# Patient Record
Sex: MALE | Race: WHITE | NOT HISPANIC OR LATINO | Employment: OTHER | URBAN - METROPOLITAN AREA
[De-identification: names, ages, dates, MRNs, and addresses within clinical notes are randomized per-mention and may not be internally consistent; named-entity substitution may affect disease eponyms.]

---

## 2018-01-03 ENCOUNTER — OFFICE VISIT CONVERTED (OUTPATIENT)
Dept: CARDIOLOGY | Facility: CLINIC | Age: 68
End: 2018-01-03
Attending: INTERNAL MEDICINE

## 2018-01-08 ENCOUNTER — CONVERSION ENCOUNTER (OUTPATIENT)
Dept: CARDIOLOGY | Facility: CLINIC | Age: 68
End: 2018-01-08
Attending: INTERNAL MEDICINE

## 2018-01-08 ENCOUNTER — CONVERSION ENCOUNTER (OUTPATIENT)
Dept: CARDIOLOGY | Facility: CLINIC | Age: 68
End: 2018-01-08

## 2018-04-11 ENCOUNTER — CONVERSION ENCOUNTER (OUTPATIENT)
Dept: CARDIOLOGY | Facility: CLINIC | Age: 68
End: 2018-04-11

## 2018-04-11 ENCOUNTER — OFFICE VISIT CONVERTED (OUTPATIENT)
Dept: CARDIOLOGY | Facility: CLINIC | Age: 68
End: 2018-04-11
Attending: INTERNAL MEDICINE

## 2018-05-08 ENCOUNTER — OFFICE VISIT CONVERTED (OUTPATIENT)
Dept: SURGERY | Facility: CLINIC | Age: 68
End: 2018-05-08
Attending: NURSE PRACTITIONER

## 2018-10-04 ENCOUNTER — OFFICE VISIT CONVERTED (OUTPATIENT)
Dept: SURGERY | Facility: CLINIC | Age: 68
End: 2018-10-04
Attending: NURSE PRACTITIONER

## 2018-10-22 ENCOUNTER — OFFICE VISIT CONVERTED (OUTPATIENT)
Dept: CARDIOLOGY | Facility: CLINIC | Age: 68
End: 2018-10-22
Attending: INTERNAL MEDICINE

## 2019-01-28 ENCOUNTER — HOSPITAL ENCOUNTER (OUTPATIENT)
Dept: LAB | Facility: HOSPITAL | Age: 69
Discharge: HOME | End: 2019-01-28
Attending: PHYSICIAN ASSISTANT

## 2019-01-28 LAB
ALBUMIN SERPL-MCNC: 4 G/DL (ref 3.5–5)
ALBUMIN/GLOB SERPL: 1.6 {RATIO} (ref 1.4–2.6)
ALP SERPL-CCNC: 91 U/L (ref 56–155)
ALT SERPL-CCNC: 16 U/L (ref 10–40)
ANION GAP SERPL CALC-SCNC: 19 MMOL/L (ref 8–19)
AST SERPL-CCNC: 15 U/L (ref 15–50)
BILIRUB SERPL-MCNC: 0.29 MG/DL (ref 0.2–1.3)
BNP SERPL-MCNC: 562 PG/ML (ref 0–900)
BUN SERPL-MCNC: 17 MG/DL (ref 5–25)
BUN/CREAT SERPL: 17 {RATIO} (ref 6–20)
CALCIUM SERPL-MCNC: 9.4 MG/DL (ref 8.7–10.4)
CHLORIDE SERPL-SCNC: 102 MMOL/L (ref 99–111)
CONV CO2: 28 MMOL/L (ref 22–32)
CONV TOTAL PROTEIN: 6.5 G/DL (ref 6.3–8.2)
CREAT UR-MCNC: 1.03 MG/DL (ref 0.7–1.2)
GFR SERPLBLD BASED ON 1.73 SQ M-ARVRAT: >60 ML/MIN/{1.73_M2}
GLOBULIN UR ELPH-MCNC: 2.5 G/DL (ref 2–3.5)
GLUCOSE SERPL-MCNC: 112 MG/DL (ref 70–99)
OSMOLALITY SERPL CALC.SUM OF ELEC: 302 MOSM/KG (ref 273–304)
POTASSIUM SERPL-SCNC: 4.3 MMOL/L (ref 3.5–5.3)
SODIUM SERPL-SCNC: 145 MMOL/L (ref 135–147)

## 2019-01-30 ENCOUNTER — HOSPITAL ENCOUNTER (OUTPATIENT)
Dept: GENERAL RADIOLOGY | Facility: HOSPITAL | Age: 69
Discharge: HOME | End: 2019-01-30
Attending: PHYSICIAN ASSISTANT

## 2019-02-07 ENCOUNTER — HOSPITAL ENCOUNTER (OUTPATIENT)
Dept: LAB | Facility: HOSPITAL | Age: 69
Discharge: HOME | End: 2019-02-07
Attending: PHYSICIAN ASSISTANT

## 2019-02-07 LAB
ANION GAP SERPL CALC-SCNC: 20 MMOL/L (ref 8–19)
BUN SERPL-MCNC: 17 MG/DL (ref 5–25)
BUN/CREAT SERPL: 14 {RATIO} (ref 6–20)
CALCIUM SERPL-MCNC: 9.8 MG/DL (ref 8.7–10.4)
CHLORIDE SERPL-SCNC: 98 MMOL/L (ref 99–111)
CONV CO2: 28 MMOL/L (ref 22–32)
CREAT UR-MCNC: 1.18 MG/DL (ref 0.7–1.2)
GFR SERPLBLD BASED ON 1.73 SQ M-ARVRAT: >60 ML/MIN/{1.73_M2}
GLUCOSE SERPL-MCNC: 120 MG/DL (ref 70–99)
OSMOLALITY SERPL CALC.SUM OF ELEC: 297 MOSM/KG (ref 273–304)
POTASSIUM SERPL-SCNC: 4 MMOL/L (ref 3.5–5.3)
SODIUM SERPL-SCNC: 142 MMOL/L (ref 135–147)

## 2019-04-17 ENCOUNTER — HOSPITAL ENCOUNTER (OUTPATIENT)
Dept: LAB | Facility: HOSPITAL | Age: 69
Discharge: HOME | End: 2019-04-17
Attending: INTERNAL MEDICINE

## 2019-04-17 LAB
ALBUMIN SERPL-MCNC: 4 G/DL (ref 3.5–5)
ALBUMIN/GLOB SERPL: 1.4 {RATIO} (ref 1.4–2.6)
ALP SERPL-CCNC: 93 U/L (ref 56–155)
ALT SERPL-CCNC: 21 U/L (ref 10–40)
ANION GAP SERPL CALC-SCNC: 21 MMOL/L (ref 8–19)
AST SERPL-CCNC: 22 U/L (ref 15–50)
BILIRUB SERPL-MCNC: 0.33 MG/DL (ref 0.2–1.3)
BUN SERPL-MCNC: 13 MG/DL (ref 5–25)
BUN/CREAT SERPL: 11 {RATIO} (ref 6–20)
CALCIUM SERPL-MCNC: 9.3 MG/DL (ref 8.7–10.4)
CHLORIDE SERPL-SCNC: 99 MMOL/L (ref 99–111)
CHOLEST SERPL-MCNC: 147 MG/DL (ref 107–200)
CHOLEST/HDLC SERPL: 4.7 {RATIO} (ref 3–6)
CONV CO2: 27 MMOL/L (ref 22–32)
CONV TOTAL PROTEIN: 6.8 G/DL (ref 6.3–8.2)
CREAT UR-MCNC: 1.18 MG/DL (ref 0.7–1.2)
EST. AVERAGE GLUCOSE BLD GHB EST-MCNC: 134 MG/DL
GFR SERPLBLD BASED ON 1.73 SQ M-ARVRAT: >60 ML/MIN/{1.73_M2}
GLOBULIN UR ELPH-MCNC: 2.8 G/DL (ref 2–3.5)
GLUCOSE SERPL-MCNC: 116 MG/DL (ref 70–99)
HBA1C MFR BLD: 6.3 % (ref 3.5–5.7)
HDLC SERPL-MCNC: 31 MG/DL (ref 40–60)
LDLC SERPL CALC-MCNC: 45 MG/DL (ref 70–100)
OSMOLALITY SERPL CALC.SUM OF ELEC: 295 MOSM/KG (ref 273–304)
POTASSIUM SERPL-SCNC: 4.5 MMOL/L (ref 3.5–5.3)
PSA SERPL-MCNC: 0.74 NG/ML (ref 0–4)
SODIUM SERPL-SCNC: 142 MMOL/L (ref 135–147)
TRIGL SERPL-MCNC: 355 MG/DL (ref 40–150)
URATE SERPL-MCNC: 4 MG/DL (ref 3.5–8.5)
VLDLC SERPL-MCNC: 71 MG/DL (ref 5–37)

## 2019-04-22 ENCOUNTER — OFFICE VISIT CONVERTED (OUTPATIENT)
Dept: CARDIOLOGY | Facility: CLINIC | Age: 69
End: 2019-04-22
Attending: INTERNAL MEDICINE

## 2019-05-20 ENCOUNTER — HOSPITAL ENCOUNTER (OUTPATIENT)
Dept: CARDIOLOGY | Facility: HOSPITAL | Age: 69
Discharge: HOME | End: 2019-05-20
Attending: INTERNAL MEDICINE

## 2019-07-18 ENCOUNTER — HOSPITAL ENCOUNTER (OUTPATIENT)
Dept: SLEEP MEDICINE | Facility: HOSPITAL | Age: 69
Discharge: HOME | End: 2019-07-18
Attending: INTERNAL MEDICINE

## 2019-08-02 ENCOUNTER — HOSPITAL ENCOUNTER (OUTPATIENT)
Dept: GENERAL RADIOLOGY | Facility: HOSPITAL | Age: 69
Discharge: HOME | End: 2019-08-02
Attending: INTERNAL MEDICINE

## 2019-08-02 ENCOUNTER — HOSPITAL ENCOUNTER (OUTPATIENT)
Dept: LAB | Facility: HOSPITAL | Age: 69
Discharge: HOME | End: 2019-08-02
Attending: INTERNAL MEDICINE

## 2019-08-02 LAB
25(OH)D3 SERPL-MCNC: 32.6 NG/ML (ref 30–100)
ALBUMIN SERPL-MCNC: 4.2 G/DL (ref 3.5–5)
ALBUMIN/GLOB SERPL: 1.6 {RATIO} (ref 1.4–2.6)
ALP SERPL-CCNC: 53 U/L (ref 56–155)
ALT SERPL-CCNC: 33 U/L (ref 10–40)
ANION GAP SERPL CALC-SCNC: 19 MMOL/L (ref 8–19)
AST SERPL-CCNC: 45 U/L (ref 15–50)
BILIRUB SERPL-MCNC: 0.41 MG/DL (ref 0.2–1.3)
BUN SERPL-MCNC: 20 MG/DL (ref 5–25)
BUN/CREAT SERPL: 14 {RATIO} (ref 6–20)
CALCIUM SERPL-MCNC: 9.3 MG/DL (ref 8.7–10.4)
CHLORIDE SERPL-SCNC: 98 MMOL/L (ref 99–111)
CHOLEST SERPL-MCNC: 139 MG/DL (ref 107–200)
CHOLEST/HDLC SERPL: 5.3 {RATIO} (ref 3–6)
CONV CO2: 29 MMOL/L (ref 22–32)
CONV TOTAL PROTEIN: 6.8 G/DL (ref 6.3–8.2)
CREAT UR-MCNC: 1.35 MG/DL (ref 0.7–1.2)
CREAT UR-MCNC: 1.39 MG/DL (ref 0.7–1.2)
GFR SERPLBLD BASED ON 1.73 SQ M-ARVRAT: 51 ML/MIN/{1.73_M2}
GLOBULIN UR ELPH-MCNC: 2.6 G/DL (ref 2–3.5)
GLUCOSE SERPL-MCNC: 113 MG/DL (ref 70–99)
HDLC SERPL-MCNC: 26 MG/DL (ref 40–60)
LDLC SERPL CALC-MCNC: 61 MG/DL (ref 70–100)
OSMOLALITY SERPL CALC.SUM OF ELEC: 297 MOSM/KG (ref 273–304)
POTASSIUM SERPL-SCNC: 3.8 MMOL/L (ref 3.5–5.3)
SODIUM SERPL-SCNC: 142 MMOL/L (ref 135–147)
TRIGL SERPL-MCNC: 258 MG/DL (ref 40–150)
URATE SERPL-MCNC: 3.6 MG/DL (ref 3.5–8.5)
VLDLC SERPL-MCNC: 52 MG/DL (ref 5–37)

## 2019-10-29 ENCOUNTER — HOSPITAL ENCOUNTER (OUTPATIENT)
Dept: LAB | Facility: HOSPITAL | Age: 69
Discharge: HOME | End: 2019-10-29
Attending: INTERNAL MEDICINE

## 2019-10-29 LAB
25(OH)D3 SERPL-MCNC: 28.5 NG/ML (ref 30–100)
ALBUMIN SERPL-MCNC: 4.3 G/DL (ref 3.5–5)
ALBUMIN/GLOB SERPL: 1.7 {RATIO} (ref 1.4–2.6)
ALP SERPL-CCNC: 48 U/L (ref 56–155)
ALT SERPL-CCNC: 19 U/L (ref 10–40)
ANION GAP SERPL CALC-SCNC: 22 MMOL/L (ref 8–19)
AST SERPL-CCNC: 31 U/L (ref 15–50)
BASOPHILS # BLD AUTO: 0.08 10*3/UL (ref 0–0.2)
BASOPHILS NFR BLD AUTO: 1 % (ref 0–3)
BILIRUB SERPL-MCNC: 0.42 MG/DL (ref 0.2–1.3)
BUN SERPL-MCNC: 18 MG/DL (ref 5–25)
BUN/CREAT SERPL: 11 {RATIO} (ref 6–20)
CALCIUM SERPL-MCNC: 9.8 MG/DL (ref 8.7–10.4)
CHLORIDE SERPL-SCNC: 101 MMOL/L (ref 99–111)
CHOLEST SERPL-MCNC: 126 MG/DL (ref 107–200)
CHOLEST/HDLC SERPL: 4.8 {RATIO} (ref 3–6)
CONV ABS IMM GRAN: 0.04 10*3/UL (ref 0–0.2)
CONV CO2: 27 MMOL/L (ref 22–32)
CONV IMMATURE GRAN: 0.5 % (ref 0–1.8)
CONV TOTAL PROTEIN: 6.8 G/DL (ref 6.3–8.2)
CREAT UR-MCNC: 1.64 MG/DL (ref 0.7–1.2)
CREAT UR-MCNC: 1.66 MG/DL (ref 0.7–1.2)
DEPRECATED RDW RBC AUTO: 53.1 FL (ref 35.1–43.9)
EOSINOPHIL # BLD AUTO: 0.57 10*3/UL (ref 0–0.7)
EOSINOPHIL # BLD AUTO: 6.8 % (ref 0–7)
ERYTHROCYTE [DISTWIDTH] IN BLOOD BY AUTOMATED COUNT: 15.9 % (ref 11.6–14.4)
GFR SERPLBLD BASED ON 1.73 SQ M-ARVRAT: 41 ML/MIN/{1.73_M2}
GLOBULIN UR ELPH-MCNC: 2.5 G/DL (ref 2–3.5)
GLUCOSE SERPL-MCNC: 93 MG/DL (ref 70–99)
HCT VFR BLD AUTO: 48.4 % (ref 42–52)
HDLC SERPL-MCNC: 26 MG/DL (ref 40–60)
HGB BLD-MCNC: 14.2 G/DL (ref 14–18)
LDLC SERPL CALC-MCNC: 68 MG/DL (ref 70–100)
LYMPHOCYTES # BLD AUTO: 1.9 10*3/UL (ref 1–5)
LYMPHOCYTES NFR BLD AUTO: 22.8 % (ref 20–45)
MCH RBC QN AUTO: 26.7 PG (ref 27–31)
MCHC RBC AUTO-ENTMCNC: 29.3 G/DL (ref 33–37)
MCV RBC AUTO: 91.1 FL (ref 80–96)
MONOCYTES # BLD AUTO: 0.85 10*3/UL (ref 0.2–1.2)
MONOCYTES NFR BLD AUTO: 10.2 % (ref 3–10)
NEUTROPHILS # BLD AUTO: 4.89 10*3/UL (ref 2–8)
NEUTROPHILS NFR BLD AUTO: 58.7 % (ref 30–85)
NRBC CBCN: 0 % (ref 0–0.7)
OSMOLALITY SERPL CALC.SUM OF ELEC: 302 MOSM/KG (ref 273–304)
PLATELET # BLD AUTO: 244 10*3/UL (ref 130–400)
PMV BLD AUTO: 12.1 FL (ref 9.4–12.4)
POTASSIUM SERPL-SCNC: 4.8 MMOL/L (ref 3.5–5.3)
RBC # BLD AUTO: 5.31 10*6/UL (ref 4.7–6.1)
SODIUM SERPL-SCNC: 145 MMOL/L (ref 135–147)
T4 FREE SERPL-MCNC: 1.4 NG/DL (ref 0.9–1.8)
TRIGL SERPL-MCNC: 158 MG/DL (ref 40–150)
TSH SERPL-ACNC: 8.18 M[IU]/L (ref 0.27–4.2)
URATE SERPL-MCNC: 4.3 MG/DL (ref 3.5–8.5)
VLDLC SERPL-MCNC: 32 MG/DL (ref 5–37)
WBC # BLD AUTO: 8.33 10*3/UL (ref 4.8–10.8)

## 2019-11-06 ENCOUNTER — OFFICE VISIT CONVERTED (OUTPATIENT)
Dept: CARDIOLOGY | Facility: CLINIC | Age: 69
End: 2019-11-06
Attending: NURSE PRACTITIONER

## 2019-12-19 ENCOUNTER — HOSPITAL ENCOUNTER (OUTPATIENT)
Dept: LAB | Facility: HOSPITAL | Age: 69
Discharge: HOME | End: 2019-12-19
Attending: INTERNAL MEDICINE

## 2019-12-19 LAB
T4 FREE SERPL-MCNC: 2.7 NG/DL (ref 0.9–1.8)
TSH SERPL-ACNC: 0.04 M[IU]/L (ref 0.27–4.2)

## 2020-02-11 ENCOUNTER — HOSPITAL ENCOUNTER (OUTPATIENT)
Dept: LAB | Facility: HOSPITAL | Age: 70
Discharge: HOME | End: 2020-02-11
Attending: INTERNAL MEDICINE

## 2020-02-11 LAB
25(OH)D3 SERPL-MCNC: 29 NG/ML (ref 30–100)
CREAT UR-MCNC: 1.43 MG/DL (ref 0.7–1.2)
URATE SERPL-MCNC: 4.2 MG/DL (ref 3.5–8.5)

## 2020-03-12 ENCOUNTER — HOSPITAL ENCOUNTER (OUTPATIENT)
Dept: SLEEP MEDICINE | Facility: HOSPITAL | Age: 70
Discharge: HOME | End: 2020-03-12
Attending: INTERNAL MEDICINE

## 2020-04-08 ENCOUNTER — OFFICE VISIT CONVERTED (OUTPATIENT)
Dept: OTHER | Facility: HOSPITAL | Age: 70
End: 2020-04-08
Attending: PHYSICIAN ASSISTANT

## 2020-04-27 ENCOUNTER — HOSPITAL ENCOUNTER (OUTPATIENT)
Dept: LAB | Facility: HOSPITAL | Age: 70
Discharge: HOME | End: 2020-04-27
Attending: INTERNAL MEDICINE

## 2020-04-27 LAB
ALBUMIN SERPL-MCNC: 3.6 G/DL (ref 3.5–5)
ALBUMIN/GLOB SERPL: 1.2 {RATIO} (ref 1.4–2.6)
ALP SERPL-CCNC: 94 U/L (ref 56–155)
ALT SERPL-CCNC: 52 U/L (ref 10–40)
ANION GAP SERPL CALC-SCNC: 20 MMOL/L (ref 8–19)
AST SERPL-CCNC: 27 U/L (ref 15–50)
BASOPHILS # BLD AUTO: 0.05 10*3/UL (ref 0–0.2)
BASOPHILS NFR BLD AUTO: 0.7 % (ref 0–3)
BILIRUB SERPL-MCNC: 0.48 MG/DL (ref 0.2–1.3)
BUN SERPL-MCNC: 15 MG/DL (ref 5–25)
BUN/CREAT SERPL: 11 {RATIO} (ref 6–20)
CALCIUM SERPL-MCNC: 10 MG/DL (ref 8.7–10.4)
CEA SERPL-MCNC: 2.5 NG/ML (ref 0–5)
CHLORIDE SERPL-SCNC: 102 MMOL/L (ref 99–111)
CHOLEST SERPL-MCNC: 128 MG/DL (ref 107–200)
CHOLEST/HDLC SERPL: 5.1 {RATIO} (ref 3–6)
CONV ABS IMM GRAN: 0.02 10*3/UL (ref 0–0.2)
CONV CO2: 25 MMOL/L (ref 22–32)
CONV CREATININE URINE, RANDOM: 89.6 MG/DL (ref 10–300)
CONV IMMATURE GRAN: 0.3 % (ref 0–1.8)
CONV MICROALBUM.,U,RANDOM: 30.6 MG/L (ref 0–20)
CONV TOTAL PROTEIN: 6.6 G/DL (ref 6.3–8.2)
CREAT UR-MCNC: 1.33 MG/DL (ref 0.7–1.2)
DEPRECATED RDW RBC AUTO: 55.8 FL (ref 35.1–43.9)
EOSINOPHIL # BLD AUTO: 0.26 10*3/UL (ref 0–0.7)
EOSINOPHIL # BLD AUTO: 3.6 % (ref 0–7)
ERYTHROCYTE [DISTWIDTH] IN BLOOD BY AUTOMATED COUNT: 17.6 % (ref 11.6–14.4)
EST. AVERAGE GLUCOSE BLD GHB EST-MCNC: 169 MG/DL
GFR SERPLBLD BASED ON 1.73 SQ M-ARVRAT: 53 ML/MIN/{1.73_M2}
GLOBULIN UR ELPH-MCNC: 3 G/DL (ref 2–3.5)
GLUCOSE SERPL-MCNC: 98 MG/DL (ref 70–99)
HBA1C MFR BLD: 7.5 % (ref 3.5–5.7)
HCT VFR BLD AUTO: 43.1 % (ref 42–52)
HDLC SERPL-MCNC: 25 MG/DL (ref 40–60)
HGB BLD-MCNC: 12.8 G/DL (ref 14–18)
LDLC SERPL CALC-MCNC: 65 MG/DL (ref 70–100)
LYMPHOCYTES # BLD AUTO: 1.7 10*3/UL (ref 1–5)
LYMPHOCYTES NFR BLD AUTO: 23.4 % (ref 20–45)
MCH RBC QN AUTO: 26.3 PG (ref 27–31)
MCHC RBC AUTO-ENTMCNC: 29.7 G/DL (ref 33–37)
MCV RBC AUTO: 88.7 FL (ref 80–96)
MICROALBUMIN/CREAT UR: 34.2 MG/G{CRE} (ref 0–25)
MONOCYTES # BLD AUTO: 0.8 10*3/UL (ref 0.2–1.2)
MONOCYTES NFR BLD AUTO: 11 % (ref 3–10)
NEUTROPHILS # BLD AUTO: 4.44 10*3/UL (ref 2–8)
NEUTROPHILS NFR BLD AUTO: 61 % (ref 30–85)
NRBC CBCN: 0 % (ref 0–0.7)
OSMOLALITY SERPL CALC.SUM OF ELEC: 297 MOSM/KG (ref 273–304)
PLATELET # BLD AUTO: 171 10*3/UL (ref 130–400)
PMV BLD AUTO: 12.7 FL (ref 9.4–12.4)
POTASSIUM SERPL-SCNC: 4.2 MMOL/L (ref 3.5–5.3)
PSA SERPL-MCNC: 0.79 NG/ML (ref 0–4)
RBC # BLD AUTO: 4.86 10*6/UL (ref 4.7–6.1)
SODIUM SERPL-SCNC: 143 MMOL/L (ref 135–147)
T4 FREE SERPL-MCNC: 1.1 NG/DL (ref 0.9–1.8)
TESTOST SERPL-MCNC: 369 NG/DL (ref 193–740)
TRIGL SERPL-MCNC: 188 MG/DL (ref 40–150)
TSH SERPL-ACNC: 13.1 M[IU]/L (ref 0.27–4.2)
VLDLC SERPL-MCNC: 38 MG/DL (ref 5–37)
WBC # BLD AUTO: 7.27 10*3/UL (ref 4.8–10.8)

## 2020-05-04 ENCOUNTER — HOSPITAL ENCOUNTER (OUTPATIENT)
Dept: OTHER | Facility: HOSPITAL | Age: 70
Discharge: HOME | End: 2020-05-04

## 2020-05-05 ENCOUNTER — HOSPITAL ENCOUNTER (OUTPATIENT)
Dept: GENERAL RADIOLOGY | Facility: HOSPITAL | Age: 70
Discharge: HOME | End: 2020-05-05
Attending: INTERNAL MEDICINE

## 2020-05-05 LAB — SARS-COV-2 RNA SPEC QL NAA+PROBE: NOT DETECTED

## 2020-05-06 ENCOUNTER — TELEMEDICINE CONVERTED (OUTPATIENT)
Dept: CARDIOLOGY | Facility: CLINIC | Age: 70
End: 2020-05-06
Attending: NURSE PRACTITIONER

## 2020-07-15 ENCOUNTER — HOSPITAL ENCOUNTER (OUTPATIENT)
Dept: LAB | Facility: HOSPITAL | Age: 70
Discharge: HOME | End: 2020-07-15
Attending: INTERNAL MEDICINE

## 2020-07-15 LAB
ALBUMIN SERPL-MCNC: 4.2 G/DL (ref 3.5–5)
ALBUMIN/GLOB SERPL: 1.9 {RATIO} (ref 1.4–2.6)
ALP SERPL-CCNC: 38 U/L (ref 56–155)
ALT SERPL-CCNC: 14 U/L (ref 10–40)
ANION GAP SERPL CALC-SCNC: 17 MMOL/L (ref 8–19)
AST SERPL-CCNC: 23 U/L (ref 15–50)
BASOPHILS # BLD AUTO: 0.07 10*3/UL (ref 0–0.2)
BASOPHILS NFR BLD AUTO: 0.9 % (ref 0–3)
BILIRUB SERPL-MCNC: 0.46 MG/DL (ref 0.2–1.3)
BNP SERPL-MCNC: 1155 PG/ML (ref 0–900)
BUN SERPL-MCNC: 19 MG/DL (ref 5–25)
BUN/CREAT SERPL: 11 {RATIO} (ref 6–20)
CALCIUM SERPL-MCNC: 9.4 MG/DL (ref 8.7–10.4)
CHLORIDE SERPL-SCNC: 101 MMOL/L (ref 99–111)
CONV ABS IMM GRAN: 0.03 10*3/UL (ref 0–0.2)
CONV CO2: 28 MMOL/L (ref 22–32)
CONV IMMATURE GRAN: 0.4 % (ref 0–1.8)
CONV TOTAL PROTEIN: 6.4 G/DL (ref 6.3–8.2)
CREAT UR-MCNC: 1.69 MG/DL (ref 0.7–1.2)
DEPRECATED RDW RBC AUTO: 56.1 FL (ref 35.1–43.9)
EOSINOPHIL # BLD AUTO: 0.46 10*3/UL (ref 0–0.7)
EOSINOPHIL # BLD AUTO: 5.6 % (ref 0–7)
ERYTHROCYTE [DISTWIDTH] IN BLOOD BY AUTOMATED COUNT: 16.7 % (ref 11.6–14.4)
GFR SERPLBLD BASED ON 1.73 SQ M-ARVRAT: 40 ML/MIN/{1.73_M2}
GLOBULIN UR ELPH-MCNC: 2.2 G/DL (ref 2–3.5)
GLUCOSE SERPL-MCNC: 65 MG/DL (ref 70–99)
HCT VFR BLD AUTO: 45 % (ref 42–52)
HGB BLD-MCNC: 13.5 G/DL (ref 14–18)
LYMPHOCYTES # BLD AUTO: 2.19 10*3/UL (ref 1–5)
LYMPHOCYTES NFR BLD AUTO: 26.8 % (ref 20–45)
MCH RBC QN AUTO: 27.3 PG (ref 27–31)
MCHC RBC AUTO-ENTMCNC: 30 G/DL (ref 33–37)
MCV RBC AUTO: 91.1 FL (ref 80–96)
MONOCYTES # BLD AUTO: 0.63 10*3/UL (ref 0.2–1.2)
MONOCYTES NFR BLD AUTO: 7.7 % (ref 3–10)
NEUTROPHILS # BLD AUTO: 4.79 10*3/UL (ref 2–8)
NEUTROPHILS NFR BLD AUTO: 58.6 % (ref 30–85)
NRBC CBCN: 0 % (ref 0–0.7)
OSMOLALITY SERPL CALC.SUM OF ELEC: 294 MOSM/KG (ref 273–304)
PLATELET # BLD AUTO: 216 10*3/UL (ref 130–400)
PMV BLD AUTO: 12.2 FL (ref 9.4–12.4)
POTASSIUM SERPL-SCNC: 4.2 MMOL/L (ref 3.5–5.3)
RBC # BLD AUTO: 4.94 10*6/UL (ref 4.7–6.1)
SODIUM SERPL-SCNC: 142 MMOL/L (ref 135–147)
T4 FREE SERPL-MCNC: 1.6 NG/DL (ref 0.9–1.8)
TSH SERPL-ACNC: 5.24 M[IU]/L (ref 0.27–4.2)
WBC # BLD AUTO: 8.17 10*3/UL (ref 4.8–10.8)

## 2020-08-07 ENCOUNTER — HOSPITAL ENCOUNTER (OUTPATIENT)
Dept: LAB | Facility: HOSPITAL | Age: 70
Discharge: HOME | End: 2020-08-07
Attending: INTERNAL MEDICINE

## 2020-08-07 ENCOUNTER — HOSPITAL ENCOUNTER (OUTPATIENT)
Dept: GENERAL RADIOLOGY | Facility: HOSPITAL | Age: 70
Discharge: HOME | End: 2020-08-07
Attending: INTERNAL MEDICINE

## 2020-08-07 LAB
ALBUMIN SERPL-MCNC: 3.6 G/DL (ref 3.5–5)
ALBUMIN/GLOB SERPL: 1.7 {RATIO} (ref 1.4–2.6)
ALP SERPL-CCNC: 64 U/L (ref 56–155)
ALT SERPL-CCNC: 19 U/L (ref 10–40)
ANION GAP SERPL CALC-SCNC: 22 MMOL/L (ref 8–19)
AST SERPL-CCNC: 20 U/L (ref 15–50)
BASOPHILS # BLD AUTO: 0.07 10*3/UL (ref 0–0.2)
BASOPHILS NFR BLD AUTO: 0.5 % (ref 0–3)
BILIRUB SERPL-MCNC: 0.51 MG/DL (ref 0.2–1.3)
BUN SERPL-MCNC: 14 MG/DL (ref 5–25)
BUN/CREAT SERPL: 8 {RATIO} (ref 6–20)
CALCIUM SERPL-MCNC: 10.2 MG/DL (ref 8.7–10.4)
CHLORIDE SERPL-SCNC: 100 MMOL/L (ref 99–111)
CONV ABS IMM GRAN: 0.17 10*3/UL (ref 0–0.2)
CONV CO2: 25 MMOL/L (ref 22–32)
CONV IMMATURE GRAN: 1.2 % (ref 0–1.8)
CONV TOTAL PROTEIN: 5.7 G/DL (ref 6.3–8.2)
CREAT UR-MCNC: 1.83 MG/DL (ref 0.7–1.2)
DEPRECATED RDW RBC AUTO: 57 FL (ref 35.1–43.9)
EOSINOPHIL # BLD AUTO: 0.27 10*3/UL (ref 0–0.7)
EOSINOPHIL # BLD AUTO: 1.9 % (ref 0–7)
ERYTHROCYTE [DISTWIDTH] IN BLOOD BY AUTOMATED COUNT: 17.3 % (ref 11.6–14.4)
GFR SERPLBLD BASED ON 1.73 SQ M-ARVRAT: 36 ML/MIN/{1.73_M2}
GLOBULIN UR ELPH-MCNC: 2.1 G/DL (ref 2–3.5)
GLUCOSE SERPL-MCNC: 80 MG/DL (ref 70–99)
HCT VFR BLD AUTO: 41.5 % (ref 42–52)
HGB BLD-MCNC: 12.3 G/DL (ref 14–18)
LYMPHOCYTES # BLD AUTO: 2.68 10*3/UL (ref 1–5)
LYMPHOCYTES NFR BLD AUTO: 19.3 % (ref 20–45)
MCH RBC QN AUTO: 26.9 PG (ref 27–31)
MCHC RBC AUTO-ENTMCNC: 29.6 G/DL (ref 33–37)
MCV RBC AUTO: 90.6 FL (ref 80–96)
MONOCYTES # BLD AUTO: 1.17 10*3/UL (ref 0.2–1.2)
MONOCYTES NFR BLD AUTO: 8.4 % (ref 3–10)
NEUTROPHILS # BLD AUTO: 9.5 10*3/UL (ref 2–8)
NEUTROPHILS NFR BLD AUTO: 68.7 % (ref 30–85)
NRBC CBCN: 0 % (ref 0–0.7)
OSMOLALITY SERPL CALC.SUM OF ELEC: 293 MOSM/KG (ref 273–304)
PLATELET # BLD AUTO: 243 10*3/UL (ref 130–400)
PMV BLD AUTO: 11.8 FL (ref 9.4–12.4)
POTASSIUM SERPL-SCNC: 4.6 MMOL/L (ref 3.5–5.3)
RBC # BLD AUTO: 4.58 10*6/UL (ref 4.7–6.1)
SODIUM SERPL-SCNC: 142 MMOL/L (ref 135–147)
WBC # BLD AUTO: 13.86 10*3/UL (ref 4.8–10.8)

## 2020-08-12 ENCOUNTER — OFFICE VISIT CONVERTED (OUTPATIENT)
Dept: PULMONOLOGY | Facility: CLINIC | Age: 70
End: 2020-08-12
Attending: NURSE PRACTITIONER

## 2020-08-19 ENCOUNTER — HOSPITAL ENCOUNTER (OUTPATIENT)
Dept: CARDIOLOGY | Facility: HOSPITAL | Age: 70
Discharge: HOME | End: 2020-08-19
Attending: INTERNAL MEDICINE

## 2020-09-02 ENCOUNTER — HOSPITAL ENCOUNTER (OUTPATIENT)
Dept: LAB | Facility: HOSPITAL | Age: 70
Discharge: HOME | End: 2020-09-02
Attending: INTERNAL MEDICINE

## 2020-09-02 LAB
ANION GAP SERPL CALC-SCNC: 21 MMOL/L (ref 8–19)
BUN SERPL-MCNC: 23 MG/DL (ref 5–25)
BUN/CREAT SERPL: 12 {RATIO} (ref 6–20)
CALCIUM SERPL-MCNC: 10.2 MG/DL (ref 8.7–10.4)
CHLORIDE SERPL-SCNC: 102 MMOL/L (ref 99–111)
CONV CO2: 25 MMOL/L (ref 22–32)
CREAT UR-MCNC: 1.92 MG/DL (ref 0.7–1.2)
GFR SERPLBLD BASED ON 1.73 SQ M-ARVRAT: 34 ML/MIN/{1.73_M2}
GLUCOSE SERPL-MCNC: 93 MG/DL (ref 70–99)
OSMOLALITY SERPL CALC.SUM OF ELEC: 299 MOSM/KG (ref 273–304)
POTASSIUM SERPL-SCNC: 5 MMOL/L (ref 3.5–5.3)
SODIUM SERPL-SCNC: 143 MMOL/L (ref 135–147)

## 2020-09-08 ENCOUNTER — APPOINTMENT (OUTPATIENT)
Dept: PREADMISSION TESTING | Facility: HOSPITAL | Age: 70
End: 2020-09-08

## 2020-09-11 ENCOUNTER — APPOINTMENT (OUTPATIENT)
Dept: PREADMISSION TESTING | Facility: HOSPITAL | Age: 70
End: 2020-09-11

## 2020-09-11 VITALS
HEART RATE: 63 BPM | BODY MASS INDEX: 34.51 KG/M2 | OXYGEN SATURATION: 98 % | TEMPERATURE: 98.2 F | HEIGHT: 69 IN | WEIGHT: 233 LBS | SYSTOLIC BLOOD PRESSURE: 126 MMHG | RESPIRATION RATE: 20 BRPM | DIASTOLIC BLOOD PRESSURE: 73 MMHG

## 2020-09-11 LAB
ANION GAP SERPL CALCULATED.3IONS-SCNC: 9.7 MMOL/L (ref 5–15)
BUN SERPL-MCNC: 11 MG/DL (ref 8–23)
BUN/CREAT SERPL: 9.2 (ref 7–25)
CALCIUM SPEC-SCNC: 9 MG/DL (ref 8.6–10.5)
CHLORIDE SERPL-SCNC: 101 MMOL/L (ref 98–107)
CO2 SERPL-SCNC: 27.3 MMOL/L (ref 22–29)
CREAT SERPL-MCNC: 1.2 MG/DL (ref 0.76–1.27)
DEPRECATED RDW RBC AUTO: 48.3 FL (ref 37–54)
ERYTHROCYTE [DISTWIDTH] IN BLOOD BY AUTOMATED COUNT: 15.6 % (ref 12.3–15.4)
GFR SERPL CREATININE-BSD FRML MDRD: 60 ML/MIN/1.73
GLUCOSE SERPL-MCNC: 79 MG/DL (ref 65–99)
HCT VFR BLD AUTO: 38.8 % (ref 37.5–51)
HGB BLD-MCNC: 12.1 G/DL (ref 13–17.7)
MCH RBC QN AUTO: 26.7 PG (ref 26.6–33)
MCHC RBC AUTO-ENTMCNC: 31.2 G/DL (ref 31.5–35.7)
MCV RBC AUTO: 85.7 FL (ref 79–97)
PLATELET # BLD AUTO: 171 10*3/MM3 (ref 140–450)
PMV BLD AUTO: 12.7 FL (ref 6–12)
POTASSIUM SERPL-SCNC: 4.5 MMOL/L (ref 3.5–5.2)
RBC # BLD AUTO: 4.53 10*6/MM3 (ref 4.14–5.8)
SODIUM SERPL-SCNC: 138 MMOL/L (ref 136–145)
WBC # BLD AUTO: 9.91 10*3/MM3 (ref 3.4–10.8)

## 2020-09-11 PROCEDURE — 36415 COLL VENOUS BLD VENIPUNCTURE: CPT

## 2020-09-11 PROCEDURE — C9803 HOPD COVID-19 SPEC COLLECT: HCPCS | Performed by: NURSE PRACTITIONER

## 2020-09-11 PROCEDURE — 93005 ELECTROCARDIOGRAM TRACING: CPT

## 2020-09-11 PROCEDURE — 80048 BASIC METABOLIC PNL TOTAL CA: CPT | Performed by: SURGERY

## 2020-09-11 PROCEDURE — 85027 COMPLETE CBC AUTOMATED: CPT | Performed by: SURGERY

## 2020-09-11 PROCEDURE — 93010 ELECTROCARDIOGRAM REPORT: CPT | Performed by: INTERNAL MEDICINE

## 2020-09-11 PROCEDURE — U0004 COV-19 TEST NON-CDC HGH THRU: HCPCS | Performed by: NURSE PRACTITIONER

## 2020-09-11 RX ORDER — IPRATROPIUM BROMIDE AND ALBUTEROL SULFATE 2.5; .5 MG/3ML; MG/3ML
SOLUTION RESPIRATORY (INHALATION) AS NEEDED
COMMUNITY
Start: 2020-07-29

## 2020-09-11 RX ORDER — COLCHICINE 0.6 MG/1
0.6 TABLET ORAL AS NEEDED
COMMUNITY
Start: 2020-07-02 | End: 2021-12-14 | Stop reason: SDUPTHER

## 2020-09-11 RX ORDER — LEVOTHYROXINE SODIUM 0.15 MG/1
TABLET ORAL DAILY
COMMUNITY
Start: 2020-07-11 | End: 2021-12-13

## 2020-09-11 RX ORDER — FENOFIBRATE 145 MG/1
145 TABLET, COATED ORAL DAILY
COMMUNITY
Start: 2020-08-08 | End: 2021-07-19

## 2020-09-11 RX ORDER — MORPHINE SULFATE 30 MG/1
30 TABLET, FILM COATED, EXTENDED RELEASE ORAL 2 TIMES DAILY
COMMUNITY
Start: 2020-08-29

## 2020-09-11 RX ORDER — PANTOPRAZOLE SODIUM 40 MG/1
40 TABLET, DELAYED RELEASE ORAL DAILY
COMMUNITY
Start: 2020-06-20 | End: 2021-12-02

## 2020-09-11 RX ORDER — FUROSEMIDE 20 MG/1
40 TABLET ORAL DAILY
COMMUNITY
End: 2021-08-13 | Stop reason: ALTCHOICE

## 2020-09-11 RX ORDER — BUDESONIDE 0.5 MG/2ML
0.5 INHALANT ORAL AS NEEDED
COMMUNITY
Start: 2020-07-29 | End: 2022-10-13

## 2020-09-11 RX ORDER — ASCORBIC ACID 500 MG
500 TABLET ORAL DAILY
COMMUNITY
Start: 2020-06-20 | End: 2021-12-13

## 2020-09-11 RX ORDER — POTASSIUM CHLORIDE 750 MG/1
10 CAPSULE, EXTENDED RELEASE ORAL DAILY
COMMUNITY
Start: 2020-07-29 | End: 2021-08-13

## 2020-09-11 RX ORDER — FERROUS SULFATE 325(65) MG
TABLET ORAL
COMMUNITY
Start: 2020-06-20 | End: 2021-12-02

## 2020-09-11 RX ORDER — METOPROLOL SUCCINATE 50 MG/1
50 TABLET, EXTENDED RELEASE ORAL 2 TIMES DAILY
COMMUNITY
Start: 2020-06-15 | End: 2021-07-29

## 2020-09-11 RX ORDER — ATORVASTATIN CALCIUM 20 MG/1
20 TABLET, FILM COATED ORAL DAILY
COMMUNITY
Start: 2020-07-16 | End: 2021-11-01

## 2020-09-11 RX ORDER — BUPROPION HYDROCHLORIDE 150 MG/1
150 TABLET ORAL
COMMUNITY
Start: 2020-07-20 | End: 2022-06-27

## 2020-09-11 RX ORDER — AMLODIPINE BESYLATE 5 MG/1
2.5 TABLET ORAL DAILY
COMMUNITY
Start: 2020-07-11 | End: 2021-08-02 | Stop reason: SDUPTHER

## 2020-09-11 RX ORDER — GABAPENTIN 400 MG/1
600 CAPSULE ORAL 2 TIMES DAILY
COMMUNITY
End: 2021-08-13 | Stop reason: ALTCHOICE

## 2020-09-11 RX ORDER — CLOPIDOGREL BISULFATE 75 MG/1
75 TABLET ORAL DAILY
COMMUNITY
Start: 2020-07-16 | End: 2021-11-22

## 2020-09-11 RX ORDER — ASPIRIN 81 MG/1
81 TABLET ORAL DAILY
COMMUNITY
End: 2022-06-15

## 2020-09-11 NOTE — DISCHARGE INSTRUCTIONS
Take the following medications the morning of surgery:    AMLODIPINE, METOPROLOL, MORPHINE, LEVOTHYROXINE, GABAPENTIN, MININEBS      ARRIVE TO MAIN SURGERY AT 5:30 AM ON 9/14/2020    If you are on prescription narcotic pain medication to control your pain you may also take that medication the morning of surgery.    General Instructions:  • Do not eat solid food after midnight the night before surgery.  • You may drink clear liquids day of surgery but must stop at least one hour before your hospital arrival time.  • It is beneficial for you to have a clear drink that contains carbohydrates the day of surgery.  We suggest a 12 to 20 ounce bottle of Gatorade or Powerade for non-diabetic patients or a 12 to 20 ounce bottle of G2 or Powerade Zero for diabetic patients. (Pediatric patients, are not advised to drink a 12 to 20 ounce carbohydrate drink)    Clear liquids are liquids you can see through.  Nothing red in color.     Plain water                               Sports drinks  Sodas                                   Gelatin (Jell-O)  Fruit juices without pulp such as white grape juice and apple juice  Popsicles that contain no fruit or yogurt  Tea or coffee (no cream or milk added)  Gatorade / Powerade  G2 / Powerade Zero    • Infants may have breast milk up to four hours before surgery.  • Infants drinking formula may drink formula up to six hours before surgery.   • Patients who avoid smoking, chewing tobacco and alcohol for 4 weeks prior to surgery have a reduced risk of post-operative complications.  Quit smoking as many days before surgery as you can.  • Do not smoke, use chewing tobacco or drink alcohol the day of surgery.   • If applicable bring your C-PAP/ BI-PAP machine.  • Bring any papers given to you in the doctor’s office.  • Wear clean comfortable clothes.  • Do not wear contact lenses, false eyelashes or make-up.  Bring a case for your glasses.   • Bring crutches or walker if applicable.  • Remove all  piercings.  Leave jewelry and any other valuables at home.  • Hair extensions with metal clips must be removed prior to surgery.  • The Pre-Admission Testing nurse will instruct you to bring medications if unable to obtain an accurate list in Pre-Admission Testing.            Preventing a Surgical Site Infection:  • For 2 to 3 days before surgery, avoid shaving with a razor because the razor can irritate skin and make it easier to develop an infection.    • Any areas of open skin can increase the risk of a post-operative wound infection by allowing bacteria to enter and travel throughout the body.  Notify your surgeon if you have any skin wounds / rashes even if it is not near the expected surgical site.  The area will need assessed to determine if surgery should be delayed until it is healed.  • The night prior to surgery shower using a fresh bar of anti-bacterial soap (such as Dial) and clean washcloth.  Sleep in a clean bed with clean clothing.  Do not allow pets to sleep with you.  • Shower on the morning of surgery using a fresh bar of anti-bacterial soap (such as Dial) and clean washcloth.  Dry with a clean towel and dress in clean clothing.  • Ask your surgeon if you will be receiving antibiotics prior to surgery.  • Make sure you, your family, and all healthcare providers clean their hands with soap and water or an alcohol based hand  before caring for you or your wound.    Day of surgery:  Your arrival time is approximately two hours before your scheduled surgery time.  Upon arrival, a Pre-op nurse and Anesthesiologist will review your health history, obtain vital signs, and answer questions you may have.  The only belongings needed at this time will be a list of your home medications and if applicable your C-PAP/BI-PAP machine.  If you are staying overnight your family can leave the rest of your belongings in the car and bring them to your room later.  A Pre-op nurse will start an IV and you may  receive medication in preparation for surgery, including something to help you relax.  Your family will be able to see you in the Pre-op area.  Two visitors at a time will be allowed in the Pre-op room.  While you are in surgery your family should notify the waiting room  if they leave the waiting room area and provide a contact phone number.    Please be aware that surgery does come with discomfort.  We want to make every effort to control your discomfort so please discuss any uncontrolled symptoms with your nurse.   Your doctor will most likely have prescribed pain medications.      If you are going home after surgery you will receive individualized written care instructions before being discharged.  A responsible adult must drive you to and from the hospital on the day of your surgery and stay with you for 24 hours.    If you are staying overnight following surgery, you will be transported to your hospital room following the recovery period.  [facility] has all private rooms.    If you have any questions please call Pre-Admission Testing at [phone].  Deductibles and co-payments are collected on the day of service. Please be prepared to pay the required co-pay, deductible or deposit on the day of service as defined by your plan.    Patient Education for Self-Quarantine Process    Following your COVID testing, we strongly recommend that you do not leave your home after you have been tested for COVID except to get medical care. This includes not going to work, school or to public areas.  If this is not possible for you to do please limit your activities to only required outings.  Be sure to wear a mask when you are with other people, practice social distancing and wash your hands frequently.      The following items provide additional details to keep you safe.  • Wash your hands with soap and water frequently for at least 20 seconds.   • Avoid touching your eyes, nose and mouth with  unwashed hands.  • Do not share anything - utensils, towels, food from the same bowl.   • Have your own utensils, drinking glass, dishes, towels and bedding.   • Do not have visitors.   • Do use FaceTime to stay in touch with family and friends.  • You should stay in a specific room away from others if possible.   • Stay at least 6 feet away from others in the home if you cannot have a dedicated room to yourself.   • Do not snuggle with your pet. While the CDC says there is no evidence that pets can spread COVID-19 or be infected from humans, it is probably best to avoid “petting, snuggling, being kissed or licked and sharing food (during self-quarantine)”, according to the CDC.   • Sanitize household surfaces daily. Include all high touch areas (door handles, light switches, phones, countertops, etc.)  • Do not share a bathroom with others, if possible.   • Wear a mask around others in your home if you are unable to stay in a separate room or 6 feet apart. If  you are unable to wear a mask, have your family member wear a mask if they must be within 6 feet of you.   Call your surgeon immediately if you experience any of the following symptoms:  • Sore Throat  • Shortness of Breath or difficulty breathing  • Cough  • Chills  • Body soreness or muscle pain  • Headache  • Fever  • New loss of taste or smell  • Do not arrive for your surgery ill.  Your procedure will need to be rescheduled to another time.  You will need to call your physician before the day of surgery to avoid any unnecessary exposure to hospital staff as well as other patients.    CHLORHEXIDINE CLOTH INSTRUCTIONS  The morning of surgery follow these instructions using the Chlorhexidine cloths you've been given.  These steps reduce bacteria on the body.  Do not use the cloths near your eyes, ears mouth, genitalia or on open wounds.  Throw the cloths away after use but do not try to flush them down a toilet.      • Open and remove one cloth at a time  from the package.    • Leave the cloth unfolded and begin the bathing.  • Massage the skin with the cloths using gentle pressure to remove bacteria.  Do not scrub harshly.   • Follow the steps below with one 2% CHG cloth per area (6 total cloths).  • One cloth for neck, shoulders and chest.  • One cloth for both arms, hands, fingers and underarms (do underarms last).  • One cloth for the abdomen followed by groin.  • One cloth for right leg and foot including between the toes.  • One cloth for left leg and foot including between the toes.  • The last cloth is to be used for the back of the neck, back and buttocks.    Allow the CHG to air dry 3 minutes on the skin which will give it time to work and decrease the chance of irritation.  The skin may feel sticky until it is dry.  Do not rinse with water or any other liquid or you will lose the beneficial effects of the CHG.  If mild skin irritation occurs, do rinse the skin to remove the CHG.  Report this to the nurse at time of admission.  Do not apply lotions, creams, ointments, deodorants or perfumes after using the clothes. Dress in clean clothes before coming to the hospital.

## 2020-09-12 LAB — SARS-COV-2 RNA RESP QL NAA+PROBE: NOT DETECTED

## 2020-09-14 ENCOUNTER — HOSPITAL ENCOUNTER (INPATIENT)
Facility: HOSPITAL | Age: 70
LOS: 1 days | Discharge: HOME | End: 2020-09-15
Attending: SURGERY | Admitting: SURGERY

## 2020-09-14 ENCOUNTER — ANESTHESIA EVENT (OUTPATIENT)
Dept: PERIOP | Facility: HOSPITAL | Age: 70
End: 2020-09-14

## 2020-09-14 ENCOUNTER — ANESTHESIA (OUTPATIENT)
Dept: PERIOP | Facility: HOSPITAL | Age: 70
End: 2020-09-14

## 2020-09-14 ENCOUNTER — APPOINTMENT (OUTPATIENT)
Dept: GENERAL RADIOLOGY | Facility: HOSPITAL | Age: 70
End: 2020-09-14

## 2020-09-14 DIAGNOSIS — I73.9 PVD (PERIPHERAL VASCULAR DISEASE) WITH CLAUDICATION (HCC): Primary | ICD-10-CM

## 2020-09-14 LAB
GLUCOSE BLDC GLUCOMTR-MCNC: 102 MG/DL (ref 70–130)
GLUCOSE BLDC GLUCOMTR-MCNC: 91 MG/DL (ref 70–130)

## 2020-09-14 PROCEDURE — 25010000002 FENTANYL CITRATE (PF) 100 MCG/2ML SOLUTION: Performed by: NURSE ANESTHETIST, CERTIFIED REGISTERED

## 2020-09-14 PROCEDURE — 25010000002 HYDROMORPHONE PER 4 MG: Performed by: NURSE ANESTHETIST, CERTIFIED REGISTERED

## 2020-09-14 PROCEDURE — 25010000002 PHENYLEPHRINE PER 1 ML: Performed by: NURSE ANESTHETIST, CERTIFIED REGISTERED

## 2020-09-14 PROCEDURE — 25010000002 PROPOFOL 10 MG/ML EMULSION: Performed by: NURSE ANESTHETIST, CERTIFIED REGISTERED

## 2020-09-14 PROCEDURE — 25010000002 HEPARIN (PORCINE) PER 1000 UNITS: Performed by: SURGERY

## 2020-09-14 PROCEDURE — 25010000002 NEOSTIGMINE PER 0.5 MG: Performed by: NURSE ANESTHETIST, CERTIFIED REGISTERED

## 2020-09-14 PROCEDURE — C1768 GRAFT, VASCULAR: HCPCS | Performed by: SURGERY

## 2020-09-14 PROCEDURE — 25010000002 VANCOMYCIN PER 500 MG: Performed by: SURGERY

## 2020-09-14 PROCEDURE — C1894 INTRO/SHEATH, NON-LASER: HCPCS | Performed by: SURGERY

## 2020-09-14 PROCEDURE — C1887 CATHETER, GUIDING: HCPCS | Performed by: SURGERY

## 2020-09-14 PROCEDURE — 25010000002 DEXAMETHASONE PER 1 MG: Performed by: NURSE ANESTHETIST, CERTIFIED REGISTERED

## 2020-09-14 PROCEDURE — 75625 CONTRAST EXAM ABDOMINL AORTA: CPT

## 2020-09-14 PROCEDURE — 75716 ARTERY X-RAYS ARMS/LEGS: CPT

## 2020-09-14 PROCEDURE — 04UL0KZ SUPPLEMENT LEFT FEMORAL ARTERY WITH NONAUTOLOGOUS TISSUE SUBSTITUTE, OPEN APPROACH: ICD-10-PCS | Performed by: SURGERY

## 2020-09-14 PROCEDURE — 25010000002 PROTAMINE SULFATE PER 10 MG: Performed by: NURSE ANESTHETIST, CERTIFIED REGISTERED

## 2020-09-14 PROCEDURE — 25010000002 HEPARIN (PORCINE) PER 1000 UNITS: Performed by: NURSE ANESTHETIST, CERTIFIED REGISTERED

## 2020-09-14 PROCEDURE — 04CL0ZZ EXTIRPATION OF MATTER FROM LEFT FEMORAL ARTERY, OPEN APPROACH: ICD-10-PCS | Performed by: SURGERY

## 2020-09-14 PROCEDURE — 94640 AIRWAY INHALATION TREATMENT: CPT

## 2020-09-14 PROCEDURE — 94799 UNLISTED PULMONARY SVC/PX: CPT

## 2020-09-14 PROCEDURE — 25010000002 SUCCINYLCHOLINE PER 20 MG: Performed by: NURSE ANESTHETIST, CERTIFIED REGISTERED

## 2020-09-14 PROCEDURE — B4101ZZ FLUOROSCOPY OF ABDOMINAL AORTA USING LOW OSMOLAR CONTRAST: ICD-10-PCS | Performed by: SURGERY

## 2020-09-14 PROCEDURE — 82962 GLUCOSE BLOOD TEST: CPT

## 2020-09-14 PROCEDURE — C1769 GUIDE WIRE: HCPCS | Performed by: SURGERY

## 2020-09-14 PROCEDURE — 0 IOPAMIDOL PER 1 ML: Performed by: SURGERY

## 2020-09-14 PROCEDURE — 25010000002 ONDANSETRON PER 1 MG: Performed by: NURSE ANESTHETIST, CERTIFIED REGISTERED

## 2020-09-14 DEVICE — LIGACLIP MCA MULTIPLE CLIP APPLIERS, 20 MEDIUM CLIPS
Type: IMPLANTABLE DEVICE | Site: LEG | Status: FUNCTIONAL
Brand: LIGACLIP

## 2020-09-14 DEVICE — PTCH VASC XENOSURE PLS BIO 0.8X8CM: Type: IMPLANTABLE DEVICE | Site: GROIN | Status: FUNCTIONAL

## 2020-09-14 DEVICE — ABSORBABLE HEMOSTAT (OXIDIZED REGENERATED CELLULOSE)
Type: IMPLANTABLE DEVICE | Site: GROIN | Status: FUNCTIONAL
Brand: SURGICEL NU-KNIT

## 2020-09-14 DEVICE — LIGACLIP MCA MULTIPLE CLIP APPLIERS, 20 SMALL CLIPS
Type: IMPLANTABLE DEVICE | Site: LEG | Status: FUNCTIONAL
Brand: LIGACLIP

## 2020-09-14 RX ORDER — EPHEDRINE SULFATE 50 MG/ML
5 INJECTION, SOLUTION INTRAVENOUS ONCE AS NEEDED
Status: DISCONTINUED | OUTPATIENT
Start: 2020-09-14 | End: 2020-09-14 | Stop reason: HOSPADM

## 2020-09-14 RX ORDER — AMLODIPINE BESYLATE 5 MG/1
5 TABLET ORAL DAILY
Status: DISCONTINUED | OUTPATIENT
Start: 2020-09-14 | End: 2020-09-15 | Stop reason: HOSPADM

## 2020-09-14 RX ORDER — GLYCOPYRROLATE 0.2 MG/ML
INJECTION INTRAMUSCULAR; INTRAVENOUS AS NEEDED
Status: DISCONTINUED | OUTPATIENT
Start: 2020-09-14 | End: 2020-09-14 | Stop reason: SURG

## 2020-09-14 RX ORDER — FENTANYL CITRATE 50 UG/ML
INJECTION, SOLUTION INTRAMUSCULAR; INTRAVENOUS AS NEEDED
Status: DISCONTINUED | OUTPATIENT
Start: 2020-09-14 | End: 2020-09-14 | Stop reason: SURG

## 2020-09-14 RX ORDER — FENTANYL CITRATE 50 UG/ML
50 INJECTION, SOLUTION INTRAMUSCULAR; INTRAVENOUS
Status: DISCONTINUED | OUTPATIENT
Start: 2020-09-14 | End: 2020-09-14 | Stop reason: HOSPADM

## 2020-09-14 RX ORDER — SODIUM CHLORIDE 0.9 % (FLUSH) 0.9 %
3-10 SYRINGE (ML) INJECTION AS NEEDED
Status: DISCONTINUED | OUTPATIENT
Start: 2020-09-14 | End: 2020-09-14 | Stop reason: HOSPADM

## 2020-09-14 RX ORDER — DEXAMETHASONE SODIUM PHOSPHATE 10 MG/ML
INJECTION INTRAMUSCULAR; INTRAVENOUS AS NEEDED
Status: DISCONTINUED | OUTPATIENT
Start: 2020-09-14 | End: 2020-09-14 | Stop reason: SURG

## 2020-09-14 RX ORDER — MIDAZOLAM HYDROCHLORIDE 1 MG/ML
1 INJECTION INTRAMUSCULAR; INTRAVENOUS
Status: DISCONTINUED | OUTPATIENT
Start: 2020-09-14 | End: 2020-09-14 | Stop reason: HOSPADM

## 2020-09-14 RX ORDER — PANTOPRAZOLE SODIUM 40 MG/1
80 TABLET, DELAYED RELEASE ORAL
Status: DISCONTINUED | OUTPATIENT
Start: 2020-09-15 | End: 2020-09-15 | Stop reason: HOSPADM

## 2020-09-14 RX ORDER — HYDROMORPHONE HYDROCHLORIDE 1 MG/ML
0.25 INJECTION, SOLUTION INTRAMUSCULAR; INTRAVENOUS; SUBCUTANEOUS
Status: DISCONTINUED | OUTPATIENT
Start: 2020-09-14 | End: 2020-09-14 | Stop reason: HOSPADM

## 2020-09-14 RX ORDER — ROCURONIUM BROMIDE 10 MG/ML
INJECTION, SOLUTION INTRAVENOUS AS NEEDED
Status: DISCONTINUED | OUTPATIENT
Start: 2020-09-14 | End: 2020-09-14 | Stop reason: SURG

## 2020-09-14 RX ORDER — HYDROCODONE BITARTRATE AND ACETAMINOPHEN 5; 325 MG/1; MG/1
2 TABLET ORAL EVERY 4 HOURS PRN
Status: DISCONTINUED | OUTPATIENT
Start: 2020-09-14 | End: 2020-09-15 | Stop reason: HOSPADM

## 2020-09-14 RX ORDER — BUDESONIDE 0.5 MG/2ML
0.5 INHALANT ORAL
Status: DISCONTINUED | OUTPATIENT
Start: 2020-09-14 | End: 2020-09-15 | Stop reason: HOSPADM

## 2020-09-14 RX ORDER — METOPROLOL SUCCINATE 50 MG/1
50 TABLET, EXTENDED RELEASE ORAL 2 TIMES DAILY
Status: DISCONTINUED | OUTPATIENT
Start: 2020-09-14 | End: 2020-09-15 | Stop reason: HOSPADM

## 2020-09-14 RX ORDER — PROMETHAZINE HYDROCHLORIDE 25 MG/1
25 SUPPOSITORY RECTAL ONCE AS NEEDED
Status: DISCONTINUED | OUTPATIENT
Start: 2020-09-14 | End: 2020-09-14 | Stop reason: HOSPADM

## 2020-09-14 RX ORDER — ALLOPURINOL 300 MG/1
300 TABLET ORAL DAILY
COMMUNITY
End: 2021-12-14 | Stop reason: SDUPTHER

## 2020-09-14 RX ORDER — VANCOMYCIN HYDROCHLORIDE 1 G/200ML
1 INJECTION, SOLUTION INTRAVENOUS ONCE
Status: COMPLETED | OUTPATIENT
Start: 2020-09-14 | End: 2020-09-14

## 2020-09-14 RX ORDER — BUPROPION HYDROCHLORIDE 300 MG/1
300 TABLET ORAL DAILY
Status: DISCONTINUED | OUTPATIENT
Start: 2020-09-14 | End: 2020-09-14

## 2020-09-14 RX ORDER — FAMOTIDINE 10 MG/ML
20 INJECTION, SOLUTION INTRAVENOUS ONCE
Status: COMPLETED | OUTPATIENT
Start: 2020-09-14 | End: 2020-09-14

## 2020-09-14 RX ORDER — SODIUM CHLORIDE 0.9 % (FLUSH) 0.9 %
3 SYRINGE (ML) INJECTION EVERY 12 HOURS SCHEDULED
Status: DISCONTINUED | OUTPATIENT
Start: 2020-09-14 | End: 2020-09-14 | Stop reason: HOSPADM

## 2020-09-14 RX ORDER — HYDROCODONE BITARTRATE AND ACETAMINOPHEN 5; 325 MG/1; MG/1
1 TABLET ORAL EVERY 4 HOURS PRN
Status: DISCONTINUED | OUTPATIENT
Start: 2020-09-14 | End: 2020-09-15 | Stop reason: HOSPADM

## 2020-09-14 RX ORDER — HYDROCODONE BITARTRATE AND ACETAMINOPHEN 7.5; 325 MG/1; MG/1
1 TABLET ORAL ONCE AS NEEDED
Status: DISCONTINUED | OUTPATIENT
Start: 2020-09-14 | End: 2020-09-14 | Stop reason: HOSPADM

## 2020-09-14 RX ORDER — ASPIRIN 81 MG/1
81 TABLET ORAL DAILY
Status: DISCONTINUED | OUTPATIENT
Start: 2020-09-14 | End: 2020-09-15 | Stop reason: HOSPADM

## 2020-09-14 RX ORDER — FLUMAZENIL 0.1 MG/ML
0.2 INJECTION INTRAVENOUS AS NEEDED
Status: DISCONTINUED | OUTPATIENT
Start: 2020-09-14 | End: 2020-09-14 | Stop reason: HOSPADM

## 2020-09-14 RX ORDER — MORPHINE SULFATE 30 MG/1
30 TABLET, FILM COATED, EXTENDED RELEASE ORAL 2 TIMES DAILY
Status: DISCONTINUED | OUTPATIENT
Start: 2020-09-14 | End: 2020-09-15 | Stop reason: HOSPADM

## 2020-09-14 RX ORDER — SODIUM CHLORIDE 9 MG/ML
125 INJECTION, SOLUTION INTRAVENOUS CONTINUOUS
Status: DISCONTINUED | OUTPATIENT
Start: 2020-09-14 | End: 2020-09-15

## 2020-09-14 RX ORDER — BUPROPION HYDROCHLORIDE 150 MG/1
150 TABLET ORAL DAILY
Status: DISCONTINUED | OUTPATIENT
Start: 2020-09-14 | End: 2020-09-15 | Stop reason: HOSPADM

## 2020-09-14 RX ORDER — FERROUS SULFATE 325(65) MG
325 TABLET ORAL
Status: DISCONTINUED | OUTPATIENT
Start: 2020-09-15 | End: 2020-09-15 | Stop reason: HOSPADM

## 2020-09-14 RX ORDER — ALBUTEROL SULFATE 2.5 MG/3ML
2.5 SOLUTION RESPIRATORY (INHALATION) ONCE AS NEEDED
Status: DISCONTINUED | OUTPATIENT
Start: 2020-09-14 | End: 2020-09-14 | Stop reason: HOSPADM

## 2020-09-14 RX ORDER — FUROSEMIDE 20 MG/1
20 TABLET ORAL EVERY OTHER DAY
Status: DISCONTINUED | OUTPATIENT
Start: 2020-09-15 | End: 2020-09-15 | Stop reason: HOSPADM

## 2020-09-14 RX ORDER — COLCHICINE 0.6 MG/1
0.6 TABLET ORAL DAILY PRN
Status: DISCONTINUED | OUTPATIENT
Start: 2020-09-14 | End: 2020-09-15 | Stop reason: HOSPADM

## 2020-09-14 RX ORDER — MORPHINE SULFATE 2 MG/ML
4 INJECTION, SOLUTION INTRAMUSCULAR; INTRAVENOUS EVERY 4 HOURS PRN
Status: DISCONTINUED | OUTPATIENT
Start: 2020-09-14 | End: 2020-09-15 | Stop reason: HOSPADM

## 2020-09-14 RX ORDER — DIPHENHYDRAMINE HCL 25 MG
25 CAPSULE ORAL
Status: DISCONTINUED | OUTPATIENT
Start: 2020-09-14 | End: 2020-09-14 | Stop reason: HOSPADM

## 2020-09-14 RX ORDER — MORPHINE SULFATE 2 MG/ML
2 INJECTION, SOLUTION INTRAMUSCULAR; INTRAVENOUS EVERY 4 HOURS PRN
Status: DISCONTINUED | OUTPATIENT
Start: 2020-09-14 | End: 2020-09-15 | Stop reason: HOSPADM

## 2020-09-14 RX ORDER — SUCCINYLCHOLINE CHLORIDE 20 MG/ML
INJECTION INTRAMUSCULAR; INTRAVENOUS AS NEEDED
Status: DISCONTINUED | OUTPATIENT
Start: 2020-09-14 | End: 2020-09-14 | Stop reason: SURG

## 2020-09-14 RX ORDER — LABETALOL HYDROCHLORIDE 5 MG/ML
5 INJECTION, SOLUTION INTRAVENOUS
Status: DISCONTINUED | OUTPATIENT
Start: 2020-09-14 | End: 2020-09-14 | Stop reason: HOSPADM

## 2020-09-14 RX ORDER — ONDANSETRON 2 MG/ML
4 INJECTION INTRAMUSCULAR; INTRAVENOUS ONCE AS NEEDED
Status: DISCONTINUED | OUTPATIENT
Start: 2020-09-14 | End: 2020-09-14 | Stop reason: HOSPADM

## 2020-09-14 RX ORDER — CLOPIDOGREL BISULFATE 75 MG/1
75 TABLET ORAL DAILY
Status: DISCONTINUED | OUTPATIENT
Start: 2020-09-14 | End: 2020-09-15 | Stop reason: HOSPADM

## 2020-09-14 RX ORDER — ONDANSETRON 2 MG/ML
INJECTION INTRAMUSCULAR; INTRAVENOUS AS NEEDED
Status: DISCONTINUED | OUTPATIENT
Start: 2020-09-14 | End: 2020-09-14 | Stop reason: SURG

## 2020-09-14 RX ORDER — PROMETHAZINE HYDROCHLORIDE 25 MG/1
25 TABLET ORAL ONCE AS NEEDED
Status: DISCONTINUED | OUTPATIENT
Start: 2020-09-14 | End: 2020-09-14 | Stop reason: HOSPADM

## 2020-09-14 RX ORDER — GABAPENTIN 400 MG/1
400 CAPSULE ORAL 2 TIMES DAILY
Status: DISCONTINUED | OUTPATIENT
Start: 2020-09-14 | End: 2020-09-15 | Stop reason: HOSPADM

## 2020-09-14 RX ORDER — IPRATROPIUM BROMIDE AND ALBUTEROL SULFATE 2.5; .5 MG/3ML; MG/3ML
1.5 SOLUTION RESPIRATORY (INHALATION) EVERY 6 HOURS PRN
Status: DISCONTINUED | OUTPATIENT
Start: 2020-09-14 | End: 2020-09-15 | Stop reason: HOSPADM

## 2020-09-14 RX ORDER — FENOFIBRATE 145 MG/1
145 TABLET, COATED ORAL DAILY
Status: DISCONTINUED | OUTPATIENT
Start: 2020-09-14 | End: 2020-09-15 | Stop reason: HOSPADM

## 2020-09-14 RX ORDER — LIDOCAINE HYDROCHLORIDE 20 MG/ML
INJECTION, SOLUTION INFILTRATION; PERINEURAL AS NEEDED
Status: DISCONTINUED | OUTPATIENT
Start: 2020-09-14 | End: 2020-09-14 | Stop reason: SURG

## 2020-09-14 RX ORDER — HEPARIN SODIUM 1000 [USP'U]/ML
INJECTION, SOLUTION INTRAVENOUS; SUBCUTANEOUS AS NEEDED
Status: DISCONTINUED | OUTPATIENT
Start: 2020-09-14 | End: 2020-09-14 | Stop reason: SURG

## 2020-09-14 RX ORDER — LIDOCAINE HYDROCHLORIDE 10 MG/ML
0.5 INJECTION, SOLUTION EPIDURAL; INFILTRATION; INTRACAUDAL; PERINEURAL ONCE AS NEEDED
Status: DISCONTINUED | OUTPATIENT
Start: 2020-09-14 | End: 2020-09-14 | Stop reason: HOSPADM

## 2020-09-14 RX ORDER — LEVOTHYROXINE SODIUM 0.03 MG/1
150 TABLET ORAL DAILY
Status: DISCONTINUED | OUTPATIENT
Start: 2020-09-14 | End: 2020-09-15 | Stop reason: HOSPADM

## 2020-09-14 RX ORDER — MAGNESIUM HYDROXIDE 1200 MG/15ML
LIQUID ORAL AS NEEDED
Status: DISCONTINUED | OUTPATIENT
Start: 2020-09-14 | End: 2020-09-14 | Stop reason: HOSPADM

## 2020-09-14 RX ORDER — OXYCODONE AND ACETAMINOPHEN 7.5; 325 MG/1; MG/1
1 TABLET ORAL ONCE AS NEEDED
Status: COMPLETED | OUTPATIENT
Start: 2020-09-14 | End: 2020-09-14

## 2020-09-14 RX ORDER — PROPOFOL 10 MG/ML
VIAL (ML) INTRAVENOUS AS NEEDED
Status: DISCONTINUED | OUTPATIENT
Start: 2020-09-14 | End: 2020-09-14 | Stop reason: SURG

## 2020-09-14 RX ORDER — ATORVASTATIN CALCIUM 20 MG/1
20 TABLET, FILM COATED ORAL DAILY
Status: DISCONTINUED | OUTPATIENT
Start: 2020-09-14 | End: 2020-09-15 | Stop reason: HOSPADM

## 2020-09-14 RX ORDER — DIPHENHYDRAMINE HYDROCHLORIDE 50 MG/ML
12.5 INJECTION INTRAMUSCULAR; INTRAVENOUS
Status: DISCONTINUED | OUTPATIENT
Start: 2020-09-14 | End: 2020-09-14 | Stop reason: HOSPADM

## 2020-09-14 RX ORDER — HYDRALAZINE HYDROCHLORIDE 20 MG/ML
5 INJECTION INTRAMUSCULAR; INTRAVENOUS
Status: DISCONTINUED | OUTPATIENT
Start: 2020-09-14 | End: 2020-09-14 | Stop reason: HOSPADM

## 2020-09-14 RX ORDER — NALOXONE HCL 0.4 MG/ML
0.2 VIAL (ML) INJECTION AS NEEDED
Status: DISCONTINUED | OUTPATIENT
Start: 2020-09-14 | End: 2020-09-14 | Stop reason: HOSPADM

## 2020-09-14 RX ORDER — POTASSIUM CHLORIDE 750 MG/1
10 CAPSULE, EXTENDED RELEASE ORAL DAILY
Status: DISCONTINUED | OUTPATIENT
Start: 2020-09-14 | End: 2020-09-15 | Stop reason: HOSPADM

## 2020-09-14 RX ORDER — SODIUM CHLORIDE, SODIUM LACTATE, POTASSIUM CHLORIDE, CALCIUM CHLORIDE 600; 310; 30; 20 MG/100ML; MG/100ML; MG/100ML; MG/100ML
9 INJECTION, SOLUTION INTRAVENOUS CONTINUOUS
Status: DISCONTINUED | OUTPATIENT
Start: 2020-09-14 | End: 2020-09-15

## 2020-09-14 RX ORDER — PROTAMINE SULFATE 10 MG/ML
INJECTION, SOLUTION INTRAVENOUS AS NEEDED
Status: DISCONTINUED | OUTPATIENT
Start: 2020-09-14 | End: 2020-09-14 | Stop reason: SURG

## 2020-09-14 RX ADMIN — ONDANSETRON HYDROCHLORIDE 4 MG: 2 SOLUTION INTRAMUSCULAR; INTRAVENOUS at 09:14

## 2020-09-14 RX ADMIN — SODIUM CHLORIDE 5 MG/HR: 9 INJECTION, SOLUTION INTRAVENOUS at 10:15

## 2020-09-14 RX ADMIN — LEVOTHYROXINE SODIUM 150 MCG: 25 TABLET ORAL at 15:48

## 2020-09-14 RX ADMIN — FENTANYL CITRATE 50 MCG: 50 INJECTION INTRAMUSCULAR; INTRAVENOUS at 08:31

## 2020-09-14 RX ADMIN — HYDROMORPHONE HYDROCHLORIDE 0.25 MG: 1 INJECTION, SOLUTION INTRAMUSCULAR; INTRAVENOUS; SUBCUTANEOUS at 11:31

## 2020-09-14 RX ADMIN — BUDESONIDE 0.5 MG: 0.5 INHALANT ORAL at 20:03

## 2020-09-14 RX ADMIN — VANCOMYCIN HYDROCHLORIDE 1 G: 1 INJECTION, SOLUTION INTRAVENOUS at 06:55

## 2020-09-14 RX ADMIN — PROPOFOL 180 MG: 10 INJECTION, EMULSION INTRAVENOUS at 07:45

## 2020-09-14 RX ADMIN — FENOFIBRATE 145 MG: 145 TABLET ORAL at 18:25

## 2020-09-14 RX ADMIN — METOPROLOL SUCCINATE 50 MG: 50 TABLET, EXTENDED RELEASE ORAL at 15:47

## 2020-09-14 RX ADMIN — BUPROPION HYDROCHLORIDE 150 MG: 150 TABLET, FILM COATED, EXTENDED RELEASE ORAL at 15:48

## 2020-09-14 RX ADMIN — HYDROMORPHONE HYDROCHLORIDE 0.25 MG: 1 INJECTION, SOLUTION INTRAMUSCULAR; INTRAVENOUS; SUBCUTANEOUS at 12:17

## 2020-09-14 RX ADMIN — HYDROMORPHONE HYDROCHLORIDE 0.25 MG: 1 INJECTION, SOLUTION INTRAMUSCULAR; INTRAVENOUS; SUBCUTANEOUS at 12:10

## 2020-09-14 RX ADMIN — MORPHINE SULFATE 30 MG: 30 TABLET, FILM COATED, EXTENDED RELEASE ORAL at 20:39

## 2020-09-14 RX ADMIN — NEOSTIGMINE METHYLSULFATE 2 MG: 1 INJECTION INTRAMUSCULAR; INTRAVENOUS; SUBCUTANEOUS at 09:34

## 2020-09-14 RX ADMIN — HYDROMORPHONE HYDROCHLORIDE 0.25 MG: 1 INJECTION, SOLUTION INTRAMUSCULAR; INTRAVENOUS; SUBCUTANEOUS at 11:41

## 2020-09-14 RX ADMIN — SODIUM CHLORIDE 125 ML/HR: 9 INJECTION, SOLUTION INTRAVENOUS at 20:39

## 2020-09-14 RX ADMIN — ATORVASTATIN CALCIUM 20 MG: 20 TABLET, FILM COATED ORAL at 15:20

## 2020-09-14 RX ADMIN — SODIUM CHLORIDE 125 ML/HR: 9 INJECTION, SOLUTION INTRAVENOUS at 12:24

## 2020-09-14 RX ADMIN — FENTANYL CITRATE 50 MCG: 50 INJECTION, SOLUTION INTRAMUSCULAR; INTRAVENOUS at 10:33

## 2020-09-14 RX ADMIN — SODIUM CHLORIDE, POTASSIUM CHLORIDE, SODIUM LACTATE AND CALCIUM CHLORIDE 9 ML/HR: 600; 310; 30; 20 INJECTION, SOLUTION INTRAVENOUS at 06:51

## 2020-09-14 RX ADMIN — HYDROCODONE BITARTRATE AND ACETAMINOPHEN 2 TABLET: 5; 325 TABLET ORAL at 15:20

## 2020-09-14 RX ADMIN — SUCCINYLCHOLINE CHLORIDE 120 MG: 20 INJECTION, SOLUTION INTRAMUSCULAR; INTRAVENOUS; PARENTERAL at 07:45

## 2020-09-14 RX ADMIN — FENTANYL CITRATE 50 MCG: 50 INJECTION, SOLUTION INTRAMUSCULAR; INTRAVENOUS at 12:27

## 2020-09-14 RX ADMIN — POTASSIUM CHLORIDE 10 MEQ: 10 CAPSULE, COATED, EXTENDED RELEASE ORAL at 15:20

## 2020-09-14 RX ADMIN — OXYCODONE HYDROCHLORIDE AND ACETAMINOPHEN 1 TABLET: 7.5; 325 TABLET ORAL at 11:27

## 2020-09-14 RX ADMIN — GLYCOPYRROLATE 0.2 MG: 0.2 INJECTION INTRAMUSCULAR; INTRAVENOUS at 07:43

## 2020-09-14 RX ADMIN — GLYCOPYRROLATE 0.4 MG: 0.2 INJECTION INTRAMUSCULAR; INTRAVENOUS at 09:29

## 2020-09-14 RX ADMIN — PHENYLEPHRINE HYDROCHLORIDE 100 MCG: 10 INJECTION INTRAVENOUS at 07:57

## 2020-09-14 RX ADMIN — HYDROMORPHONE HYDROCHLORIDE 0.25 MG: 1 INJECTION, SOLUTION INTRAMUSCULAR; INTRAVENOUS; SUBCUTANEOUS at 10:59

## 2020-09-14 RX ADMIN — GABAPENTIN 400 MG: 400 CAPSULE ORAL at 20:39

## 2020-09-14 RX ADMIN — HYDROMORPHONE HYDROCHLORIDE 0.25 MG: 1 INJECTION, SOLUTION INTRAMUSCULAR; INTRAVENOUS; SUBCUTANEOUS at 11:19

## 2020-09-14 RX ADMIN — HYDROMORPHONE HYDROCHLORIDE 0.25 MG: 1 INJECTION, SOLUTION INTRAMUSCULAR; INTRAVENOUS; SUBCUTANEOUS at 10:50

## 2020-09-14 RX ADMIN — PROTAMINE SULFATE 30 MG: 10 INJECTION, SOLUTION INTRAVENOUS at 09:12

## 2020-09-14 RX ADMIN — HYDROMORPHONE HYDROCHLORIDE 0.25 MG: 1 INJECTION, SOLUTION INTRAMUSCULAR; INTRAVENOUS; SUBCUTANEOUS at 11:10

## 2020-09-14 RX ADMIN — FENTANYL CITRATE 50 MCG: 50 INJECTION, SOLUTION INTRAMUSCULAR; INTRAVENOUS at 12:17

## 2020-09-14 RX ADMIN — IOPAMIDOL 45 ML: 510 INJECTION, SOLUTION INTRAVASCULAR at 07:21

## 2020-09-14 RX ADMIN — ASPIRIN 81 MG: 81 TABLET, COATED ORAL at 15:20

## 2020-09-14 RX ADMIN — FAMOTIDINE 20 MG: 10 INJECTION INTRAVENOUS at 06:50

## 2020-09-14 RX ADMIN — DEXAMETHASONE SODIUM PHOSPHATE 6 MG: 10 INJECTION INTRAMUSCULAR; INTRAVENOUS at 08:18

## 2020-09-14 RX ADMIN — CLOPIDOGREL 75 MG: 75 TABLET, FILM COATED ORAL at 15:48

## 2020-09-14 RX ADMIN — AMLODIPINE BESYLATE 5 MG: 5 TABLET ORAL at 15:48

## 2020-09-14 RX ADMIN — FENTANYL CITRATE 50 MCG: 50 INJECTION, SOLUTION INTRAMUSCULAR; INTRAVENOUS at 10:15

## 2020-09-14 RX ADMIN — LIDOCAINE HYDROCHLORIDE 80 MG: 20 INJECTION, SOLUTION INFILTRATION; PERINEURAL at 07:45

## 2020-09-14 RX ADMIN — FENTANYL CITRATE 50 MCG: 50 INJECTION INTRAMUSCULAR; INTRAVENOUS at 07:43

## 2020-09-14 RX ADMIN — ROCURONIUM BROMIDE 5 MG: 10 INJECTION INTRAVENOUS at 07:45

## 2020-09-14 RX ADMIN — HEPARIN SODIUM 7500 UNITS: 1000 INJECTION, SOLUTION INTRAVENOUS; SUBCUTANEOUS at 08:46

## 2020-09-14 NOTE — ANESTHESIA POSTPROCEDURE EVALUATION
Patient: Radhames Colón    Procedure Summary     Date: 09/14/20 Room / Location: [facility] OR 18 [facility] / [facility] HYBRID OR 18/19    Anesthesia Start: 0732 Anesthesia Stop: 1001    Procedures:       AORTOILLOFEMORAL ARTERIOGRAM (Left Thigh)      LEFT FEMORAL ENDARECTOMY WITHH PATCH ANGIOPLASTY (Left Thigh) Diagnosis:     Surgeon: Chula Nam Jr., MD Provider: Jabari Peters MD    Anesthesia Type: general ASA Status: 4          Anesthesia Type: general    Vitals  Vitals Value Taken Time   /61 09/14/20 1300   Temp 36.6 °C (97.9 °F) 09/14/20 1000   Pulse 56 09/14/20 1312   Resp 16 09/14/20 1300   SpO2 96 % 09/14/20 1312   Vitals shown include unvalidated device data.        Post Anesthesia Care and Evaluation    Patient location during evaluation: bedside  Pain management: adequate  Airway patency: patent  Anesthetic complications: No anesthetic complications    Cardiovascular status: acceptable  Respiratory status: acceptable  Hydration status: acceptable    Comments: /61   Pulse 55   Temp 36.6 °C (97.9 °F) (Oral)   Resp 16   Wt 105 kg (231 lb 9.6 oz)   SpO2 96%   BMI 34.20 kg/m²

## 2020-09-14 NOTE — ANESTHESIA PREPROCEDURE EVALUATION
Anesthesia Evaluation     Patient summary reviewed and Nursing notes reviewed   no history of anesthetic complications:  NPO Solid Status: > 8 hours  NPO Liquid Status: > 2 hours           Airway   Mallampati: III  Dental      Pulmonary - normal exam   (+) a smoker Former, COPD, sleep apnea on CPAP,   Cardiovascular - normal exam    (+) hypertension 2 medications or greater, CAD, CABG >6 Months, hyperlipidemia,       Neuro/Psych  (+) psychiatric history Depression,     GI/Hepatic/Renal/Endo    (+)  GERD,  renal disease ARF, diabetes mellitus type 2 using insulin,     Musculoskeletal     Abdominal    Substance History      OB/GYN          Other   arthritis,    history of cancer                  Anesthesia Plan    ASA 4     general     intravenous induction     Anesthetic plan, all risks, benefits, and alternatives have been provided, discussed and informed consent has been obtained with: patient.

## 2020-09-14 NOTE — ANESTHESIA PROCEDURE NOTES
Airway  Urgency: elective    Date/Time: 9/14/2020 7:49 AM  Airway not difficult    General Information and Staff    Patient location during procedure: OR  Anesthesiologist: Jabari Peters MD  CRNA: Juan F Sidhu CRNA    Indications and Patient Condition  Indications for airway management: airway protection    Preoxygenated: yes  MILS maintained throughout  Mask difficulty assessment: 1 - vent by mask    Final Airway Details  Final airway type: endotracheal airway      Successful airway: ETT  Cuffed: yes   Successful intubation technique: direct laryngoscopy  Facilitating devices/methods: cricoid pressure  Endotracheal tube insertion site: oral  Blade: Ana  Blade size: 3  ETT size (mm): 8.0  Cormack-Lehane Classification: grade IIb - view of arytenoids or posterior of glottis only  Placement verified by: chest auscultation and capnometry   Inital cuff pressure (cm H2O): 22  Cuff volume (mL): 9  Measured from: lips  ETT/EBT  to lips (cm): 23  Number of attempts at approach: 1

## 2020-09-14 NOTE — OP NOTE
Operative Note  Date of Admission:  9/14/2020  OR Date: 9/14/2020    Pre-op Diagnosis:   Peripheral artery occlusive disease with worsening claudication lower extremities; coral reef plaque formation at left femoral anastomosis    Post-op Diagnosis:   Same    Procedure:   1) duplex ultrasound-guided percutaneous access of the left common femoral artery with abdominal aortogram, multiple views  2) reoperative left femoral artery greater than 1 month with common femoral endarterectomy with bovine pericardial patch angioplasty    Surgeon: Chula Nam Jr, MD    Assistant: Maia MATOS and they provided critical assistance during the case including suctioning, exposure, retraction, and reduction of blood loss.    Anesthesia: General    Staff:   Cell Saver : Suhas Davies  Circulator: Garrett Ji RN; Sushila Sanders RN  Scrub Person: Mai Garduno  Assistant: Maia Mckeon CSA  Vascular Radiology Technician: Santa Beavers    Estimated Blood Loss:  350 cc; replacement 135 cc Cell Saver    Specimens: Plaque, not sent    Complications: None apparent    Findings: The aortic anastomosis was widely patent.  Aortobifemoral bypass graft was widely patent except for the distal portion at the anastomosis with the left common femoral artery.  There was greater than 90% stenosis of the graft in the anastomotic region.  The right common femoral artery anastomosis was widely patent.    Indications:  As in preop diagnosis           Procedure: The patient was placed on the operating table in supine position.  After satisfactory general endotracheal anesthesia was achieved the patient was prepped from the umbilicus to the knees using ChloraPrep and draped in usual sterile fashion.  Using duplex ultrasound the left femoral artery was identified and an access needle placed into the femoral artery.  Guidewire was advanced but this curved back on itself and what was felt to be the common iliac artery  region.  Micropuncture sheath was placed over the guidewire followed by a 6 Georgian sheath.  A hand-held injection confirmed this to be in the native arterial system with the external iliac artery being widely patent as was the common femoral artery.  Therefore, using duplex ultrasound the graft was identified more cephalad.  It was accessed percutaneously and a guidewire advanced.  A 0.035 guidewire was advanced to the upper abdominal aorta.  A 6 Georgian sheath was placed over the guidewire.  Pigtail catheter was then positioned in the upper abdominal aorta.  An aortogram was performed using 15 cc of contrast at 10 cc/s.  This demonstrated widely patent renal arteries bilaterally.  The suprarenal abdominal aorta was mildly dilated with some plaquing but without significant stenosis.  The aortobifemoral bypass graft was widely patent.  The image intensifier was then placed into the 20 degree PEARL view.  Another abdominal aortogram was performed and this demonstrated the high-grade stenosis noted in the distal graft at the common femoral anastomosis on the left.  The image intensifier was then placed in the 35 degree DANIELS view and another aortogram performed.  Again the right-sided anastomosis was widely patent and the severe stenosis on the left was identified.    A longitudinal incision was made through a previous longitudinal incision on the left incorporating both puncture sites.  The electrocautery was used to dissect through the subcutaneous tissue.  The more proximal access on the left was cephalad to the inguinal ligament and the inguinal ligament was partially divided.  7500 units of heparin was then given intravenously.  Both puncture sites were repaired with 5-0 Prolene suture.  Further dissection was carried out around the graft so that the graft was circumferentially mobilized above the level of the inguinal ligament.  The dissection was carried out down to the origins of the superficial femoral and  profundofemoral arteries.  The graft and native external iliac artery were clamped proximally and using a partial occlusion clamp distally the superficial femoral and profundofemoral arteries were clamped.  A lateral arteriotomy was made in the graft.  The severe plaquing was then excised from the graft and origin of the common femoral artery.  All feathery media and debris were vertically removed.  The lumens are to get heparinized saline solution.  The patch angioplasty was then performed using bovine pericardial patch and a running 4-0 Prolene suture beginning at each end and tying on the side.  Prior to completion of this the arteries were flushed in turn and the lumen irrigated heparinized saline solution.  Anastomosis was completed.  Brisk flow was noted into the native arterial system.  Using a hand-held Doppler there was a normal triphasic signal in the common femoral artery and profundofemoral artery.  There was a biphasic signal in the superficial femoral artery which is known to be chronically occluded.  30 mg of protamine sulfate was given intravenously.  The wound was copiously irrigated with antibiotic solution and dried.  Platelet rich plasma was then sprayed into the wound.  The middle ligament was then reapproximated with interrupted figure-of-eight 2-0 Vicryl suture.  The deep subcutaneous tissue and femoral sheath were closed with running 2-0 Vicryl suture in 2 layers.  The superficial subcutaneous tissue was closed a running 3-0 Vicryl.  The skin was closed with a running 4-0 Vicryl in a subcuticular fashion.  Dermabond was placed over the incision.  Sponge, needle, and instrument counts were all reported as correct.  The patient was extubated and transported to recovery room in satisfactory condition.        Radiographic Findings:  1) as described above      There are no hospital problems to display for this patient.     Chula Nam Jr., MD     Date: 9/14/2020  Time: 09:32 EDT

## 2020-09-15 VITALS
TEMPERATURE: 98 F | RESPIRATION RATE: 18 BRPM | SYSTOLIC BLOOD PRESSURE: 119 MMHG | DIASTOLIC BLOOD PRESSURE: 60 MMHG | OXYGEN SATURATION: 94 % | HEART RATE: 55 BPM | HEIGHT: 68 IN | BODY MASS INDEX: 35.1 KG/M2 | WEIGHT: 231.6 LBS

## 2020-09-15 LAB
ANION GAP SERPL CALCULATED.3IONS-SCNC: 8.9 MMOL/L (ref 5–15)
BASOPHILS # BLD AUTO: 0.02 10*3/MM3 (ref 0–0.2)
BASOPHILS NFR BLD AUTO: 0.2 % (ref 0–1.5)
BUN SERPL-MCNC: 15 MG/DL (ref 8–23)
BUN/CREAT SERPL: 13.2 (ref 7–25)
CALCIUM SPEC-SCNC: 8.2 MG/DL (ref 8.6–10.5)
CHLORIDE SERPL-SCNC: 106 MMOL/L (ref 98–107)
CO2 SERPL-SCNC: 25.1 MMOL/L (ref 22–29)
CREAT SERPL-MCNC: 1.14 MG/DL (ref 0.76–1.27)
DEPRECATED RDW RBC AUTO: 49.7 FL (ref 37–54)
EOSINOPHIL # BLD AUTO: 0.01 10*3/MM3 (ref 0–0.4)
EOSINOPHIL NFR BLD AUTO: 0.1 % (ref 0.3–6.2)
ERYTHROCYTE [DISTWIDTH] IN BLOOD BY AUTOMATED COUNT: 15.6 % (ref 12.3–15.4)
GFR SERPL CREATININE-BSD FRML MDRD: 63 ML/MIN/1.73
GLUCOSE SERPL-MCNC: 140 MG/DL (ref 65–99)
HCT VFR BLD AUTO: 35.1 % (ref 37.5–51)
HGB BLD-MCNC: 10.7 G/DL (ref 13–17.7)
IMM GRANULOCYTES # BLD AUTO: 0.05 10*3/MM3 (ref 0–0.05)
IMM GRANULOCYTES NFR BLD AUTO: 0.5 % (ref 0–0.5)
LYMPHOCYTES # BLD AUTO: 1.36 10*3/MM3 (ref 0.7–3.1)
LYMPHOCYTES NFR BLD AUTO: 12.5 % (ref 19.6–45.3)
MCH RBC QN AUTO: 26.4 PG (ref 26.6–33)
MCHC RBC AUTO-ENTMCNC: 30.5 G/DL (ref 31.5–35.7)
MCV RBC AUTO: 86.5 FL (ref 79–97)
MONOCYTES # BLD AUTO: 0.97 10*3/MM3 (ref 0.1–0.9)
MONOCYTES NFR BLD AUTO: 8.9 % (ref 5–12)
NEUTROPHILS NFR BLD AUTO: 77.8 % (ref 42.7–76)
NEUTROPHILS NFR BLD AUTO: 8.46 10*3/MM3 (ref 1.7–7)
NRBC BLD AUTO-RTO: 0 /100 WBC (ref 0–0.2)
PLATELET # BLD AUTO: 165 10*3/MM3 (ref 140–450)
PMV BLD AUTO: 12.6 FL (ref 6–12)
POTASSIUM SERPL-SCNC: 4.6 MMOL/L (ref 3.5–5.2)
RBC # BLD AUTO: 4.06 10*6/MM3 (ref 4.14–5.8)
SODIUM SERPL-SCNC: 140 MMOL/L (ref 136–145)
WBC # BLD AUTO: 10.87 10*3/MM3 (ref 3.4–10.8)

## 2020-09-15 PROCEDURE — 94799 UNLISTED PULMONARY SVC/PX: CPT

## 2020-09-15 PROCEDURE — 80048 BASIC METABOLIC PNL TOTAL CA: CPT | Performed by: SURGERY

## 2020-09-15 PROCEDURE — 85025 COMPLETE CBC W/AUTO DIFF WBC: CPT | Performed by: SURGERY

## 2020-09-15 RX ORDER — HYDROCODONE BITARTRATE AND ACETAMINOPHEN 5; 325 MG/1; MG/1
2 TABLET ORAL EVERY 6 HOURS PRN
Qty: 24 TABLET | Refills: 0 | Status: SHIPPED | OUTPATIENT
Start: 2020-09-15 | End: 2020-09-21

## 2020-09-15 RX ADMIN — HYDROCODONE BITARTRATE AND ACETAMINOPHEN 2 TABLET: 5; 325 TABLET ORAL at 08:55

## 2020-09-15 RX ADMIN — AMLODIPINE BESYLATE 5 MG: 5 TABLET ORAL at 08:04

## 2020-09-15 RX ADMIN — GABAPENTIN 400 MG: 400 CAPSULE ORAL at 08:04

## 2020-09-15 RX ADMIN — BUPROPION HYDROCHLORIDE 150 MG: 150 TABLET, FILM COATED, EXTENDED RELEASE ORAL at 08:04

## 2020-09-15 RX ADMIN — MORPHINE SULFATE 30 MG: 30 TABLET, FILM COATED, EXTENDED RELEASE ORAL at 08:04

## 2020-09-15 RX ADMIN — ATORVASTATIN CALCIUM 20 MG: 20 TABLET, FILM COATED ORAL at 08:04

## 2020-09-15 RX ADMIN — FENOFIBRATE 145 MG: 145 TABLET ORAL at 08:04

## 2020-09-15 RX ADMIN — FUROSEMIDE 20 MG: 20 TABLET ORAL at 08:04

## 2020-09-15 RX ADMIN — PANTOPRAZOLE SODIUM 80 MG: 40 TABLET, DELAYED RELEASE ORAL at 05:59

## 2020-09-15 RX ADMIN — METOPROLOL SUCCINATE 50 MG: 50 TABLET, EXTENDED RELEASE ORAL at 08:04

## 2020-09-15 RX ADMIN — CLOPIDOGREL 75 MG: 75 TABLET, FILM COATED ORAL at 08:04

## 2020-09-15 RX ADMIN — BUDESONIDE 0.5 MG: 0.5 INHALANT ORAL at 11:02

## 2020-09-15 RX ADMIN — HYDROCODONE BITARTRATE AND ACETAMINOPHEN 2 TABLET: 5; 325 TABLET ORAL at 12:27

## 2020-09-15 RX ADMIN — HYDROCODONE BITARTRATE AND ACETAMINOPHEN 2 TABLET: 5; 325 TABLET ORAL at 03:18

## 2020-09-15 RX ADMIN — LEVOTHYROXINE SODIUM 150 MCG: 25 TABLET ORAL at 08:04

## 2020-09-15 RX ADMIN — FERROUS SULFATE TAB 325 MG (65 MG ELEMENTAL FE) 325 MG: 325 (65 FE) TAB at 08:04

## 2020-09-15 RX ADMIN — ASPIRIN 81 MG: 81 TABLET, COATED ORAL at 08:04

## 2020-09-15 RX ADMIN — SODIUM CHLORIDE 125 ML/HR: 9 INJECTION, SOLUTION INTRAVENOUS at 03:20

## 2020-09-15 RX ADMIN — POTASSIUM CHLORIDE 10 MEQ: 10 CAPSULE, COATED, EXTENDED RELEASE ORAL at 08:04

## 2020-09-15 NOTE — DISCHARGE SUMMARY
"  Name: Radhames Colón ADMIT: 2020   : 1950  PCP: Mingo Loja MD    MRN: [id number] LOS: 1 days   AGE/SEX: 69 y.o. male  Location: [facility]     Date of Admission: 2020  Date of Discharge:  9/15/2020    PCP: Mingo Loja MD      DISCHARGE DIAGNOSIS  Active Hospital Problems    Diagnosis  POA   • PVD (peripheral vascular disease) with claudication (CMS/[facility]) [I73.9]  Yes      Resolved Hospital Problems   No resolved problems to display.       SECONDARY DIAGNOSES  Past Medical History:   Diagnosis Date   • Acute renal failure (ARF) (CMS/HCC)     WAS SHORT TERM DIALYSIS, STAGE 3   • Arthritis    • Cancer (CMS/HCC)     COLON    • COPD (chronic obstructive pulmonary disease) (CMS/HCC)    • Coronary artery disease    • Depression    • Diabetes mellitus (CMS/HCC)    • Disease of thyroid gland    • GERD (gastroesophageal reflux disease)    • Hyperlipidemia    • Hypertension    • Sleep apnea     CPAP       CONSULTS   Consults     No orders found for last 30 day(s).          PROCEDURES PERFORMED    Date: 2020, abdominal aortogram with left femoral endarterectomy with patch angioplasty    HOSPITAL COURSE  Patient is a 69 y.o. male presented to [facility] admitted for worsening claudication of the lower extremities.  Please see the admitting history and physical for further details.  Patient was admitted underwent the above-mentioned procedure.  Postoperative course following this was uneventful.  He maintained a normal hemodynamic status.  Bilateral lower extremity perfusion was good.  He was started on a diet which he tolerated well.  He was able to void adequately after catheter removal.  He was therefore discharged home.      VITAL SIGNS  /69   Pulse 55   Temp 97.4 °F (36.3 °C) (Oral)   Resp 18   Ht 172.5 cm (67.91\")   Wt 105 kg (231 lb 9.6 oz)   SpO2 96%   BMI 35.30 kg/m²   Objective: Left groin incision is healing satisfactorily without signs of " infection.  No hematoma.  Mild ecchymosis.  Both legs well-perfused.  CONDITION ON DISCHARGE  Stable.      DISCHARGE DISPOSITION         DISCHARGE MEDICATIONS     Discharge Medications      ASK your doctor about these medications      Instructions Start Date   allopurinol 300 MG tablet  Commonly known as: ZYLOPRIM   300 mg, Oral, Daily      amLODIPine 5 MG tablet  Commonly known as: NORVASC   2.5 mg, Oral, Daily      aspirin 81 MG EC tablet   81 mg, Oral, Daily, INSTUCTED TO CONTINUE UNTIL DAY OF SURGERY      atorvastatin 20 MG tablet  Commonly known as: LIPITOR   20 mg, Oral, Daily      budesonide 0.5 MG/2ML nebulizer solution  Commonly known as: PULMICORT   As Needed      buPROPion  MG 24 hr tablet  Commonly known as: WELLBUTRIN XL   TK 1 T PO QD IN THE MORNING      clopidogrel 75 MG tablet  Commonly known as: PLAVIX   75 mg, Oral, Daily      colchicine 0.6 MG tablet   0.6 mg, Oral, As Needed      fenofibrate 145 MG tablet  Commonly known as: TRICOR   145 mg, Oral, Daily      FeroSul 325 (65 FE) MG tablet  Generic drug: ferrous sulfate   Daily With Breakfast      furosemide 20 MG tablet  Commonly known as: LASIX   40 mg, Oral, Daily      gabapentin 400 MG capsule  Commonly known as: NEURONTIN   600 mg, Oral, 2 times daily      insulin  UNIT/ML injection  Commonly known as: humuLIN N,novoLIN N   40 Units, Subcutaneous, Nightly      ipratropium-albuterol 0.5-2.5 mg/3 ml nebulizer  Commonly known as: DUO-NEB   As Needed      levothyroxine 150 MCG tablet  Commonly known as: SYNTHROID, LEVOTHROID   Daily      metFORMIN 500 MG tablet  Commonly known as: GLUCOPHAGE   500 mg, Oral, 2 Times Daily With Meals      metoprolol succinate XL 50 MG 24 hr tablet  Commonly known as: TOPROL-XL   50 mg, Oral, 2 times daily      Morphine 30 MG 12 hr tablet  Commonly known as: MS CONTIN   2 Times Daily      pantoprazole 40 MG EC tablet  Commonly known as: PROTONIX   40 mg, Oral, Daily      potassium chloride 10 MEQ CR  capsule  Commonly known as: MICRO-K   10 mEq, Oral, Daily      vitamin C 500 MG tablet  Commonly known as: ASCORBIC ACID   500 mg, Daily            No future appointments.    TEST  RESULTS PENDING AT DISCHARGE  Pending Labs     Order Current Status    Basic Metabolic Panel Collected (09/15/20 0644)    CBC & Differential Collected (09/15/20 0644)    CBC Auto Differential Collected (09/15/20 0644)           [place], Post Op Global    [place] Nam Jr., MD  Office Number [phone]    09/15/20  07:12 EDT

## 2020-09-15 NOTE — PROGRESS NOTES
The patient is 1 day status post aortogram with left femoral endarterectomy with patch angioplasty.  He is doing satisfactorily with no significant complaints.  Urine output has been good.  He has been started on a diet which he has tolerated well.    Left groin incision without significant hematoma.  No erythema or cellulitis.  Mild ecchymosis noted.  Both legs warm and well-perfused.    We will discharge patient home today.  Instructions have been given.  Prescription written.  We will see in follow-up in the office in 3 weeks.

## 2020-09-15 NOTE — PROGRESS NOTES
Case Management Discharge Note      Final Note: Pt discharged home, no known needs.  ELÍAS leiva RN         Selected Continued Care - Discharged on 9/15/2020 Admission date: 9/14/2020 - Discharge disposition: Home or Self Care    Destination    No services have been selected for the patient.              Durable Medical Equipment    No services have been selected for the patient.              Dialysis/Infusion    No services have been selected for the patient.              Home Medical Care    No services have been selected for the patient.              Therapy    No services have been selected for the patient.              Community Resources    No services have been selected for the patient.                  Transportation Services  Private: Car    Final Discharge Disposition Code: 01 - home or self-care

## 2020-09-15 NOTE — PLAN OF CARE
Goal Outcome Evaluation:  Plan of Care Reviewed With: patient  Progress: improving  Outcome Summary: VSS, ROSALIND, TINY/Ox4. Patient requesting PRN meds for incisional groin pain. Patient states he is ready for discharge around noon, when his daughter would come pick him up. Encouraged patient to get out of bed, pt dangled feet at bedside this am, refused to get into chair. Martins out this am, per night shift.

## 2020-09-21 ENCOUNTER — HOSPITAL ENCOUNTER (OUTPATIENT)
Dept: LAB | Facility: HOSPITAL | Age: 70
Discharge: HOME | End: 2020-09-21
Attending: INTERNAL MEDICINE

## 2020-09-21 LAB
CREAT UR-MCNC: 1.6 MG/DL (ref 0.7–1.2)
URATE SERPL-MCNC: 3.4 MG/DL (ref 3.5–8.5)

## 2020-09-25 ENCOUNTER — HOSPITAL ENCOUNTER (OUTPATIENT)
Dept: GENERAL RADIOLOGY | Facility: HOSPITAL | Age: 70
Discharge: HOME | End: 2020-09-25

## 2020-10-14 ENCOUNTER — OFFICE VISIT CONVERTED (OUTPATIENT)
Dept: URGENT CARE | Facility: CLINIC | Age: 70
End: 2020-10-14
Attending: INTERNAL MEDICINE

## 2020-11-02 ENCOUNTER — HOSPITAL ENCOUNTER (OUTPATIENT)
Dept: LAB | Facility: HOSPITAL | Age: 70
Discharge: HOME | End: 2020-11-02
Attending: INTERNAL MEDICINE

## 2020-11-02 LAB
ALBUMIN SERPL-MCNC: 3.7 G/DL (ref 3.5–5)
ALBUMIN/GLOB SERPL: 1.4 {RATIO} (ref 1.4–2.6)
ALP SERPL-CCNC: 44 U/L (ref 56–155)
ALT SERPL-CCNC: 14 U/L (ref 10–40)
ANION GAP SERPL CALC-SCNC: 20 MMOL/L (ref 8–19)
AST SERPL-CCNC: 25 U/L (ref 15–50)
BASOPHILS # BLD AUTO: 0.07 10*3/UL (ref 0–0.2)
BASOPHILS NFR BLD AUTO: 0.9 % (ref 0–3)
BILIRUB SERPL-MCNC: 0.27 MG/DL (ref 0.2–1.3)
BNP SERPL-MCNC: 1128 PG/ML (ref 0–900)
BUN SERPL-MCNC: 18 MG/DL (ref 5–25)
BUN/CREAT SERPL: 12 {RATIO} (ref 6–20)
CALCIUM SERPL-MCNC: 9.7 MG/DL (ref 8.7–10.4)
CHLORIDE SERPL-SCNC: 99 MMOL/L (ref 99–111)
CHOLEST SERPL-MCNC: 128 MG/DL (ref 107–200)
CHOLEST/HDLC SERPL: 4.3 {RATIO} (ref 3–6)
CONV ABS IMM GRAN: 0.03 10*3/UL (ref 0–0.2)
CONV CO2: 27 MMOL/L (ref 22–32)
CONV IMMATURE GRAN: 0.4 % (ref 0–1.8)
CONV TOTAL PROTEIN: 6.3 G/DL (ref 6.3–8.2)
CREAT UR-MCNC: 1.54 MG/DL (ref 0.7–1.2)
DEPRECATED RDW RBC AUTO: 53.6 FL (ref 35.1–43.9)
EOSINOPHIL # BLD AUTO: 0.29 10*3/UL (ref 0–0.7)
EOSINOPHIL # BLD AUTO: 3.8 % (ref 0–7)
ERYTHROCYTE [DISTWIDTH] IN BLOOD BY AUTOMATED COUNT: 17.2 % (ref 11.6–14.4)
EST. AVERAGE GLUCOSE BLD GHB EST-MCNC: 140 MG/DL
GFR SERPLBLD BASED ON 1.73 SQ M-ARVRAT: 44 ML/MIN/{1.73_M2}
GLOBULIN UR ELPH-MCNC: 2.6 G/DL (ref 2–3.5)
GLUCOSE SERPL-MCNC: 72 MG/DL (ref 70–99)
HBA1C MFR BLD: 6.5 % (ref 3.5–5.7)
HCT VFR BLD AUTO: 43.7 % (ref 42–52)
HDLC SERPL-MCNC: 30 MG/DL (ref 40–60)
HGB BLD-MCNC: 13.1 G/DL (ref 14–18)
LDLC SERPL CALC-MCNC: 69 MG/DL (ref 70–100)
LYMPHOCYTES # BLD AUTO: 1.83 10*3/UL (ref 1–5)
LYMPHOCYTES NFR BLD AUTO: 24 % (ref 20–45)
MCH RBC QN AUTO: 25.5 PG (ref 27–31)
MCHC RBC AUTO-ENTMCNC: 30 G/DL (ref 33–37)
MCV RBC AUTO: 85 FL (ref 80–96)
MONOCYTES # BLD AUTO: 0.83 10*3/UL (ref 0.2–1.2)
MONOCYTES NFR BLD AUTO: 10.9 % (ref 3–10)
NEUTROPHILS # BLD AUTO: 4.58 10*3/UL (ref 2–8)
NEUTROPHILS NFR BLD AUTO: 60 % (ref 30–85)
NRBC CBCN: 0 % (ref 0–0.7)
OSMOLALITY SERPL CALC.SUM OF ELEC: 292 MOSM/KG (ref 273–304)
PLATELET # BLD AUTO: 183 10*3/UL (ref 130–400)
PMV BLD AUTO: 12.6 FL (ref 9.4–12.4)
POTASSIUM SERPL-SCNC: 4.5 MMOL/L (ref 3.5–5.3)
RBC # BLD AUTO: 5.14 10*6/UL (ref 4.7–6.1)
SODIUM SERPL-SCNC: 141 MMOL/L (ref 135–147)
T4 FREE SERPL-MCNC: 1.8 NG/DL (ref 0.9–1.8)
TRIGL SERPL-MCNC: 144 MG/DL (ref 40–150)
TSH SERPL-ACNC: 3.72 M[IU]/L (ref 0.27–4.2)
URATE SERPL-MCNC: 4 MG/DL (ref 3.5–8.5)
VLDLC SERPL-MCNC: 29 MG/DL (ref 5–37)
WBC # BLD AUTO: 7.63 10*3/UL (ref 4.8–10.8)

## 2020-11-09 ENCOUNTER — OFFICE VISIT CONVERTED (OUTPATIENT)
Dept: CARDIOLOGY | Facility: CLINIC | Age: 70
End: 2020-11-09
Attending: NURSE PRACTITIONER

## 2020-11-12 ENCOUNTER — HOSPITAL ENCOUNTER (OUTPATIENT)
Dept: URGENT CARE | Facility: CLINIC | Age: 70
Discharge: HOME | End: 2020-11-12
Attending: INTERNAL MEDICINE

## 2020-11-16 LAB — SARS-COV-2 RNA SPEC QL NAA+PROBE: NOT DETECTED

## 2021-03-10 ENCOUNTER — HOSPITAL ENCOUNTER (OUTPATIENT)
Dept: LAB | Facility: HOSPITAL | Age: 71
Discharge: HOME | End: 2021-03-10
Attending: INTERNAL MEDICINE

## 2021-03-10 LAB
ALBUMIN SERPL-MCNC: 4 G/DL (ref 3.5–5)
ALBUMIN/GLOB SERPL: 1.5 {RATIO} (ref 1.4–2.6)
ALP SERPL-CCNC: 56 U/L (ref 56–155)
ALT SERPL-CCNC: 13 U/L (ref 10–40)
ANION GAP SERPL CALC-SCNC: 23 MMOL/L (ref 8–19)
AST SERPL-CCNC: 25 U/L (ref 15–50)
BASOPHILS # BLD AUTO: 0.07 10*3/UL (ref 0–0.2)
BASOPHILS NFR BLD AUTO: 0.7 % (ref 0–3)
BILIRUB SERPL-MCNC: 0.36 MG/DL (ref 0.2–1.3)
BNP SERPL-MCNC: 1937 PG/ML (ref 0–900)
BUN SERPL-MCNC: 20 MG/DL (ref 5–25)
BUN/CREAT SERPL: 12 {RATIO} (ref 6–20)
CALCIUM SERPL-MCNC: 9.8 MG/DL (ref 8.7–10.4)
CHLORIDE SERPL-SCNC: 104 MMOL/L (ref 99–111)
CONV ABS IMM GRAN: 0.05 10*3/UL (ref 0–0.2)
CONV CO2: 23 MMOL/L (ref 22–32)
CONV IMMATURE GRAN: 0.5 % (ref 0–1.8)
CONV TOTAL PROTEIN: 6.7 G/DL (ref 6.3–8.2)
CREAT UR-MCNC: 1.73 MG/DL (ref 0.7–1.2)
DEPRECATED RDW RBC AUTO: 54.9 FL (ref 35.1–43.9)
EOSINOPHIL # BLD AUTO: 0.51 10*3/UL (ref 0–0.7)
EOSINOPHIL # BLD AUTO: 5.2 % (ref 0–7)
ERYTHROCYTE [DISTWIDTH] IN BLOOD BY AUTOMATED COUNT: 17.3 % (ref 11.6–14.4)
EST. AVERAGE GLUCOSE BLD GHB EST-MCNC: 128 MG/DL
GFR SERPLBLD BASED ON 1.73 SQ M-ARVRAT: 39 ML/MIN/{1.73_M2}
GLOBULIN UR ELPH-MCNC: 2.7 G/DL (ref 2–3.5)
GLUCOSE SERPL-MCNC: 74 MG/DL (ref 70–99)
HBA1C MFR BLD: 6.1 % (ref 3.5–5.7)
HCT VFR BLD AUTO: 48.7 % (ref 42–52)
HGB BLD-MCNC: 14.4 G/DL (ref 14–18)
LYMPHOCYTES # BLD AUTO: 2.43 10*3/UL (ref 1–5)
LYMPHOCYTES NFR BLD AUTO: 24.9 % (ref 20–45)
MCH RBC QN AUTO: 26.3 PG (ref 27–31)
MCHC RBC AUTO-ENTMCNC: 29.6 G/DL (ref 33–37)
MCV RBC AUTO: 88.9 FL (ref 80–96)
MONOCYTES # BLD AUTO: 0.88 10*3/UL (ref 0.2–1.2)
MONOCYTES NFR BLD AUTO: 9 % (ref 3–10)
NEUTROPHILS # BLD AUTO: 5.81 10*3/UL (ref 2–8)
NEUTROPHILS NFR BLD AUTO: 59.7 % (ref 30–85)
NRBC CBCN: 0 % (ref 0–0.7)
OSMOLALITY SERPL CALC.SUM OF ELEC: 301 MOSM/KG (ref 273–304)
PLATELET # BLD AUTO: 235 10*3/UL (ref 130–400)
PMV BLD AUTO: 12.3 FL (ref 9.4–12.4)
POTASSIUM SERPL-SCNC: 5.2 MMOL/L (ref 3.5–5.3)
RBC # BLD AUTO: 5.48 10*6/UL (ref 4.7–6.1)
SODIUM SERPL-SCNC: 145 MMOL/L (ref 135–147)
WBC # BLD AUTO: 9.75 10*3/UL (ref 4.8–10.8)

## 2021-03-31 ENCOUNTER — HOSPITAL ENCOUNTER (OUTPATIENT)
Dept: LAB | Facility: HOSPITAL | Age: 71
Discharge: HOME | End: 2021-03-31
Attending: INTERNAL MEDICINE

## 2021-03-31 LAB
CREAT UR-MCNC: 1.88 MG/DL (ref 0.7–1.2)
URATE SERPL-MCNC: 3.6 MG/DL (ref 3.5–8.5)

## 2021-05-07 ENCOUNTER — HOSPITAL ENCOUNTER (OUTPATIENT)
Dept: SLEEP MEDICINE | Facility: HOSPITAL | Age: 71
Discharge: HOME | End: 2021-05-07
Attending: INTERNAL MEDICINE

## 2021-05-10 NOTE — H&P
History and Physical      Patient Name: Radhames Colón   Patient ID: [id number]   Sex: Male   YOB: 1950    Primary Care Provider: Mingo Loja MD   Referring Provider: Mingo Loja MD    Visit Date: April 8, 2020    Provider: Harriet Mayers PA-C   Location[facility]   Location Address: [address] [place] [address]  [id number]   Location Phone: [phone]          Chief Complaint  · Evaluation for Respiratory Virus      History Of Present Illness  Radhames Colón is a 69 year old /White male who presents today to the clinic for evaluation of a respiratory virus.   Date of Onset: 04/07/2020   What Symptoms: fever/chills and shortness of breath   Quality of Symptoms: Patient presents with 24-hour history of shortness of breath low-grade fever up to a temp of 101 starting last night. He took Tylenol. He is also with body aches chills. He was having some epigastric/chest pain earlier but he felt like it was his need to belch. It has resolved. He does have a history of COPD. He states his been a year since his last COPD exacerbation. He came here to be seen very quickly to make sure that he could get treated. Has not been outside. He has not been socializing or around anyone for the past 3 weeks. Children are getting his groceries and leaving them on the front porch   Anything make it worse or better: nothing has helped   Severity of Symptoms: severe per patient   Any known Exposure to COVID-19: no known exposure, staying at home, does not work       Past Medical History  Abdominal Aortic Aneurysm; Arthritis; Cancer; Chronic Obstructive Pulmonary Disease; Colon Cancer Stage III; Congestive heart failure; Coronary artery disease; Diabetes; Diabetes Mellitus, Type I; Diverticulosis; Elevated CEA; High blood pressure; High cholesterol; Hypertension, essential; Hyperthyroidism; Hypothyroidism; Kidney Disease; Low back pain; Neurologic disorder; Pain,  Back; Pain, Chronic Pain Syndrome; Rectal bleeding; Renal Failure; Shortness Of Air         Past Surgical History  Appendectomy; Carotid Endarderectomy-Left; Cholecystectomy; Colon Surgery; Coronary Artery_Bypass Graft         Medication List  allopurinol 100 mg oral tablet; amlodipine 10 mg oral tablet; aspirin 81 mg oral tablet,delayed release (DR/EC); atorvastatin 20 mg oral tablet; colchicine 0.6 mg oral tablet; CoQ-10 30 mg oral capsule; ferrous sulfate 15 mg iron (75 mg)/mL oral drops; gabapentin 600 mg oral tablet; Lasix 40 mg oral tablet; levothyroxine 175 mcg oral tablet; metoprolol succinate 50 mg oral tablet extended release 24 hr; morphine 30 mg oral capsule,extend.release pellets; Novolin N NPH U-100 Insulin 100 unit/mL subcutaneous suspension; pantoprazole 20 mg oral tablet,delayed release (DR/EC); Plavix 75 mg oral tablet; Tricor 145 mg oral tablet         Allergy List  Latex; PENICILLINS       Allergies Reconciled  Family Medical History  Cancer, Unspecified; Prostate cancer; Family history of breast cancer         Social History  Alcohol (Current some day); Caffeine (Current every day); Second hand smoke exposure (Never); Tobacco (Former)         Review of Systems  · Constitutional  o Admits  o : fever  o Denies  o : fatigue, weight gain, weight loss  · Respiratory  o Admits  o : shortness of breath  o Denies  o : cough, asthma  · Gastrointestinal  o Denies  o : nausea, vomiting, diarrhea, constipation, abdominal pain  · Integument  o Denies  o : rash  · Neurologic  o Denies  o : headache      Vitals  Date Time BP Position Site L\R Cuff Size HR RR TEMP (F) WT  HT  BMI kg/m2 BSA m2 O2 Sat        04/08/2020 09:13 AM      89 - R  99.2     94 %          Physical Examination  · Constitutional  o Appearance  o : no acute distress, well-nourished  · Head and Face  o Head  o :   § Inspection  § : atraumatic, normocephalic  · Eyes  o Eyes  o : extraocular movements intact, no scleral icterus, no  conjunctival injection  · Ears, Nose, Mouth and Throat  o Ears  o :   § External Ears  § : normal  § Otoscopic Examination  § : normal tympanic membrane exam  o Nose  o :   § Intranasal Exam  § : sinuses nontender to palpation  o Oral Cavity  o :   § Oral Mucosa  § : moist mucous membranes  o Throat  o :   § Oropharynx  § : normal tonsils, normal oropharynx  · Respiratory  o Respiratory Effort  o : labored breathing present, no accessory muscle use  o Auscultation of Lungs  o : clear to asculatation bilaterally, no wheezes, rales, or rhonchii  · Cardiovascular  o Heart  o :   § Auscultation of Heart  § : regular rate and rhythm, no murmurs, rubs, or gallops  o Peripheral Vascular System  o :   § Extremities  § : no edema  · Lymphatic  o Neck  o : no lymphadenopathy present  o Supraclavicular Nodes  o : no supraclavicular nodes  · Skin and Subcutaneous Tissue  o General Inspection  o : normal inspection  · Neurologic  o Mental Status Examination  o :   § Orientation  § : grossly oriented to person, place and time  o Gait and Station  o :   § Gait Screening  § : normal gait  · Psychiatric  o General  o : normal mood and affect          Results  · In-Office Procedures  o Lab procedure  § IOP - Influenza A/B Test (15809)   § Influenza A: Negative   § Influenza B: Negative   § Internal Control Verified?: Yes   § Rapid group A Streptococcus screen (08182)   § Beta Strep Gp A Culture: Negative   § Internal Control Verified?: Yes       Assessment  · Fever     780.60/R50.9  · Shortness of breath     786.05/R06.02    Problems Reconciled  Plan  · Medications  o Augmentin 875-125 mg oral tablet   SIG: take 1 tablet by oral route every 12 hours for 10 days   DISP: (20) tablets with 0 refills  Prescribed on 2020     o prednisone 20 mg oral tablet   SI mg daily for 5 days   DISP: (10) tablets with 0 refills  Prescribed on 2020     o Medications have been Reconciled  o Transition of Care or Provider  Policy  · Instructions  o Plan of Care:   o Patient instructed to seek medical attention urgently for new or worsening symptoms.  o Patient was educated on their diagnosis, treatment, and medications today.   o Recommend self monitoring. Instructions given.   o Stay home until without fever for 72 hours without using fever-reducing medications and other symptoms are gone.  o Recommends over the counter medications for symptom management.  o Patient will be treated for his shortness of breath and fever. He has COPD he is worried about an exacerbation. He was able to walk around outside of our clinic with his O2 sat staying at 94%. He did not drop. He is not wheezing today. He has been using his nebulizer. I discussed he probably needed to do this in his own room, door closed vs in the living room where aerosolized particles could be inhaled by his wife.   · Disposition  o Call or Return if symptoms worsen or persist.            Electronically Signed by: Harriet Mayers PA-C -Author on April 8, 2020 09:40:42 AM

## 2021-05-13 NOTE — PROGRESS NOTES
Progress Note      Patient Name: Radhames Colón   Patient ID: [id number]   Sex: Male   YOB: 1950    Primary Care Provider: Mingo oLja MD   Referring Provider: Mingo Loja MD    Visit Date: November 9, 2020    Provider: MUKUL Hanson   Location: [facility]   Location Address: [address] [place] [address], [address]   [place], [place]  [id number]   Location Phone: [phone]          Chief Complaint  · Shortness of breath   · Pedal edema       History Of Present Illness  REFERRING PROVIDER: Mingo Loja MD   Radhames Colón is a 69 year old /White male who states his shortness of breath is better since he was in the hospital last month for pneumonia. Pedal edema he actually feels is better. He denies any chest pain or pressure. No palpitations, dizziness, syncope, PND or orthopnea. He has lost about 13 pounds since he was last here last year in the office.   PAST MEDICAL HISTORY: Coronary artery disease with previous bypass surgery (2005 x3) and subsequent stent/PCI of the native circumflex obtuse marginal branch in January 2018; Diabetes mellitus; Diastolic heart failure; Hypertension; Hypothyroidism.   PSYCHOSOCIAL HISTORY: He rarely drinks alcohol. He previously used tobacco but quit.   CURRENT MEDICATIONS: include Allopurinol 100 mg b.i.d.; Amlodipine 2.5 mg daily; ASA 81 mg daily; Atorvastatin 20 mg daily; Bupropion 150 mg daily; Clopidogrel 75 mg daily; Colchicine 0.6 mg p.r.n.; CoQ10 200 mg daily; Fenofibrate 145 mg daily; iron 325 mg daily; Lasix 40 mg daily; folic acid 1 mg b.i.d.; Gabapentin 600 mg b.i.d.; insulin; Levothyroxine 150 mcg daily; Metformin 500 mg b.i.d.; Metoprolol ER 50 mg b.i.d.; morphine 30 mg b.i.d.; Pantoprazole 40 mg daily; Potassium 10 mEq daily; Vitamin B12; Vitamin C; Vitamin D3. The dosage and frequency of the medications were reviewed with the patient.       Review of Systems  · Cardiovascular  o Admits  o : swelling (feet,  "ankles, hands), shortness of breath while walking or lying flat  o Denies  o : palpitations (fast, fluttering, or skipping beats), chest pain or angina pectoris   · Respiratory  o Denies  o : chronic or frequent cough, asthma or wheezing      Vitals  Date Time BP Position Site L\R Cuff Size HR RR TEMP (F) WT  HT  BMI kg/m2 BSA m2 O2 Sat FR L/min FiO2 HC       11/09/2020 11:31 /64 Sitting    66 - R   231lbs 0oz 5'  9\" 34.11 2.26             Physical Examination  · Constitutional  o Appearance  o : Awake, alert, in no acute distress, accompanied by his wife.  · Eyes  o Conjunctivae  o : Conjunctivae normal.  · Ears, Nose, Mouth and Throat  o Oral Cavity  o :   § Oral Mucosa  § : Normal.  · Neck  o Jugular Veins  o : No JVD. Good carotid upstroke. No bruits noted.  · Respiratory  o Respiratory  o : Good respiratory effort. Clear to percussion and auscultation.  · Cardiovascular  o Heart  o : PMI not well felt. Heart sounds are distant. S1, S2 regular. No S3. No S4. 1/6 systolic murmur at the apex. Negative diastolic murmur.   o Peripheral Vascular System  o :   § Extremities  § : Good femoral and pedal pulses. Trace pedal edema.  · Gastrointestinal  o Abdominal Examination  o : Soft. No tenderness or masses felt. No hepatosplenomegaly. Abdominal aorta is not palpable.     Labs:  Glucose 72, BUN 18, creatinine 1.54.  BNP is 1128, which is good for him. Hemoglobin A1c is 6.5.  , , HDL 30, LDL good at 69.           Assessment     1.  Coronary artery disease with previous bypass, without angina.  2.  Shortness of breath related to pneumonia improving.  3.  Hypertension controlled.  4.  Hyperlipidemia at goal.  5.  Diastolic heart failure, Class II, improving.       Plan     1.  Continue current medications.  2.  Will follow up in 6 months or earlier if needed.    Sondra tomlin/isela           This note was transcribed by Deb Rivera.  isela/nabor  The above service was transcribed by Deb Rivera, and I " attest to the accuracy of the note.  JF                 Electronically Signed by: Deb Rivera-, -Author on November 12, 2020 09:13:12 AM  Electronically Co-signed by: MUKUL Hanson -Reviewer on November 12, 2020 10:21:28 AM

## 2021-05-13 NOTE — PROGRESS NOTES
Progress Note      Patient Name: Radhames Colón   Patient ID: [id number]   Sex: Male   YOB: 1950    Primary Care Provider: Mingo Loja MD   Referring Provider: Mingo Loja MD    Visit Date: May 6, 2020    Provider: MUKUL Hanson   Location: [facility]   Location Address: [address] [place] [address], [address]   [place], [place]  [id number]   Location Phone: [phone]          Chief Complaint  · Shortness of breath      History Of Present Illness  Video Conferencing Visit  Radhames Colón is a 69 year old /White male who is presenting for evaluation via video conferencing. Verbal consent obtained before beginning visit. Telehealth due to COVID-19. He continues to be short of breath. It is mild with moderate exertion; that is long term and normal. He attributes it to his COPD. Swelling is intermittent and mild. He denies any chest pain. No palpitations, dizziness, syncope, PND or orthopnea. Cardiac-wise he said he is feeling good. He admits he does not get any regular exercise, but he has lost about 8 pounds since his last visit if his scale aligns with ours.   The following staff were present during this visit: Provider only.   PAST MEDICAL HISTORY: Coronary artery disease with previous bypass surgery (2005 x3) and subsequent stent/PCI of the native circumflex obtuse marginal branch in January 2018; Diabetes mellitus; Diastolic heart failure; Hypertension; Hypothyroidism.   FAMILY HISTORY: Positive for diabetes and heart disease. Negative for hypertension.   PSYCHOSOCIAL HISTORY: No history of mood changes or depression. He never used alcohol. He previously used tobacco but quit.   CURRENT MEDICATIONS: include Vitamin D 50 mcg daily; Allopurinol 300 mg daily; Amlodipine 2.5 mg daily; ASA 81 mg daily; Atorvastatin 20 mg daily; Plavix 75 mg daily; CoQ10 200 mg daily; Fenofibrate 145 mg daily; iron 325 mg daily; Lasix 40 mg (he says about once every month to  two months he might take an extra one for increased swelling); Gabapentin 600 mg b.i.d; insulin; Levothyroxine 150 mcg daily; Metformin 500 mg b.i.d.; Metoprolol ER 50 mg b.i.d; Pantoprazole 40 mg daily; Vitamin C. The dosage and frequency of the medications were reviewed with the patient.      Labs taken recently:  BUN 15, creatinine 1.33.  , , HDL25, LDL 65.  TSH is elevated at 13.1 with a free T4 of 1.1.  Hemoglobin A1c was 7.5.       Review of Systems  · Cardiovascular  o Admits  o : swelling (feet, ankles, hands), shortness of breath while walking or lying flat  o Denies  o : palpitations (fast, fluttering, or skipping beats), chest pain or angina pectoris   · Respiratory  o Denies  o : chronic or frequent cough, asthma or wheezing      Vitals     Per patient, at-home vitals are blood pressure 123/62, heart rate of 63.               Assessment     1.  Coronary artery disease with previous bypass and stents, without angina.  2.  Shortness of breath related to chronic lung disease, stable.  3.  Hypothyroidism.  4.  Hyperlipidemia with LDL at goal.  5.  Chronic kidney disease, Stage 3, stable.       Plan     1.  His thyroid is monitored by his PCP, and he saw him yesterday, and they said they are going to repeat the       thyroid in 2 months.  2.  He will continue other medications.  3.  Will follow up in 6 months with an EKG or earlier if needed.    Sondra tomlin/isela           This note was transcribed by Deb Rivera.  isela/juan pablo  The above service was transcribed by Deb Rivera, and I attest to the accuracy of the note.  JUAN PABLO                 Electronically Signed by: Deb Rivera-, -Author on May 13, 2020 08:29:51 AM  Electronically Co-signed by: MUKUL Hanson -Reviewer on May 15, 2020 10:59:56 AM

## 2021-05-14 VITALS
HEART RATE: 66 BPM | WEIGHT: 231 LBS | SYSTOLIC BLOOD PRESSURE: 126 MMHG | HEIGHT: 69 IN | DIASTOLIC BLOOD PRESSURE: 64 MMHG | BODY MASS INDEX: 34.21 KG/M2

## 2021-05-15 VITALS
DIASTOLIC BLOOD PRESSURE: 68 MMHG | WEIGHT: 242 LBS | BODY MASS INDEX: 35.84 KG/M2 | HEIGHT: 69 IN | HEART RATE: 64 BPM | SYSTOLIC BLOOD PRESSURE: 122 MMHG

## 2021-05-15 VITALS — TEMPERATURE: 99.2 F | HEART RATE: 89 BPM | OXYGEN SATURATION: 94 %

## 2021-05-15 VITALS
HEIGHT: 69 IN | HEART RATE: 68 BPM | SYSTOLIC BLOOD PRESSURE: 128 MMHG | DIASTOLIC BLOOD PRESSURE: 66 MMHG | BODY MASS INDEX: 36.43 KG/M2 | WEIGHT: 246 LBS

## 2021-05-16 VITALS
BODY MASS INDEX: 38.21 KG/M2 | HEIGHT: 69 IN | WEIGHT: 258 LBS | SYSTOLIC BLOOD PRESSURE: 132 MMHG | HEART RATE: 58 BPM | DIASTOLIC BLOOD PRESSURE: 68 MMHG

## 2021-05-16 VITALS
HEIGHT: 69 IN | SYSTOLIC BLOOD PRESSURE: 122 MMHG | WEIGHT: 248 LBS | BODY MASS INDEX: 36.73 KG/M2 | HEART RATE: 74 BPM | DIASTOLIC BLOOD PRESSURE: 60 MMHG

## 2021-05-16 VITALS — WEIGHT: 249 LBS | HEIGHT: 69 IN | RESPIRATION RATE: 16 BRPM | BODY MASS INDEX: 36.88 KG/M2

## 2021-05-16 VITALS — HEART RATE: 62 BPM | SYSTOLIC BLOOD PRESSURE: 134 MMHG | DIASTOLIC BLOOD PRESSURE: 67 MMHG

## 2021-05-16 VITALS — WEIGHT: 246 LBS | BODY MASS INDEX: 36.43 KG/M2 | HEIGHT: 69 IN | RESPIRATION RATE: 16 BRPM

## 2021-05-16 VITALS
SYSTOLIC BLOOD PRESSURE: 148 MMHG | DIASTOLIC BLOOD PRESSURE: 72 MMHG | WEIGHT: 246 LBS | HEART RATE: 62 BPM | BODY MASS INDEX: 36.43 KG/M2 | HEIGHT: 69 IN

## 2021-05-28 VITALS
RESPIRATION RATE: 15 BRPM | BODY MASS INDEX: 34.21 KG/M2 | OXYGEN SATURATION: 96 % | DIASTOLIC BLOOD PRESSURE: 60 MMHG | HEART RATE: 62 BPM | HEIGHT: 69 IN | TEMPERATURE: 98 F | SYSTOLIC BLOOD PRESSURE: 119 MMHG | WEIGHT: 231 LBS

## 2021-05-28 VITALS
RESPIRATION RATE: 22 BRPM | WEIGHT: 232.37 LBS | DIASTOLIC BLOOD PRESSURE: 85 MMHG | HEART RATE: 88 BPM | TEMPERATURE: 97.9 F | SYSTOLIC BLOOD PRESSURE: 166 MMHG | BODY MASS INDEX: 34.42 KG/M2 | OXYGEN SATURATION: 89 % | HEIGHT: 69 IN

## 2021-05-28 NOTE — PROGRESS NOTES
Patient: YARIEL GREENBERG     Acct: [id number]     Report: #[id number]  UNIT #: [id number]     : 1950    Encounter Date:2020  PRIMARY CARE: Paul Loja  ***Signed***  --------------------------------------------------------------------------------------------------------------------  Chief Complaint      Encounter Date      Aug 12, 2020            Primary Care Provider      Paul Loja            Referring Provider            [facility]            Patient Complaint      Patient is complaining of      PT here today for [facility] F/U, Pneumonia            VITALS      Height 5 ft 9 in / 175.26 cm      Weight 231 lbs  / 104.591777 kg      BSA 2.20 m2      BMI 34.1 kg/m2      Temperature 98.0 F / 36.67 C - Temporal      Pulse 62      Respirations 15      Blood Pressure 119/60 Sitting, Left Arm      Pulse Oximetry 96%, room air            HPI      The patient is a 69 year old male who presented to the ER on 2020 due to     ongoing fever, worsening shortness of breath and overall not feeling well.  The     patient also admitted to some nausea, vomiting and one episode of diarrhea.  The    patient states that symptoms started the day before prior to ER visit.  The     patient does have a history of COPD and does use nightly CPAP for obstructive     sleep apnea.  The patient had reported that his CPAP machine kept alarming and     he felt like he could not breath.  In the ER, the patient did have an ABG which     revealed a CO2 of 53. The patient was placed on BiPAP.  The patient was also     with an elevated lactic acid level of 3.9 and elevated white blood cell count of    15.9.  Chest x-ray was concerning for upper lobe pneumonia. The patient was     admitted to the hospital. Pulmonary critical care was consulted secondary to     right lower lobe pneumonia, acute hypoxemic and hypercapnic respiratory failure     for further evaluation and treatment.  The patient was treated during his     hospital stay with  antibiotics and neb treatments.  COVID19 PCR came back     negative. Mycoplasma IgM came back negative.  MRSA screen came back negative.     Elevated procalcitonin level consistent with bacterial pneumonia. CT of the     chest was obtained showing pleural effusions and pneumonia of the right lung.      The patient to continue with nebulizer treatments, IV steroids and IV Lasix.  He    was also considered for possible bronchoscopy, however patient clinically     improved and was discharged home on 07/29/2020.  The patient was also treated     for acute on chronic diastolic heart failure during hospital stay with diuretics    and patient states he is under the care of Dr. Gonzalez and is scheduled to follow     up with him.  The patient states since hospital stay he is feeling much better.     The patient currently denies any increase in shortness of breath, cough,     wheezing, fever, chills, night sweats, hemoptysis, purulent sputum production,     swollen glands in the head and neck, chest pain, chest tightness, abdominal     pain, nausea, vomiting, diarrhea. The patient denies any headaches, myalgias,     changes in sense of taste and/or smell or any other coronavirus or flu-like     symptoms. The patient states that he is wearing a CPAP at night with nasal     pillows and is under the care of sleep center who is managing his settings. The     patient states that he was seen by his PCP yesterday and had a chest x-ray and     labs completed. The patient states that he is taking Pulmicort everyday as     prescribed and uses DuoNebs as needed.  The patient states he is able to perform    ADLs without difficulty.  Patient had a chest x-ray completed on 08/07/2020, it     did not show any active disease.              I have personally reviewed the review of systems, past family, social, surgical     and medical histories and I agree with those as entered in the chart.  Of note,     patient was seen by Dr. Ortega and   Elyse during his hospital stay.      Copies To:   ALONSO NAGY      Constitutional:  Denies: Fatigue, Fever, Weight gain, Weight loss, Chills, Inso    mnia, Other      Respiratory/Breathing:  Complains of: Shortness of air; Denies: Wheezing, Cough,    Hemoptysis, Pleuritic pain, Other      Endocrine:  Denies: Polydipsia, Polyuria, Heat/cold intolerance, Diabetes, Other      Eyes:  Denies: Blurred vision, Vision Changes, Other      Ears, nose, mouth, throat:  Denies: Mouth lesions, Thrush, Throat pain,     Hoarseness, Allergies/Hay Fever, Post Nasal Drip, Headaches, Recent Head Injury,    Nose Bleeding, Neck Stiffness, Thyroid Mass, Hearing Loss, Ear Fullness, Dry     Mouth, Nasal or Sinus Pain, Dry Lips, Nasal discharge, Nasal congestion, Other      Cardiovascular:  Denies: Palpitations, Syncope, Claudication, Chest Pain, Wake     up Gasping for air, Leg Swelling, Irregular Heart Rate, Cyanosis, Dyspnea on     Exertion, Other      Gastrointestinal:  Denies: Nausea, Constipation, Diarrhea, Abdominal pain,     Vomiting, Difficulty Swallowing, Reflux/Heartburn, Dysphagia, Jaundice,     Bloating, Melena, Bloody stools, Other      Genitourinary:  Denies: Urinary frequency, Incontinence, Hematuria, Urgency,     Nocturia, Dysuria, Testicular problems, Other      Musculoskeletal:  Denies: Joint Pain, Joint Stiffness, Joint Swelling, Myalgias,    Other      Hematologic/lymphatic:  DENIES: Lymphadenopathy, Bruising, Bleeding tendencies,     Other      Neurological:  Denies: Headache, Numbness, Weakness, Seizures, Other      Psychiatric:  Denies: Anxiety, Appropriate Effect, Depression, Other      Sleep:  No: Excessive daytime sleep, Morning Headache?, Snoring, Insomnia?, Stop    breathing at sleep?, Other      Integumentary:  Denies: Rash, Dry skin, Skin Warm to Touch, Other      Immunologic/Allergic:  Denies: Latex allergy, Seasonal allergies, Asthma,     Urticaria, Eczema, Other      Immunization status:   No: Up to date            FAMILY/SOCIAL/MEDICAL HX      Surgical History:  Yes: Appendectomy (1982.), Bowel Surgery (2010 colon cancer     removal.), Cholecystectomy (2013), Other Surgeries; No: AAA Repair, Abdominal     Surgery, Bladder Surgery, CABG, Head Surgery, Oral Surgery, Orthopedic Surgery,     Vascular Surgery      Heart - Family Hx:  Mother      Is Father Still Living?:  No      Is Mother Still Living?:  No      Smoking status:  Former smoker (smoker x40 yrs, smoked 1 PPD, Quit 2005)      Anticoagulation Therapy:  Yes      Antibiotic Prophylaxis:  No      Medical History:  Yes: Arthritis, Blood Disease (anemia), Chemotherapy/Cancer     (colon cancer in 2010.), Chronic Bronchitis/COPD, Congestive Heart Failu, D    iabetes, Hemorrhoids/Rectal Prob (COLON CA-COLON RESECTION 2010), High Blood     Pressure (CONTROLLED WITH  MEDICATION), High Cholesterol, Reflux Disease,     Shortness Of Breath, Thyroid Problem (HYPOTHYROIDISM); No: Asthma, Deafness or     Ringing Ears, Heart Attack, Miscellaneous Medical/oth      Psychiatric History      none            PREVENTION      2 or More Falls in Past Year?:  No      Fall Past Year with Injury?:  No      Encouraged to follow-up with:  PCP regarding preventative exams.      Chart initiated by      Aundrea Fontenot CMA            ALLERGIES/MEDICATIONS      Allergies:        Coded Allergies:             ADHESIVE (Verified  Allergy, Unknown, RASH; Steri-Strips breaks down skin ,    11/9/18)           PENICILLINS (Verified  Allergy, Unknown, rash, 11/9/18)      Medications    Last Reconciled on 8/12/20 10:08 by BROOK RODRIGUEZ-Albuterol (Ventolin HFA) 18 Gm Hfa.aer.ad      2 PUFFS INH QID, #1 MDI 5 Refills         Prov: AKHIL WHITE [facility]         8/12/20       Albuterol/Ipratropium (Duoneb) 3 Ml Ampul.neb      3 ML INH RTQ4H for 30 Days, #180 NEB 5 Refills         Prov: Paul Loja         7/29/20       Neb-Budesonide (Pulmicort) 0.5 Mg/2 Ml Ampul.neb       0.5 MG INH RTBID for 30 Days, #60 NEB 5 Refills         Prov: Paul Loja         7/29/20       Potassium Chloride (Potassium Chloride) 10 Meq Capsule.er      10 MEQ PO BID for 30 Days, #60 CAP.ER 5 Refills         Prov: Paul Loja         7/29/20       Insulin Human Nph (novoLIN N VIAL) 100 Unit/1 Ml Vial      40 UNITS SUBQ HS, #1 VIAL 0 Refills         Reported         7/23/20       Morphine Sulfate ER (Morphine Sulfate ER) 15 Mg Tablet.er      30 MG PO Q12HR, #60 TAB         Reported         7/23/20       Ubidecarenone (Co Q-10) 200 Mg Cap      200 MG PO QDAY, CAP         Reported         7/23/20       amLODIPine (amLODIPine) 2.5 Mg Tablet      2.5 MG PO QDAY, #30 TAB 0 Refills         Reported         7/23/20       Fenofibrate Nanocrystallized (Fenofibrate*) 145 Mg Tablet      1 TAB PO QDAY         Reported         7/23/20       Allopurinol (Allopurinol) 300 Mg Tablet      1 TAB PO QDAY         Reported         7/23/20       FLUoxetine HCl (FLUoxetine HCl) 20 Mg Capsule      1 CAP PO QDAY         Reported         7/23/20       metFORMIN HCl (metFORMIN HCl) 500 Mg Tablet      500 MG PO BID, #60 TAB 0 Refills         Reported         7/23/20       buPROPion XL (buPROPion XL) 150 Mg Tab.er.24h      1 TAB PO QAM         Reported         7/23/20       Levothyroxine (Levothyroxine) 0.15 Mg Tablet      0.15 MG PO QDAY@07, #30 TAB 0 Refills         Reported         7/23/20       Furosemide (Furosemide) 40 Mg Tablet      60 MG PO QDAY, #30 TAB 0 Refills         Reported         7/23/18       Ascorbic Acid (Vitamin C*) 500 Mg Tablet      500 MG PO QDAY, #60 TAB 0 Refills         Reported         7/23/18       Pantoprazole (Protonix) 40 Mg Tablet.dr      40 MG PO QDAY@07, #30 TAB 0 Refills         Reported         7/23/18       Clopidogrel Bisulfate (Plavix) 75 Mg Tablet      75 MG PO QDAY, TAB         Reported         7/23/18       Nitroglycerin (Nitrostat*) 0.4 Mg Tablet      0.4 MG SL Q5M PRN for CHEST PAIN, #30  TAB.SL         Prov: AMADA JENNINGS         1/26/18       amLODIPine (amLODIPine) 10 Mg Tablet      5 MG PO QDAY, #30 TAB         Prov: AMADA JENNINGS         1/26/18       Ferrous Sulfate (Ferrous Sulfate*) 325 Mg Tablet      325 MG PO QDAY, #30 TAB 0 Refills         Reported         1/11/18       Colchicine (Colcrys) 0.6 Mg Tab      0.6 MG PO QDAY, TAB         Reported         11/10/14       Atorvastatin Calcium (Lipitor*) 20 Mg Tablet      20 MG PO HS, #30 TAB 0 Refills         Reported         11/10/14       Gabapentin (Gabapentin) 600 Mg Tablet      600 MG PO BID, #90 TAB         Reported         6/21/14       Aspirin (Aspirin*) 81 Mg Tab.chew      81 MG PO QDAY         Reported         5/29/13      Current Medications      Current Medications Reviewed 8/12/20            EXAM      Vital Signs Reviewed.      General:  WDWN, Alert, NAD.      HEENT: PERRL, EOMI.  OP, nares clear, no sinus tenderness.      Neck: Supple, no JVD, no thyromegaly.      Lymph: No axillary, cervical, supraclavicular lymphadenopathy noted bilaterally.      Chest: Lungs clear to auscultation bilaterally, no wheezes, rales or rhonchi,     normal work of breathing noted, patient able to speak full sentences without     difficulty.       CV: RRR, no MGR, pulses 2+, equal.        Abd: Soft, NT, ND, +BS, no HSM.      EXT: No clubbing, no cyanosis, no edema, no joint tenderness.        Neuro:  A  Skin: No rashes or lesions.      Vtials      Vitals:             Height 5 ft 9 in / 175.26 cm           Weight 231 lbs  / 104.177833 kg           BSA 2.20 m2           BMI 34.1 kg/m2           Temperature 98.0 F / 36.67 C - Temporal           Pulse 62           Respirations 15           Blood Pressure 119/60 Sitting, Left Arm           Pulse Oximetry 96%, room air            REVIEW      Results Reviewed      PCCS Results Reviewed?:  Yes Prev Lab Results, Yes Prev Radiology Results, Yes     Previous [facility] Records      Lab Results      I reviewed patient's  discharge summary and patient's chest CT dated 2020.      Radiographic Results               [facility]                PACS RADIOLOGY REPORT            Patient: YARIEL GREENBERG   Acct: #[id number]   Report: #[id number]            UNIT #: [id number]    DOS: 20 1122      INSURANCE:MEDICARE PART A   LOCATION:[facility]     : 1950            PROVIDERS      ADMITTING:     ATTENDING: Paul Loja      FAMILY:  NONE,MD   ORDERING:  Paul Loja         OTHER: Royce Pappas   DICTATING:  Sue Monaco MD            REQ #:[id number]   EXAM:CXR2 - CHEST 2V AP PA LAT      REASON FOR EXAM:        REASON FOR VISIT:  PNEUMONIA            *******Signed******         PROCEDURE:   CHEST AP/PA AND LATERAL             COMPARISON:   [facility], CR, CHEST PA/AP   2020, 13:39.  [facility], CR, CHEST AP/PA 1 VIEW, 2020, 7:20.             INDICATIONS:   Pneumonia; Shortness of breath; COPD; History of smoking             FINDINGS:         The heart remains normal in size.             The lungs are well-expanded.             Airspace disease seen on the prior study in the right lung has resolved.  Mild     chronic changes in       the left lower lobe are stable.             Changes of coronary artery bypass are again seen.             Mild degenerative spurring is seen in the thoracic spine.             CONCLUSION:         No active disease is seen.             Post coronary artery bypass.              SUE MONACO MD             Electronically Signed and Approved By: SUE MONACO MD on 2020 at 11:49                                  Until signed, this is an unconfirmed preliminary report that may contain      errors and is subject to change.                                              COUST:      D:20 1149            Assessment      Pneumonia - J18.9            COPD (chronic obstructive  pulmonary disease) - J44.9            Dyspnea - R06.00            Notes      New Medications      * MDI-Albuterol (Ventolin HFA) 18 GM HFA.AER.AD: 2 PUFFS INH QID #1      Discontinued Medications      * predniSONE (Deltasone) 10 MG TABLET: 30 MG PO QDAY 5 Days #15      * Cefuroxime Axetil (Ceftin) 500 MG TABLET: 500 MG PO BID 7 Days #14      * OXYGEN GAS: L NS HS #2      New Diagnostics      * Chest W/O Cont CT, 6 WEEKS         Dx: Pneumonia - J18.9      * PFT Comp PrePost DLCO BodBx sx, Week         Dx: COPD (chronic obstructive pulmonary disease) - J44.9      * 6 Min Walk w O2 Titration Test, Routine         Dx: COPD (chronic obstructive pulmonary disease) - J44.9      * [facility] Pre-Op Covid Screening, Routine         Dx: Encounter for screening for other viral diseases - Z11.59      IMPRESSION:      1.  Acute on chronic hypercapnic respiratory failure requiring NIPPV.      2.  Multifocal pneumonia _____.      3.  Right greater than left bilateral pleural effusions.      4.  Sepsis.      5. COPD.      6. Obstructive sleep apnea on CPAP nightly.      7. Coronary artery disease, patient is under the care of Dr. Gonzalez.      8. CKD.      9. Acute on chronic diastolic heart failure.  The patient appears euvolemic.      The patient under the care of Dr. Gonzalez.        10. Coronary artery disease, previous coronary artery bypass grafting.            PLAN:      1.  The patient to continue Pulmicort everyday as prescribed and rinse his mouth    out after each use.      2.  The patient to continue DuoNebs and albuterol inhaler as needed.      3. I will schedule patient for pulmonary function test and a six minute walk     test.  PreCOVID screening order entered today.      4. I will check an alpha 1 antitrypsin level today in the office.        5. I will repeat patient's chest CT again in 8 weeks to ensure resolution of     pneumonia.      6. Patient is advised to call the office, 911 or go to the ER with any new or      worsening symptoms.      7. The patient is up to date on flu and pneumonia vaccines.  The patient is     advised to get the new flu vaccine when it comes out later this month.       8.  For obstructive sleep apnea, patient is to continue CPAP at current settings    and follow up with the sleep center as scheduled.  The patient is advised to     clean mask and tubing daily.        9. The patient is advised to follow up with Dr. Gonzalez, cardiologist as     scheduled.      10.  The patient is to follow up with Dr. Pappas or Dr. Ortega in eight weeks,    sooner if needed.            Patient Education      Patient Education Provided:  COPD, Sleep Apnea      Time Spent:  > 50% /Coord Care            Patient Education:        Chronic Obstructive Pulmonary Disease            Electronically signed by AKHIL WHITE [facility]  08/13/2020 11:00       Disclaimer: Converted document may not contain table formatting or lab diagrams. Please see [facility] for the authenticated document.

## 2021-05-28 NOTE — PROGRESS NOTES
Patient: YARIEL GREENBERG     Acct: [id number]     Report: #[id number]  UNIT #: [id number]     : 1950    Encounter Date:10/14/2020  PRIMARY CARE: Paul Loja  ***Signed***  --------------------------------------------------------------------------------------------------------------------  Chief Complaint      Encounter Date      Oct 14, 2020            Primary Care Provider      Paul Loja            Referring Provider            [facility]            Patient Complaint      Patient is complaining of      Pt here for 8w f/u, copd            VITALS      Height 5 ft 9 in / 175.26 cm      Weight 232 lbs 6 oz / 105.485212 kg      BSA 2.20 m2      BMI 34.3 kg/m2      Temperature 97.9 F / 36.61 C - Temporal      Pulse 88      Respirations 22      Blood Pressure 166/85 Sitting, Left Arm      Pulse Oximetry 89%, Room air            HPI      The patient is a 69 year old male here today for follow up.             The patient and his wife state that he was doing well up until this morning when    he work up severely short of breath and has been having some reported chills but    no fevers. The patient had significant shortness of breath, the thought about     going to the ER however since they had this office visit today they decided to     come in because I am a lung specialist. He has had no fevers or chest pains. His    shortness of breath has been getting worse over the course of the morning. He     has shortness of breath even at rest and has difficulty gathering himself due to    his significant dyspnea. He is not having any ongoing chest pains. He has very     mild lower extremity edema and his shortness of breath is worse with any and all    kinds of activities and he does have some associated chills as well. He has been    compliant with all his medications.            ROS      Constitutional:  Denies: Fatigue, Fever, Weight gain, Weight loss, Chills,     Insomnia, Other      Respiratory/Breathing:  Complains  of: Shortness of air, Wheezing; Denies: Cough,    Hemoptysis, Pleuritic pain, Other      Endocrine:  Denies: Polydipsia, Polyuria, Heat/cold intolerance, Diabetes, Other      Eyes:  Denies: Blurred vision, Vision Changes, Other      Ears, nose, mouth, throat:  Denies: Congestion, Dysphagia, Hearing Changes, Nose    Bleeding, Nasal Discharge, Throat pain, Tinnitus, Other      Cardiovascular:  Denies: Chest Pain, Exertional dyspnea, Peripheral Edema,     Palpitations, Syncope, Wake up Gasping for air, Orthopnea, Tachycardia, Other      Gastrointestinal:  Denies: Abdominal pain/cramping, Bloody stools, Constipation,    Diarrhea, Melena, Nausea, Vomiting, Other      Genitourinary:  Denies: Dysuria, Urinary frequency, Incontinence, Hematuria,     Urgency, Other      Musculoskeletal:  Denies: Joint Pain, Joint Stiffness, Joint Swelling, Myalgias,    Other      Hematologic/lymphatic:  DENIES: Lymphadenopathy, Bruising, Bleeding tendencies,     Other      Neurologic:  Complains of: Weakness; Denies: Headache, Numbness, Seizures, Other      Psychiatric:  Denies: Anxiety, Appropriate Effect, Depression, Other      Sleep:  No: Excessive daytime sleep, Morning Headache?, Snoring, Insomnia?, Stop    breathing at sleep?, Other      Integumentary:  Denies: Rash, Dry skin, Skin Warm to Touch, Other            FAMILY/SOCIAL/MEDICAL HX      Surgical History:  Yes: Appendectomy (1982.), Bowel Surgery (2010 colon cancer     removal.), Cholecystectomy (2013), Other Surgeries; No: AAA Repair, Abdominal     Surgery, Bladder Surgery, CABG, Head Surgery, Oral Surgery, Orthopedic Surgery,     Vascular Surgery      Heart - Family Hx:  Mother      Is Father Still Living?:  No      Is Mother Still Living?:  No      Social History:  No Tobacco Use, No Alcohol Use, No Recreational Drug use      Smoking status:  Former smoker (11yo, 1ppd, quit 2005)      Anticoagulation Therapy:  Yes      Antibiotic Prophylaxis:  No      Medical History:  Yes:  Arthritis, Blood Disease (anemia), Chemotherapy/Cancer     (colon cancer in 2010.), Chronic Bronchitis/COPD, Congestive Heart Failu,     Depression, Anxiety, Diabetes, Hemorrhoids/Rectal Prob (COLON CA-COLON RESECTION    2010), High Blood Pressure (CONTROLLED WITH  MEDICATION), Reflux Disease,     Shortness Of Breath, Thyroid Problem (HYPOTHYROIDISM); No: Asthma, Deafness or     Ringing Ears, Heart Attack, Sinus Trouble, Miscellaneous Medical/oth      Psychiatric History      Anxiety and depressio n            PREVENTION      Hx Influenza Vaccination:  Yes      Date Influenza Vaccine Given:  Oct 1, 2020      Influenza Vaccine Declined:  No      2 or More Falls in Past Year?:  No      Fall Past Year with Injury?:  No      Hx Pneumococcal Vaccination:  Yes      Encouraged to follow-up with:  PCP regarding preventative exams.      Chart initiated by      Silvana Goldstein MA            ALLERGIES/MEDICATIONS      Allergies:        Coded Allergies:             ADHESIVE (Verified  Allergy, Unknown, RASH; Steri-Strips breaks down skin ,    10/14/20)           PENICILLINS (Verified  Allergy, Unknown, rash, 10/14/20)      Medications    Last Reconciled on 10/14/20 09:31 by ROYCE PAPPAS MD      Potassium Chloride (Potassium Chloride) 10 Meq Capsule.er      10 MEQ PO QDAY for 30 Days, #30 CAP.ER 5 Refills         Prov: Royce Pappas         10/14/20       MDI-Albuterol (Ventolin HFA) 18 Gm Hfa.aer.ad      2 PUFFS INH QID, #1 MDI 5 Refills         Prov: [facility]         8/12/20       Albuterol/Ipratropium (Duoneb) 3 Ml Ampul.neb      3 ML INH RTQ4H for 30 Days, #180 NEB 5 Refills         Prov: Paul Loja         7/29/20       Neb-Budesonide (Pulmicort) 0.5 Mg/2 Ml Ampul.neb      0.5 MG INH RTBID for 30 Days, #60 NEB 5 Refills         Prov: Paul Loja         7/29/20       Insulin Human Nph (novoLIN N VIAL) 100 Unit/1 Ml Vial      40 UNITS SUBQ HS, #1 VIAL 0 Refills         Reported         7/23/20        Morphine Sulfate ER (Morphine Sulfate ER) 15 Mg Tablet.er      30 MG PO Q12HR, #60 TAB         Reported         7/23/20       Ubidecarenone (Co Q-10) 200 Mg Cap      200 MG PO QDAY, CAP         Reported         7/23/20       amLODIPine (amLODIPine) 2.5 Mg Tablet      2.5 MG PO QDAY, #30 TAB 0 Refills         Reported         7/23/20       Fenofibrate Nanocrystallized (Fenofibrate*) 145 Mg Tablet      1 TAB PO QDAY         Reported         7/23/20       Allopurinol (Allopurinol) 300 Mg Tablet      1 TAB PO QDAY         Reported         7/23/20       FLUoxetine HCl (FLUoxetine HCl) 20 Mg Capsule      1 CAP PO QDAY         Reported         7/23/20       metFORMIN HCl (metFORMIN HCl) 500 Mg Tablet      500 MG PO BID, #60 TAB 0 Refills         Reported         7/23/20       buPROPion XL (buPROPion XL) 150 Mg Tab.er.24h      1 TAB PO QAM         Reported         7/23/20       Levothyroxine (Levothyroxine) 0.15 Mg Tablet      0.15 MG PO QDAY@07, #30 TAB 0 Refills         Reported         7/23/20       Furosemide (Furosemide) 40 Mg Tablet      60 MG PO Q2D, #30 TAB 0 Refills         Reported         7/23/18       Ascorbic Acid (Vitamin C*) 500 Mg Tablet      500 MG PO QDAY, #60 TAB 0 Refills         Reported         7/23/18       Pantoprazole (Protonix) 40 Mg Tablet.dr      40 MG PO QDAY@07, #30 TAB 0 Refills         Reported         7/23/18       Clopidogrel Bisulfate (Plavix) 75 Mg Tablet      75 MG PO QDAY, TAB         Reported         7/23/18       Nitroglycerin (Nitrostat*) 0.4 Mg Tablet      0.4 MG SL Q5M PRN for CHEST PAIN, #30 TAB.SL         Prov: AMADA JENNINGS         1/26/18       amLODIPine (amLODIPine) 10 Mg Tablet      5 MG PO QDAY, #30 TAB         Prov: AMADA JENNINGS         1/26/18       Ferrous Sulfate (Ferrous Sulfate*) 325 Mg Tablet      325 MG PO QDAY, #30 TAB 0 Refills         Reported         1/11/18       Colchicine (Colcrys) 0.6 Mg Tab      0.6 MG PO QDAY, TAB         Reported         11/10/14        Atorvastatin Calcium (Lipitor*) 20 Mg Tablet      20 MG PO HS, #30 TAB 0 Refills         Reported         11/10/14       Gabapentin (Gabapentin) 600 Mg Tablet      600 MG PO BID, #90 TAB         Reported         6/21/14       Aspirin (Aspirin*) 81 Mg Tab.chew      81 MG PO QDAY         Reported         5/29/13      Current Medications      Current Medications Reviewed 10/14/20            EXAM      Vital Signs Reviewed      Gen: The patient is a 69 year old male appears uncomfortable using accessory     muscles.        HEENT:  PERRL, EOMI.  OP, nares clear, no sinus tenderness.      Neck:  Supple, no JVD, no thyromegaly.      Lymph: No axillary, cervical, supraclavicular lymphadenopathy noted bilaterally.      Chest: Scattered crackles throughout with very poor air exchange overall. The     patient does have significant conversational dyspnea and appears to be in     distress using accessory muscles. Chest is unremarkable to percussion.        CV:  RRR, no MGR, pulses 2+, equal.      Abd:  Soft, NT, ND, + BS, no HSM.      EXT:  No clubbing, no cyanosis, no edema, no joint tenderness.       Neuro:  A  Skin: No rashes or lesions.      Vitals      Vitals:             Height 5 ft 9 in / 175.26 cm           Weight 232 lbs 6 oz / 105.198751 kg           BSA 2.20 m2           BMI 34.3 kg/m2           Temperature 97.9 F / 36.61 C - Temporal           Pulse 88           Respirations 22           Blood Pressure 166/85 Sitting, Left Arm           Pulse Oximetry 89%, Room air            REVIEW      Results Reviewed      PCCS Results Reviewed?:  Yes Prev Lab Results, Yes Prev Radiology Results, Yes     Previous [facility] Records            Assessment      Notes      Changed Medications      * Furosemide 40 MG TABLET: 40 MG PO Q2D #30      * Potassium Chloride 10 MEQ CAPSULE.ER:         From: 10 MEQ PO BID 30 Days #60         To: 10 MEQ PO QDAY 30 Days #30      ASSESSMENT:      1. Acute hypoxic respiratory failure with oxygen  saturation of 89% in the     office.       2. History of chronic hypercarbia.       3. Chronic obstructive pulmonary disease.       4. Concern for acute decompensated diastolic heart failure./             PLAN:      1. At this time the patient appears to be acutely ill. We have placed     supplemental oxygen on the patient. I have explained to the patient that he     needs to report to the ER for further evaluation.       2. While in the room with the patient I have called Dr. Gottlieb the on call ER     physician and discussed the patient's case with him. He will be seen immediately    in the ER, will have a chest x-ray and blood work and likely need CT scan of the    chest.       3. While in the room I have also talked with the patient's primary care provider    Dr. Paul Loja letting him know about the changes in the patient's clinical     status.       4. For the patient's chronic obstructive pulmonary disease we will continue him     on his current bronchodilator therapies. He will likely need treatment for  his     chronic obstructive pulmonary disease exacerbation in the hospital and possible     decompensated heart failure.       5. I have personally reviewed laboratory data, imaging and previous medical     records.            Patient Education      Education resources provided:  Yes      Patient Education Provided:  COPD            Electronically signed by Royce Pappas  10/16/2020 13:02       Disclaimer: Converted document may not contain table formatting or lab diagrams. Please see [employer] for the authenticated document.

## 2021-06-03 NOTE — CONSULTS
Patient: YARIEL GREENBERG     Acct: [id number]     Report: #[id number]  MR #:  [id number]     DOS: 05/10/2021 1318     : 1950  DICTATING: Susannah Diana  ***Signed***  --------------------------------------------------------------------------------------------------------------------                              [employer]                          [place], [place]  [address]           __________________________________________________________________________         Patient Name:                   Attending Physician:    Yariel Greenberg M.D.         Patient Visit # MR #            Admit Date  Disch Date     Location    [id number]    [id number]      2021                 SLEEP- -         Date of Birth    1950    __________________________________________________________________________    0814 - SLEEP OFFICE VISIT         DATE OF SERVICE:  2021         REFERRING PHYSICIAN:         CHIEF COMPLAINT:    Follow-up on obstructive sleep apnea and comorbid conditions, one year.         HISTORY OF PRESENT ILLNESS:    This is a 70-year-old male patient with a history of obstructive sleep apnea.    He follows with my partner Aj Campbell M.D. I reviewed the prior records    and summarized them in my note.         Obstructive sleep apnea, this was initially diagnosed on 2016.    respiratory disturbance index was 23 events per hour and apnea-hypopnea index    is 17 events per hour. The patient has been on CPAP therapy since then. He    reported using his machine regularly. He denies any issues with air leak or    pressure intolerance. He uses nasal pillows. [employer] is [email or web address].         He goes to bed at 9:30 and wakes up at 8:30. He wakes up twice at night to    urinate. [place] Sleepiness Scale = 9.         Follow-up obesity.         He does not exercise regularly and does not have  a special diet but actually    has lost about 17 pounds since the last visit a year ago.         Follow-up Hypertension.         He takes multiple medications for blood pressure, reported normal blood    pressure during his visits to his primary provider. He denies side effects    from medication.         REVIEW OF SYSTEMS:    CONSTITUTIONAL: No fatigue or change in appetite.         EMNT: He reported nasal congestion and postnasal drip.    RESPIRATORY: He has shortness of breath on activities but no cough or    wheezing.    CARDIOVASCULAR: He denies chest pain or palpitation.    GASTROINTESTINAL: He reported acid reflux but denies abdominal bloating.    NEURO/PSYCH: He denies anxiety or depression.         PAST MEDICAL HISTORY:    1.  COPD.    2.  Insulin dependent diabetes mellitus.    3.  Coronary artery disease.    4.  Hypertension.    5.  CHF.         PAST SURGICAL HISTORY:    1.  CABG.    2.  Colon cancer surgery.    3.  Carotid endarterectomy.    4.  Cholecystectomy.    5.  Bypass surgery.         MEDICATIONS:    1.  Aspirin.    2.  Amlodipine.    3.  Allopurinol.    4.  Atorvastatin.    5.  Metformin.    6.  Metoprolol.    7.  Levothyroxine.    8.  Vitamins.         ALLERGIES:    No known drug allergies.         SOCIAL HISTORY:    Has not changed. He does drink 2.5 cups of coffee during the day. He does not    smoke. He denies also drug use.         PHYSICAL EXAMINATION:    VITAL SIGNS: Blood pressure is 145/63, heart rate is 64, neck size is 19    inches, SpO2 97%. BMI is 34, weight is 231 pounds and height is 5 feet 9    inches.    CONSTITUTIONAL: Not in any acute distress.    EYES: Injected conjunctivae. Extraocular movements are intact. Pupils are    equal and reactive to light.    NECK: Large, no thyromegaly. Trachea in the midline.    LUNGS: Clear to auscultation bilaterally. No crackles or wheezing. Nonlabored    breathing.    HEART: Regular rhythm and rate, no audible murmur.    musculoskeletal: No  cyanosis, clubbing, or edema. Warm extremities, well    perfused.    EMNT: Freidman's score of 1 and Mallampati score of III.    NEURO/PSYCH: Conscious, alert, and oriented. Has appropriate mood and affect.         DIAGNOSTIC DATA:    CPAP download over the last year showed:    Usage > 4 hours 91%; average use 8 hours; PE 90%, 11; apnea-hypopnea index    1.5; air leak 14 seconds.         ASSESSMENT:    1.  Obstructive sleep apnea on CPAP.    2.  Obesity (BMI 36).    3.  Essential hypertension.         RECOMMENDATIONS:    1.  I discussed the results of the download. The patient is compliant with        therapy and can continue to benefit from treatment. He was encouraged to        continue using his CPAP. I will see him again in 1 year to refill his        supplies.    2.  I counseled him for weight loss and encouraged him to exercise regularly        and cut down on carbohydrates. I explained that losing weight can        decrease the severity of sleep apnea and obviate the need of CPAP therapy.    3.  Discussed the importance of PAP compliance in the setting of comorbid        heart disease.         To be electronically signed in [employer]    0988 ALEXANDRIA DIANA M.D.         RJ:[email or web address]    D:  05/07/2021 14:02    T:  05/10/2021 12:58    #[id number]         CC: SLEEP LAB         Until signed, this is an unconfirmed preliminary report that may contain    errors and is subject to change.         Electronically signed by Alexandria Diana  05/21/2021 08:45     Disclaimer: Converted hospital document may not contain table formatting or lab diagrams. Please see [employer] for authenticated document.

## 2021-07-09 ENCOUNTER — TRANSCRIBE ORDERS (OUTPATIENT)
Dept: LAB | Facility: HOSPITAL | Age: 71
End: 2021-07-09

## 2021-07-09 ENCOUNTER — LAB (OUTPATIENT)
Dept: LAB | Facility: HOSPITAL | Age: 71
End: 2021-07-09

## 2021-07-09 ENCOUNTER — TRANSCRIBE ORDERS (OUTPATIENT)
Dept: INTERNAL MEDICINE | Facility: CLINIC | Age: 71
End: 2021-07-09

## 2021-07-09 DIAGNOSIS — D50.9 IRON DEFICIENCY ANEMIA, UNSPECIFIED IRON DEFICIENCY ANEMIA TYPE: ICD-10-CM

## 2021-07-09 DIAGNOSIS — G63 GOUTY NEURITIS (HCC): ICD-10-CM

## 2021-07-09 DIAGNOSIS — M10.00 GOUTY NEURITIS (HCC): ICD-10-CM

## 2021-07-09 DIAGNOSIS — M10.00 GOUTY NEURITIS (HCC): Primary | ICD-10-CM

## 2021-07-09 DIAGNOSIS — Z79.899 ENCOUNTER FOR LONG-TERM (CURRENT) USE OF OTHER MEDICATIONS: ICD-10-CM

## 2021-07-09 DIAGNOSIS — E03.4 IDIOPATHIC ATROPHIC HYPOTHYROIDISM: ICD-10-CM

## 2021-07-09 DIAGNOSIS — E78.00 PURE HYPERCHOLESTEROLEMIA: ICD-10-CM

## 2021-07-09 DIAGNOSIS — M10.9 GOUT, UNSPECIFIED CAUSE, UNSPECIFIED CHRONICITY, UNSPECIFIED SITE: ICD-10-CM

## 2021-07-09 DIAGNOSIS — G63 GOUTY NEURITIS (HCC): Primary | ICD-10-CM

## 2021-07-09 DIAGNOSIS — E11.9 DIABETES MELLITUS WITHOUT COMPLICATION (HCC): ICD-10-CM

## 2021-07-09 DIAGNOSIS — R06.02 SHORTNESS OF BREATH: ICD-10-CM

## 2021-07-09 DIAGNOSIS — E11.9 DIABETES MELLITUS WITHOUT COMPLICATION (HCC): Primary | ICD-10-CM

## 2021-07-09 LAB
ALBUMIN SERPL-MCNC: 3.9 G/DL (ref 3.5–5.2)
ALBUMIN/GLOB SERPL: 1.6 G/DL
ALP SERPL-CCNC: 50 U/L (ref 39–117)
ALT SERPL W P-5'-P-CCNC: 16 U/L (ref 1–41)
ANION GAP SERPL CALCULATED.3IONS-SCNC: 10.8 MMOL/L (ref 5–15)
AST SERPL-CCNC: 24 U/L (ref 1–40)
BASOPHILS # BLD AUTO: 0.09 10*3/MM3 (ref 0–0.2)
BASOPHILS NFR BLD AUTO: 1 % (ref 0–1.5)
BILIRUB SERPL-MCNC: 0.2 MG/DL (ref 0–1.2)
BUN SERPL-MCNC: 18 MG/DL (ref 8–23)
BUN/CREAT SERPL: 11.7 (ref 7–25)
CALCIUM SPEC-SCNC: 9.7 MG/DL (ref 8.6–10.5)
CHLORIDE SERPL-SCNC: 102 MMOL/L (ref 98–107)
CO2 SERPL-SCNC: 27.2 MMOL/L (ref 22–29)
CREAT SERPL-MCNC: 1.54 MG/DL (ref 0.76–1.27)
DEPRECATED RDW RBC AUTO: 49.2 FL (ref 37–54)
EOSINOPHIL # BLD AUTO: 0.52 10*3/MM3 (ref 0–0.4)
EOSINOPHIL NFR BLD AUTO: 5.5 % (ref 0.3–6.2)
ERYTHROCYTE [DISTWIDTH] IN BLOOD BY AUTOMATED COUNT: 15.9 % (ref 12.3–15.4)
GFR SERPL CREATININE-BSD FRML MDRD: 45 ML/MIN/1.73
GLOBULIN UR ELPH-MCNC: 2.4 GM/DL
GLUCOSE SERPL-MCNC: 88 MG/DL (ref 65–99)
HBA1C MFR BLD: 6 % (ref 4.8–5.6)
HCT VFR BLD AUTO: 44.9 % (ref 37.5–51)
HGB BLD-MCNC: 13.8 G/DL (ref 13–17.7)
IMM GRANULOCYTES # BLD AUTO: 0.04 10*3/MM3 (ref 0–0.05)
IMM GRANULOCYTES NFR BLD AUTO: 0.4 % (ref 0–0.5)
LYMPHOCYTES # BLD AUTO: 2.44 10*3/MM3 (ref 0.7–3.1)
LYMPHOCYTES NFR BLD AUTO: 25.9 % (ref 19.6–45.3)
MCH RBC QN AUTO: 26.3 PG (ref 26.6–33)
MCHC RBC AUTO-ENTMCNC: 30.7 G/DL (ref 31.5–35.7)
MCV RBC AUTO: 85.7 FL (ref 79–97)
MONOCYTES # BLD AUTO: 0.9 10*3/MM3 (ref 0.1–0.9)
MONOCYTES NFR BLD AUTO: 9.6 % (ref 5–12)
NEUTROPHILS NFR BLD AUTO: 5.43 10*3/MM3 (ref 1.7–7)
NEUTROPHILS NFR BLD AUTO: 57.6 % (ref 42.7–76)
NRBC BLD AUTO-RTO: 0.1 /100 WBC (ref 0–0.2)
NT-PROBNP SERPL-MCNC: 1596 PG/ML (ref 0–900)
PLATELET # BLD AUTO: 209 10*3/MM3 (ref 140–450)
PMV BLD AUTO: 12 FL (ref 6–12)
POTASSIUM SERPL-SCNC: 5.3 MMOL/L (ref 3.5–5.2)
PROT SERPL-MCNC: 6.3 G/DL (ref 6–8.5)
RBC # BLD AUTO: 5.24 10*6/MM3 (ref 4.14–5.8)
SODIUM SERPL-SCNC: 140 MMOL/L (ref 136–145)
T4 FREE SERPL-MCNC: 1.29 NG/DL (ref 0.93–1.7)
TSH SERPL DL<=0.05 MIU/L-ACNC: 2.49 UIU/ML (ref 0.27–4.2)
URATE SERPL-MCNC: 4.2 MG/DL (ref 3.4–7)
WBC # BLD AUTO: 9.42 10*3/MM3 (ref 3.4–10.8)

## 2021-07-09 PROCEDURE — 83880 ASSAY OF NATRIURETIC PEPTIDE: CPT

## 2021-07-09 PROCEDURE — 80053 COMPREHEN METABOLIC PANEL: CPT

## 2021-07-09 PROCEDURE — 36415 COLL VENOUS BLD VENIPUNCTURE: CPT

## 2021-07-09 PROCEDURE — 84439 ASSAY OF FREE THYROXINE: CPT

## 2021-07-09 PROCEDURE — 83036 HEMOGLOBIN GLYCOSYLATED A1C: CPT

## 2021-07-09 PROCEDURE — 85025 COMPLETE CBC W/AUTO DIFF WBC: CPT

## 2021-07-09 PROCEDURE — 84550 ASSAY OF BLOOD/URIC ACID: CPT

## 2021-07-09 PROCEDURE — 84443 ASSAY THYROID STIM HORMONE: CPT

## 2021-07-14 ENCOUNTER — OFFICE VISIT (OUTPATIENT)
Dept: INTERNAL MEDICINE | Facility: CLINIC | Age: 71
End: 2021-07-14

## 2021-07-14 VITALS
DIASTOLIC BLOOD PRESSURE: 79 MMHG | HEIGHT: 67 IN | TEMPERATURE: 98.2 F | SYSTOLIC BLOOD PRESSURE: 152 MMHG | BODY MASS INDEX: 36.79 KG/M2 | OXYGEN SATURATION: 95 % | WEIGHT: 234.4 LBS

## 2021-07-14 DIAGNOSIS — E11.9 TYPE 2 DIABETES MELLITUS WITHOUT COMPLICATION, WITH LONG-TERM CURRENT USE OF INSULIN (HCC): ICD-10-CM

## 2021-07-14 DIAGNOSIS — M1A.00X0 CHRONIC GOUTY ARTHRITIS: ICD-10-CM

## 2021-07-14 DIAGNOSIS — Z79.891 LONG-TERM CURRENT USE OF OPIATE ANALGESIC: ICD-10-CM

## 2021-07-14 DIAGNOSIS — I10 HYPERTENSION, ESSENTIAL: ICD-10-CM

## 2021-07-14 DIAGNOSIS — I71.40 ABDOMINAL AORTIC ANEURYSM WITHOUT RUPTURE (HCC): Primary | ICD-10-CM

## 2021-07-14 DIAGNOSIS — M13.0 POLYARTHROPATHY: ICD-10-CM

## 2021-07-14 DIAGNOSIS — Z12.5 SCREENING FOR PROSTATE CANCER: ICD-10-CM

## 2021-07-14 DIAGNOSIS — E03.9 ACQUIRED HYPOTHYROIDISM: ICD-10-CM

## 2021-07-14 DIAGNOSIS — M47.816 LUMBAR SPONDYLOSIS: ICD-10-CM

## 2021-07-14 DIAGNOSIS — I73.9 PERIPHERAL ARTERY DISEASE (HCC): ICD-10-CM

## 2021-07-14 DIAGNOSIS — M1A.0790 CHRONIC GOUT OF FOOT, UNSPECIFIED CAUSE, UNSPECIFIED LATERALITY: ICD-10-CM

## 2021-07-14 DIAGNOSIS — C18.9 MALIGNANT NEOPLASM OF COLON, UNSPECIFIED PART OF COLON (HCC): ICD-10-CM

## 2021-07-14 DIAGNOSIS — I25.10 ATHEROSCLEROSIS OF CORONARY ARTERY OF NATIVE HEART WITHOUT ANGINA PECTORIS, UNSPECIFIED VESSEL OR LESION TYPE: ICD-10-CM

## 2021-07-14 DIAGNOSIS — I73.9 PVD (PERIPHERAL VASCULAR DISEASE) WITH CLAUDICATION (HCC): ICD-10-CM

## 2021-07-14 DIAGNOSIS — Z79.4 TYPE 2 DIABETES MELLITUS WITHOUT COMPLICATION, WITH LONG-TERM CURRENT USE OF INSULIN (HCC): ICD-10-CM

## 2021-07-14 DIAGNOSIS — I71.40 ABDOMINAL AORTIC ANEURYSM (AAA) WITHOUT RUPTURE (HCC): ICD-10-CM

## 2021-07-14 DIAGNOSIS — N18.31 STAGE 3A CHRONIC KIDNEY DISEASE (HCC): ICD-10-CM

## 2021-07-14 DIAGNOSIS — K57.90 DIVERTICULOSIS: ICD-10-CM

## 2021-07-14 DIAGNOSIS — I65.23 OCCLUSION AND STENOSIS OF BILATERAL CAROTID ARTERIES: ICD-10-CM

## 2021-07-14 DIAGNOSIS — M51.36 DEGENERATION OF LUMBAR INTERVERTEBRAL DISC: ICD-10-CM

## 2021-07-14 DIAGNOSIS — J43.2 CENTRILOBULAR EMPHYSEMA (HCC): ICD-10-CM

## 2021-07-14 DIAGNOSIS — I50.32 CHRONIC DIASTOLIC CONGESTIVE HEART FAILURE (HCC): ICD-10-CM

## 2021-07-14 DIAGNOSIS — E78.00 PURE HYPERCHOLESTEROLEMIA: ICD-10-CM

## 2021-07-14 PROBLEM — R97.0 HIGH CARCINOEMBRYONIC ANTIGEN (CEA): Status: ACTIVE | Noted: 2021-07-14

## 2021-07-14 PROBLEM — M10.9 GOUT: Status: ACTIVE | Noted: 2020-09-30

## 2021-07-14 PROBLEM — K62.5 RECTAL BLEEDING: Status: ACTIVE | Noted: 2021-07-14

## 2021-07-14 PROBLEM — R97.0 ELEVATED CARCINOEMBRYONIC ANTIGEN (CEA): Status: ACTIVE | Noted: 2021-07-14

## 2021-07-14 PROBLEM — E05.90 HYPERTHYROIDISM: Status: ACTIVE | Noted: 2021-07-14

## 2021-07-14 PROBLEM — I71.9 AORTIC ANEURYSM: Status: ACTIVE | Noted: 2021-04-05

## 2021-07-14 PROBLEM — C80.1 CANCER: Status: ACTIVE | Noted: 2021-07-14

## 2021-07-14 PROBLEM — N19 RENAL FAILURE: Status: ACTIVE | Noted: 2021-07-14

## 2021-07-14 PROBLEM — M19.90 ARTHRITIS: Status: ACTIVE | Noted: 2021-07-14

## 2021-07-14 PROBLEM — G98.8 NEUROLOGIC DISORDER: Status: ACTIVE | Noted: 2021-07-14

## 2021-07-14 PROBLEM — I50.9 CONGESTIVE HEART FAILURE: Status: ACTIVE | Noted: 2021-07-14

## 2021-07-14 PROBLEM — N28.9 KIDNEY DISEASE: Status: ACTIVE | Noted: 2021-07-14

## 2021-07-14 PROBLEM — K76.9 DISEASE OF LIVER: Status: ACTIVE | Noted: 2021-04-05

## 2021-07-14 PROCEDURE — 99214 OFFICE O/P EST MOD 30 MIN: CPT | Performed by: INTERNAL MEDICINE

## 2021-07-14 RX ORDER — MELOXICAM 15 MG/1
TABLET ORAL
COMMUNITY
End: 2021-08-13 | Stop reason: ALTCHOICE

## 2021-07-14 RX ORDER — PREDNISONE 10 MG/1
TABLET ORAL
COMMUNITY
End: 2021-08-13

## 2021-07-14 RX ORDER — ALLOPURINOL 100 MG/1
TABLET ORAL
COMMUNITY
Start: 2021-04-07 | End: 2021-12-14 | Stop reason: SDUPTHER

## 2021-07-14 RX ORDER — LISINOPRIL 20 MG/1
TABLET ORAL
Status: ON HOLD | COMMUNITY
End: 2021-12-15

## 2021-07-14 RX ORDER — FLUOXETINE HYDROCHLORIDE 20 MG/1
CAPSULE ORAL
COMMUNITY
End: 2021-07-20 | Stop reason: ALTCHOICE

## 2021-07-14 RX ORDER — CEFUROXIME AXETIL 500 MG/1
TABLET ORAL
COMMUNITY
End: 2021-08-13

## 2021-07-14 RX ORDER — GUAIFENESIN 400 MG/1
TABLET ORAL
COMMUNITY
End: 2021-08-13

## 2021-07-14 RX ORDER — NITROGLYCERIN 0.4 MG/1
0.4 TABLET SUBLINGUAL
COMMUNITY
End: 2022-06-03

## 2021-07-14 RX ORDER — INSULIN DETEMIR 100 [IU]/ML
INJECTION, SOLUTION SUBCUTANEOUS
COMMUNITY
End: 2021-08-13 | Stop reason: ALTCHOICE

## 2021-07-14 RX ORDER — FOLIC ACID 1 MG/1
TABLET ORAL
COMMUNITY
Start: 2021-06-26 | End: 2021-12-02

## 2021-07-14 RX ORDER — AZITHROMYCIN 250 MG/1
TABLET, FILM COATED ORAL
COMMUNITY
End: 2021-08-13

## 2021-07-14 NOTE — PROGRESS NOTES
"Chief Complaint  Follow-up (labs done)    Subjective  Patient denies chest pains still short of breath with exertion at times but about the same, he is trying to lose weight he has no falls no recent trauma says he is getting out they are doing a few more things,        Radhames Colón presents to [facility] INTERNAL MEDICINE      Objective   Vital Signs  Vitals:    07/14/21 1342   BP: 152/79   BP Location: Right arm   Patient Position: Sitting   Cuff Size: Large Adult   Temp: 98.2 °F (36.8 °C)   SpO2: 95%   Weight: 106 kg (234 lb 6.4 oz)   Height: 170.2 cm (67\")      Review of Systems legs give out ,no cp, no change in soa  Physical Exam  Constitutional:       General: He is not in acute distress.     Appearance: Normal appearance.   HENT:      Head: Normocephalic.      Mouth/Throat:      Mouth: Mucous membranes are moist.   Eyes:      Conjunctiva/sclera: Conjunctivae normal.      Pupils: Pupils are equal, round, and reactive to light.   Cardiovascular:      Rate and Rhythm: Normal rate and regular rhythm.      Pulses: Normal pulses.      Heart sounds: Normal heart sounds.   Pulmonary:      Effort: Pulmonary effort is normal.      Breath sounds: Normal breath sounds.   Abdominal:      General: Abdomen is flat. Bowel sounds are normal.      Palpations: Abdomen is soft.   Musculoskeletal:         General: No swelling. Normal range of motion.      Cervical back: Neck supple.   Skin:     General: Skin is warm and dry.      Coloration: Skin is not jaundiced.   Neurological:      General: No focal deficit present.      Mental Status: He is alert and oriented to person, place, and time. Mental status is at baseline.   Psychiatric:         Mood and Affect: Mood normal.         Behavior: Behavior normal.         Thought Content: Thought content normal.         Judgment: Judgment normal.      abd obese soft   Diminished breath sounds bilateral otherwise clear, skin is dry no edema to the lower legs,    Result " Review :   Lab Results   Component Value Date    PROBNP 1,596.0 (H) 07/09/2021    BNP 1937 (H) 03/10/2021    BNP 1128 (H) 11/02/2020    BNP 1802 (H) 10/14/2020     CMP    CMP 3/10/21 3/31/21 7/9/21   Glucose   88   Glucose 74     BUN 20  18   Creatinine 1.73 (A) 1.88 (A) 1.54 (A)   eGFR Non African Am   45 (A)   Sodium 145  140   Potassium 5.2  5.3 (A)   Chloride 104  102   Calcium 9.8  9.7   Albumin 4.0  3.90   Total Bilirubin 0.36  0.2   Alkaline Phosphatase 56  50   AST (SGOT) 25  24   ALT (SGPT) 13  16   (A) Abnormal value            CBC w/diff    CBC w/Diff 11/2/20 3/10/21 7/9/21   WBC 7.63 9.75 9.42   RBC 5.14 5.48 5.24   Hemoglobin 13.1 (A) 14.4 13.8   Hematocrit 43.7 48.7 44.9   MCV 85.0 88.9 85.7   MCH 25.5 (A) 26.3 (A) 26.3 (A)   MCHC 30.0 (A) 29.6 (A) 30.7 (A)   RDW 17.2 (A) 17.3 (A) 15.9 (A)   Platelets 183 235 209   Neutrophil Rel % 60.0 59.7 57.6   Immature Granulocyte Rel %   0.4   Lymphocyte Rel % 24.0 24.9 25.9   Monocyte Rel % 10.9 (A) 9.0 9.6   Eosinophil Rel % 3.8 5.2 5.5   Basophil Rel % 0.9 0.7 1.0   (A) Abnormal value             Lipid Panel    Lipid Panel 11/2/20   Total Cholesterol 128   Triglycerides 144   HDL Cholesterol 30 (A)   VLDL Cholesterol 29   LDL Cholesterol  69 (A)   (A) Abnormal value       Comments are available for some flowsheets but are not being displayed.            Lab Results   Component Value Date    TSH 2.490 07/09/2021    TSH 3.720 11/02/2020    TSH 0.402 10/15/2020      Lab Results   Component Value Date    FREET4 1.29 07/09/2021    FREET4 1.8 11/02/2020    FREET4 1.5 10/15/2020      A1C Last 3 Results    HGBA1C Last 3 Results 11/2/20 3/10/21 7/9/21   Hemoglobin A1C 6.5 (A) 6.1 (A) 6.00 (A)   (A) Abnormal value       Comments are available for some flowsheets but are not being displayed.            PSA    PSA 10/14/20   PSA 0.68                          Assessment and Plan      Hospital stay transitional care visit October 14 through October 20 with acute right lower  lobe pneumonia, acute hypoxic respiratory failure shortness of breath CT scan showed groundglass opacity coronavirus testing ??2 was negative in the hospital, patient was put back on Lasix daily sent home on prednisone and antibiotics Levaquin for 7 days following this hospital stay, he did not qualify for home O2 with 6 minute walk test in the room prior to discharge, he had acute diastolic heart failure in the hospital all medications are reviewed, blood sugars are doing much better over the past week or so,    transtional care visit from repair LEFT FEM. --RECONTRUCTION, AND ATERECTOMY---SEPT 14, 2020, ---PERIPHERAL ASO ---Patient is having claudication and leg pain with weakness, arterial evaluation shows bilateral proximal level occlusive disease either just below the aorta or at the aortic repair level, ---Patient is status post left femoral iliac artery atherectomy grafting and angioplasty by Dr. Nam at [facility], [place], operated by [facility], September 14, 2020, patient is doing better overall,--------, carotid ultrasound shows no significant stenosis,----- August 2020 compared to previous and prior ultrasounds,-    transtional care , hosp 8/5/2020 for R LL PNEUMONIA, AND ACUTE DIASTOLIC CHF, LASHA ON CKD 3---Patient presented with acute hypercapnic respiratory failure, pneumonia, was treated with IV antibiotics, IV steroids in the hospital due to wheezing that developed, he had low potassium that was replaced his anemia was monitored he was kept on oxygen initially he had a follow-up chest x-ray August 7 showing no active disease his white count is still a little high at 13, he says his breathing well he is taking his breathing treatments currently, he has follow-up with pulmonology tomorrow August 12, his proBNP level is down from 6000 500-4600 during his hospital course, he is currently off antibiotics and off steroids currently, it seems to be getting back to baseline    BACK PAIN, SPINAL STENOSIS, DX DISCUSSE, AUG 2020      MILD DEPRESSION _ Wellbutrin  mg daily doing well March 2021    Type 2 Diabetes , on Levemir 40 units daily and Metformin 500mg bid--Hemoglobin A1c, 6.0 improved July 2021    H/O Colon cancer PARTIAL COLECTOMY 2010 - , CEA elevated now at 7.7, -- Dr. Ibanez---Colonoscopy November 2018 unremarkable----CEA level 2.5 April 2020    Fatigue, Weight loss--- BETTER OFF BRILLINTA --ON PLAVIX NOW     sees sleep specialist - on CPAP;     Angina, cardiac catheter jan 2018, with 80-90% stenosis circumflex proximal to vein graft, LIMA graft to LAD was patent, normal ejection fraction January 2018, patient had elective semi-elective stent placement to the circumflex artery, JAN 23, 2018, --Patient currently off BRILLINTA and on Plavix with much improved and breathing status as of July 2018----previous echo October 2020 showed mild mitral valve annular calcification, no significant prolapse, or regurgitation, ejection fraction 55 to 60% diastolic dysfunction noted otherwise unremarkable    R HIP PAIN, GLUTEUS MUSCLE , PARASPINAL MUSCLE ATROPHY, S/P INFARCTION AFTER AAA REPAIR IN 3/13-, status post recent radiofrequency ablation X 2, by pain management much improved, continues on gabapentin 600 mg twice a day,and morphine long-acting twice a day, 30 mg    Elevated cholesterol--Lipitor 20 mg daily    CAROTID ASO, HAS YRLY F/U WITH VASC SURG ---    HYPOTHRYOIDISM-- levothyroxine 150 MCG daily-    HTN---- Norvasc 2.5 mg daily Lasix 40 mg QD , potassium qd , metoprolol extended release 50 mg BID    LASHA, ON TOP OF CKD 3-- STABLE AT 1.73, MARCH 2021, , now 1.5 July 2021     GOUT, ALLOPURINOL 200 MG QD, colchicine,0.1 PRN ----Dr. Gonzalez rheumatology for second opinion     PSA 0.7 April 2019 AND STABLE APRIL 2020, ---TOTAL TESTOST, =369 APRIL 2020, --GOOD      Follow Up   No follow-ups on file.  Patient was given instructions and counseling regarding his condition or for health maintenance advice. Please see specific information  pulled into the AVS if appropriate.

## 2021-07-19 ENCOUNTER — TELEPHONE (OUTPATIENT)
Dept: INTERNAL MEDICINE | Facility: CLINIC | Age: 71
End: 2021-07-19

## 2021-07-19 RX ORDER — FENOFIBRATE 145 MG/1
TABLET, COATED ORAL
Qty: 90 TABLET | Refills: 3 | Status: SHIPPED | OUTPATIENT
Start: 2021-07-19 | End: 2022-07-18

## 2021-07-19 NOTE — TELEPHONE ENCOUNTER
Pt called and states he was seen in office with  last week and  was going to put him on a med to take with his antidepressant to give it a boost? Pt was unsure of what the med was but states it not at his pharmacy.

## 2021-07-20 RX ORDER — FLUOXETINE 10 MG/1
10 CAPSULE ORAL DAILY
Qty: 30 CAPSULE | Refills: 5 | Status: SHIPPED | OUTPATIENT
Start: 2021-07-20 | End: 2021-08-13

## 2021-07-20 NOTE — TELEPHONE ENCOUNTER
I called pt with cont. Concerns re depression and consider additional medication such as fluoxetine starting at 10 mg in the morning or in the afternoon

## 2021-07-27 ENCOUNTER — LAB (OUTPATIENT)
Dept: LAB | Facility: HOSPITAL | Age: 71
End: 2021-07-27

## 2021-07-27 ENCOUNTER — TRANSCRIBE ORDERS (OUTPATIENT)
Dept: LAB | Facility: HOSPITAL | Age: 71
End: 2021-07-27

## 2021-07-27 DIAGNOSIS — E11.9 TYPE 2 DIABETES MELLITUS WITHOUT COMPLICATION, WITH LONG-TERM CURRENT USE OF INSULIN (HCC): ICD-10-CM

## 2021-07-27 DIAGNOSIS — I71.40 ABDOMINAL AORTIC ANEURYSM WITHOUT RUPTURE (HCC): ICD-10-CM

## 2021-07-27 DIAGNOSIS — I73.9 PVD (PERIPHERAL VASCULAR DISEASE) WITH CLAUDICATION (HCC): ICD-10-CM

## 2021-07-27 DIAGNOSIS — M1A.00X0 CHRONIC GOUTY ARTHRITIS: ICD-10-CM

## 2021-07-27 DIAGNOSIS — I10 HYPERTENSION, UNSPECIFIED TYPE: ICD-10-CM

## 2021-07-27 DIAGNOSIS — Z79.899 ENCOUNTER FOR LONG-TERM (CURRENT) USE OF OTHER MEDICATIONS: ICD-10-CM

## 2021-07-27 DIAGNOSIS — I50.32 CHRONIC DIASTOLIC CONGESTIVE HEART FAILURE (HCC): ICD-10-CM

## 2021-07-27 DIAGNOSIS — M51.36 DEGENERATION OF LUMBAR INTERVERTEBRAL DISC: ICD-10-CM

## 2021-07-27 DIAGNOSIS — I25.10 DISEASE OF CARDIOVASCULAR SYSTEM: ICD-10-CM

## 2021-07-27 DIAGNOSIS — J43.2 CENTRILOBULAR EMPHYSEMA (HCC): ICD-10-CM

## 2021-07-27 DIAGNOSIS — E78.5 HYPERLIPIDEMIA, UNSPECIFIED HYPERLIPIDEMIA TYPE: ICD-10-CM

## 2021-07-27 DIAGNOSIS — I65.23 OCCLUSION AND STENOSIS OF BILATERAL CAROTID ARTERIES: ICD-10-CM

## 2021-07-27 DIAGNOSIS — N18.31 STAGE 3A CHRONIC KIDNEY DISEASE (HCC): ICD-10-CM

## 2021-07-27 DIAGNOSIS — Z79.891 LONG-TERM CURRENT USE OF OPIATE ANALGESIC: ICD-10-CM

## 2021-07-27 DIAGNOSIS — I71.40 ABDOMINAL AORTIC ANEURYSM (AAA) WITHOUT RUPTURE (HCC): ICD-10-CM

## 2021-07-27 DIAGNOSIS — I10 HYPERTENSION, ESSENTIAL: ICD-10-CM

## 2021-07-27 DIAGNOSIS — K57.90 DIVERTICULOSIS: ICD-10-CM

## 2021-07-27 DIAGNOSIS — M47.816 LUMBAR SPONDYLOSIS: ICD-10-CM

## 2021-07-27 DIAGNOSIS — M1A.0790 CHRONIC GOUT OF FOOT, UNSPECIFIED CAUSE, UNSPECIFIED LATERALITY: ICD-10-CM

## 2021-07-27 DIAGNOSIS — Z12.5 SCREENING FOR PROSTATE CANCER: ICD-10-CM

## 2021-07-27 DIAGNOSIS — E03.9 ACQUIRED HYPOTHYROIDISM: ICD-10-CM

## 2021-07-27 DIAGNOSIS — Z79.4 TYPE 2 DIABETES MELLITUS WITHOUT COMPLICATION, WITH LONG-TERM CURRENT USE OF INSULIN (HCC): ICD-10-CM

## 2021-07-27 DIAGNOSIS — M13.0 POLYARTHROPATHY: ICD-10-CM

## 2021-07-27 DIAGNOSIS — I25.10 ATHEROSCLEROSIS OF CORONARY ARTERY OF NATIVE HEART WITHOUT ANGINA PECTORIS, UNSPECIFIED VESSEL OR LESION TYPE: ICD-10-CM

## 2021-07-27 DIAGNOSIS — I25.10 DISEASE OF CARDIOVASCULAR SYSTEM: Primary | ICD-10-CM

## 2021-07-27 DIAGNOSIS — C18.9 MALIGNANT NEOPLASM OF COLON, UNSPECIFIED PART OF COLON (HCC): ICD-10-CM

## 2021-07-27 DIAGNOSIS — E78.00 PURE HYPERCHOLESTEROLEMIA: ICD-10-CM

## 2021-07-27 DIAGNOSIS — I73.9 PERIPHERAL ARTERY DISEASE (HCC): ICD-10-CM

## 2021-07-27 LAB
25(OH)D3 SERPL-MCNC: 48.1 NG/ML
ALBUMIN SERPL-MCNC: 4.2 G/DL (ref 3.5–5.2)
ALBUMIN/GLOB SERPL: 1.9 G/DL
ALP SERPL-CCNC: 50 U/L (ref 39–117)
ALT SERPL W P-5'-P-CCNC: 12 U/L (ref 1–41)
ANION GAP SERPL CALCULATED.3IONS-SCNC: 10.5 MMOL/L (ref 5–15)
AST SERPL-CCNC: 21 U/L (ref 1–40)
BASOPHILS # BLD AUTO: 0.05 10*3/MM3 (ref 0–0.2)
BASOPHILS NFR BLD AUTO: 0.7 % (ref 0–1.5)
BILIRUB SERPL-MCNC: 0.3 MG/DL (ref 0–1.2)
BUN SERPL-MCNC: 18 MG/DL (ref 8–23)
BUN/CREAT SERPL: 12.1 (ref 7–25)
CALCIUM SPEC-SCNC: 9.3 MG/DL (ref 8.6–10.5)
CHLORIDE SERPL-SCNC: 103 MMOL/L (ref 98–107)
CHOLEST SERPL-MCNC: 129 MG/DL (ref 0–200)
CO2 SERPL-SCNC: 28.5 MMOL/L (ref 22–29)
CREAT SERPL-MCNC: 1.49 MG/DL (ref 0.76–1.27)
DEPRECATED RDW RBC AUTO: 48.8 FL (ref 37–54)
EOSINOPHIL # BLD AUTO: 0.32 10*3/MM3 (ref 0–0.4)
EOSINOPHIL NFR BLD AUTO: 4.2 % (ref 0.3–6.2)
ERYTHROCYTE [DISTWIDTH] IN BLOOD BY AUTOMATED COUNT: 15.8 % (ref 12.3–15.4)
FOLATE SERPL-MCNC: >20 NG/ML (ref 4.78–24.2)
GFR SERPL CREATININE-BSD FRML MDRD: 46 ML/MIN/1.73
GLOBULIN UR ELPH-MCNC: 2.2 GM/DL
GLUCOSE SERPL-MCNC: 85 MG/DL (ref 65–99)
HBA1C MFR BLD: 6.14 % (ref 4.8–5.6)
HCT VFR BLD AUTO: 47 % (ref 37.5–51)
HDLC SERPL-MCNC: 26 MG/DL (ref 40–60)
HGB BLD-MCNC: 14.3 G/DL (ref 13–17.7)
IMM GRANULOCYTES # BLD AUTO: 0.03 10*3/MM3 (ref 0–0.05)
IMM GRANULOCYTES NFR BLD AUTO: 0.4 % (ref 0–0.5)
LDLC SERPL CALC-MCNC: 63 MG/DL (ref 0–100)
LDLC/HDLC SERPL: 2.08 {RATIO}
LYMPHOCYTES # BLD AUTO: 1.79 10*3/MM3 (ref 0.7–3.1)
LYMPHOCYTES NFR BLD AUTO: 23.7 % (ref 19.6–45.3)
MCH RBC QN AUTO: 26 PG (ref 26.6–33)
MCHC RBC AUTO-ENTMCNC: 30.4 G/DL (ref 31.5–35.7)
MCV RBC AUTO: 85.6 FL (ref 79–97)
MONOCYTES # BLD AUTO: 0.68 10*3/MM3 (ref 0.1–0.9)
MONOCYTES NFR BLD AUTO: 9 % (ref 5–12)
NEUTROPHILS NFR BLD AUTO: 4.68 10*3/MM3 (ref 1.7–7)
NEUTROPHILS NFR BLD AUTO: 62 % (ref 42.7–76)
NRBC BLD AUTO-RTO: 0 /100 WBC (ref 0–0.2)
NT-PROBNP SERPL-MCNC: 1494 PG/ML (ref 0–900)
PLATELET # BLD AUTO: 195 10*3/MM3 (ref 140–450)
PMV BLD AUTO: 12 FL (ref 6–12)
POTASSIUM SERPL-SCNC: 4.3 MMOL/L (ref 3.5–5.2)
PROT SERPL-MCNC: 6.4 G/DL (ref 6–8.5)
PSA SERPL-MCNC: 0.71 NG/ML (ref 0–4)
RBC # BLD AUTO: 5.49 10*6/MM3 (ref 4.14–5.8)
SODIUM SERPL-SCNC: 142 MMOL/L (ref 136–145)
T4 FREE SERPL-MCNC: 1.44 NG/DL (ref 0.93–1.7)
TRIGL SERPL-MCNC: 244 MG/DL (ref 0–150)
TSH SERPL DL<=0.05 MIU/L-ACNC: 1.53 UIU/ML (ref 0.27–4.2)
VIT B12 BLD-MCNC: 791 PG/ML (ref 211–946)
VLDLC SERPL-MCNC: 40 MG/DL (ref 5–40)
WBC # BLD AUTO: 7.55 10*3/MM3 (ref 3.4–10.8)

## 2021-07-27 PROCEDURE — 80053 COMPREHEN METABOLIC PANEL: CPT

## 2021-07-27 PROCEDURE — 82746 ASSAY OF FOLIC ACID SERUM: CPT

## 2021-07-27 PROCEDURE — 82607 VITAMIN B-12: CPT

## 2021-07-27 PROCEDURE — 83036 HEMOGLOBIN GLYCOSYLATED A1C: CPT

## 2021-07-27 PROCEDURE — 36415 COLL VENOUS BLD VENIPUNCTURE: CPT

## 2021-07-27 PROCEDURE — 84443 ASSAY THYROID STIM HORMONE: CPT

## 2021-07-27 PROCEDURE — 82306 VITAMIN D 25 HYDROXY: CPT

## 2021-07-27 PROCEDURE — 83880 ASSAY OF NATRIURETIC PEPTIDE: CPT

## 2021-07-27 PROCEDURE — G0103 PSA SCREENING: HCPCS

## 2021-07-27 PROCEDURE — 85025 COMPLETE CBC W/AUTO DIFF WBC: CPT

## 2021-07-27 PROCEDURE — 84439 ASSAY OF FREE THYROXINE: CPT

## 2021-07-27 PROCEDURE — 80061 LIPID PANEL: CPT

## 2021-07-29 RX ORDER — METOPROLOL SUCCINATE 50 MG/1
TABLET, EXTENDED RELEASE ORAL
Qty: 180 TABLET | Refills: 3 | Status: SHIPPED | OUTPATIENT
Start: 2021-07-29 | End: 2022-08-01

## 2021-08-02 RX ORDER — AMLODIPINE BESYLATE 2.5 MG/1
2.5 TABLET ORAL DAILY
Qty: 30 TABLET | Refills: 5 | Status: SHIPPED | OUTPATIENT
Start: 2021-08-02 | End: 2021-09-01

## 2021-08-13 ENCOUNTER — OFFICE VISIT (OUTPATIENT)
Dept: CARDIOLOGY | Facility: CLINIC | Age: 71
End: 2021-08-13

## 2021-08-13 VITALS
SYSTOLIC BLOOD PRESSURE: 107 MMHG | HEIGHT: 69 IN | WEIGHT: 232 LBS | BODY MASS INDEX: 34.36 KG/M2 | DIASTOLIC BLOOD PRESSURE: 57 MMHG | HEART RATE: 54 BPM

## 2021-08-13 DIAGNOSIS — I10 ESSENTIAL HYPERTENSION: ICD-10-CM

## 2021-08-13 DIAGNOSIS — I27.20 PULMONARY HYPERTENSION (HCC): ICD-10-CM

## 2021-08-13 DIAGNOSIS — E78.5 HYPERLIPIDEMIA LDL GOAL <70: ICD-10-CM

## 2021-08-13 DIAGNOSIS — I25.10 CORONARY ARTERY DISEASE INVOLVING NATIVE CORONARY ARTERY OF NATIVE HEART, ANGINA PRESENCE UNSPECIFIED: Primary | ICD-10-CM

## 2021-08-13 PROCEDURE — 99214 OFFICE O/P EST MOD 30 MIN: CPT | Performed by: INTERNAL MEDICINE

## 2021-08-13 RX ORDER — FUROSEMIDE 40 MG/1
40 TABLET ORAL 2 TIMES DAILY
COMMUNITY
End: 2021-10-05

## 2021-08-13 RX ORDER — GABAPENTIN 600 MG/1
600 TABLET ORAL 2 TIMES DAILY
COMMUNITY
End: 2021-11-23 | Stop reason: SDUPTHER

## 2021-08-13 NOTE — ASSESSMENT & PLAN NOTE
Coronary artery disease is stable. coronary artery disease; previous bypass surgery (2005 x3); subsequent stent/PCI of the native circumflex obtuse marginal branch in January 2018.  He denies chest pain with or without exertion.

## 2021-08-13 NOTE — PROGRESS NOTES
Chief Complaint  Hypertension and Congestive Heart Failure    Subjective            Radhames Colón presents to [facility] CARDIOLOGY  Radhames is a 70 years old gentleman with known coronary disease hypertension hyperlipidemia chronic lung disease or shortness of breath is still present.  It is moderate and occurs with mild to moderate exertion.  It is worse during winter months.  He denies any chest pain palpitation dizziness syncope      Past Medical History:   Diagnosis Date   • Abdominal aortic aneurysm without rupture (CMS/HCC)    • Acute renal failure (ARF) (CMS/HCC)     WAS SHORT TERM DIALYSIS, STAGE 3   • Aortic aneurysm (CMS/HCC)    • Arthritis    • Atherosclerosis of coronary artery    • Atherosclerotic heart disease of native coronary artery without angina pectoris    • Atrophy of thyroid (acquired)    • Bilateral carotid artery stenosis    • Cancer (CMS/HCC)     COLON    • Chronic airway obstruction (CMS/HCC)    • Chronic gouty arthritis    • Chronic kidney disease, stage 3 (CMS/HCC)    • Controlled diabetes mellitus type II without complication (CMS/HCC)    • COPD (chronic obstructive pulmonary disease) (CMS/HCC)    • Coronary artery disease    • Depression    • Diabetes mellitus (CMS/HCC)    • Disease of liver    • Disease of thyroid gland    • Elevated carcinoembryonic antigen (CEA)    • GERD (gastroesophageal reflux disease)    • Hyperlipidemia    • Hypertension    • Hypothyroidism    • Iron deficiency anemia    • Long term (current) use of opiate analgesic    • Lumbar spondylosis    • Malignant neoplasm of colon (CMS/HCC)    • On long term drug therapy    • Peripheral artery disease (CMS/HCC)    • Polyarthropathy    • Pure hypercholesterolemia    • Rhabdomyolysis    • Sleep apnea     CPAP       Allergies   Allergen Reactions   • Brilinta [Ticagrelor] Shortness Of Breath   • Penicillins Shortness Of Breath        Past Surgical History:   Procedure Laterality Date   • ABDOMINAL AORTIC  ANEURYSM REPAIR     • ANGIOPLASTY FEMORAL ARTERY Left 9/14/2020    Procedure: AORTOILLOFEMORAL ARTERIOGRAM;  Surgeon: Chula Nam Jr., MD;  Location: [facility] OR 18/19;  Service: Vascular;  Laterality: Left;   • APPENDECTOMY     • CARDIAC CATHETERIZATION     • CAROTID ENDARTERECTOMY Left 2005   • CAROTID ENDARTERECTOMY Right 2010   • CHOLECYSTECTOMY OPEN     • COLON SURGERY      COLON RESECTION   • COLONOSCOPY  2004,11/2018    Dr. Lamas 2004,    • CORONARY ANGIOPLASTY WITH STENT PLACEMENT     • CORONARY ARTERY BYPASS GRAFT  2005   • ENDOSCOPY     • FEMORAL ENDARTERECTOMY Left 9/14/2020    Procedure: LEFT FEMORAL ENDARECTOMY WITHH PATCH ANGIOPLASTY;  Surgeon: Chula Nam Jr., MD;  Location: [facility] OR 18/19;  Service: Vascular;  Laterality: Left;   • GALLBLADDER SURGERY     • RHINOPLASTY Bilateral     70-80% R, 50% L        Social History     Tobacco Use   • Smoking status: Former Smoker     Packs/day: 1.00     Years: 45.00     Pack years: 45.00     Types: Cigarettes     Quit date: 9/11/2005     Years since quitting: 15.9   • Smokeless tobacco: Never Used   Vaping Use   • Vaping Use: Never used   Substance Use Topics   • Alcohol use: Yes     Comment: RARE   • Drug use: Never       Family History   Problem Relation Age of Onset   • Heart failure Mother    • Malig Hyperthermia Neg Hx         Prior to Admission medications    Medication Sig Start Date End Date Taking? Authorizing Provider           allopurinol (ZYLOPRIM) 300 MG tablet Take 150 mg by mouth Daily.   Yes Katheryn Montana MD   amLODIPine (NORVASC) 2.5 MG tablet Take 1 tablet by mouth Daily for 30 days. 8/2/21 9/1/21 Yes Mingo Loja MD   aspirin 81 MG EC tablet Take 81 mg by mouth Daily. INSTUCTED TO CONTINUE UNTIL DAY OF SURGERY   Yes ProviderKatheryn MD   atorvastatin (LIPITOR) 20 MG tablet Take 20 mg by mouth Daily. 7/16/20  Yes Katheryn Montana MD   budesonide (PULMICORT) 0.5 MG/2ML nebulizer  solution As Needed. 7/29/20  Yes Katheryn Montana MD   buPROPion XL (WELLBUTRIN XL) 150 MG 24 hr tablet TK 1 T PO QD IN THE MORNING 7/20/20  Yes Katheryn Montana MD   Cholecalciferol 50 MCG (2000 UT) capsule Take  by mouth.   Yes Katheryn Montana MD   clopidogrel (PLAVIX) 75 MG tablet Take 75 mg by mouth Daily. 7/16/20  Yes Katheryn Montana MD   co-enzyme Q-10 30 MG capsule CoQ-10 30 mg oral capsule take 1 capsule by oral route daily   Active   Yes Katheryn Montana MD   colchicine 0.6 MG tablet Take 0.6 mg by mouth As Needed. 7/2/20  Yes Katheryn Montana MD   cyanocobalamin (VITAMIN B-12) 1000 MCG tablet  1/27/21  Yes Katheryn Montana MD   fenofibrate (TRICOR) 145 MG tablet TAKE 1 TABLET EVERY DAY 7/19/21  Yes Mingo Loja MD   FEROSUL 325 (65 Fe) MG tablet Daily With Breakfast. 6/20/20  Yes Katheryn Montana MD   furosemide (LASIX) 40 MG tablet Take 40 mg by mouth 2 (Two) Times a Day.   Yes Katheryn Montana MD   gabapentin (NEURONTIN) 600 MG tablet Take 600 mg by mouth 2 (Two) Times a Day.   Yes Katheryn Montana MD   insulin NPH (humuLIN N,novoLIN N) 100 UNIT/ML injection Inject 40 Units under the skin into the appropriate area as directed Every Night.   Yes Katheryn Montana MD   ipratropium-albuterol (DUO-NEB) 0.5-2.5 mg/3 ml nebulizer As Needed. 7/29/20  Yes Katheryn Montana MD   levothyroxine (SYNTHROID, LEVOTHROID) 150 MCG tablet Daily. 7/11/20  Yes Katheryn Montana MD   metFORMIN (GLUCOPHAGE) 500 MG tablet Take 500 mg by mouth 2 (Two) Times a Day With Meals.   Yes Katheryn Montana MD   metoprolol succinate XL (TOPROL-XL) 50 MG 24 hr tablet TAKE 1 TABLET TWICE DAILY 7/29/21  Yes Mingo Loja MD   Morphine (MS CONTIN) 30 MG 12 hr tablet 2 (Two) Times a Day. 8/29/20  Yes Katheryn Montana MD   nitroglycerin (NITROSTAT) 0.4 MG SL tablet nitroglycerin 0.4 mg sublingual tablet   Yes Provider, Historical, MD   pantoprazole  "(PROTONIX) 40 MG EC tablet Take 40 mg by mouth Daily. 6/20/20  Yes Katheryn Montana MD   vitamin C (ASCORBIC ACID) 500 MG tablet 500 mg Daily. 6/20/20  Yes Katheryn Montana MD   azithromycin (ZITHROMAX) 250 MG tablet azithromycin 250 mg tablet    Katheryn Montana MD   cefuroxime (CEFTIN) 500 MG tablet cefuroxime axetil 500 mg tablet    Katheryn Montana MD   FLUoxetine (PROzac) 10 MG capsule Take 1 capsule by mouth Daily. 7/20/21   Mingo Loja MD   folic acid (FOLVITE) 1 MG tablet  6/26/21   Katheryn Montana MD   furosemide (LASIX) 20 MG tablet Take 40 mg by mouth Daily.    Katheryn Montana MD   gabapentin (NEURONTIN) 400 MG capsule Take 600 mg by mouth 2 (two) times a day.    Katheryn Montana MD   guaiFENesin (HUMIBID 3) 400 MG tablet Mucus Relief oral tablet 400 mg take 1 tablet (400 mg) by oral route every 4 hours as needed   Suspended    Katheryn Montana MD   insulin detemir (Levemir FlexTouch) 100 UNIT/ML injection Levemir FlexTouch U-100 Insulin 100 unit/mL (3 mL) subcutaneous pen    Katheryn Montana MD   lisinopril (PRINIVIL,ZESTRIL) 20 MG tablet lisinopril oral tablet 20 mg take 1 tablet (20 mg) by oral route once daily   Suspended    Katheryn Montana MD   meloxicam (Mobic) 15 MG tablet Mobic Oral Tablet 15 mg take 1 tablet (15 mg) by oral route once daily   Suspended    Katheryn Montana MD   potassium chloride (MICRO-K) 10 MEQ CR capsule Take 10 mEq by mouth Daily. 7/29/20   Katheryn Montana MD   predniSONE (DELTASONE) 10 MG tablet prednisone 10 mg tablet    Katheryn Montana MD        Review of Systems   Respiratory: Positive for shortness of breath.    Cardiovascular: Negative for chest pain and palpitations.        Objective     /57 (BP Location: Right arm, Patient Position: Sitting, Cuff Size: Adult)   Pulse 54   Ht 175.3 cm (69\")   Wt 105 kg (232 lb)   BMI 34.26 kg/m²       Physical Exam  Constitutional:       General: " He is awake.      Appearance: Normal appearance.   Neck:      Thyroid: No thyromegaly.      Vascular: No carotid bruit or JVD.   Cardiovascular:      Rate and Rhythm: Normal rate and regular rhythm.      Chest Wall: PMI is not displaced.      Pulses: Normal pulses.      Heart sounds: Normal heart sounds, S1 normal and S2 normal. No murmur heard.   No friction rub. No gallop. No S3 or S4 sounds.    Pulmonary:      Effort: Pulmonary effort is normal. Prolonged expiration present.      Breath sounds: Normal breath sounds. Decreased air movement present. No wheezing, rhonchi or rales.   Abdominal:      General: Bowel sounds are normal.      Palpations: Abdomen is soft. There is no mass.      Tenderness: There is no abdominal tenderness.   Musculoskeletal:      Cervical back: Neck supple.      Right lower leg: No edema.      Left lower leg: No edema.   Neurological:      Mental Status: He is alert and oriented to person, place, and time.   Psychiatric:         Mood and Affect: Mood normal.         Behavior: Behavior is cooperative.       Lab Results   Component Value Date    PROBNP 1,494.0 (H) 07/27/2021    PROBNP 1,596.0 (H) 07/09/2021    BNP 1937 (H) 03/10/2021    BNP 1128 (H) 11/02/2020    BNP 1802 (H) 10/14/2020     CMP    CMP 3/31/21 7/9/21 7/27/21   Glucose  88 85   BUN  18 18   Creatinine 1.88 (A) 1.54 (A) 1.49 (A)   eGFR Non  Am  45 (A) 46 (A)   Sodium  140 142   Potassium  5.3 (A) 4.3   Chloride  102 103   Calcium  9.7 9.3   Albumin  3.90 4.20   Total Bilirubin  0.2 0.3   Alkaline Phosphatase  50 50   AST (SGOT)  24 21   ALT (SGPT)  16 12   (A) Abnormal value            CBC w/diff    CBC w/Diff 3/10/21 7/9/21 7/27/21   WBC 9.75 9.42 7.55   RBC 5.48 5.24 5.49   Hemoglobin 14.4 13.8 14.3   Hematocrit 48.7 44.9 47.0   MCV 88.9 85.7 85.6   MCH 26.3 (A) 26.3 (A) 26.0 (A)   MCHC 29.6 (A) 30.7 (A) 30.4 (A)   RDW 17.3 (A) 15.9 (A) 15.8 (A)   Platelets 235 209 195   Neutrophil Rel % 59.7 57.6 62.0   Immature  Granulocyte Rel %  0.4 0.4   Lymphocyte Rel % 24.9 25.9 23.7   Monocyte Rel % 9.0 9.6 9.0   Eosinophil Rel % 5.2 5.5 4.2   Basophil Rel % 0.7 1.0 0.7   (A) Abnormal value             Lipid Panel    Lipid Panel 11/2/20 7/27/21   Total Cholesterol  129   Total Cholesterol 128    Triglycerides 144 244 (A)   HDL Cholesterol 30 (A) 26 (A)   VLDL Cholesterol 29 40   LDL Cholesterol  69 (A) 63   LDL/HDL Ratio  2.08   (A) Abnormal value       Comments are available for some flowsheets but are not being displayed.            Lab Results   Component Value Date    TSH 1.530 07/27/2021    TSH 2.490 07/09/2021    TSH 3.720 11/02/2020      Lab Results   Component Value Date    FREET4 1.44 07/27/2021    FREET4 1.29 07/09/2021    FREET4 1.8 11/02/2020      No results found for: DDIMERQUANT  Magnesium   Date Value Ref Range Status   10/16/2020 2.24 1.60 - 2.30 mg/dL Final      No results found for: DIGOXIN   A1C Last 3 Results    HGBA1C Last 3 Results 3/10/21 7/9/21 7/27/21   Hemoglobin A1C 6.1 (A) 6.00 (A) 6.14 (A)   (A) Abnormal value       Comments are available for some flowsheets but are not being displayed.                  Result Review :                           Assessment and Plan        Diagnoses and all orders for this visit:    1. Coronary artery disease involving native coronary artery of native heart, angina presence unspecified (Primary)  Assessment & Plan:  Coronary artery disease is stable. coronary artery disease; previous bypass surgery (2005 x3); subsequent stent/PCI of the native circumflex obtuse marginal branch in January 2018.  He denies chest pain with or without exertion.    Orders:  -     Adult Transthoracic Echo Complete W/ Cont if Necessary Per Protocol; Future  -     Lipid Panel; Future  -     Comprehensive Metabolic Panel; Future  -     Magnesium; Future    2. Hyperlipidemia LDL goal <70  Assessment & Plan:  Lipid abnormalities are slightly worse.  His triglycerides have gone up to 244.  He is already  on fenofibrate and I do not want to add Vascepa to it.  I have indicated to him a low carbohydrate weight reducing diet that will help..  If at the time of his next visit there still heart then we will add Vascepa    Orders:  -     Adult Transthoracic Echo Complete W/ Cont if Necessary Per Protocol; Future  -     Lipid Panel; Future  -     Comprehensive Metabolic Panel; Future  -     Magnesium; Future    3. Essential hypertension  -     Adult Transthoracic Echo Complete W/ Cont if Necessary Per Protocol; Future  -     Lipid Panel; Future  -     Comprehensive Metabolic Panel; Future  -     Magnesium; Future    4. Pulmonary hypertension (CMS/HCC)  -     Adult Transthoracic Echo Complete W/ Cont if Necessary Per Protocol; Future  -     Lipid Panel; Future  -     Comprehensive Metabolic Panel; Future  -     Magnesium; Future          Follow Up     Return in about 8 months (around 4/18/2022) for echo with next visit ekg also.    Patient was given instructions and counseling regarding his condition or for health maintenance advice. Please see specific information pulled into the AVS if appropriate.

## 2021-08-13 NOTE — ASSESSMENT & PLAN NOTE
Lipid abnormalities are slightly worse.  His triglycerides have gone up to 244.  He is already on fenofibrate and I do not want to add Vascepa to it.  I have indicated to him a low carbohydrate weight reducing diet that will help..  If at the time of his next visit there still heart then we will add Vascepa

## 2021-10-04 RX ORDER — AMLODIPINE BESYLATE 2.5 MG/1
2.5 TABLET ORAL DAILY
COMMUNITY
End: 2021-10-04 | Stop reason: SDUPTHER

## 2021-10-04 NOTE — TELEPHONE ENCOUNTER
Pharmacy is requesting refills on behalf of Patient for Fluoxetine 10 mg daily to [facility]. This is not on his medication list in the either system

## 2021-10-05 RX ORDER — FUROSEMIDE 40 MG/1
TABLET ORAL
Qty: 135 TABLET | Refills: 3 | Status: SHIPPED | OUTPATIENT
Start: 2021-10-05 | End: 2022-02-23 | Stop reason: SDUPTHER

## 2021-10-05 RX ORDER — AMLODIPINE BESYLATE 2.5 MG/1
2.5 TABLET ORAL DAILY
Qty: 90 TABLET | Refills: 3 | Status: SHIPPED | OUTPATIENT
Start: 2021-10-05 | End: 2022-02-15 | Stop reason: SDUPTHER

## 2021-10-07 RX ORDER — FLUOXETINE 10 MG/1
10 CAPSULE ORAL DAILY
COMMUNITY
Start: 2021-09-05 | End: 2021-10-07 | Stop reason: SDUPTHER

## 2021-10-08 RX ORDER — FLUOXETINE 10 MG/1
10 CAPSULE ORAL DAILY
Qty: 90 CAPSULE | Refills: 0 | Status: SHIPPED | OUTPATIENT
Start: 2021-10-08 | End: 2022-01-04

## 2021-11-01 RX ORDER — LANOLIN ALCOHOL/MO/W.PET/CERES
CREAM (GRAM) TOPICAL
Qty: 90 TABLET | Refills: 0 | Status: SHIPPED | OUTPATIENT
Start: 2021-11-01 | End: 2022-01-24

## 2021-11-01 RX ORDER — ATORVASTATIN CALCIUM 20 MG/1
TABLET, FILM COATED ORAL
Qty: 90 TABLET | Refills: 0 | Status: SHIPPED | OUTPATIENT
Start: 2021-11-01 | End: 2022-01-24

## 2021-11-22 RX ORDER — CLOPIDOGREL BISULFATE 75 MG/1
TABLET ORAL
Qty: 90 TABLET | Refills: 1 | Status: SHIPPED | OUTPATIENT
Start: 2021-11-22 | End: 2022-04-04 | Stop reason: SDUPTHER

## 2021-11-23 DIAGNOSIS — G62.9 PERIPHERAL POLYNEUROPATHY: Primary | ICD-10-CM

## 2021-11-23 RX ORDER — GABAPENTIN 600 MG/1
600 TABLET ORAL 2 TIMES DAILY
Qty: 60 TABLET | Refills: 5 | Status: SHIPPED | OUTPATIENT
Start: 2021-11-23 | End: 2022-06-03

## 2021-11-29 ENCOUNTER — TELEPHONE (OUTPATIENT)
Dept: INTERNAL MEDICINE | Facility: CLINIC | Age: 71
End: 2021-11-29

## 2021-11-29 DIAGNOSIS — I50.32 CHRONIC DIASTOLIC CONGESTIVE HEART FAILURE (HCC): ICD-10-CM

## 2021-11-29 DIAGNOSIS — E03.4 ACQUIRED ATROPHY OF THYROID: ICD-10-CM

## 2021-11-29 DIAGNOSIS — C80.1 CANCER (HCC): ICD-10-CM

## 2021-11-29 DIAGNOSIS — R73.01 IFG (IMPAIRED FASTING GLUCOSE): ICD-10-CM

## 2021-11-29 DIAGNOSIS — M1A.00X0 CHRONIC GOUTY ARTHRITIS: ICD-10-CM

## 2021-11-29 DIAGNOSIS — I25.10 CORONARY ARTERY DISEASE INVOLVING NATIVE CORONARY ARTERY OF NATIVE HEART WITHOUT ANGINA PECTORIS: ICD-10-CM

## 2021-11-29 DIAGNOSIS — J43.2 CENTRILOBULAR EMPHYSEMA (HCC): Primary | ICD-10-CM

## 2021-11-29 DIAGNOSIS — E03.9 ACQUIRED HYPOTHYROIDISM: ICD-10-CM

## 2021-11-29 DIAGNOSIS — I73.9 PVD (PERIPHERAL VASCULAR DISEASE) WITH CLAUDICATION (HCC): ICD-10-CM

## 2021-11-29 DIAGNOSIS — I71.40 ABDOMINAL AORTIC ANEURYSM WITHOUT RUPTURE (HCC): ICD-10-CM

## 2021-11-29 NOTE — TELEPHONE ENCOUNTER
Tell him to go to the lab and do the lab work that I am sending in tonight it should be at the office tonight or first thing tomorrow

## 2021-11-29 NOTE — TELEPHONE ENCOUNTER
Pt has a appt. On 12/01/21. He had labs to do but it looks like he had them in July. He said he did not have any labs done after his last appointment here. Please advise? [phone]

## 2021-11-30 ENCOUNTER — LAB (OUTPATIENT)
Dept: LAB | Facility: HOSPITAL | Age: 71
End: 2021-11-30

## 2021-11-30 DIAGNOSIS — J43.2 CENTRILOBULAR EMPHYSEMA (HCC): ICD-10-CM

## 2021-11-30 DIAGNOSIS — E03.4 ACQUIRED ATROPHY OF THYROID: ICD-10-CM

## 2021-11-30 DIAGNOSIS — M1A.00X0 CHRONIC GOUTY ARTHRITIS: ICD-10-CM

## 2021-11-30 DIAGNOSIS — R73.01 IFG (IMPAIRED FASTING GLUCOSE): ICD-10-CM

## 2021-11-30 DIAGNOSIS — I71.40 ABDOMINAL AORTIC ANEURYSM WITHOUT RUPTURE (HCC): ICD-10-CM

## 2021-11-30 DIAGNOSIS — I50.32 CHRONIC DIASTOLIC CONGESTIVE HEART FAILURE (HCC): ICD-10-CM

## 2021-11-30 DIAGNOSIS — I25.10 CORONARY ARTERY DISEASE INVOLVING NATIVE CORONARY ARTERY OF NATIVE HEART WITHOUT ANGINA PECTORIS: ICD-10-CM

## 2021-11-30 DIAGNOSIS — C80.1 CANCER (HCC): ICD-10-CM

## 2021-11-30 DIAGNOSIS — I73.9 PVD (PERIPHERAL VASCULAR DISEASE) WITH CLAUDICATION (HCC): ICD-10-CM

## 2021-11-30 DIAGNOSIS — E03.9 ACQUIRED HYPOTHYROIDISM: ICD-10-CM

## 2021-11-30 LAB
ALBUMIN SERPL-MCNC: 3.9 G/DL (ref 3.5–5.2)
ALBUMIN/GLOB SERPL: 1.4 G/DL
ALP SERPL-CCNC: 221 U/L (ref 39–117)
ALT SERPL W P-5'-P-CCNC: 120 U/L (ref 1–41)
ANION GAP SERPL CALCULATED.3IONS-SCNC: 9.6 MMOL/L (ref 5–15)
AST SERPL-CCNC: 173 U/L (ref 1–40)
BASOPHILS # BLD AUTO: 0.05 10*3/MM3 (ref 0–0.2)
BASOPHILS NFR BLD AUTO: 0.6 % (ref 0–1.5)
BILIRUB SERPL-MCNC: 1.9 MG/DL (ref 0–1.2)
BUN SERPL-MCNC: 17 MG/DL (ref 8–23)
BUN/CREAT SERPL: 11.3 (ref 7–25)
CALCIUM SPEC-SCNC: 9.8 MG/DL (ref 8.6–10.5)
CHLORIDE SERPL-SCNC: 102 MMOL/L (ref 98–107)
CHOLEST SERPL-MCNC: 123 MG/DL (ref 0–200)
CO2 SERPL-SCNC: 30.4 MMOL/L (ref 22–29)
CREAT SERPL-MCNC: 1.51 MG/DL (ref 0.76–1.27)
DEPRECATED RDW RBC AUTO: 49.5 FL (ref 37–54)
EOSINOPHIL # BLD AUTO: 0.25 10*3/MM3 (ref 0–0.4)
EOSINOPHIL NFR BLD AUTO: 2.8 % (ref 0.3–6.2)
ERYTHROCYTE [DISTWIDTH] IN BLOOD BY AUTOMATED COUNT: 15.8 % (ref 12.3–15.4)
GFR SERPL CREATININE-BSD FRML MDRD: 46 ML/MIN/1.73
GLOBULIN UR ELPH-MCNC: 2.7 GM/DL
GLUCOSE SERPL-MCNC: 100 MG/DL (ref 65–99)
HBA1C MFR BLD: 6.21 % (ref 4.8–5.6)
HCT VFR BLD AUTO: 44.1 % (ref 37.5–51)
HDLC SERPL-MCNC: 18 MG/DL (ref 40–60)
HGB BLD-MCNC: 13.7 G/DL (ref 13–17.7)
IMM GRANULOCYTES # BLD AUTO: 0.03 10*3/MM3 (ref 0–0.05)
IMM GRANULOCYTES NFR BLD AUTO: 0.3 % (ref 0–0.5)
LDLC SERPL CALC-MCNC: 66 MG/DL (ref 0–100)
LDLC/HDLC SERPL: 3.18 {RATIO}
LYMPHOCYTES # BLD AUTO: 1.96 10*3/MM3 (ref 0.7–3.1)
LYMPHOCYTES NFR BLD AUTO: 21.6 % (ref 19.6–45.3)
MCH RBC QN AUTO: 26.7 PG (ref 26.6–33)
MCHC RBC AUTO-ENTMCNC: 31.1 G/DL (ref 31.5–35.7)
MCV RBC AUTO: 85.8 FL (ref 79–97)
MONOCYTES # BLD AUTO: 1.04 10*3/MM3 (ref 0.1–0.9)
MONOCYTES NFR BLD AUTO: 11.5 % (ref 5–12)
NEUTROPHILS NFR BLD AUTO: 5.75 10*3/MM3 (ref 1.7–7)
NEUTROPHILS NFR BLD AUTO: 63.2 % (ref 42.7–76)
NRBC BLD AUTO-RTO: 0 /100 WBC (ref 0–0.2)
NT-PROBNP SERPL-MCNC: 2157 PG/ML (ref 0–900)
PLATELET # BLD AUTO: 215 10*3/MM3 (ref 140–450)
PMV BLD AUTO: 12.3 FL (ref 6–12)
POTASSIUM SERPL-SCNC: 3.8 MMOL/L (ref 3.5–5.2)
PROT SERPL-MCNC: 6.6 G/DL (ref 6–8.5)
RBC # BLD AUTO: 5.14 10*6/MM3 (ref 4.14–5.8)
SODIUM SERPL-SCNC: 142 MMOL/L (ref 136–145)
T4 FREE SERPL-MCNC: 1.99 NG/DL (ref 0.93–1.7)
TRIGL SERPL-MCNC: 239 MG/DL (ref 0–150)
TSH SERPL DL<=0.05 MIU/L-ACNC: 0.62 UIU/ML (ref 0.27–4.2)
VLDLC SERPL-MCNC: 39 MG/DL (ref 5–40)
WBC NRBC COR # BLD: 9.08 10*3/MM3 (ref 3.4–10.8)

## 2021-11-30 PROCEDURE — 83880 ASSAY OF NATRIURETIC PEPTIDE: CPT

## 2021-11-30 PROCEDURE — 80053 COMPREHEN METABOLIC PANEL: CPT

## 2021-11-30 PROCEDURE — 83036 HEMOGLOBIN GLYCOSYLATED A1C: CPT

## 2021-11-30 PROCEDURE — 36415 COLL VENOUS BLD VENIPUNCTURE: CPT

## 2021-11-30 PROCEDURE — 84439 ASSAY OF FREE THYROXINE: CPT

## 2021-11-30 PROCEDURE — 80061 LIPID PANEL: CPT

## 2021-11-30 PROCEDURE — 85025 COMPLETE CBC W/AUTO DIFF WBC: CPT

## 2021-11-30 PROCEDURE — 84443 ASSAY THYROID STIM HORMONE: CPT

## 2021-12-01 ENCOUNTER — OFFICE VISIT (OUTPATIENT)
Dept: INTERNAL MEDICINE | Facility: CLINIC | Age: 71
End: 2021-12-01

## 2021-12-01 VITALS
TEMPERATURE: 97.2 F | HEART RATE: 69 BPM | BODY MASS INDEX: 33.8 KG/M2 | OXYGEN SATURATION: 94 % | DIASTOLIC BLOOD PRESSURE: 71 MMHG | SYSTOLIC BLOOD PRESSURE: 114 MMHG | HEIGHT: 69 IN | WEIGHT: 228.2 LBS

## 2021-12-01 DIAGNOSIS — I73.9 PVD (PERIPHERAL VASCULAR DISEASE) WITH CLAUDICATION (HCC): ICD-10-CM

## 2021-12-01 DIAGNOSIS — E03.9 ACQUIRED HYPOTHYROIDISM: ICD-10-CM

## 2021-12-01 DIAGNOSIS — M1A.00X0 CHRONIC GOUTY ARTHRITIS: ICD-10-CM

## 2021-12-01 DIAGNOSIS — R17 JAUNDICE: ICD-10-CM

## 2021-12-01 DIAGNOSIS — I50.32 CHRONIC DIASTOLIC CONGESTIVE HEART FAILURE (HCC): ICD-10-CM

## 2021-12-01 DIAGNOSIS — C80.1 CANCER (HCC): ICD-10-CM

## 2021-12-01 DIAGNOSIS — I25.10 CORONARY ARTERY DISEASE INVOLVING NATIVE CORONARY ARTERY OF NATIVE HEART WITHOUT ANGINA PECTORIS: ICD-10-CM

## 2021-12-01 DIAGNOSIS — M19.90 ARTHRITIS: ICD-10-CM

## 2021-12-01 DIAGNOSIS — R74.8 ELEVATED LIVER ENZYMES: Primary | ICD-10-CM

## 2021-12-01 DIAGNOSIS — E03.4 ACQUIRED ATROPHY OF THYROID: ICD-10-CM

## 2021-12-01 DIAGNOSIS — J43.2 CENTRILOBULAR EMPHYSEMA (HCC): ICD-10-CM

## 2021-12-01 PROCEDURE — 99214 OFFICE O/P EST MOD 30 MIN: CPT | Performed by: INTERNAL MEDICINE

## 2021-12-01 RX ORDER — ALLOPURINOL 300 MG/1
150 TABLET ORAL DAILY
COMMUNITY
Start: 2021-08-03 | End: 2022-10-13

## 2021-12-01 RX ORDER — COLCHICINE 0.6 MG/1
0.6 TABLET ORAL AS NEEDED
COMMUNITY

## 2021-12-01 RX ORDER — LEVOTHYROXINE SODIUM 0.12 MG/1
125 TABLET ORAL DAILY
Qty: 90 TABLET | Refills: 3 | Status: SHIPPED | OUTPATIENT
Start: 2021-12-01 | End: 2022-01-03 | Stop reason: SDUPTHER

## 2021-12-01 RX ORDER — POTASSIUM CHLORIDE 750 MG/1
10 TABLET, FILM COATED, EXTENDED RELEASE ORAL
COMMUNITY
Start: 2021-10-23 | End: 2022-01-04

## 2021-12-02 DIAGNOSIS — M06.4 INFLAMMATORY POLYARTHROPATHY (HCC): ICD-10-CM

## 2021-12-02 DIAGNOSIS — R17 JAUNDICE: ICD-10-CM

## 2021-12-02 DIAGNOSIS — R74.8 ELEVATED LIVER ENZYMES: Primary | ICD-10-CM

## 2021-12-02 RX ORDER — FOLIC ACID 1 MG/1
TABLET ORAL
Qty: 180 TABLET | Refills: 1 | Status: SHIPPED | OUTPATIENT
Start: 2021-12-02 | End: 2022-06-13

## 2021-12-02 RX ORDER — PANTOPRAZOLE SODIUM 40 MG/1
TABLET, DELAYED RELEASE ORAL
Qty: 90 TABLET | Refills: 1 | Status: SHIPPED | OUTPATIENT
Start: 2021-12-02 | End: 2022-06-27

## 2021-12-02 RX ORDER — FERROUS SULFATE 325(65) MG
TABLET ORAL
Qty: 90 TABLET | Refills: 1 | Status: SHIPPED | OUTPATIENT
Start: 2021-12-02 | End: 2022-07-05

## 2021-12-06 ENCOUNTER — TELEPHONE (OUTPATIENT)
Dept: INTERNAL MEDICINE | Facility: CLINIC | Age: 71
End: 2021-12-06

## 2021-12-06 DIAGNOSIS — R74.8 ELEVATED LIVER ENZYMES: Primary | ICD-10-CM

## 2021-12-06 DIAGNOSIS — R17 JAUNDICE: Primary | ICD-10-CM

## 2021-12-06 DIAGNOSIS — Z11.59 ENCOUNTER FOR SCREENING FOR OTHER VIRAL DISEASES: ICD-10-CM

## 2021-12-06 NOTE — TELEPHONE ENCOUNTER
Please call patient back and let them know I sent an order in for a stat study so he should hopefully hear something in the next day or 2 if he does not have his wife give me a call back

## 2021-12-06 NOTE — TELEPHONE ENCOUNTER
Patient called and said that at his last appointment he understood he needed an MRI of liver and that it needed to be done before his next appt which is dec 15th at 10:45. However when scheduling called to schedule it the earliest they could get him in was Dec 15th at 11:45. They told the patient that if it was needing to be done sooner that we could call and reschedule for a STAT. Do you want me to reschedule it for sooner or is it ok?

## 2021-12-06 NOTE — TELEPHONE ENCOUNTER
Yes please try to schedule this is a stat study for his MRCP of the liver due to obstructive jaundice and let me know if you cannot get it done

## 2021-12-07 ENCOUNTER — HOSPITAL ENCOUNTER (OUTPATIENT)
Dept: MRI IMAGING | Facility: HOSPITAL | Age: 71
Discharge: HOME | End: 2021-12-07
Admitting: INTERNAL MEDICINE

## 2021-12-07 DIAGNOSIS — I73.9 PVD (PERIPHERAL VASCULAR DISEASE) WITH CLAUDICATION (HCC): ICD-10-CM

## 2021-12-07 DIAGNOSIS — E03.9 ACQUIRED HYPOTHYROIDISM: ICD-10-CM

## 2021-12-07 DIAGNOSIS — C80.1 CANCER (HCC): ICD-10-CM

## 2021-12-07 DIAGNOSIS — I50.32 CHRONIC DIASTOLIC CONGESTIVE HEART FAILURE (HCC): ICD-10-CM

## 2021-12-07 DIAGNOSIS — M19.90 ARTHRITIS: ICD-10-CM

## 2021-12-07 DIAGNOSIS — R17 JAUNDICE: ICD-10-CM

## 2021-12-07 DIAGNOSIS — M1A.00X0 CHRONIC GOUTY ARTHRITIS: ICD-10-CM

## 2021-12-07 DIAGNOSIS — E03.4 ACQUIRED ATROPHY OF THYROID: ICD-10-CM

## 2021-12-07 DIAGNOSIS — I25.10 CORONARY ARTERY DISEASE INVOLVING NATIVE CORONARY ARTERY OF NATIVE HEART WITHOUT ANGINA PECTORIS: ICD-10-CM

## 2021-12-07 DIAGNOSIS — J43.2 CENTRILOBULAR EMPHYSEMA (HCC): ICD-10-CM

## 2021-12-07 DIAGNOSIS — R74.8 ELEVATED LIVER ENZYMES: ICD-10-CM

## 2021-12-07 PROCEDURE — 74182 MRI ABDOMEN W/CONTRAST: CPT

## 2021-12-07 PROCEDURE — A9577 INJ MULTIHANCE: HCPCS | Performed by: INTERNAL MEDICINE

## 2021-12-07 PROCEDURE — 0 GADOBENATE DIMEGLUMINE 529 MG/ML SOLUTION: Performed by: INTERNAL MEDICINE

## 2021-12-07 RX ADMIN — GADOBENATE DIMEGLUMINE 20 ML: 529 INJECTION, SOLUTION INTRAVENOUS at 17:17

## 2021-12-07 NOTE — TELEPHONE ENCOUNTER
Dr diamond, I just went ahead and called to set this up for him. He will be going today at 4:15 to have this done. I also called and spoke with patient and let him know is set this up

## 2021-12-08 ENCOUNTER — LAB (OUTPATIENT)
Dept: LAB | Facility: HOSPITAL | Age: 71
End: 2021-12-08

## 2021-12-08 DIAGNOSIS — M19.90 ARTHRITIS: ICD-10-CM

## 2021-12-08 DIAGNOSIS — I73.9 PVD (PERIPHERAL VASCULAR DISEASE) WITH CLAUDICATION (HCC): ICD-10-CM

## 2021-12-08 DIAGNOSIS — M1A.00X0 CHRONIC GOUTY ARTHRITIS: ICD-10-CM

## 2021-12-08 DIAGNOSIS — E03.9 ACQUIRED HYPOTHYROIDISM: ICD-10-CM

## 2021-12-08 DIAGNOSIS — R74.8 ELEVATED LIVER ENZYMES: ICD-10-CM

## 2021-12-08 DIAGNOSIS — R79.89 ELEVATED LFTS: Primary | ICD-10-CM

## 2021-12-08 DIAGNOSIS — Z11.59 ENCOUNTER FOR SCREENING FOR OTHER VIRAL DISEASES: ICD-10-CM

## 2021-12-08 DIAGNOSIS — R79.89 ELEVATED LFTS: ICD-10-CM

## 2021-12-08 DIAGNOSIS — E03.4 ACQUIRED ATROPHY OF THYROID: ICD-10-CM

## 2021-12-08 DIAGNOSIS — I50.32 CHRONIC DIASTOLIC CONGESTIVE HEART FAILURE (HCC): ICD-10-CM

## 2021-12-08 DIAGNOSIS — R79.1 ABNORMAL COAGULATION PROFILE: ICD-10-CM

## 2021-12-08 DIAGNOSIS — J43.2 CENTRILOBULAR EMPHYSEMA (HCC): ICD-10-CM

## 2021-12-08 DIAGNOSIS — R17 JAUNDICE: ICD-10-CM

## 2021-12-08 DIAGNOSIS — I25.10 CORONARY ARTERY DISEASE INVOLVING NATIVE CORONARY ARTERY OF NATIVE HEART WITHOUT ANGINA PECTORIS: ICD-10-CM

## 2021-12-08 DIAGNOSIS — C80.1 CANCER (HCC): ICD-10-CM

## 2021-12-08 LAB
ALBUMIN SERPL-MCNC: 3.8 G/DL (ref 3.5–5.2)
ALBUMIN/GLOB SERPL: 1.2 G/DL
ALP SERPL-CCNC: 132 U/L (ref 39–117)
ALPHA1 GLOB MFR UR ELPH: 177 MG/DL (ref 90–200)
ALT SERPL W P-5'-P-CCNC: 30 U/L (ref 1–41)
ANION GAP SERPL CALCULATED.3IONS-SCNC: 14.3 MMOL/L (ref 5–15)
AST SERPL-CCNC: 32 U/L (ref 1–40)
BASOPHILS # BLD AUTO: 0.09 10*3/MM3 (ref 0–0.2)
BASOPHILS NFR BLD AUTO: 0.7 % (ref 0–1.5)
BILIRUB SERPL-MCNC: 0.6 MG/DL (ref 0–1.2)
BUN SERPL-MCNC: 16 MG/DL (ref 8–23)
BUN/CREAT SERPL: 12.1 (ref 7–25)
CALCIUM SPEC-SCNC: 9.4 MG/DL (ref 8.6–10.5)
CERULOPLASMIN SERPL-MCNC: 27 MG/DL (ref 16–31)
CHLORIDE SERPL-SCNC: 101 MMOL/L (ref 98–107)
CO2 SERPL-SCNC: 26.7 MMOL/L (ref 22–29)
CREAT SERPL-MCNC: 1.32 MG/DL (ref 0.76–1.27)
DEPRECATED RDW RBC AUTO: 47.6 FL (ref 37–54)
EOSINOPHIL # BLD AUTO: 0.55 10*3/MM3 (ref 0–0.4)
EOSINOPHIL NFR BLD AUTO: 4.4 % (ref 0.3–6.2)
ERYTHROCYTE [DISTWIDTH] IN BLOOD BY AUTOMATED COUNT: 15.6 % (ref 12.3–15.4)
GFR SERPL CREATININE-BSD FRML MDRD: 53 ML/MIN/1.73
GLOBULIN UR ELPH-MCNC: 3.1 GM/DL
GLUCOSE SERPL-MCNC: 81 MG/DL (ref 65–99)
HBV SURFACE AG SERPL QL IA: NORMAL
HCT VFR BLD AUTO: 43.2 % (ref 37.5–51)
HCV AB SER DONR QL: NORMAL
HGB BLD-MCNC: 14 G/DL (ref 13–17.7)
IMM GRANULOCYTES # BLD AUTO: 0.09 10*3/MM3 (ref 0–0.05)
IMM GRANULOCYTES NFR BLD AUTO: 0.7 % (ref 0–0.5)
IRON 24H UR-MRATE: 56 MCG/DL (ref 59–158)
IRON SATN MFR SERPL: 13 % (ref 20–50)
LYMPHOCYTES # BLD AUTO: 2.57 10*3/MM3 (ref 0.7–3.1)
LYMPHOCYTES NFR BLD AUTO: 20.4 % (ref 19.6–45.3)
MCH RBC QN AUTO: 27.4 PG (ref 26.6–33)
MCHC RBC AUTO-ENTMCNC: 32.4 G/DL (ref 31.5–35.7)
MCV RBC AUTO: 84.5 FL (ref 79–97)
MONOCYTES # BLD AUTO: 1.08 10*3/MM3 (ref 0.1–0.9)
MONOCYTES NFR BLD AUTO: 8.6 % (ref 5–12)
NEUTROPHILS NFR BLD AUTO: 65.2 % (ref 42.7–76)
NEUTROPHILS NFR BLD AUTO: 8.22 10*3/MM3 (ref 1.7–7)
NRBC BLD AUTO-RTO: 0 /100 WBC (ref 0–0.2)
PLATELET # BLD AUTO: 310 10*3/MM3 (ref 140–450)
PMV BLD AUTO: 12.3 FL (ref 6–12)
POTASSIUM SERPL-SCNC: 4.2 MMOL/L (ref 3.5–5.2)
PROT SERPL-MCNC: 6.9 G/DL (ref 6–8.5)
RBC # BLD AUTO: 5.11 10*6/MM3 (ref 4.14–5.8)
SODIUM SERPL-SCNC: 142 MMOL/L (ref 136–145)
TIBC SERPL-MCNC: 429 MCG/DL (ref 298–536)
TRANSFERRIN SERPL-MCNC: 288 MG/DL (ref 200–360)
WBC NRBC COR # BLD: 12.6 10*3/MM3 (ref 3.4–10.8)

## 2021-12-08 PROCEDURE — 87340 HEPATITIS B SURFACE AG IA: CPT

## 2021-12-08 PROCEDURE — 83540 ASSAY OF IRON: CPT

## 2021-12-08 PROCEDURE — 82103 ALPHA-1-ANTITRYPSIN TOTAL: CPT

## 2021-12-08 PROCEDURE — 82390 ASSAY OF CERULOPLASMIN: CPT

## 2021-12-08 PROCEDURE — 83516 IMMUNOASSAY NONANTIBODY: CPT

## 2021-12-08 PROCEDURE — 86803 HEPATITIS C AB TEST: CPT

## 2021-12-08 PROCEDURE — 84466 ASSAY OF TRANSFERRIN: CPT

## 2021-12-08 PROCEDURE — 36415 COLL VENOUS BLD VENIPUNCTURE: CPT

## 2021-12-08 PROCEDURE — 85025 COMPLETE CBC W/AUTO DIFF WBC: CPT

## 2021-12-08 PROCEDURE — 80053 COMPREHEN METABOLIC PANEL: CPT

## 2021-12-09 ENCOUNTER — OFFICE VISIT (OUTPATIENT)
Dept: INTERNAL MEDICINE | Facility: CLINIC | Age: 71
End: 2021-12-09

## 2021-12-09 ENCOUNTER — TELEPHONE (OUTPATIENT)
Dept: GASTROENTEROLOGY | Facility: CLINIC | Age: 71
End: 2021-12-09

## 2021-12-09 DIAGNOSIS — K83.1 OBSTRUCTIVE JAUNDICE: Primary | ICD-10-CM

## 2021-12-09 PROCEDURE — 99213 OFFICE O/P EST LOW 20 MIN: CPT | Performed by: INTERNAL MEDICINE

## 2021-12-09 NOTE — PROGRESS NOTES
Chief Complaint/ HPI: PT HERE FOR ABN MRI/ MRCP AND JAUNDICE AND, results discussed with patient and wife, I spoke with Dr. Hernandez GI specialist today December 9, she is going to call the patient and set up an outpatient ERCP procedure for next week after his been off Plavix for 5 to 6 days,          Objective   Vital Signs  There were no vitals filed for this visit.   There is no height or weight on file to calculate BMI.  Review of Systems skin color is normal, sclera nonicteric, skin color does not appear to be jaundiced today, patient is alert and oriented ambulatory,  Physical Exam   Result Review :   Lab Results   Component Value Date    PROBNP 2,157.0 (H) 11/30/2021    PROBNP 1,494.0 (H) 07/27/2021    PROBNP 1,596.0 (H) 07/09/2021    BNP 1937 (H) 03/10/2021    BNP 1128 (H) 11/02/2020    BNP 1802 (H) 10/14/2020     CMP    CMP 7/27/21 11/30/21 12/8/21   Glucose 85 100 (A) 81   BUN 18 17 16   Creatinine 1.49 (A) 1.51 (A) 1.32 (A)   eGFR Non  Am 46 (A) 46 (A) 53 (A)   Sodium 142 142 142   Potassium 4.3 3.8 4.2   Chloride 103 102 101   Calcium 9.3 9.8 9.4   Albumin 4.20 3.90 3.80   Total Bilirubin 0.3 1.9 (A) 0.6   Alkaline Phosphatase 50 221 (A) 132 (A)   AST (SGOT) 21 173 (A) 32   ALT (SGPT) 12 120 (A) 30   (A) Abnormal value            CBC w/diff    CBC w/Diff 7/27/21 11/30/21 12/8/21   WBC 7.55 9.08 12.60 (A)   RBC 5.49 5.14 5.11   Hemoglobin 14.3 13.7 14.0   Hematocrit 47.0 44.1 43.2   MCV 85.6 85.8 84.5   MCH 26.0 (A) 26.7 27.4   MCHC 30.4 (A) 31.1 (A) 32.4   RDW 15.8 (A) 15.8 (A) 15.6 (A)   Platelets 195 215 310   Neutrophil Rel % 62.0 63.2 65.2   Immature Granulocyte Rel % 0.4 0.3 0.7 (A)   Lymphocyte Rel % 23.7 21.6 20.4   Monocyte Rel % 9.0 11.5 8.6   Eosinophil Rel % 4.2 2.8 4.4   Basophil Rel % 0.7 0.6 0.7   (A) Abnormal value             Lipid Panel    Lipid Panel 7/27/21 11/30/21   Total Cholesterol 129 123   Triglycerides 244 (A) 239 (A)   HDL Cholesterol 26 (A) 18 (A)   VLDL Cholesterol 40  39   LDL Cholesterol  63 66   LDL/HDL Ratio 2.08 3.18   (A) Abnormal value             Lab Results   Component Value Date    TSH 0.617 11/30/2021    TSH 1.530 07/27/2021    TSH 2.490 07/09/2021      Lab Results   Component Value Date    FREET4 1.99 (H) 11/30/2021    FREET4 1.44 07/27/2021    FREET4 1.29 07/09/2021      A1C Last 3 Results    HGBA1C Last 3 Results 7/9/21 7/27/21 11/30/21   Hemoglobin A1C 6.00 (A) 6.14 (A) 6.21 (A)   (A) Abnormal value             PSA    PSA 7/27/21   PSA 0.710                          Visit Diagnoses:  No diagnosis found.    Assessment and Plan   There are no diagnoses linked to this encounter.    Obstructive jaundice, MRCP shows choledocholithiasis, intra and extrahepatic biliary duct dilation December 6, 2021, patient is going to be scheduled for an outpatient elective semielective ERCP procedure with Dr. Hernandez I spoke with her, patient's current lab studies have improved over the past 1 week, patient will continue pushing fluids, no alcohol, bland diet recommended for now, and stop Plavix until procedure is complete, DISCUSSED WIT H PT DEC 2021,     Hospital stay transitional care visit October 14 through October 20, 2021 with acute right lower lobe pneumonia, acute hypoxic respiratory failure shortness of breath CT scan showed groundglass opacity coronavirus testing ??2 was negative in the hospital, patient was put back on Lasix daily sent home on prednisone and antibiotics Levaquin for 7 days following this hospital stay, he did not qualify for home O2 with 6 minute walk test in the room prior to discharge, he had acute diastolic heart failure in the hospital all medications are reviewed, blood sugars are doing much better over the past week or so,    transtional care visit from repair LEFT FEM. --RECONTRUCTION, AND ATERECTOMY---SEPT 14, 2020, ---PERIPHERAL ASO ---Patient is having claudication and leg pain with weakness, arterial evaluation shows bilateral proximal level  occlusive disease either just below the aorta or at the aortic repair level, ---Patient is status post left femoral iliac artery atherectomy grafting and angioplasty by Dr. Nam at [facility], September 14, 2020, patient is doing better overall,--------, carotid ultrasound shows no significant stenosis,----- August 2020 compared to previous and prior ultrasounds,-    [facility], [place] , hosp 8/5/2020 for R LL PNEUMONIA, AND ACUTE DIASTOLIC CHF, LASHA ON CKD 3---Patient presented with acute hypercapnic respiratory failure, pneumonia, was treated with IV antibiotics, IV steroids in the hospital due to wheezing that developed, he had low potassium that was replaced his anemia was monitored he was kept on oxygen initially he had a follow-up chest x-ray August 7 showing no active disease his white count is still a little high at 13, he says his breathing well he is taking his breathing treatments currently, he has follow-up with pulmonology tomorrow August 12, his proBNP level is down from 6000 500-4600 during his hospital course, he is currently off antibiotics and off steroids currently, it seems to be getting back to baseline    BACK PAIN, SPINAL STENOSIS, DX DISCUSSE, AUG 2020     MILD DEPRESSION _continues Wellbutrin  mg daily doing well March 2021    Type 2 Diabetes , continues on Levemir 40 units daily and Metformin 500mg bid-    H/O Colon cancer PARTIAL COLECTOMY 2010 - , CEA elevated now at 7.7, -- Dr. Ibanez---Colonoscopy November 2018 unremarkable----CEA level 2.5 April 2020    Fatigue, Weight loss--- BETTER OFF BRILLINTA --continues ON PLAVIX NOW     sees sleep specialist - on CPAP;     Angina, cardiac catheter jan 2018, with 80-90% stenosis circumflex proximal to vein graft, LIMA graft to LAD was patent, normal ejection fraction January 2018, patient had elective semi-elective stent placement to the circumflex artery, JAN 23, 2018, --Patient currently off BRILLINTA and on Plavix with much improved  and breathing status as of July 2018----previous echo October 2020 showed mild mitral valve annular calcification, no significant prolapse, or regurgitation, ejection fraction 55 to 60% diastolic dysfunction noted otherwise unremarkable    R HIP PAIN, GLUTEUS MUSCLE , PARASPINAL MUSCLE ATROPHY, S/P INFARCTION AFTER AAA REPAIR IN 3/13-, status post recent radiofrequency ablation X 2, by pain management much improved, continues on gabapentin 600 mg twice a day,and morphine long-acting twice a day, 30 mg    Elevated cholesterol--continues Lipitor 20 mg daily    CAROTID ASO, HAS YRLY F/U WITH VASC SURG ---    HYPOTHRYOIDISM--continues levothyroxine 150 MCG daily-, currently over replaced November 2021 will decrease dose to 0.125 November 2021    HTN----continues Norvasc 2.5 mg daily Lasix 40 mg QD , potassium qd , metoprolol extended release 50 mg BID    LASHA, ON TOP OF CKD 3-- STABLE AT 1.5, November 2021 ----1.5 July 2021     GOUT, continues ALLOPURINOL 150 MG QD, colchicine,0.1 PRN ----Dr. Gonzalez rheumatology for second opinion     PSA 0.7 April 2019 AND STABLE APRIL 2020, ---TOTAL TESTOST, =369 APRIL 2020, --GOOD            Follow Up   No follow-ups on file.  Patient was given instructions and counseling regarding his condition or for health maintenance advice. Please see specific information pulled into the AVS if appropriate.

## 2021-12-10 ENCOUNTER — PREP FOR SURGERY (OUTPATIENT)
Dept: OTHER | Facility: HOSPITAL | Age: 71
End: 2021-12-10

## 2021-12-10 DIAGNOSIS — K80.50 CHOLEDOCHOLITHIASIS: Primary | ICD-10-CM

## 2021-12-10 LAB — ACTIN IGG SERPL-ACNC: 15 UNITS (ref 0–19)

## 2021-12-10 NOTE — TELEPHONE ENCOUNTER
Patient has been scheduled for an ERCP on 12/15/2021 with an arrival time of 1330.   Patient states he is holding plavix.

## 2021-12-13 RX ORDER — LEVOTHYROXINE SODIUM 0.15 MG/1
TABLET ORAL
Qty: 90 TABLET | Refills: 1 | Status: SHIPPED | OUTPATIENT
Start: 2021-12-13 | End: 2021-12-14

## 2021-12-13 RX ORDER — ASCORBIC ACID 500 MG
TABLET ORAL
Qty: 90 TABLET | Refills: 1 | Status: SHIPPED | OUTPATIENT
Start: 2021-12-13 | End: 2022-07-05

## 2021-12-14 NOTE — PRE-PROCEDURE INSTRUCTIONS
Called patient to discuss instructions for NPO after midnight and pre-op medications. Patient aware he can take allopurinol, amlodipine, buproprion, fenofibrate, prozac, gabapentin, synthroid, metoprolol, protonix, and morphine. Stopped aspirin and plavix on 12/9/21 per patient. Patient stated understanding. No other issues or concerns noted currently per patient.   Blood glucose ordered.  Patient is vaccinated for covid-19.

## 2021-12-15 ENCOUNTER — APPOINTMENT (OUTPATIENT)
Dept: GENERAL RADIOLOGY | Facility: HOSPITAL | Age: 71
End: 2021-12-15

## 2021-12-15 ENCOUNTER — HOSPITAL ENCOUNTER (OUTPATIENT)
Facility: HOSPITAL | Age: 71
Setting detail: HOSPITAL OUTPATIENT SURGERY
Discharge: HOME | End: 2021-12-15
Attending: INTERNAL MEDICINE | Admitting: INTERNAL MEDICINE

## 2021-12-15 ENCOUNTER — APPOINTMENT (OUTPATIENT)
Dept: MRI IMAGING | Facility: HOSPITAL | Age: 71
End: 2021-12-15

## 2021-12-15 ENCOUNTER — ANESTHESIA (OUTPATIENT)
Dept: GASTROENTEROLOGY | Facility: HOSPITAL | Age: 71
End: 2021-12-15

## 2021-12-15 ENCOUNTER — ANESTHESIA EVENT (OUTPATIENT)
Dept: GASTROENTEROLOGY | Facility: HOSPITAL | Age: 71
End: 2021-12-15

## 2021-12-15 VITALS
HEIGHT: 69 IN | BODY MASS INDEX: 34.61 KG/M2 | OXYGEN SATURATION: 95 % | DIASTOLIC BLOOD PRESSURE: 63 MMHG | SYSTOLIC BLOOD PRESSURE: 144 MMHG | RESPIRATION RATE: 14 BRPM | HEART RATE: 59 BPM | TEMPERATURE: 97.6 F | WEIGHT: 233.69 LBS

## 2021-12-15 DIAGNOSIS — K80.50 CHOLEDOCHOLITHIASIS: ICD-10-CM

## 2021-12-15 PROCEDURE — C1769 GUIDE WIRE: HCPCS | Performed by: INTERNAL MEDICINE

## 2021-12-15 PROCEDURE — 25010000002 LEVOFLOXACIN PER 250 MG: Performed by: NURSE ANESTHETIST, CERTIFIED REGISTERED

## 2021-12-15 PROCEDURE — 25010000002 IOPAMIDOL 61 % SOLUTION: Performed by: INTERNAL MEDICINE

## 2021-12-15 PROCEDURE — C2617 STENT, NON-COR, TEM W/O DEL: HCPCS | Performed by: INTERNAL MEDICINE

## 2021-12-15 PROCEDURE — 74330 X-RAY BILE/PANC ENDOSCOPY: CPT

## 2021-12-15 PROCEDURE — 43264 ERCP REMOVE DUCT CALCULI: CPT | Performed by: INTERNAL MEDICINE

## 2021-12-15 PROCEDURE — 43274 ERCP DUCT STENT PLACEMENT: CPT | Performed by: INTERNAL MEDICINE

## 2021-12-15 PROCEDURE — 25010000002 LEVOFLOXACIN PER 250 MG: Performed by: INTERNAL MEDICINE

## 2021-12-15 PROCEDURE — 25010000002 PROPOFOL 10 MG/ML EMULSION: Performed by: NURSE ANESTHETIST, CERTIFIED REGISTERED

## 2021-12-15 PROCEDURE — 25010000002 GLUCAGON (HUMAN RECOMBINANT) 1 MG RECONSTITUTED SOLUTION: Performed by: NURSE ANESTHETIST, CERTIFIED REGISTERED

## 2021-12-15 DEVICE — BILIARY STENT
Type: IMPLANTABLE DEVICE | Site: BILE DUCT | Status: FUNCTIONAL
Brand: ADVANIX™ BILIARY

## 2021-12-15 RX ORDER — SODIUM CHLORIDE, SODIUM LACTATE, POTASSIUM CHLORIDE, CALCIUM CHLORIDE 600; 310; 30; 20 MG/100ML; MG/100ML; MG/100ML; MG/100ML
30 INJECTION, SOLUTION INTRAVENOUS CONTINUOUS
Status: DISCONTINUED | OUTPATIENT
Start: 2021-12-15 | End: 2021-12-15 | Stop reason: HOSPADM

## 2021-12-15 RX ORDER — PHENYLEPHRINE HCL IN 0.9% NACL 1 MG/10 ML
SYRINGE (ML) INTRAVENOUS AS NEEDED
Status: DISCONTINUED | OUTPATIENT
Start: 2021-12-15 | End: 2021-12-15 | Stop reason: SURG

## 2021-12-15 RX ORDER — PROPOFOL 10 MG/ML
VIAL (ML) INTRAVENOUS AS NEEDED
Status: DISCONTINUED | OUTPATIENT
Start: 2021-12-15 | End: 2021-12-15 | Stop reason: SURG

## 2021-12-15 RX ORDER — LEVOFLOXACIN 5 MG/ML
INJECTION, SOLUTION INTRAVENOUS AS NEEDED
Status: DISCONTINUED | OUTPATIENT
Start: 2021-12-15 | End: 2021-12-15 | Stop reason: SURG

## 2021-12-15 RX ORDER — LIDOCAINE HYDROCHLORIDE 20 MG/ML
INJECTION, SOLUTION INFILTRATION; PERINEURAL AS NEEDED
Status: DISCONTINUED | OUTPATIENT
Start: 2021-12-15 | End: 2021-12-15 | Stop reason: SURG

## 2021-12-15 RX ORDER — LEVOFLOXACIN 5 MG/ML
500 INJECTION, SOLUTION INTRAVENOUS EVERY 24 HOURS
Status: COMPLETED | OUTPATIENT
Start: 2021-12-15 | End: 2021-12-15

## 2021-12-15 RX ADMIN — PROPOFOL 50 MG: 10 INJECTION, EMULSION INTRAVENOUS at 12:04

## 2021-12-15 RX ADMIN — LEVOFLOXACIN 500 MG: 5 INJECTION, SOLUTION INTRAVENOUS at 11:55

## 2021-12-15 RX ADMIN — Medication 200 MCG: at 12:43

## 2021-12-15 RX ADMIN — Medication 200 MCG: at 12:24

## 2021-12-15 RX ADMIN — GLUCAGON HYDROCHLORIDE 0.5 MG: KIT at 12:36

## 2021-12-15 RX ADMIN — GLUCAGON HYDROCHLORIDE 0.5 MG: KIT at 12:26

## 2021-12-15 RX ADMIN — INDOMETHACIN 100 MG: 50 SUPPOSITORY RECTAL at 11:41

## 2021-12-15 RX ADMIN — LEVOFLOXACIN 500 MG: 500 INJECTION, SOLUTION INTRAVENOUS at 11:42

## 2021-12-15 RX ADMIN — PROPOFOL 250 MCG/KG/MIN: 10 INJECTION, EMULSION INTRAVENOUS at 12:04

## 2021-12-15 RX ADMIN — Medication 100 MCG: at 12:54

## 2021-12-15 RX ADMIN — SODIUM CHLORIDE, POTASSIUM CHLORIDE, SODIUM LACTATE AND CALCIUM CHLORIDE 30 ML/HR: 600; 310; 30; 20 INJECTION, SOLUTION INTRAVENOUS at 11:11

## 2021-12-15 RX ADMIN — LIDOCAINE HYDROCHLORIDE 100 MG: 20 INJECTION, SOLUTION INFILTRATION; PERINEURAL at 12:04

## 2021-12-15 NOTE — H&P
Pre Procedure History & Physical    Chief Complaint:   Choledocholithiasis, elevated liver enzymes    Subjective     HPI:   72 yo M here for eval of choledocholithiasis, elevated liver enzymes.    Past Medical History:   Past Medical History:   Diagnosis Date   • Abdominal aortic aneurysm without rupture (HCC)    • Acute renal failure (ARF) (HCC)     WAS SHORT TERM DIALYSIS, STAGE 3   • Aortic aneurysm (HCC)    • Arthritis    • Atherosclerosis of coronary artery    • Atherosclerotic heart disease of native coronary artery without angina pectoris    • Atrophy of thyroid (acquired)    • Bilateral carotid artery stenosis    • Cancer (HCC)     COLON    • Chronic airway obstruction (HCC)    • Chronic gouty arthritis    • Chronic kidney disease, stage 3 (HCC)    • Controlled diabetes mellitus type II without complication (HCC)    • COPD (chronic obstructive pulmonary disease) (HCC)    • Coronary artery disease    • Depression    • Diabetes mellitus (HCC)    • Disease of liver    • Disease of thyroid gland    • Elevated carcinoembryonic antigen (CEA)    • GERD (gastroesophageal reflux disease)    • Hyperlipidemia    • Hypertension    • Hypothyroidism    • Iron deficiency anemia    • Long term (current) use of opiate analgesic    • Lumbar spondylosis    • Malignant neoplasm of colon (HCC)    • On long term drug therapy    • Peripheral artery disease (HCC)    • Polyarthropathy    • Pure hypercholesterolemia    • Rhabdomyolysis    • Sleep apnea     CPAP       Past Surgical History:  Past Surgical History:   Procedure Laterality Date   • ABDOMINAL AORTIC ANEURYSM REPAIR     • ANGIOPLASTY FEMORAL ARTERY Left 9/14/2020    Procedure: AORTOILLOFEMORAL ARTERIOGRAM;  Surgeon: Chula Nam Jr., MD;  Location: [facility] 18/19;  Service: Vascular;  Laterality: Left;   • APPENDECTOMY     • CARDIAC CATHETERIZATION     • CAROTID ENDARTERECTOMY Left 2005   • CAROTID ENDARTERECTOMY Right 2010   • CHOLECYSTECTOMY OPEN     •  COLON SURGERY      COLON RESECTION   • COLONOSCOPY  2004,11/2018    Dr. Lamas 2004,    • CORONARY ANGIOPLASTY WITH STENT PLACEMENT     • CORONARY ARTERY BYPASS GRAFT  2005   • ENDOSCOPY     • FEMORAL ENDARTERECTOMY Left 9/14/2020    Procedure: LEFT FEMORAL ENDARECTOMY WITHH PATCH ANGIOPLASTY;  Surgeon: Chula Nam Jr., MD;  Location: [facility] 18/19;  Service: Vascular;  Laterality: Left;   • GALLBLADDER SURGERY     • RHINOPLASTY Bilateral     70-80% R, 50% L       Family History:  Family History   Problem Relation Age of Onset   • Heart failure Mother    • Malig Hyperthermia Neg Hx        Social History:   reports that he quit smoking about 16 years ago. His smoking use included cigarettes. He has a 45.00 pack-year smoking history. He has never used smokeless tobacco. He reports current alcohol use. He reports that he does not use drugs.    Medications:   Medications Prior to Admission   Medication Sig Dispense Refill Last Dose   • allopurinol (ZYLOPRIM) 300 MG tablet Take 150 mg by mouth Daily.   12/14/2021 at Unknown time   • amLODIPine (NORVASC) 2.5 MG tablet Take 1 tablet by mouth Daily. 90 tablet 3 12/14/2021 at Unknown time   • ascorbic acid (VITAMIN C) 500 MG tablet TAKE 1 TABLET EVERY DAY 90 tablet 1 Past Week at Unknown time   • aspirin 81 MG EC tablet Take 81 mg by mouth Daily. INSTUCTED TO CONTINUE UNTIL DAY OF SURGERY   12/14/2021 at Unknown time   • atorvastatin (LIPITOR) 20 MG tablet TAKE 1 TABLET EVERY DAY 90 tablet 0 12/14/2021 at Unknown time   • budesonide (PULMICORT) 0.5 MG/2ML nebulizer solution As Needed.   12/14/2021 at Unknown time   • buPROPion XL (WELLBUTRIN XL) 150 MG 24 hr tablet TK 1 T PO QD IN THE MORNING   12/14/2021 at Unknown time   • Cholecalciferol (VITAMIN D3 MAXIMUM STRENGTH PO) Vitamin D3   Past Week at Unknown time   • Cholecalciferol 50 MCG (2000 UT) capsule Take  by mouth.   Past Week at Unknown time   • clopidogrel (PLAVIX) 75 MG tablet TAKE 1 TABLET EVERY  DAY 90 tablet 1 Past Week at Unknown time   • co-enzyme Q-10 30 MG capsule CoQ-10 30 mg oral capsule take 1 capsule by oral route daily   Active   Past Week at Unknown time   • colchicine 0.6 MG tablet Take 0.6 mg by mouth.   Past Month at Unknown time   • fenofibrate (TRICOR) 145 MG tablet TAKE 1 TABLET EVERY DAY 90 tablet 3 12/14/2021 at Unknown time   • FeroSul 325 (65 Fe) MG tablet TAKE 1 TABLET EVERY DAY 90 tablet 1 Past Week at Unknown time   • FLUoxetine (PROzac) 10 MG capsule Take 1 capsule by mouth Daily. 90 capsule 0 12/14/2021 at Unknown time   • folic acid (FOLVITE) 1 MG tablet TAKE 1 TABLET TWICE DAILY 180 tablet 1 Past Week at Unknown time   • furosemide (LASIX) 40 MG tablet TAKE 1 AND 1/2 TABLETS ONCE A DAY  135 tablet 3 12/14/2021 at Unknown time   • gabapentin (NEURONTIN) 600 MG tablet Take 1 tablet by mouth 2 (Two) Times a Day. 60 tablet 5 12/15/2021 at Unknown time   • insulin NPH (humuLIN N,novoLIN N) 100 UNIT/ML injection Inject 40 Units under the skin into the appropriate area as directed Every Night.   12/14/2021 at Unknown time   • ipratropium-albuterol (DUO-NEB) 0.5-2.5 mg/3 ml nebulizer As Needed.   Past Week at Unknown time   • levothyroxine (Synthroid) 125 MCG tablet Take 1 tablet by mouth Daily. 90 tablet 3 12/15/2021 at Unknown time   • metFORMIN (GLUCOPHAGE) 500 MG tablet Take 500 mg by mouth 2 (Two) Times a Day With Meals.   12/14/2021 at Unknown time   • metoprolol succinate XL (TOPROL-XL) 50 MG 24 hr tablet TAKE 1 TABLET TWICE DAILY 180 tablet 3 12/15/2021 at Unknown time   • Morphine (MS CONTIN) 30 MG 12 hr tablet 2 (Two) Times a Day.   12/14/2021 at Unknown time   • pantoprazole (PROTONIX) 40 MG EC tablet TAKE 1 TABLET EVERY DAY 90 tablet 1 12/15/2021 at Unknown time   • potassium chloride 10 MEQ CR tablet    Past Week at Unknown time   • vitamin B-12 (CYANOCOBALAMIN) 1000 MCG tablet TAKE 1 TABLET EVERY DAY 90 tablet 0 Past Week at Unknown time   • nitroglycerin (NITROSTAT) 0.4 MG  "SL tablet nitroglycerin 0.4 mg sublingual tablet          Allergies:  Penicillins, Ticagrelor, and Penicillin g    ROS:    Pertinent items are noted in HPI     Objective     Blood pressure 141/69, pulse 56, temperature 97 °F (36.1 °C), temperature source Temporal, resp. rate 18, height 175.3 cm (69\"), weight 106 kg (233 lb 11 oz), SpO2 95 %.    Physical Exam   Constitutional: Pt is oriented to person, place, and time and well-developed, well-nourished, and in no distress.   Mouth/Throat: Oropharynx is clear and moist.   Neck: Normal range of motion.   Cardiovascular: Normal rate, regular rhythm and normal heart sounds.    Pulmonary/Chest: Effort normal and breath sounds normal.   Abdominal: Soft. Nontender  Skin: Skin is warm and dry.   Psychiatric: Mood, memory, affect and judgment normal.     Assessment/Plan     Diagnosis:  Choledocholithiasis, elevated liver enzymes    Anticipated Surgical Procedure:  ERCP    The risks, benefits, and alternatives of this procedure have been discussed with the patient or the responsible party- the patient understands and agrees to proceed.          "

## 2021-12-15 NOTE — ANESTHESIA POSTPROCEDURE EVALUATION
Patient: Radhames Colón    Procedure Summary     Date: 12/15/21 Room / Location: [facility] ENDOSCOPY 4 / [facility]COPY    Anesthesia Start: 1202 Anesthesia Stop: 1302    Procedure: ENDOSCOPIC RETROGRADE CHOLANGIOPANCREATOGRAPHY WITH SPINCTEROTOMY, 9-12MM BALLOON SWEEP, INSERTION OF 10FR 9CM BILIARY STENT (N/A ) Diagnosis:       Choledocholithiasis      (Choledocholithiasis [K80.50])    Surgeons: Eden Hernandez MD Provider: Andra Lynch MD    Anesthesia Type: general, MAC ASA Status: 4          Anesthesia Type: general, MAC    Vitals  Vitals Value Taken Time   /63 12/15/21 1325   Temp 36.2 °C (97.2 °F) 12/15/21 1306   Pulse 65 12/15/21 1327   Resp 18 12/15/21 1315   SpO2 98 % 12/15/21 1327   Vitals shown include unvalidated device data.        Post Anesthesia Care and Evaluation    Patient location during evaluation: bedside  Patient participation: complete - patient participated  Level of consciousness: awake  Pain score: 0  Pain management: adequate  Airway patency: patent  Anesthetic complications: No anesthetic complications  PONV Status: none  Cardiovascular status: acceptable and stable  Respiratory status: acceptable and room air  Hydration status: acceptable    Comments: An Anesthesiologist personally participated in the most demanding procedures (including induction and emergence if applicable) in the anesthesia plan, monitored the course of anesthesia administration at frequent intervals and remained physically present and available for immediate diagnosis and treatment of emergencies.

## 2021-12-15 NOTE — ANESTHESIA PREPROCEDURE EVALUATION
Anesthesia Evaluation     Patient summary reviewed and Nursing notes reviewed   no history of anesthetic complications:  NPO Solid Status: > 8 hours  NPO Liquid Status: > 2 hours           Airway   Mallampati: II  TM distance: >3 FB  Neck ROM: full  No difficulty expected and Large neck circumference  Dental      Pulmonary - normal exam    breath sounds clear to auscultation  (+) a smoker Former, COPD, sleep apnea on CPAP,   Cardiovascular - normal exam  Exercise tolerance: poor (<4 METS)    PT is on anticoagulation therapy  Patient on routine beta blocker and Beta blocker given within 24 hours of surgery  Rhythm: regular    (+) hypertension, CAD, CABG >6 Months, cardiac stents more than 12 months ago CHF , PVD, hyperlipidemia,  carotid artery disease      Neuro/Psych  (+) psychiatric history Depression,     GI/Hepatic/Renal/Endo    (+)  GERD, GI bleeding lower , liver disease, renal disease CRI, diabetes mellitus,     Musculoskeletal     Abdominal    Substance History - negative use     OB/GYN negative ob/gyn ROS         Other   arthritis,    history of cancer                    Anesthesia Plan    ASA 4     general and MAC   (Patient understands anesthesia not responsible for dental damage.)  intravenous induction     Anesthetic plan, all risks, benefits, and alternatives have been provided, discussed and informed consent has been obtained with: patient.  Use of blood products discussed with patient .   Plan discussed with CRNA.

## 2021-12-16 ENCOUNTER — PREP FOR SURGERY (OUTPATIENT)
Dept: OTHER | Facility: HOSPITAL | Age: 71
End: 2021-12-16

## 2021-12-16 ENCOUNTER — TELEPHONE (OUTPATIENT)
Dept: GASTROENTEROLOGY | Facility: CLINIC | Age: 71
End: 2021-12-16

## 2021-12-16 DIAGNOSIS — K80.50 CHOLEDOCHOLITHIASIS: ICD-10-CM

## 2021-12-16 DIAGNOSIS — Z46.89 ENCOUNTER FOR REMOVAL OF BILIARY STENT: Primary | ICD-10-CM

## 2021-12-16 RX ORDER — SODIUM CHLORIDE, SODIUM LACTATE, POTASSIUM CHLORIDE, CALCIUM CHLORIDE 600; 310; 30; 20 MG/100ML; MG/100ML; MG/100ML; MG/100ML
30 INJECTION, SOLUTION INTRAVENOUS CONTINUOUS PRN
Status: CANCELLED | OUTPATIENT
Start: 2021-12-16

## 2021-12-16 NOTE — TELEPHONE ENCOUNTER
Please arrange ERCP for stent removal and possible lithotripsy with Dr. Zepeda in next 2 - 4 weeks.  I have talked with Dr. Zepeda, and he is agreeable.  Indication: choledocholithiasis.  thanks

## 2021-12-20 ENCOUNTER — TELEPHONE (OUTPATIENT)
Dept: GASTROENTEROLOGY | Facility: CLINIC | Age: 71
End: 2021-12-20

## 2021-12-20 NOTE — TELEPHONE ENCOUNTER
Blood Thinner/Cardiac Clearance                                                                                                                 [facility]    12/20/2021    Dear [place],     Patient: Radhames Colón   YOB: 1950        This patient is waiting to have a ERCP which I will perform at [facility] on 01/06/2022 date .     Our records indicate this patient is currently taking Plavix. This procedure requires the patient to suspend their anticoagulant medication prior to surgery.     Please respond to this request noting your recommendations. You may contact our office at [phone] Option 2 with any questions. I appreciate your prompt response in this matter.     Please return this form to our office as soon as possible to Dr. Zepeda    ____ I approve my patient to stop taking their Anticoagulant Therapy medication 5 days prior to the scheduled procedure.    ____ I do NOT approve my patient to stop taking their Anticoagulant Therapy medication at this time.    ____ I approve my patient from a cardiac standpoint.    ____ I do NOT approve my patient from a cardiac standpoint at this time.    Approving physician name (please print):     _____________________________________________    Approving physician signature:     ________________________________    Date:________________      Sincerely,  [facility]   Gastroenterology -

## 2021-12-20 NOTE — TELEPHONE ENCOUNTER
Spoke to pt and spouse and confirmed ERCP procedure with Dr. Zepeda.Scheduled for 01/06/2022 arrival time 1200. Verified understanding.

## 2022-01-03 DIAGNOSIS — R17 JAUNDICE: ICD-10-CM

## 2022-01-03 RX ORDER — LEVOTHYROXINE SODIUM 0.12 MG/1
125 TABLET ORAL DAILY
Qty: 90 TABLET | Refills: 3 | Status: SHIPPED | OUTPATIENT
Start: 2022-01-03 | End: 2022-02-15 | Stop reason: SDUPTHER

## 2022-01-04 NOTE — PRE-PROCEDURE INSTRUCTIONS
PT INSTRUCTED WHERE TO COME TO HE WAS JUST HERE 6 WEEKS AGO TO HAVE STENT PLACED HE CAN TAKE ALLOPURINOL,NORVASC, TRICOR,WELLBUTRIN, M/S CONTIN,SYNTHROID,METOPROLOL,AND PROTONIX WITH A SIP OF WATER IN THE AM ON JAN 6TH ARRIVAL TIME IS 0830 AM BLOOD SUGAR ORDERED

## 2022-01-06 ENCOUNTER — APPOINTMENT (OUTPATIENT)
Dept: GENERAL RADIOLOGY | Facility: HOSPITAL | Age: 72
End: 2022-01-06

## 2022-01-06 ENCOUNTER — HOSPITAL ENCOUNTER (OUTPATIENT)
Facility: HOSPITAL | Age: 72
Setting detail: HOSPITAL OUTPATIENT SURGERY
Discharge: HOME | End: 2022-01-06
Attending: INTERNAL MEDICINE | Admitting: INTERNAL MEDICINE

## 2022-01-06 ENCOUNTER — ANESTHESIA (OUTPATIENT)
Dept: GASTROENTEROLOGY | Facility: HOSPITAL | Age: 72
End: 2022-01-06

## 2022-01-06 ENCOUNTER — ANESTHESIA EVENT (OUTPATIENT)
Dept: GASTROENTEROLOGY | Facility: HOSPITAL | Age: 72
End: 2022-01-06

## 2022-01-06 VITALS
HEIGHT: 69 IN | OXYGEN SATURATION: 92 % | BODY MASS INDEX: 35.27 KG/M2 | TEMPERATURE: 97.2 F | RESPIRATION RATE: 14 BRPM | DIASTOLIC BLOOD PRESSURE: 68 MMHG | WEIGHT: 238.1 LBS | SYSTOLIC BLOOD PRESSURE: 147 MMHG | HEART RATE: 67 BPM

## 2022-01-06 DIAGNOSIS — Z46.89 ENCOUNTER FOR REMOVAL OF BILIARY STENT: ICD-10-CM

## 2022-01-06 DIAGNOSIS — K80.50 CHOLEDOCHOLITHIASIS: ICD-10-CM

## 2022-01-06 LAB — GLUCOSE BLDC GLUCOMTR-MCNC: 130 MG/DL (ref 70–99)

## 2022-01-06 PROCEDURE — 25010000002 PROPOFOL 10 MG/ML EMULSION: Performed by: NURSE ANESTHETIST, CERTIFIED REGISTERED

## 2022-01-06 PROCEDURE — 25010000002 IOPAMIDOL 61 % SOLUTION: Performed by: INTERNAL MEDICINE

## 2022-01-06 PROCEDURE — 25010000002 LEVOFLOXACIN PER 250 MG: Performed by: INTERNAL MEDICINE

## 2022-01-06 PROCEDURE — 74330 X-RAY BILE/PANC ENDOSCOPY: CPT

## 2022-01-06 PROCEDURE — 43264 ERCP REMOVE DUCT CALCULI: CPT | Performed by: INTERNAL MEDICINE

## 2022-01-06 PROCEDURE — 43275 ERCP REMOVE FORGN BODY DUCT: CPT | Performed by: INTERNAL MEDICINE

## 2022-01-06 PROCEDURE — C1769 GUIDE WIRE: HCPCS | Performed by: INTERNAL MEDICINE

## 2022-01-06 PROCEDURE — 82962 GLUCOSE BLOOD TEST: CPT

## 2022-01-06 PROCEDURE — C1726 CATH, BAL DIL, NON-VASCULAR: HCPCS | Performed by: INTERNAL MEDICINE

## 2022-01-06 PROCEDURE — 43262 ENDO CHOLANGIOPANCREATOGRAPH: CPT | Performed by: INTERNAL MEDICINE

## 2022-01-06 RX ORDER — PHENYLEPHRINE HCL IN 0.9% NACL 1 MG/10 ML
SYRINGE (ML) INTRAVENOUS AS NEEDED
Status: DISCONTINUED | OUTPATIENT
Start: 2022-01-06 | End: 2022-01-06 | Stop reason: SURG

## 2022-01-06 RX ORDER — SODIUM CHLORIDE, SODIUM LACTATE, POTASSIUM CHLORIDE, CALCIUM CHLORIDE 600; 310; 30; 20 MG/100ML; MG/100ML; MG/100ML; MG/100ML
30 INJECTION, SOLUTION INTRAVENOUS CONTINUOUS
Status: DISCONTINUED | OUTPATIENT
Start: 2022-01-06 | End: 2022-01-06 | Stop reason: HOSPADM

## 2022-01-06 RX ORDER — SODIUM CHLORIDE, SODIUM LACTATE, POTASSIUM CHLORIDE, CALCIUM CHLORIDE 600; 310; 30; 20 MG/100ML; MG/100ML; MG/100ML; MG/100ML
30 INJECTION, SOLUTION INTRAVENOUS CONTINUOUS PRN
Status: DISCONTINUED | OUTPATIENT
Start: 2022-01-06 | End: 2022-01-06 | Stop reason: HOSPADM

## 2022-01-06 RX ORDER — LEVOFLOXACIN 5 MG/ML
500 INJECTION, SOLUTION INTRAVENOUS EVERY 24 HOURS
Status: COMPLETED | OUTPATIENT
Start: 2022-01-06 | End: 2022-01-06

## 2022-01-06 RX ORDER — LIDOCAINE HYDROCHLORIDE 20 MG/ML
INJECTION, SOLUTION INFILTRATION; PERINEURAL AS NEEDED
Status: DISCONTINUED | OUTPATIENT
Start: 2022-01-06 | End: 2022-01-06 | Stop reason: SURG

## 2022-01-06 RX ORDER — EPHEDRINE SULFATE 50 MG/ML
INJECTION, SOLUTION INTRAVENOUS AS NEEDED
Status: DISCONTINUED | OUTPATIENT
Start: 2022-01-06 | End: 2022-01-06 | Stop reason: SURG

## 2022-01-06 RX ORDER — PROPOFOL 10 MG/ML
VIAL (ML) INTRAVENOUS AS NEEDED
Status: DISCONTINUED | OUTPATIENT
Start: 2022-01-06 | End: 2022-01-06 | Stop reason: SURG

## 2022-01-06 RX ADMIN — EPHEDRINE SULFATE 10 MG: 50 INJECTION INTRAVENOUS at 11:04

## 2022-01-06 RX ADMIN — PROPOFOL 50 MG: 10 INJECTION, EMULSION INTRAVENOUS at 10:18

## 2022-01-06 RX ADMIN — Medication 100 MCG: at 10:24

## 2022-01-06 RX ADMIN — Medication 100 MCG: at 10:29

## 2022-01-06 RX ADMIN — PROPOFOL 200 MCG/KG/MIN: 10 INJECTION, EMULSION INTRAVENOUS at 10:18

## 2022-01-06 RX ADMIN — SODIUM CHLORIDE, POTASSIUM CHLORIDE, SODIUM LACTATE AND CALCIUM CHLORIDE: 600; 310; 30; 20 INJECTION, SOLUTION INTRAVENOUS at 11:07

## 2022-01-06 RX ADMIN — Medication 200 MCG: at 10:39

## 2022-01-06 RX ADMIN — Medication 100 MCG: at 10:34

## 2022-01-06 RX ADMIN — SODIUM CHLORIDE, POTASSIUM CHLORIDE, SODIUM LACTATE AND CALCIUM CHLORIDE 30 ML/HR: 600; 310; 30; 20 INJECTION, SOLUTION INTRAVENOUS at 10:10

## 2022-01-06 RX ADMIN — LIDOCAINE HYDROCHLORIDE 100 MG: 20 INJECTION, SOLUTION INFILTRATION; PERINEURAL at 10:18

## 2022-01-06 RX ADMIN — LEVOFLOXACIN 500 MG: 5 INJECTION, SOLUTION INTRAVENOUS at 10:13

## 2022-01-06 NOTE — ANESTHESIA PREPROCEDURE EVALUATION
Anesthesia Evaluation     Patient summary reviewed and Nursing notes reviewed                Airway   Mallampati: II  TM distance: >3 FB  Neck ROM: full  No difficulty expected  Dental      Pulmonary    (+) COPD, sleep apnea, rhonchi,   Cardiovascular - normal exam    Rhythm: regular    (+) hypertension, CAD, CABG, CHF , PVD, hyperlipidemia,  carotid artery disease      Neuro/Psych  (+) psychiatric history,     GI/Hepatic/Renal/Endo    (+)  GERD, GI bleeding , liver disease, renal disease, diabetes mellitus,     Musculoskeletal     Abdominal    Substance History - negative use     OB/GYN negative ob/gyn ROS         Other   arthritis,    history of cancer                    Anesthesia Plan    ASA 4     general     intravenous induction     Anesthetic plan, all risks, benefits, and alternatives have been provided, discussed and informed consent has been obtained with: patient.

## 2022-01-06 NOTE — ANESTHESIA POSTPROCEDURE EVALUATION
Patient: Radhames Colón    Procedure Summary     Date: 01/06/22 Room / Location: [facility] ENDOSCOPY 4 / [facility] ENDOSCOPY    Anesthesia Start: 1013 Anesthesia Stop: 1128    Procedure: ENDOSCOPIC RETROGRADE CHOLANGIOPANCREATOGRAPHY WITH STENT REMOVAL, SPYGLASS, AUTOLITH, 8-10MM BALLOON DILATATION, 9-12 AND 12-15MM BALLOON SWEEP (N/A ) Diagnosis:       Choledocholithiasis      Encounter for removal of biliary stent      (Choledocholithiasis [K80.50])      (Encounter for removal of biliary stent [Z46.89])    Surgeons: Wayne Zepeda MD Provider: Royce Maier MD    Anesthesia Type: general ASA Status: 4          Anesthesia Type: general    Vitals  Vitals Value Taken Time   /55 01/06/22 1136   Temp 36.1 °C (97 °F) 01/06/22 1132   Pulse 69 01/06/22 1137   Resp 10 01/06/22 1132   SpO2 95 % 01/06/22 1137   Vitals shown include unvalidated device data.        Post Anesthesia Care and Evaluation    Patient location during evaluation: bedside  Patient participation: complete - patient participated  Level of consciousness: awake  Pain management: adequate  Airway patency: patent  Anesthetic complications: No anesthetic complications  PONV Status: none  Cardiovascular status: acceptable  Respiratory status: acceptable  Hydration status: acceptable    Comments: An Anesthesiologist personally participated in the most demanding procedures (including induction and emergence if applicable) in the anesthesia plan, monitored the course of anesthesia administration at frequent intervals and remained physically present and available for immediate diagnosis and treatment of emergencies.

## 2022-01-06 NOTE — H&P
Pre Procedure History & Physical    Chief Complaint:   Bile duct stones which were incompletely removed the previous ERCP    Subjective     HPI:   Bile duct stones.  Patient has history of ERCP and a large stone was not been able to be removed patient has a bile duct stent    Past Medical History:   Past Medical History:   Diagnosis Date   • Abdominal aortic aneurysm without rupture (HCC)    • Acute renal failure (ARF) (HCC)     WAS SHORT TERM DIALYSIS, STAGE 3   • Aortic aneurysm (HCC)    • Arthritis    • Atherosclerosis of coronary artery    • Atherosclerotic heart disease of native coronary artery without angina pectoris    • Atrophy of thyroid (acquired)    • Bilateral carotid artery stenosis    • Cancer (HCC)     COLON    • Chronic airway obstruction (HCC)    • Chronic gouty arthritis    • Chronic kidney disease, stage 3 (HCC)    • Controlled diabetes mellitus type II without complication (HCC)    • COPD (chronic obstructive pulmonary disease) (HCC)    • Coronary artery disease    • Depression    • Diabetes mellitus (HCC)    • Disease of liver    • Disease of thyroid gland    • Elevated carcinoembryonic antigen (CEA)    • GERD (gastroesophageal reflux disease)    • Hyperlipidemia    • Hypertension    • Hypothyroidism    • Iron deficiency anemia    • Long term (current) use of opiate analgesic    • Lumbar spondylosis    • Malignant neoplasm of colon (HCC)    • On long term drug therapy    • Peripheral artery disease (HCC)    • Polyarthropathy    • Pure hypercholesterolemia    • Rhabdomyolysis    • Sleep apnea     CPAP       Past Surgical History:  Past Surgical History:   Procedure Laterality Date   • ABDOMINAL AORTIC ANEURYSM REPAIR     • ANGIOPLASTY FEMORAL ARTERY Left 9/14/2020    Procedure: AORTOILLOFEMORAL ARTERIOGRAM;  Surgeon: Chula Nam Jr., MD;  Location: [facility] 18/19;  Service: Vascular;  Laterality: Left;   • APPENDECTOMY     • CARDIAC CATHETERIZATION     • CAROTID ENDARTERECTOMY  Left 2005   • CAROTID ENDARTERECTOMY Right 2010   • CHOLECYSTECTOMY OPEN     • COLON SURGERY      COLON RESECTION   • COLONOSCOPY  2004,11/2018    Dr. Lamas 2004,    • CORONARY ANGIOPLASTY WITH STENT PLACEMENT     • CORONARY ARTERY BYPASS GRAFT  2005   • ENDOSCOPY     • ERCP N/A 12/15/2021    Procedure: ENDOSCOPIC RETROGRADE CHOLANGIOPANCREATOGRAPHY WITH SPINCTEROTOMY, 9-12MM BALLOON SWEEP, INSERTION OF 10FR 9CM BILIARY STENT;  Surgeon: Eden Hernandez MD;  Location: [facility] ENDOSCOPY;  Service: Gastroenterology;  Laterality: N/A;  BILE DUCT STONES   • FEMORAL ENDARTERECTOMY Left 9/14/2020    Procedure: LEFT FEMORAL ENDARECTOMY WITHH PATCH ANGIOPLASTY;  Surgeon: Chula Nam Jr., MD;  Location: [facility] OR 18/19;  Service: Vascular;  Laterality: Left;   • GALLBLADDER SURGERY     • RHINOPLASTY Bilateral     70-80% R, 50% L       Family History:  Family History   Problem Relation Age of Onset   • Heart failure Mother    • Malig Hyperthermia Neg Hx        Social History:   reports that he quit smoking about 16 years ago. His smoking use included cigarettes. He has a 45.00 pack-year smoking history. He has never used smokeless tobacco. He reports current alcohol use. He reports that he does not use drugs.    Medications:   Medications Prior to Admission   Medication Sig Dispense Refill Last Dose   • allopurinol (ZYLOPRIM) 300 MG tablet Take 150 mg by mouth Daily.   1/5/2022 at Unknown time   • amLODIPine (NORVASC) 2.5 MG tablet Take 1 tablet by mouth Daily. 90 tablet 3 1/6/2022 at 0700   • ascorbic acid (VITAMIN C) 500 MG tablet TAKE 1 TABLET EVERY DAY (Patient taking differently: Take 500 mg by mouth Every Night.) 90 tablet 1 Past Week at Unknown time   • atorvastatin (LIPITOR) 20 MG tablet TAKE 1 TABLET EVERY DAY (Patient taking differently: Take 20 mg by mouth Daily.) 90 tablet 0 1/5/2022 at Unknown time   • budesonide (PULMICORT) 0.5 MG/2ML nebulizer solution Take 0.5 mg by nebulization As  Needed.   Past Week at Unknown time   • buPROPion XL (WELLBUTRIN XL) 150 MG 24 hr tablet Take 150 mg by mouth.   1/6/2022 at 0700   • Cholecalciferol (VITAMIN D3 MAXIMUM STRENGTH PO) Take 1 tablet by mouth Every Night.   Past Week at Unknown time   • Cholecalciferol 50 MCG (2000 UT) capsule Take 2,000 Units by mouth Every Night.   Past Week at Unknown time   • co-enzyme Q-10 30 MG capsule Take 30 mg by mouth Every Night.   Past Week at Unknown time   • colchicine 0.6 MG tablet Take 0.6 mg by mouth As Needed.   Past Week at Unknown time   • fenofibrate (TRICOR) 145 MG tablet TAKE 1 TABLET EVERY DAY (Patient taking differently: Take 145 mg by mouth Daily.) 90 tablet 3 1/5/2022 at Unknown time   • FeroSul 325 (65 Fe) MG tablet TAKE 1 TABLET EVERY DAY (Patient taking differently: Take 325 mg by mouth Every Night.) 90 tablet 1 Past Week at Unknown time   • folic acid (FOLVITE) 1 MG tablet TAKE 1 TABLET TWICE DAILY (Patient taking differently: Take 1 mg by mouth 2 (Two) Times a Day.) 180 tablet 1 Past Week at Unknown time   • furosemide (LASIX) 40 MG tablet TAKE 1 AND 1/2 TABLETS ONCE A DAY  (Patient taking differently: Take 40 mg by mouth Daily.) 135 tablet 3 1/5/2022 at Unknown time   • gabapentin (NEURONTIN) 600 MG tablet Take 1 tablet by mouth 2 (Two) Times a Day. 60 tablet 5 1/6/2022 at 0700   • insulin NPH (humuLIN N,novoLIN N) 100 UNIT/ML injection Inject 40 Units under the skin into the appropriate area as directed Every Night. HOLD INSULIN Wednesday NIGHT BLOOD SUGARS RUN LOW 85-95   1/5/2022 at Unknown time   • ipratropium-albuterol (DUO-NEB) 0.5-2.5 mg/3 ml nebulizer As Needed.   Past Week at Unknown time   • levothyroxine (Synthroid) 125 MCG tablet Take 1 tablet by mouth Daily. 90 tablet 3 1/6/2022 at 0700   • metFORMIN (GLUCOPHAGE) 500 MG tablet Take 500 mg by mouth 2 (Two) Times a Day With Meals. HOLD METFORMIN Wednesday AND Thursday MORNING   Past Week at Unknown time   • metoprolol succinate XL (TOPROL-XL)  "50 MG 24 hr tablet TAKE 1 TABLET TWICE DAILY (Patient taking differently: Take 50 mg by mouth 2 (Two) Times a Day.) 180 tablet 3 1/6/2022 at 0700   • Morphine (MS CONTIN) 30 MG 12 hr tablet 30 mg 2 (Two) Times a Day.   1/6/2022 at 0700   • nitroglycerin (NITROSTAT) 0.4 MG SL tablet Place 0.4 mg under the tongue Every 5 (Five) Minutes As Needed.   Past Month at Unknown time   • pantoprazole (PROTONIX) 40 MG EC tablet TAKE 1 TABLET EVERY DAY (Patient taking differently: Take 40 mg by mouth Daily.) 90 tablet 1 1/6/2022 at Unknown time   • vitamin B-12 (CYANOCOBALAMIN) 1000 MCG tablet TAKE 1 TABLET EVERY DAY (Patient taking differently: Take 1,000 mcg by mouth Daily.) 90 tablet 0 Past Week at Unknown time   • aspirin 81 MG EC tablet Take 81 mg by mouth Daily. LAST DOSE OF ASPIRIN December 29 12/30/2021   • clopidogrel (PLAVIX) 75 MG tablet TAKE 1 TABLET EVERY DAY (Patient taking differently: Take 75 mg by mouth Daily. LAST DOSE December 29) 90 tablet 1 12/30/2021       Allergies:  Penicillins, Ticagrelor, and Penicillin g        Objective     Blood pressure 167/74, pulse 62, temperature 97.3 °F (36.3 °C), temperature source Temporal, resp. rate 22, height 175.3 cm (69.02\"), weight 108 kg (238 lb 1.6 oz), SpO2 94 %.    Physical Exam   Constitutional: Pt is oriented to person, place, and time and well-developed, well-nourished, and in no distress.   Mouth/Throat: Oropharynx is clear and moist.   Neck: Normal range of motion.   Cardiovascular: Normal rate, regular rhythm and normal heart sounds.    Pulmonary/Chest: Effort normal and breath sounds normal.   Abdominal: Soft. Nontender  Skin: Skin is warm and dry.   Psychiatric: Mood, memory, affect and judgment normal.     Assessment/Plan     Diagnosis:  : Bile duct stones    Anticipated Surgical Procedure:  ERCP    The risks, benefits, and alternatives of this procedure have been discussed with the patient or the responsible party- the patient understands and agrees to " proceed.

## 2022-01-11 ENCOUNTER — TELEPHONE (OUTPATIENT)
Dept: INTERNAL MEDICINE | Facility: CLINIC | Age: 72
End: 2022-01-11

## 2022-01-11 NOTE — TELEPHONE ENCOUNTER
Caller: Radhames Colón    Relationship: Self    Best call back number: [phone]    What orders are you requesting (i.e. lab or imaging): BLOODWORK     In what timeframe would the patient need to come in: BEFORE APPT ON 4-5-22    Where will you receive your lab/imaging services: [facility]     Additional notes: PATIENT IS UNSURE IF HE NEEDS LABS BUT HE HAS A 3-4 MONTH FOLLOW UP SCHEDULED WITH DR KIM ON 4-5

## 2022-01-11 NOTE — TELEPHONE ENCOUNTER
Let him know that the lab work should be already at the lab from his last appointment if this was his routine follow-up he does not need any paperwork he just needs to go to the lab the week before his appointment

## 2022-01-12 ENCOUNTER — TELEPHONE (OUTPATIENT)
Dept: GASTROENTEROLOGY | Facility: CLINIC | Age: 72
End: 2022-01-12

## 2022-01-13 NOTE — TELEPHONE ENCOUNTER
I called pt no answer. Left message on voice mail to return the call. Patient is scheduled for a F/U 3/222

## 2022-01-24 RX ORDER — ATORVASTATIN CALCIUM 20 MG/1
20 TABLET, FILM COATED ORAL DAILY
Qty: 90 TABLET | Refills: 1 | Status: SHIPPED | OUTPATIENT
Start: 2022-01-24 | End: 2022-07-05

## 2022-01-24 RX ORDER — LANOLIN ALCOHOL/MO/W.PET/CERES
1000 CREAM (GRAM) TOPICAL DAILY
Qty: 90 TABLET | Refills: 1 | Status: SHIPPED | OUTPATIENT
Start: 2022-01-24 | End: 2022-04-04

## 2022-01-25 ENCOUNTER — TRANSCRIBE ORDERS (OUTPATIENT)
Dept: LAB | Facility: HOSPITAL | Age: 72
End: 2022-01-25

## 2022-01-25 ENCOUNTER — LAB (OUTPATIENT)
Dept: LAB | Facility: HOSPITAL | Age: 72
End: 2022-01-25

## 2022-01-25 DIAGNOSIS — G63 GOUTY NEURITIS: Primary | ICD-10-CM

## 2022-01-25 DIAGNOSIS — E78.5 HYPERLIPIDEMIA LDL GOAL <70: ICD-10-CM

## 2022-01-25 DIAGNOSIS — M10.00 GOUTY NEURITIS: Primary | ICD-10-CM

## 2022-01-25 DIAGNOSIS — I10 ESSENTIAL HYPERTENSION: ICD-10-CM

## 2022-01-25 DIAGNOSIS — I25.10 CORONARY ARTERY DISEASE INVOLVING NATIVE CORONARY ARTERY OF NATIVE HEART: ICD-10-CM

## 2022-01-25 DIAGNOSIS — Z79.899 ENCOUNTER FOR LONG-TERM (CURRENT) USE OF OTHER MEDICATIONS: ICD-10-CM

## 2022-01-25 DIAGNOSIS — I27.20 PULMONARY HYPERTENSION: ICD-10-CM

## 2022-01-25 DIAGNOSIS — G63 GOUTY NEURITIS: ICD-10-CM

## 2022-01-25 DIAGNOSIS — M10.00 GOUTY NEURITIS: ICD-10-CM

## 2022-01-25 LAB
ALBUMIN SERPL-MCNC: 3.9 G/DL (ref 3.5–5.2)
ALBUMIN/GLOB SERPL: 1.4 G/DL
ALP SERPL-CCNC: 53 U/L (ref 39–117)
ALT SERPL W P-5'-P-CCNC: 15 U/L (ref 1–41)
ANION GAP SERPL CALCULATED.3IONS-SCNC: 9.2 MMOL/L (ref 5–15)
AST SERPL-CCNC: 26 U/L (ref 1–40)
BILIRUB SERPL-MCNC: 0.3 MG/DL (ref 0–1.2)
BUN SERPL-MCNC: 21 MG/DL (ref 8–23)
BUN/CREAT SERPL: 13.7 (ref 7–25)
CALCIUM SPEC-SCNC: 9.5 MG/DL (ref 8.6–10.5)
CHLORIDE SERPL-SCNC: 99 MMOL/L (ref 98–107)
CHOLEST SERPL-MCNC: 120 MG/DL (ref 0–200)
CO2 SERPL-SCNC: 30.8 MMOL/L (ref 22–29)
CREAT SERPL-MCNC: 1.53 MG/DL (ref 0.76–1.27)
GFR SERPL CREATININE-BSD FRML MDRD: 45 ML/MIN/1.73
GLOBULIN UR ELPH-MCNC: 2.7 GM/DL
GLUCOSE SERPL-MCNC: 79 MG/DL (ref 65–99)
HDLC SERPL-MCNC: 24 MG/DL (ref 40–60)
LDLC SERPL CALC-MCNC: 68 MG/DL (ref 0–100)
LDLC/HDLC SERPL: 2.68 {RATIO}
MAGNESIUM SERPL-MCNC: 2.1 MG/DL (ref 1.6–2.4)
POTASSIUM SERPL-SCNC: 4.2 MMOL/L (ref 3.5–5.2)
PROT SERPL-MCNC: 6.6 G/DL (ref 6–8.5)
SODIUM SERPL-SCNC: 139 MMOL/L (ref 136–145)
TRIGL SERPL-MCNC: 159 MG/DL (ref 0–150)
URATE SERPL-MCNC: 3.9 MG/DL (ref 3.4–7)
VLDLC SERPL-MCNC: 28 MG/DL (ref 5–40)

## 2022-01-25 PROCEDURE — 80061 LIPID PANEL: CPT

## 2022-01-25 PROCEDURE — 36415 COLL VENOUS BLD VENIPUNCTURE: CPT

## 2022-01-25 PROCEDURE — 80053 COMPREHEN METABOLIC PANEL: CPT

## 2022-01-25 PROCEDURE — 83735 ASSAY OF MAGNESIUM: CPT

## 2022-01-25 PROCEDURE — 84550 ASSAY OF BLOOD/URIC ACID: CPT

## 2022-02-15 DIAGNOSIS — R17 JAUNDICE: ICD-10-CM

## 2022-02-15 RX ORDER — LEVOTHYROXINE SODIUM 0.12 MG/1
125 TABLET ORAL DAILY
Qty: 90 TABLET | Refills: 3 | Status: SHIPPED | OUTPATIENT
Start: 2022-02-15

## 2022-02-15 RX ORDER — AMLODIPINE BESYLATE 2.5 MG/1
2.5 TABLET ORAL DAILY
Qty: 90 TABLET | Refills: 3 | Status: SHIPPED | OUTPATIENT
Start: 2022-02-15 | End: 2022-04-08

## 2022-02-23 RX ORDER — FUROSEMIDE 40 MG/1
40 TABLET ORAL DAILY
Qty: 90 TABLET | Refills: 3 | Status: SHIPPED | OUTPATIENT
Start: 2022-02-23 | End: 2022-04-04 | Stop reason: SDUPTHER

## 2022-03-01 ENCOUNTER — OFFICE VISIT (OUTPATIENT)
Dept: GASTROENTEROLOGY | Facility: CLINIC | Age: 72
End: 2022-03-01

## 2022-03-01 VITALS
HEIGHT: 69 IN | DIASTOLIC BLOOD PRESSURE: 61 MMHG | WEIGHT: 237.8 LBS | BODY MASS INDEX: 35.22 KG/M2 | SYSTOLIC BLOOD PRESSURE: 120 MMHG | HEART RATE: 57 BPM

## 2022-03-01 DIAGNOSIS — Z85.038 PERSONAL HISTORY OF COLON CANCER: ICD-10-CM

## 2022-03-01 DIAGNOSIS — R79.89 ELEVATED LFTS: ICD-10-CM

## 2022-03-01 DIAGNOSIS — K80.51 CALCULUS OF BILE DUCT WITHOUT CHOLECYSTITIS WITH OBSTRUCTION: Primary | ICD-10-CM

## 2022-03-01 PROCEDURE — 99213 OFFICE O/P EST LOW 20 MIN: CPT | Performed by: NURSE PRACTITIONER

## 2022-03-01 RX ORDER — LEVOTHYROXINE SODIUM 0.15 MG/1
TABLET ORAL
COMMUNITY
Start: 2021-12-14 | End: 2022-03-01

## 2022-03-01 NOTE — PROGRESS NOTES
Patient Name: Radhames Colón   Visit Date: 03/01/2022   Patient ID: [id number]  Provider: MUKUL Olsen    Sex: male  Location:  Location Address:  Location Phone: [address]  [place] [address] [phone]    YOB: 1950  Age: 71 y.o.   Primary Care Provider Mingo Loja MD      Referring Provider: Mnigo Loja, *        Chief Complaint  ERCP (Follow up)    History of Present Illness  Dr. Hernandez was contacted by patient's primary care provider and an ERCP was arranged due to bile duct stone revealed on an MRI MRCP (LFTs had went up). Hx cholecystectomy around 2013 per pt.  ERCP 12/15/2021 by Dr. Hernandez bile duct stones were removed, temporary stent placed sphincterotomy was also performed  Repeat ERCP 1/6/2022 by Dr. Zepeda stones again were seen, lithotripsy performed, dilation of distal common bile duct and ampulla from 8 to 10 mm was performed.  1 stent was removed    Pt states since last ERCP he has been feeling fine. No abd pain, noted LFTs also have normalized.   Hx colon cancer dx 2010, states he is now on repeat 5 yrs. Reports last colon 2018 DR Ibanez. Pt states he has no diarrhea, no blood in stool. Denies any GI sx today.   Past Medical History:   Diagnosis Date   • Abdominal aortic aneurysm without rupture (HCC)    • Acute renal failure (ARF) (HCC)     WAS SHORT TERM DIALYSIS, STAGE 3   • Aortic aneurysm (HCC)    • Arthritis    • Atherosclerosis of coronary artery    • Atherosclerotic heart disease of native coronary artery without angina pectoris    • Atrophy of thyroid (acquired)    • Bilateral carotid artery stenosis    • Cancer (HCC)     COLON    • Chronic airway obstruction (HCC)    • Chronic gouty arthritis    • Chronic kidney disease, stage 3 (HCC)    • Controlled diabetes mellitus type II without complication (HCC)    • COPD (chronic obstructive pulmonary disease) (HCC)    • Coronary artery disease    • Depression    • Diabetes mellitus (HCC)     • Disease of liver    • Disease of thyroid gland    • Elevated carcinoembryonic antigen (CEA)    • GERD (gastroesophageal reflux disease)    • Hyperlipidemia    • Hypertension    • Hypothyroidism    • Iron deficiency anemia    • Long term (current) use of opiate analgesic    • Lumbar spondylosis    • Malignant neoplasm of colon (HCC)    • On long term drug therapy    • Peripheral artery disease (HCC)    • Polyarthropathy    • Pure hypercholesterolemia    • Rhabdomyolysis    • Sleep apnea     CPAP       Past Surgical History:   Procedure Laterality Date   • ABDOMINAL AORTIC ANEURYSM REPAIR     • ANGIOPLASTY FEMORAL ARTERY Left 9/14/2020    Procedure: AORTOILLOFEMORAL ARTERIOGRAM;  Surgeon: Chula Nam Jr., MD;  Location: [facility] OR 18/19;  Service: Vascular;  Laterality: Left;   • APPENDECTOMY     • CARDIAC CATHETERIZATION     • CAROTID ENDARTERECTOMY Left 2005   • CAROTID ENDARTERECTOMY Right 2010   • CHOLECYSTECTOMY OPEN     • COLON SURGERY      COLON RESECTION   • COLONOSCOPY  2004,11/2018    Dr. Lamas 2004,    • CORONARY ANGIOPLASTY WITH STENT PLACEMENT     • CORONARY ARTERY BYPASS GRAFT  2005   • ENDOSCOPY     • ERCP N/A 12/15/2021    Procedure: ENDOSCOPIC RETROGRADE CHOLANGIOPANCREATOGRAPHY WITH SPINCTEROTOMY, 9-12MM BALLOON SWEEP, INSERTION OF 10FR 9CM BILIARY STENT;  Surgeon: Eden Hernandez MD;  Location: [facility] ENDOSCOPY;  Service: Gastroenterology;  Laterality: N/A;  BILE DUCT STONES   • ERCP N/A 1/6/2022    Procedure: ENDOSCOPIC RETROGRADE CHOLANGIOPANCREATOGRAPHY WITH STENT REMOVAL, SPYGLASS, AUTOLITH, 8-10MM BALLOON DILATATION, 9-12 AND 12-15MM BALLOON SWEEP;  Surgeon: Wayne Zepeda MD;  Location: [facility] ENDOSCOPY;  Service: Gastroenterology;  Laterality: N/A;  BILE DUCT STONES   • FEMORAL ENDARTERECTOMY Left 9/14/2020    Procedure: LEFT FEMORAL ENDARECTOMY WITHH PATCH ANGIOPLASTY;  Surgeon: Chula Nam Jr., MD;  Location: [facility] OR 18/19;  Service:  "Vascular;  Laterality: Left;   • GALLBLADDER SURGERY     • RHINOPLASTY Bilateral     70-80% R, 50% L       Allergies   Allergen Reactions   • Penicillins Shortness Of Breath   • Ticagrelor Shortness Of Breath   • Penicillin G Provider Review Needed and Unknown - Low Severity       Family History   Problem Relation Age of Onset   • Heart failure Mother    • Malig Hyperthermia Neg Hx         Social History     Tobacco Use   • Smoking status: Former Smoker     Packs/day: 1.00     Years: 45.00     Pack years: 45.00     Types: Cigarettes     Quit date: 2005     Years since quittin.4   • Smokeless tobacco: Never Used   Vaping Use   • Vaping Use: Never used   Substance Use Topics   • Alcohol use: Yes     Comment: RARE   • Drug use: Never       Objective     Vital Signs:   /61 (BP Location: Left arm, Patient Position: Sitting, Cuff Size: Large Adult)   Pulse 57   Ht 175.3 cm (69.02\")   Wt 108 kg (237 lb 12.8 oz)   BMI 35.10 kg/m²       Physical Exam  Constitutional:       General: The patient is not in acute distress.     Appearance: Normal appearance.   HENT:      Head: Normocephalic and atraumatic.      Nose: Nose normal.   Pulmonary:      Effort: Pulmonary effort is normal. No respiratory distress.   Abdominal:      General: Abdomen is flat.      Palpations: Abdomen is soft. There is no mass.      Tenderness: There is no abdominal tenderness. There is no guarding.   Musculoskeletal:      Cervical back: Neck supple.      Right lower leg: No edema.      Left lower leg: No edema.   Skin:     General: Skin is warm and dry.   Neurological:      General: No focal deficit present.      Mental Status: The patient is alert and oriented to person, place, and time.      Gait: Gait normal.   Psychiatric:         Mood and Affect: Mood normal.         Speech: Speech normal.         Behavior: Behavior normal.         Thought Content: Thought content normal.     Result Review :   The following data was reviewed by: " Dorothy Almaraz, APRN on 03/01/2022:    CBC w/diff    CBC w/Diff 7/27/21 11/30/21 12/8/21   WBC 7.55 9.08 12.60 (A)   RBC 5.49 5.14 5.11   Hemoglobin 14.3 13.7 14.0   Hematocrit 47.0 44.1 43.2   MCV 85.6 85.8 84.5   MCH 26.0 (A) 26.7 27.4   MCHC 30.4 (A) 31.1 (A) 32.4   RDW 15.8 (A) 15.8 (A) 15.6 (A)   Platelets 195 215 310   Neutrophil Rel % 62.0 63.2 65.2   Immature Granulocyte Rel % 0.4 0.3 0.7 (A)   Lymphocyte Rel % 23.7 21.6 20.4   Monocyte Rel % 9.0 11.5 8.6   Eosinophil Rel % 4.2 2.8 4.4   Basophil Rel % 0.7 0.6 0.7   (A) Abnormal value            CMP    CMP 11/30/21 12/8/21 1/25/22   Glucose 100 (A) 81 79   BUN 17 16 21   Creatinine 1.51 (A) 1.32 (A) 1.53 (A)   eGFR Non  Am 46 (A) 53 (A) 45 (A)   Sodium 142 142 139   Potassium 3.8 4.2 4.2   Chloride 102 101 99   Calcium 9.8 9.4 9.5   Albumin 3.90 3.80 3.90   Total Bilirubin 1.9 (A) 0.6 0.3   Alkaline Phosphatase 221 (A) 132 (A) 53   AST (SGOT) 173 (A) 32 26   ALT (SGPT) 120 (A) 30 15   (A) Abnormal value                          Assessment and Plan    Diagnoses and all orders for this visit:    1. Calculus of bile duct without cholecystitis with obstruction (Primary)  Comments:  resolved after ERCP w stone removal, s/p stent placement and removal    2. Elevated LFTs  Comments:  r/t obstruction, has resolved    3. Personal history of colon cancer            Follow Up   Return if symptoms worsen or fail to improve.   Received records after patient left showing colonoscopy December 2017 by Dr. Bolanos reported as negative.  Patient had reported to me he would like to follow-up with surgical specialists for his repeat colonoscopies.  Pt to RTO as needed, r/w pt if any abd pain, increase in LFTs again to return to our office and he verbalized understanding.   Patient was given instructions and counseling regarding his condition or for health maintenance advice. Please see specific information pulled into the AVS if appropriate.

## 2022-03-09 ENCOUNTER — PATIENT ROUNDING (BHMG ONLY) (OUTPATIENT)
Dept: GASTROENTEROLOGY | Facility: CLINIC | Age: 72
End: 2022-03-09

## 2022-03-09 NOTE — PROGRESS NOTES
March 9, 2022    Hello, may I speak with Radhames Colón?    My name is Radha     I am  [facility]  [address]  [place] [address] [phone].    Before we get started may I verify your date of birth? 1950    I am calling to officially welcome you to our practice and ask about your recent visit. Is this a good time to talk? Yes     Tell me about your visit with us. What things went well? Everything went well, didn't wait long, all staff was friendly & answered all my questions     We're always looking for ways to make our patients' experiences even better. Do you have recommendations on ways we may improve?  No     Overall were you satisfied with your first visit to our practice? Yes        I appreciate you taking the time to speak with me today. Is there anything else I can do for you? No       Thank you, and have a great day.

## 2022-03-15 ENCOUNTER — LAB (OUTPATIENT)
Dept: LAB | Facility: HOSPITAL | Age: 72
End: 2022-03-15

## 2022-03-15 DIAGNOSIS — R79.1 ABNORMAL COAGULATION PROFILE: ICD-10-CM

## 2022-03-15 DIAGNOSIS — R79.89 ELEVATED LFTS: ICD-10-CM

## 2022-03-15 LAB
INR PPP: 1.01 (ref 2–3)
PROTHROMBIN TIME: 10.6 SECONDS (ref 9.4–12)

## 2022-03-15 PROCEDURE — 85610 PROTHROMBIN TIME: CPT

## 2022-03-15 PROCEDURE — 36415 COLL VENOUS BLD VENIPUNCTURE: CPT

## 2022-03-28 ENCOUNTER — OFFICE VISIT (OUTPATIENT)
Dept: INTERNAL MEDICINE | Facility: CLINIC | Age: 72
End: 2022-03-28

## 2022-03-28 VITALS
SYSTOLIC BLOOD PRESSURE: 136 MMHG | DIASTOLIC BLOOD PRESSURE: 62 MMHG | TEMPERATURE: 97.1 F | OXYGEN SATURATION: 95 % | WEIGHT: 231.8 LBS | BODY MASS INDEX: 34.33 KG/M2 | HEART RATE: 68 BPM | HEIGHT: 69 IN

## 2022-03-28 DIAGNOSIS — M54.50 CHRONIC BILATERAL LOW BACK PAIN WITHOUT SCIATICA: ICD-10-CM

## 2022-03-28 DIAGNOSIS — E78.2 MIXED HYPERLIPIDEMIA: ICD-10-CM

## 2022-03-28 DIAGNOSIS — D50.8 IRON DEFICIENCY ANEMIA SECONDARY TO INADEQUATE DIETARY IRON INTAKE: ICD-10-CM

## 2022-03-28 DIAGNOSIS — I73.9 PVD (PERIPHERAL VASCULAR DISEASE) WITH CLAUDICATION: ICD-10-CM

## 2022-03-28 DIAGNOSIS — E03.9 ACQUIRED HYPOTHYROIDISM: Primary | ICD-10-CM

## 2022-03-28 DIAGNOSIS — I50.32 CHRONIC DIASTOLIC CONGESTIVE HEART FAILURE: ICD-10-CM

## 2022-03-28 DIAGNOSIS — G89.29 CHRONIC BILATERAL LOW BACK PAIN WITHOUT SCIATICA: ICD-10-CM

## 2022-03-28 DIAGNOSIS — Z12.5 SCREENING PSA (PROSTATE SPECIFIC ANTIGEN): ICD-10-CM

## 2022-03-28 DIAGNOSIS — M1A.0790 CHRONIC GOUT OF FOOT, UNSPECIFIED CAUSE, UNSPECIFIED LATERALITY: ICD-10-CM

## 2022-03-28 DIAGNOSIS — J43.2 CENTRILOBULAR EMPHYSEMA: ICD-10-CM

## 2022-03-28 DIAGNOSIS — I71.40 ABDOMINAL AORTIC ANEURYSM WITHOUT RUPTURE: ICD-10-CM

## 2022-03-28 DIAGNOSIS — M13.0 POLYARTHROPATHY: ICD-10-CM

## 2022-03-28 DIAGNOSIS — F33.1 MAJOR DEPRESSIVE DISORDER, RECURRENT, MODERATE: ICD-10-CM

## 2022-03-28 DIAGNOSIS — E78.5 HYPERLIPIDEMIA LDL GOAL <70: ICD-10-CM

## 2022-03-28 DIAGNOSIS — E11.43 TYPE 2 DIABETES MELLITUS WITH DIABETIC AUTONOMIC NEUROPATHY, WITHOUT LONG-TERM CURRENT USE OF INSULIN: ICD-10-CM

## 2022-03-28 DIAGNOSIS — I25.10 CORONARY ARTERY DISEASE INVOLVING NATIVE CORONARY ARTERY OF NATIVE HEART WITHOUT ANGINA PECTORIS: ICD-10-CM

## 2022-03-28 PROCEDURE — 99214 OFFICE O/P EST MOD 30 MIN: CPT | Performed by: INTERNAL MEDICINE

## 2022-03-28 RX ORDER — ESCITALOPRAM OXALATE 5 MG/1
5 TABLET ORAL DAILY
Qty: 30 TABLET | Refills: 5 | Status: SHIPPED | OUTPATIENT
Start: 2022-03-28 | End: 2022-03-28

## 2022-03-28 RX ORDER — ESCITALOPRAM OXALATE 5 MG/1
5 TABLET ORAL DAILY
Qty: 90 TABLET | Refills: 3 | Status: SHIPPED | OUTPATIENT
Start: 2022-03-28 | End: 2022-10-13

## 2022-03-28 NOTE — PROGRESS NOTES
"Chief Complaint/ HPI: f/u with copd and obstructive jaundice and prior ercp with David, and Ezequiel, doing better overall, still short of breath with exertion,          Objective   Vital Signs  Vitals:    03/28/22 1344   BP: 136/62   Pulse: 68   Temp: 97.1 °F (36.2 °C)   SpO2: 95%   Weight: 105 kg (231 lb 12.8 oz)   Height: 175.3 cm (69.02\")      Body mass index is 34.22 kg/m².  Review of Systems   Constitutional: Negative.    HENT: Negative.    Eyes: Negative.    Respiratory: Negative.    Cardiovascular: Negative.    Gastrointestinal: Negative.    Endocrine: Negative.    Genitourinary: Negative.    Musculoskeletal: Negative.    Allergic/Immunologic: Negative.    Neurological: Negative.    Hematological: Negative.    Psychiatric/Behavioral: Negative.       Physical Exam  Constitutional:       General: He is not in acute distress.     Appearance: Normal appearance. He is obese.   HENT:      Head: Normocephalic.      Mouth/Throat:      Mouth: Mucous membranes are moist.   Eyes:      Conjunctiva/sclera: Conjunctivae normal.      Pupils: Pupils are equal, round, and reactive to light.   Cardiovascular:      Rate and Rhythm: Normal rate and regular rhythm.      Pulses: Normal pulses.      Heart sounds: Murmur heard.   Abdominal:      General: Bowel sounds are normal.      Palpations: Abdomen is soft.   Musculoskeletal:         General: No swelling. Normal range of motion.      Cervical back: Neck supple.   Skin:     General: Skin is warm and dry.      Coloration: Skin is not jaundiced.   Neurological:      General: No focal deficit present.      Mental Status: He is alert and oriented to person, place, and time. Mental status is at baseline.   Psychiatric:         Mood and Affect: Mood normal.         Behavior: Behavior normal.         Thought Content: Thought content normal.         Judgment: Judgment normal.        Result Review :   Lab Results   Component Value Date    PROBNP 2,157.0 (H) 11/30/2021    PROBNP 1,494.0 " (H) 07/27/2021    PROBNP 1,596.0 (H) 07/09/2021    BNP 1937 (H) 03/10/2021    BNP 1128 (H) 11/02/2020    BNP 1802 (H) 10/14/2020     CMP    CMP 11/30/21 12/8/21 1/25/22   Glucose 100 (A) 81 79   BUN 17 16 21   Creatinine 1.51 (A) 1.32 (A) 1.53 (A)   eGFR Non  Am 46 (A) 53 (A) 45 (A)   Sodium 142 142 139   Potassium 3.8 4.2 4.2   Chloride 102 101 99   Calcium 9.8 9.4 9.5   Albumin 3.90 3.80 3.90   Total Bilirubin 1.9 (A) 0.6 0.3   Alkaline Phosphatase 221 (A) 132 (A) 53   AST (SGOT) 173 (A) 32 26   ALT (SGPT) 120 (A) 30 15   (A) Abnormal value            CBC w/diff    CBC w/Diff 7/27/21 11/30/21 12/8/21   WBC 7.55 9.08 12.60 (A)   RBC 5.49 5.14 5.11   Hemoglobin 14.3 13.7 14.0   Hematocrit 47.0 44.1 43.2   MCV 85.6 85.8 84.5   MCH 26.0 (A) 26.7 27.4   MCHC 30.4 (A) 31.1 (A) 32.4   RDW 15.8 (A) 15.8 (A) 15.6 (A)   Platelets 195 215 310   Neutrophil Rel % 62.0 63.2 65.2   Immature Granulocyte Rel % 0.4 0.3 0.7 (A)   Lymphocyte Rel % 23.7 21.6 20.4   Monocyte Rel % 9.0 11.5 8.6   Eosinophil Rel % 4.2 2.8 4.4   Basophil Rel % 0.7 0.6 0.7   (A) Abnormal value             Lipid Panel    Lipid Panel 7/27/21 11/30/21 1/25/22   Total Cholesterol 129 123 120   Triglycerides 244 (A) 239 (A) 159 (A)   HDL Cholesterol 26 (A) 18 (A) 24 (A)   VLDL Cholesterol 40 39 28   LDL Cholesterol  63 66 68   LDL/HDL Ratio 2.08 3.18 2.68   (A) Abnormal value             Lab Results   Component Value Date    TSH 0.617 11/30/2021    TSH 1.530 07/27/2021    TSH 2.490 07/09/2021      Lab Results   Component Value Date    FREET4 1.99 (H) 11/30/2021    FREET4 1.44 07/27/2021    FREET4 1.29 07/09/2021      A1C Last 3 Results    HGBA1C Last 3 Results 7/9/21 7/27/21 11/30/21   Hemoglobin A1C 6.00 (A) 6.14 (A) 6.21 (A)   (A) Abnormal value             PSA    PSA 7/27/21   PSA 0.710                          Visit Diagnoses:    ICD-10-CM ICD-9-CM   1. Acquired hypothyroidism  E03.9 244.9   2. Chronic bilateral low back pain without sciatica  M54.50  724.2    G89.29 338.29   3. Chronic diastolic congestive heart failure ([facility])  I50.32 428.32     428.0   4. Hyperlipidemia LDL goal <70  E78.5 272.4   5. Centrilobular emphysema ([facility])  J43.2 492.8   6. Coronary artery disease involving native coronary artery of native heart without angina pectoris  I25.10 414.01   7. PVD (peripheral vascular disease) with claudication ([facility])  I73.9 443.9   8. Abdominal aortic aneurysm without rupture ([facility])  I71.4 441.4   9. Polyarthropathy  M13.0 716.50   10. Major depressive disorder, recurrent, moderate ([facility])  F33.1 296.32   11. Iron deficiency anemia secondary to inadequate dietary iron intake  D50.8 280.1   12. Chronic gout of foot, unspecified cause, unspecified laterality  M1A.0790 274.02   13. Mixed hyperlipidemia  E78.2 272.2   14. Screening PSA (prostate specific antigen)  Z12.5 V76.44   15. Type 2 diabetes mellitus with diabetic autonomic neuropathy, without long-term current use of insulin ([facility])  E11.43 250.60     337.1       Assessment and Plan   Diagnoses and all orders for this visit:    1. Acquired hypothyroidism (Primary)  -     escitalopram (Lexapro) 5 MG tablet; Take 1 tablet by mouth Daily.  Dispense: 30 tablet; Refill: 5  -     Lipid Panel; Future  -     Hemoglobin A1c; Future  -     Comprehensive Metabolic Panel; Future  -     CBC & Differential; Future  -     TSH+Free T4; Future  -     Uric Acid; Future  -     PSA Screen; Future    2. Chronic bilateral low back pain without sciatica  -     escitalopram (Lexapro) 5 MG tablet; Take 1 tablet by mouth Daily.  Dispense: 30 tablet; Refill: 5  -     Lipid Panel; Future  -     Hemoglobin A1c; Future  -     Comprehensive Metabolic Panel; Future  -     CBC & Differential; Future  -     TSH+Free T4; Future  -     Uric Acid; Future  -     PSA Screen; Future    3. Chronic diastolic congestive heart failure (HCC)  -     escitalopram (Lexapro) 5 MG tablet; Take 1 tablet by mouth Daily.  Dispense: 30 tablet; Refill: 5  -      Lipid Panel; Future  -     Hemoglobin A1c; Future  -     Comprehensive Metabolic Panel; Future  -     CBC & Differential; Future  -     TSH+Free T4; Future  -     Uric Acid; Future  -     PSA Screen; Future    4. Hyperlipidemia LDL goal <70  -     escitalopram (Lexapro) 5 MG tablet; Take 1 tablet by mouth Daily.  Dispense: 30 tablet; Refill: 5  -     Lipid Panel; Future  -     Hemoglobin A1c; Future  -     Comprehensive Metabolic Panel; Future  -     CBC & Differential; Future  -     TSH+Free T4; Future  -     Uric Acid; Future  -     PSA Screen; Future    5. Centrilobular emphysema (HCC)  -     escitalopram (Lexapro) 5 MG tablet; Take 1 tablet by mouth Daily.  Dispense: 30 tablet; Refill: 5  -     Lipid Panel; Future  -     Hemoglobin A1c; Future  -     Comprehensive Metabolic Panel; Future  -     CBC & Differential; Future  -     TSH+Free T4; Future  -     Uric Acid; Future  -     PSA Screen; Future    6. Coronary artery disease involving native coronary artery of native heart without angina pectoris  -     escitalopram (Lexapro) 5 MG tablet; Take 1 tablet by mouth Daily.  Dispense: 30 tablet; Refill: 5  -     Lipid Panel; Future  -     Hemoglobin A1c; Future  -     Comprehensive Metabolic Panel; Future  -     CBC & Differential; Future  -     TSH+Free T4; Future  -     Uric Acid; Future  -     PSA Screen; Future    7. PVD (peripheral vascular disease) with claudication (HCC)  -     escitalopram (Lexapro) 5 MG tablet; Take 1 tablet by mouth Daily.  Dispense: 30 tablet; Refill: 5  -     Lipid Panel; Future  -     Hemoglobin A1c; Future  -     Comprehensive Metabolic Panel; Future  -     CBC & Differential; Future  -     TSH+Free T4; Future  -     Uric Acid; Future  -     PSA Screen; Future    8. Abdominal aortic aneurysm without rupture (HCC)  -     escitalopram (Lexapro) 5 MG tablet; Take 1 tablet by mouth Daily.  Dispense: 30 tablet; Refill: 5  -     Lipid Panel; Future  -     Hemoglobin A1c; Future  -      Comprehensive Metabolic Panel; Future  -     CBC & Differential; Future  -     TSH+Free T4; Future  -     Uric Acid; Future  -     PSA Screen; Future    9. Polyarthropathy  -     escitalopram (Lexapro) 5 MG tablet; Take 1 tablet by mouth Daily.  Dispense: 30 tablet; Refill: 5  -     Lipid Panel; Future  -     Hemoglobin A1c; Future  -     Comprehensive Metabolic Panel; Future  -     CBC & Differential; Future  -     TSH+Free T4; Future  -     Uric Acid; Future  -     PSA Screen; Future    10. Major depressive disorder, recurrent, moderate (HCC)  -     escitalopram (Lexapro) 5 MG tablet; Take 1 tablet by mouth Daily.  Dispense: 30 tablet; Refill: 5  -     Lipid Panel; Future  -     Hemoglobin A1c; Future  -     Comprehensive Metabolic Panel; Future  -     CBC & Differential; Future  -     TSH+Free T4; Future  -     Uric Acid; Future  -     PSA Screen; Future    11. Iron deficiency anemia secondary to inadequate dietary iron intake  -     escitalopram (Lexapro) 5 MG tablet; Take 1 tablet by mouth Daily.  Dispense: 30 tablet; Refill: 5  -     Lipid Panel; Future  -     Hemoglobin A1c; Future  -     Comprehensive Metabolic Panel; Future  -     CBC & Differential; Future  -     TSH+Free T4; Future  -     Uric Acid; Future  -     PSA Screen; Future    12. Chronic gout of foot, unspecified cause, unspecified laterality  -     escitalopram (Lexapro) 5 MG tablet; Take 1 tablet by mouth Daily.  Dispense: 30 tablet; Refill: 5  -     Lipid Panel; Future  -     Hemoglobin A1c; Future  -     Comprehensive Metabolic Panel; Future  -     CBC & Differential; Future  -     TSH+Free T4; Future  -     Uric Acid; Future  -     PSA Screen; Future    13. Mixed hyperlipidemia  -     escitalopram (Lexapro) 5 MG tablet; Take 1 tablet by mouth Daily.  Dispense: 30 tablet; Refill: 5  -     Lipid Panel; Future  -     Hemoglobin A1c; Future  -     Comprehensive Metabolic Panel; Future  -     CBC & Differential; Future  -     TSH+Free T4;  Future  -     Uric Acid; Future  -     PSA Screen; Future    14. Screening PSA (prostate specific antigen)  -     escitalopram (Lexapro) 5 MG tablet; Take 1 tablet by mouth Daily.  Dispense: 30 tablet; Refill: 5  -     Lipid Panel; Future  -     Hemoglobin A1c; Future  -     Comprehensive Metabolic Panel; Future  -     CBC & Differential; Future  -     TSH+Free T4; Future  -     Uric Acid; Future  -     PSA Screen; Future    15. Type 2 diabetes mellitus with diabetic autonomic neuropathy, without long-term current use of insulin (HCC)  -     escitalopram (Lexapro) 5 MG tablet; Take 1 tablet by mouth Daily.  Dispense: 30 tablet; Refill: 5  -     Lipid Panel; Future  -     Hemoglobin A1c; Future  -     Comprehensive Metabolic Panel; Future  -     CBC & Differential; Future  -     TSH+Free T4; Future  -     Uric Acid; Future  -     PSA Screen; Future        Obstructive jaundice, MRCP shows choledocholithiasis, intra and extrahepatic biliary duct dilation December 6, 2021,, patient underwent ERCP and had stone removal on 2 different occasions, once initially by Dr. Hernandez and subsequently by Dr. Nieves, with biliary stent initially, patient says he is doing better overall,    Hospital stay transitional care visit October 14 through October 20, 2021 with acute right lower lobe pneumonia, acute hypoxic respiratory failure shortness of breath CT scan showed groundglass opacity coronavirus testing ??2 was negative in the hospital, patient was put back on Lasix daily sent home on prednisone and antibiotics Levaquin for 7 days following this hospital stay, he did not qualify for home O2 with 6 minute walk test in the room prior to discharge, he had acute diastolic heart failure in the hospital all medications are reviewed, blood sugars are doing much better over the past week or so,    transtional care visit from repair LEFT FEM. --RECONTRUCTION, AND ATERECTOMY---SEPT 14, 2020, ---PERIPHERAL ASO ---Patient is having  claudication and leg pain with weakness, arterial evaluation shows bilateral proximal level occlusive disease either just below the aorta or at the aortic repair level, ---Patient is status post left femoral iliac artery atherectomy grafting and angioplasty by Dr. Nam at [facility], September 14, 2020, patient is doing better overall,--------, carotid ultrasound shows no significant stenosis,----- August 2020 compared to previous and prior ultrasounds,-    [facility] , hosp 8/5/2020 for R LL PNEUMONIA, AND ACUTE DIASTOLIC CHF, LASHA ON CKD 3---Patient presented with acute hypercapnic respiratory failure, pneumonia, was treated with IV antibiotics, IV steroids in the hospital due to wheezing that developed, he had low potassium that was replaced his anemia was monitored he was kept on oxygen initially he had a follow-up chest x-ray August 7 showing no active disease his white count is still a little high at 13, he says his breathing well he is taking his breathing treatments currently, he has follow-up with pulmonology tomorrow August 12, his proBNP level is down from [phone] during his hospital course, he is currently off antibiotics and off steroids currently, it seems to be getting back to baseline    BACK PAIN, SPINAL STENOSIS, DX DISCUSSE, AUG 2020     MILD DEPRESSION _pt stopped both Wellbutrin  mg daily---still depressed , could try   lexapro     Type 2 Diabetes , continues on Levemir 40 units daily and Metformin 500mg bid-    H/O Colon cancer PARTIAL COLECTOMY 2010 - , CEA elevated now at 7.7, -- Dr. Ibanez---Colonoscopy November 2018 unremarkable----CEA level 2.5 April 2020    Fatigue, Weight loss--- BETTER OFF BRILLINTA --continues ON PLAVIX NOW     sees sleep specialist - on CPAP;     Angina, cardiac catheter jan 2018, with 80-90% stenosis circumflex proximal to vein graft, LIMA graft to LAD was patent, normal ejection fraction January 2018, patient had elective semi-elective stent  placement to the circumflex artery, JAN 23, 2018, --Patient currently off BRILLINTA and on Plavix with much improved and breathing status as of July 2018----previous echo October 2020 showed mild mitral valve annular calcification, no significant prolapse, or regurgitation, ejection fraction 55 to 60% diastolic dysfunction noted otherwise unremarkable    R HIP PAIN, GLUTEUS MUSCLE , PARASPINAL MUSCLE ATROPHY, S/P INFARCTION AFTER AAA REPAIR IN 3/13-, status post recent radiofrequency ablation X 2, by pain management much improved, continues on gabapentin 600 mg twice a day,and morphine long-acting twice a day, 30 mg    Elevated cholesterol--continues Lipitor 20 mg daily    CAROTID ASO, HAS YRLY F/U WITH VASC SURG ---    HYPOTHRYOIDISM--continues levothyroxine 150 MCG daily-, currently over replaced November 2021 will decrease dose to 0.125 November 2021    HTN----continues Norvasc 2.5 mg daily Lasix 40 mg QD , potassium qd , metoprolol extended release 50 mg BID    CKD 3--     GOUT, continues ALLOPURINOL 150 MG QD, colchicine,0.1 PRN ----Dr. Gonzalez rheumatology for second opinion           Follow Up   Return in about 4 months (around 7/28/2022).  Patient was given instructions and counseling regarding his condition or for health maintenance advice. Please see specific information pulled into the AVS if appropriate.

## 2022-03-31 PROBLEM — E78.2 MIXED HYPERLIPIDEMIA: Chronic | Status: ACTIVE | Noted: 2021-07-14

## 2022-03-31 PROBLEM — E78.00 PURE HYPERCHOLESTEROLEMIA: Status: RESOLVED | Noted: 2021-04-05 | Resolved: 2022-03-31

## 2022-03-31 PROBLEM — I10 HYPERTENSION, ESSENTIAL: Chronic | Status: ACTIVE | Noted: 2021-07-14

## 2022-03-31 PROBLEM — I10 HIGH BLOOD PRESSURE: Status: RESOLVED | Noted: 2021-07-14 | Resolved: 2022-03-31

## 2022-04-04 ENCOUNTER — OFFICE VISIT (OUTPATIENT)
Dept: CARDIOLOGY | Facility: CLINIC | Age: 72
End: 2022-04-04

## 2022-04-04 VITALS
WEIGHT: 235 LBS | SYSTOLIC BLOOD PRESSURE: 106 MMHG | BODY MASS INDEX: 35.61 KG/M2 | HEIGHT: 68 IN | DIASTOLIC BLOOD PRESSURE: 50 MMHG | HEART RATE: 52 BPM

## 2022-04-04 DIAGNOSIS — I25.810 CORONARY ARTERY DISEASE INVOLVING CORONARY BYPASS GRAFT OF NATIVE HEART WITHOUT ANGINA PECTORIS: Primary | Chronic | ICD-10-CM

## 2022-04-04 DIAGNOSIS — I10 HYPERTENSION, ESSENTIAL: ICD-10-CM

## 2022-04-04 DIAGNOSIS — E78.2 MIXED HYPERLIPIDEMIA: ICD-10-CM

## 2022-04-04 PROCEDURE — 99214 OFFICE O/P EST MOD 30 MIN: CPT | Performed by: INTERNAL MEDICINE

## 2022-04-04 RX ORDER — CLOPIDOGREL BISULFATE 75 MG/1
75 TABLET ORAL DAILY
Qty: 90 TABLET | Refills: 1 | Status: SHIPPED | OUTPATIENT
Start: 2022-04-04 | End: 2022-11-23

## 2022-04-04 RX ORDER — FUROSEMIDE 40 MG/1
40 TABLET ORAL DAILY
Qty: 90 TABLET | Refills: 3 | Status: SHIPPED | OUTPATIENT
Start: 2022-04-04 | End: 2023-03-20

## 2022-04-04 NOTE — PROGRESS NOTES
Office Visit    Chief Complaint  Coronary Artery Disease, Congestive Heart Failure, Hyperlipidemia, and Hypertension    Subjective            Radhames Colón presents to [facility] CARDIOLOGY  Radhames is a 71 years old gentleman with coronary disease previous bypass surgery subsequent stent PCI of his native circumflex coronary artery hypertension hyperlipidemia chronic lung disease who is doing reasonably well.  He does have shortness of breath on exertion but this is mild to moderate.  Related to his lung disease.  He denies any chest pain.  He denies dizziness or syncope.  His blood pressure sometimes runs low.      Past Medical History:   Diagnosis Date   • Abdominal aortic aneurysm without rupture (HCC)    • Acute renal failure (ARF) (HCC)     WAS SHORT TERM DIALYSIS, STAGE 3   • Aortic aneurysm (HCC)    • Arthritis    • Atherosclerosis of coronary artery    • Atherosclerotic heart disease of native coronary artery without angina pectoris    • Atrophy of thyroid (acquired)    • Bilateral carotid artery stenosis    • Cancer (HCC)     COLON    • Chronic airway obstruction (HCC)    • Chronic gouty arthritis    • Chronic kidney disease, stage 3 (HCC)    • Controlled diabetes mellitus type II without complication (HCC)    • COPD (chronic obstructive pulmonary disease) (HCC)    • Coronary artery disease    • Coronary artery disease involving coronary bypass graft of native heart without angina pectoris 3/28/2012    with previous bypass surgery (2005 x3) and subsequent stent/PCI of the native circumflex obtuse marginal branch in January 2018   • Depression    • Diabetes mellitus (HCC)    • Disease of liver    • Disease of thyroid gland    • Elevated carcinoembryonic antigen (CEA)    • GERD (gastroesophageal reflux disease)    • Hyperlipidemia    • Hypertension    • Hypertension, essential 7/14/2021   • Hypothyroidism    • Iron deficiency anemia    • Long term (current) use of opiate analgesic    • Lumbar  spondylosis    • Malignant neoplasm of colon (HCC)    • Mixed hyperlipidemia 7/14/2021   • On long term drug therapy    • Peripheral artery disease (HCC)    • Polyarthropathy    • Pure hypercholesterolemia    • Rhabdomyolysis    • Sleep apnea     CPAP       Allergies   Allergen Reactions   • Penicillins Shortness Of Breath   • Ticagrelor Shortness Of Breath   • Penicillin G Provider Review Needed and Unknown - Low Severity        Past Surgical History:   Procedure Laterality Date   • ABDOMINAL AORTIC ANEURYSM REPAIR     • ANGIOPLASTY FEMORAL ARTERY Left 9/14/2020    Procedure: AORTOILLOFEMORAL ARTERIOGRAM;  Surgeon: Chula Nam Jr., MD;  Location: [facility] OR 18/19;  Service: Vascular;  Laterality: Left;   • APPENDECTOMY     • CARDIAC CATHETERIZATION     • CAROTID ENDARTERECTOMY Left 2005   • CAROTID ENDARTERECTOMY Right 2010   • CHOLECYSTECTOMY OPEN     • COLON SURGERY      COLON RESECTION   • COLONOSCOPY  2004,11/2018    Dr. Lamas 2004,    • CORONARY ANGIOPLASTY WITH STENT PLACEMENT     • CORONARY ARTERY BYPASS GRAFT  2005   • ENDOSCOPY     • ERCP N/A 12/15/2021    Procedure: ENDOSCOPIC RETROGRADE CHOLANGIOPANCREATOGRAPHY WITH SPINCTEROTOMY, 9-12MM BALLOON SWEEP, INSERTION OF 10FR 9CM BILIARY STENT;  Surgeon: Eden Hernandez MD;  Location: [facility] ENDOSCOPY;  Service: Gastroenterology;  Laterality: N/A;  BILE DUCT STONES   • ERCP N/A 1/6/2022    Procedure: ENDOSCOPIC RETROGRADE CHOLANGIOPANCREATOGRAPHY WITH STENT REMOVAL, SPYGLASS, AUTOLITH, 8-10MM BALLOON DILATATION, 9-12 AND 12-15MM BALLOON SWEEP;  Surgeon: Wayne Zepeda MD;  Location: [facility] ENDOSCOPY;  Service: Gastroenterology;  Laterality: N/A;  BILE DUCT STONES   • FEMORAL ENDARTERECTOMY Left 9/14/2020    Procedure: LEFT FEMORAL ENDARECTOMY WITHH PATCH ANGIOPLASTY;  Surgeon: Chula Nam Jr., MD;  Location: [facility] OR 18/19;  Service: Vascular;  Laterality: Left;   • GALLBLADDER SURGERY     • RHINOPLASTY  Bilateral     70-80% R, 50% L        Social History     Tobacco Use   • Smoking status: Former Smoker     Packs/day: 1.00     Years: 45.00     Pack years: 45.00     Types: Cigarettes     Quit date: 2005     Years since quittin.5   • Smokeless tobacco: Never Used   Vaping Use   • Vaping Use: Never used   Substance Use Topics   • Alcohol use: Yes     Comment: RARE   • Drug use: Never       Family History   Problem Relation Age of Onset   • Heart failure Mother    • Malig Hyperthermia Neg Hx         Prior to Admission medications    Medication Sig Start Date End Date Taking? Authorizing Provider   allopurinol (ZYLOPRIM) 300 MG tablet Take 150 mg by mouth Daily. 8/3/21  Yes Katheryn Montana MD   amLODIPine (NORVASC) 2.5 MG tablet Take 1 tablet by mouth Daily. 2/15/22  Yes Mingo Loja MD   ascorbic acid (VITAMIN C) 500 MG tablet TAKE 1 TABLET EVERY DAY  Patient taking differently: Take 500 mg by mouth Every Night. 21  Yes Mingo Loja MD   aspirin 81 MG EC tablet Take 81 mg by mouth Daily. LAST DOSE OF ASPIRIN    Yes Katheryn Montana MD   atorvastatin (LIPITOR) 20 MG tablet Take 1 tablet by mouth Daily. 22  Yes Mingo Loja MD   budesonide (PULMICORT) 0.5 MG/2ML nebulizer solution Take 0.5 mg by nebulization As Needed. 20  Yes Katheryn Montana MD   buPROPion XL (WELLBUTRIN XL) 150 MG 24 hr tablet Take 150 mg by mouth. 20  Yes Katheryn Montana MD   clopidogrel (PLAVIX) 75 MG tablet TAKE 1 TABLET EVERY DAY  Patient taking differently: Take 75 mg by mouth Daily. LAST DOSE 21  Yes Mingo Loja MD   co-enzyme Q-10 30 MG capsule Take 30 mg by mouth Every Night.   Yes Katheryn Montana MD   colchicine 0.6 MG tablet Take 0.6 mg by mouth As Needed.   Yes Katheryn Montana MD   escitalopram (Lexapro) 5 MG tablet Take 1 tablet by mouth Daily. 3/28/22  Yes Mingo Loja MD   fenofibrate  (TRICOR) 145 MG tablet TAKE 1 TABLET EVERY DAY  Patient taking differently: Take 145 mg by mouth Daily. 7/19/21  Yes Mingo Loja MD   FeroSul 325 (65 Fe) MG tablet TAKE 1 TABLET EVERY DAY  Patient taking differently: Take 325 mg by mouth Every Night. 12/2/21  Yes Mingo Loja MD   folic acid (FOLVITE) 1 MG tablet TAKE 1 TABLET TWICE DAILY  Patient taking differently: Take 1 mg by mouth 2 (Two) Times a Day. 12/2/21  Yes Mingo Loja MD   furosemide (LASIX) 40 MG tablet Take 1 tablet by mouth Daily.  Patient taking differently: Take 20 mg by mouth Daily. 2/23/22  Yes Mingo Loja MD   gabapentin (NEURONTIN) 600 MG tablet Take 1 tablet by mouth 2 (Two) Times a Day. 11/23/21  Yes Mingo Loja MD   insulin NPH (humuLIN N,novoLIN N) 100 UNIT/ML injection Inject 40 Units under the skin into the appropriate area as directed Every Night. HOLD INSULIN Wednesday NIGHT BLOOD SUGARS RUN LOW 85-95   Yes Katheryn Montana MD   ipratropium-albuterol (DUO-NEB) 0.5-2.5 mg/3 ml nebulizer As Needed. 7/29/20  Yes Katheryn Montana MD   levothyroxine (Synthroid) 125 MCG tablet Take 1 tablet by mouth Daily. 2/15/22  Yes Mingo Loja MD   metFORMIN (GLUCOPHAGE) 500 MG tablet Take 500 mg by mouth 2 (Two) Times a Day With Meals. HOLD METFORMIN Wednesday AND Thursday MORNING   Yes Katheryn Montana MD   metoprolol succinate XL (TOPROL-XL) 50 MG 24 hr tablet TAKE 1 TABLET TWICE DAILY  Patient taking differently: Take 50 mg by mouth 2 (Two) Times a Day. 7/29/21  Yes Mingo Loja MD   Morphine (MS CONTIN) 30 MG 12 hr tablet 30 mg 2 (Two) Times a Day. 8/29/20  Yes Katheryn Montana MD   pantoprazole (PROTONIX) 40 MG EC tablet TAKE 1 TABLET EVERY DAY  Patient taking differently: Take 40 mg by mouth Daily. 12/2/21  Yes Mingo Loja MD   vitamin B-12 (CYANOCOBALAMIN) 1000 MCG tablet Take 1 tablet by mouth Daily. 1/24/22  Yes Mingo Loja  "MD VINNIE   nitroglycerin (NITROSTAT) 0.4 MG SL tablet Place 0.4 mg under the tongue Every 5 (Five) Minutes As Needed.    ProviderKatheryn MD   Cholecalciferol (VITAMIN D3 MAXIMUM STRENGTH PO) Take 1 tablet by mouth Every Night.  4/4/22  Katheryn Montana MD   Cholecalciferol 50 MCG (2000 UT) capsule Take 2,000 Units by mouth Every Night.  4/4/22  Katheryn Montana MD        Review of Systems   Constitutional: Positive for fatigue.   Respiratory: Negative for cough and shortness of breath.    Cardiovascular: Positive for leg swelling. Negative for chest pain and palpitations.   Neurological: Negative for dizziness.        Objective     /50 (BP Location: Left arm)   Pulse 52   Ht 172.7 cm (68\")   Wt 107 kg (235 lb)   BMI 35.73 kg/m²       Physical Exam  Constitutional:       General: He is awake.      Appearance: Normal appearance.   Neck:      Thyroid: No thyromegaly.      Vascular: No carotid bruit or JVD.   Cardiovascular:      Rate and Rhythm: Normal rate and regular rhythm.      Chest Wall: PMI is not displaced.      Pulses: Normal pulses.      Heart sounds: S1 normal and S2 normal. Murmur heard.    Systolic murmur is present.    No friction rub. No gallop. No S3 or S4 sounds.   Pulmonary:      Effort: Pulmonary effort is normal.      Breath sounds: Normal breath sounds and air entry. No wheezing, rhonchi or rales.   Abdominal:      General: Bowel sounds are normal.      Palpations: Abdomen is soft. There is no mass.      Tenderness: There is no abdominal tenderness.   Musculoskeletal:      Cervical back: Neck supple.      Right lower leg: No edema.      Left lower leg: No edema.   Neurological:      Mental Status: He is alert and oriented to person, place, and time.   Psychiatric:         Mood and Affect: Mood normal.         Behavior: Behavior is cooperative.           Result Review :         Lab Results   Component Value Date    PROBNP 2,157.0 (H) 11/30/2021    PROBNP 1,494.0 (H) 07/27/2021 "    PROBNP 1,596.0 (H) 07/09/2021    BNP 1937 (H) 03/10/2021    BNP 1128 (H) 11/02/2020    BNP 1802 (H) 10/14/2020     CMP    CMP 11/30/21 12/8/21 1/25/22   Glucose 100 (A) 81 79   BUN 17 16 21   Creatinine 1.51 (A) 1.32 (A) 1.53 (A)   eGFR Non  Am 46 (A) 53 (A) 45 (A)   Sodium 142 142 139   Potassium 3.8 4.2 4.2   Chloride 102 101 99   Calcium 9.8 9.4 9.5   Albumin 3.90 3.80 3.90   Total Bilirubin 1.9 (A) 0.6 0.3   Alkaline Phosphatase 221 (A) 132 (A) 53   AST (SGOT) 173 (A) 32 26   ALT (SGPT) 120 (A) 30 15   (A) Abnormal value            CBC w/diff    CBC w/Diff 7/27/21 11/30/21 12/8/21   WBC 7.55 9.08 12.60 (A)   RBC 5.49 5.14 5.11   Hemoglobin 14.3 13.7 14.0   Hematocrit 47.0 44.1 43.2   MCV 85.6 85.8 84.5   MCH 26.0 (A) 26.7 27.4   MCHC 30.4 (A) 31.1 (A) 32.4   RDW 15.8 (A) 15.8 (A) 15.6 (A)   Platelets 195 215 310   Neutrophil Rel % 62.0 63.2 65.2   Immature Granulocyte Rel % 0.4 0.3 0.7 (A)   Lymphocyte Rel % 23.7 21.6 20.4   Monocyte Rel % 9.0 11.5 8.6   Eosinophil Rel % 4.2 2.8 4.4   Basophil Rel % 0.7 0.6 0.7   (A) Abnormal value             Lipid Panel    Lipid Panel 7/27/21 11/30/21 1/25/22   Total Cholesterol 129 123 120   Triglycerides 244 (A) 239 (A) 159 (A)   HDL Cholesterol 26 (A) 18 (A) 24 (A)   VLDL Cholesterol 40 39 28   LDL Cholesterol  63 66 68   LDL/HDL Ratio 2.08 3.18 2.68   (A) Abnormal value             Lab Results   Component Value Date    TSH 0.617 11/30/2021    TSH 1.530 07/27/2021    TSH 2.490 07/09/2021      Lab Results   Component Value Date    FREET4 1.99 (H) 11/30/2021    FREET4 1.44 07/27/2021    FREET4 1.29 07/09/2021      No results found for: DDIMERQUANT  Magnesium   Date Value Ref Range Status   01/25/2022 2.1 1.6 - 2.4 mg/dL Final      No results found for: DIGOXIN   A1C Last 3 Results    HGBA1C Last 3 Results 7/9/21 7/27/21 11/30/21   Hemoglobin A1C 6.00 (A) 6.14 (A) 6.21 (A)   (A) Abnormal value                                   Assessment and Plan        Diagnoses and  all orders for this visit:    1. Coronary artery disease involving coronary bypass graft of native heart without angina pectoris (Primary)  Assessment & Plan:  Radhames has coronary disease and is status post bypass surgery in 2005 and subsequent stent PCI of his native circumflex in 2018.  He has not had any significant anginal.  He has shortness of breath on exertion which is related to his COPD.      2. Mixed hyperlipidemia  Assessment & Plan:  Radhames has combined mixed hyperlipidemia.  His LDL is at goal.  His triglycerides have improved and his HDL is low but improving.  He will continue the combination of fenofibrate and atorvastatin in the current dosage      3. Hypertension, essential  Assessment & Plan:  His hypertension is well controlled and sometimes runs low.  He will continue his current medications but will hold his amlodipine if his systolic is less than 120 when he is ready to take the amlodipine      Other orders  -     clopidogrel (PLAVIX) 75 MG tablet; Take 1 tablet by mouth Daily.  Dispense: 90 tablet; Refill: 1  -     furosemide (LASIX) 40 MG tablet; Take 1 tablet by mouth Daily.  Dispense: 90 tablet; Refill: 3          Follow Up     Return in about 6 months (around 10/4/2022) for next ov with June, EKG with next office visit.    Patient was given instructions and counseling regarding his condition or for health maintenance advice. Please see specific information pulled into the AVS if appropriate.     Talita Gonzalez MD  04/04/22 11:48 EDT

## 2022-04-04 NOTE — ASSESSMENT & PLAN NOTE
Radhames has coronary disease and is status post bypass surgery in 2005 and subsequent stent PCI of his native circumflex in 2018.  He has not had any significant anginal.  He has shortness of breath on exertion which is related to his COPD.

## 2022-04-04 NOTE — ASSESSMENT & PLAN NOTE
Radhames has combined mixed hyperlipidemia.  His LDL is at goal.  His triglycerides have improved and his HDL is low but improving.  He will continue the combination of fenofibrate and atorvastatin in the current dosage

## 2022-04-04 NOTE — ASSESSMENT & PLAN NOTE
His hypertension is well controlled and sometimes runs low.  He will continue his current medications but will hold his amlodipine if his systolic is less than 120 when he is ready to take the amlodipine

## 2022-04-08 ENCOUNTER — TELEPHONE (OUTPATIENT)
Dept: CARDIOLOGY | Facility: CLINIC | Age: 72
End: 2022-04-08

## 2022-04-08 ENCOUNTER — TELEPHONE (OUTPATIENT)
Dept: INTERNAL MEDICINE | Facility: CLINIC | Age: 72
End: 2022-04-08

## 2022-04-08 DIAGNOSIS — I10 HYPERTENSION, ESSENTIAL: Primary | ICD-10-CM

## 2022-04-08 DIAGNOSIS — I50.32 CHRONIC DIASTOLIC CONGESTIVE HEART FAILURE: ICD-10-CM

## 2022-04-08 RX ORDER — LOSARTAN POTASSIUM 25 MG/1
TABLET ORAL
Qty: 90 TABLET | Refills: 3 | Status: SHIPPED | OUTPATIENT
Start: 2022-04-08 | End: 2022-06-03

## 2022-04-08 RX ORDER — LOSARTAN POTASSIUM 25 MG/1
25 TABLET ORAL NIGHTLY
Qty: 30 TABLET | Refills: 0 | Status: SHIPPED | OUTPATIENT
Start: 2022-04-08 | End: 2022-04-08

## 2022-04-08 NOTE — TELEPHONE ENCOUNTER
"Caller: Sabrina Colón    Relationship: Emergency Contact    Best call back number: [phone]    What is the best time to reach you: ANYTIME    Who are you requesting to speak with (clinical staff, provider,  specific staff member): DR. KIM    What was the call regarding:   PATIENT'S WIFE IS REQUESTING A CALL FROM JULIO. SHE WANTED TO DISCUSS PATIENT'S RECENT CARDIOLOGY APPOINTMENT THAT SHE DESCRIBED AS \"BIZZARE\", AND WOULD LIKE A CALL FROM JULIO REGARDING THIS AS SOON AS POSSIBLE.     Do you require a callback:   YES  "

## 2022-04-08 NOTE — TELEPHONE ENCOUNTER
SW patient regarding echo results and Dr Gonzalez recommendations of stopping amlodipine, starting Losartan 25 mg 0.5 tab HS for 6 days then 1 tab HS, BNP and BMP in 2 weeks, and BP log. Patient voiced understanding. Patient request 30 day supply sent to [facility] for right now.

## 2022-04-13 NOTE — TELEPHONE ENCOUNTER
I spoke with the patients wife and they wanted to make an appt to come in and talk to dr diamond. Appt made for 4/14

## 2022-04-14 ENCOUNTER — OFFICE VISIT (OUTPATIENT)
Dept: INTERNAL MEDICINE | Facility: CLINIC | Age: 72
End: 2022-04-14

## 2022-04-14 VITALS
HEART RATE: 59 BPM | BODY MASS INDEX: 35.68 KG/M2 | OXYGEN SATURATION: 90 % | HEIGHT: 68 IN | SYSTOLIC BLOOD PRESSURE: 145 MMHG | WEIGHT: 235.4 LBS | TEMPERATURE: 98 F | DIASTOLIC BLOOD PRESSURE: 60 MMHG

## 2022-04-14 DIAGNOSIS — I50.42 CHRONIC COMBINED SYSTOLIC AND DIASTOLIC CONGESTIVE HEART FAILURE: ICD-10-CM

## 2022-04-14 DIAGNOSIS — J43.2 CENTRILOBULAR EMPHYSEMA: ICD-10-CM

## 2022-04-14 DIAGNOSIS — I25.810 CORONARY ARTERY DISEASE INVOLVING CORONARY BYPASS GRAFT OF NATIVE HEART WITHOUT ANGINA PECTORIS: Primary | ICD-10-CM

## 2022-04-14 DIAGNOSIS — E03.9 ACQUIRED HYPOTHYROIDISM: ICD-10-CM

## 2022-04-14 DIAGNOSIS — I73.9 PVD (PERIPHERAL VASCULAR DISEASE) WITH CLAUDICATION: ICD-10-CM

## 2022-04-14 DIAGNOSIS — E78.2 MIXED HYPERLIPIDEMIA: ICD-10-CM

## 2022-04-14 DIAGNOSIS — N28.9 KIDNEY DISEASE: ICD-10-CM

## 2022-04-14 PROCEDURE — 99213 OFFICE O/P EST LOW 20 MIN: CPT | Performed by: INTERNAL MEDICINE

## 2022-04-14 NOTE — PROGRESS NOTES
"Chief Complaint/ HPI: f/ u with cardio, and ekg findings   Last mon, April 4, 2022  bp low      Objective   Vital Signs  Vitals:    04/14/22 1054   BP: 145/60   Pulse: 59   Temp: 98 °F (36.7 °C)   SpO2: 90%   Weight: 107 kg (235 lb 6.4 oz)   Height: 172.7 cm (67.99\")      Body mass index is 35.8 kg/m².  Review of Systems   HENT: Negative.    Eyes: Negative.    Respiratory: Negative.    Cardiovascular: Negative.    Gastrointestinal: Negative.    Endocrine: Negative.    Genitourinary: Negative.    Musculoskeletal: Negative.    Allergic/Immunologic: Negative.    Neurological: Positive for weakness.   Hematological: Negative.    Psychiatric/Behavioral: Negative.       Physical Exam  Constitutional:       General: He is not in acute distress.     Appearance: Normal appearance. He is obese.   HENT:      Head: Normocephalic.      Mouth/Throat:      Mouth: Mucous membranes are moist.   Eyes:      Conjunctiva/sclera: Conjunctivae normal.      Pupils: Pupils are equal, round, and reactive to light.   Cardiovascular:      Rate and Rhythm: Normal rate and regular rhythm.      Pulses: Normal pulses.      Heart sounds: Normal heart sounds.   Pulmonary:      Effort: Pulmonary effort is normal.      Breath sounds: Normal breath sounds.   Abdominal:      General: Bowel sounds are normal.      Palpations: Abdomen is soft.   Musculoskeletal:         General: No swelling. Normal range of motion.      Cervical back: Neck supple.   Skin:     General: Skin is warm and dry.      Coloration: Skin is not jaundiced.   Neurological:      General: No focal deficit present.      Mental Status: He is alert and oriented to person, place, and time. Mental status is at baseline.   Psychiatric:         Mood and Affect: Mood normal.         Behavior: Behavior normal.         Thought Content: Thought content normal.         Judgment: Judgment normal.        Result Review :   Lab Results   Component Value Date    PROBNP 2,157.0 (H) 11/30/2021    PROBNP " 1,494.0 (H) 07/27/2021    PROBNP 1,596.0 (H) 07/09/2021    BNP 1937 (H) 03/10/2021    BNP 1128 (H) 11/02/2020    BNP 1802 (H) 10/14/2020     CMP    CMP 11/30/21 12/8/21 1/25/22   Glucose 100 (A) 81 79   BUN 17 16 21   Creatinine 1.51 (A) 1.32 (A) 1.53 (A)   eGFR Non  Am 46 (A) 53 (A) 45 (A)   Sodium 142 142 139   Potassium 3.8 4.2 4.2   Chloride 102 101 99   Calcium 9.8 9.4 9.5   Albumin 3.90 3.80 3.90   Total Bilirubin 1.9 (A) 0.6 0.3   Alkaline Phosphatase 221 (A) 132 (A) 53   AST (SGOT) 173 (A) 32 26   ALT (SGPT) 120 (A) 30 15   (A) Abnormal value            CBC w/diff    CBC w/Diff 7/27/21 11/30/21 12/8/21   WBC 7.55 9.08 12.60 (A)   RBC 5.49 5.14 5.11   Hemoglobin 14.3 13.7 14.0   Hematocrit 47.0 44.1 43.2   MCV 85.6 85.8 84.5   MCH 26.0 (A) 26.7 27.4   MCHC 30.4 (A) 31.1 (A) 32.4   RDW 15.8 (A) 15.8 (A) 15.6 (A)   Platelets 195 215 310   Neutrophil Rel % 62.0 63.2 65.2   Immature Granulocyte Rel % 0.4 0.3 0.7 (A)   Lymphocyte Rel % 23.7 21.6 20.4   Monocyte Rel % 9.0 11.5 8.6   Eosinophil Rel % 4.2 2.8 4.4   Basophil Rel % 0.7 0.6 0.7   (A) Abnormal value             Lipid Panel    Lipid Panel 7/27/21 11/30/21 1/25/22   Total Cholesterol 129 123 120   Triglycerides 244 (A) 239 (A) 159 (A)   HDL Cholesterol 26 (A) 18 (A) 24 (A)   VLDL Cholesterol 40 39 28   LDL Cholesterol  63 66 68   LDL/HDL Ratio 2.08 3.18 2.68   (A) Abnormal value             Lab Results   Component Value Date    TSH 0.617 11/30/2021    TSH 1.530 07/27/2021    TSH 2.490 07/09/2021      Lab Results   Component Value Date    FREET4 1.99 (H) 11/30/2021    FREET4 1.44 07/27/2021    FREET4 1.29 07/09/2021      A1C Last 3 Results    HGBA1C Last 3 Results 7/9/21 7/27/21 11/30/21   Hemoglobin A1C 6.00 (A) 6.14 (A) 6.21 (A)   (A) Abnormal value             PSA    PSA 7/27/21   PSA 0.710                          Visit Diagnoses:    ICD-10-CM ICD-9-CM   1. Coronary artery disease involving coronary bypass graft of native heart without angina  pectoris  I25.810 414.05   2. PVD (peripheral vascular disease) with claudication (HCC)  I73.9 443.9   3. Chronic combined systolic and diastolic congestive heart failure (HCC)  I50.42 428.42     428.0   4. Kidney disease  N28.9 593.9   5. Mixed hyperlipidemia  E78.2 272.2   6. Acquired hypothyroidism  E03.9 244.9   7. Centrilobular emphysema (HCC)  J43.2 492.8       Assessment and Plan   Diagnoses and all orders for this visit:    1. Coronary artery disease involving coronary bypass graft of native heart without angina pectoris (Primary)  -     Comprehensive Metabolic Panel; Future  -     CBC & Differential; Future  -     proBNP; Future    2. PVD (peripheral vascular disease) with claudication (HCC)  -     Comprehensive Metabolic Panel; Future  -     CBC & Differential; Future  -     proBNP; Future    3. Chronic combined systolic and diastolic congestive heart failure (HCC)  -     Comprehensive Metabolic Panel; Future  -     CBC & Differential; Future  -     proBNP; Future    4. Kidney disease  -     Comprehensive Metabolic Panel; Future  -     CBC & Differential; Future  -     proBNP; Future    5. Mixed hyperlipidemia  -     Comprehensive Metabolic Panel; Future  -     CBC & Differential; Future  -     proBNP; Future    6. Acquired hypothyroidism  -     Comprehensive Metabolic Panel; Future  -     CBC & Differential; Future  -     proBNP; Future    7. Centrilobular emphysema (HCC)  -     Comprehensive Metabolic Panel; Future  -     CBC & Differential; Future  -     proBNP; Future         Patient concerns wife concerns about recent visit with cardiology and echocardiogram results, recently started on losartan 25 mg daily, discussed need for follow-up lab work in 1 to 2 weeks after starting due to renal issues CKD stage IIIa,    Obstructive jaundice, MRCP shows choledocholithiasis, intra and extrahepatic biliary duct dilation December 6, 2021,, patient underwent ERCP and had stone removal on 2 different occasions,  once initially by Dr. Hernandez and subsequently by Dr. Nieves, with biliary stent initially, patient says he is doing better overall,    Hospital stay transitional care visit October 14 through October 20, 2021 with acute right lower lobe pneumonia, acute hypoxic respiratory failure shortness of breath CT scan showed groundglass opacity coronavirus testing ??2 was negative in the hospital, patient was put back on Lasix daily sent home on prednisone and antibiotics Levaquin for 7 days following this hospital stay, he did not qualify for home O2 with 6 minute walk test in the room prior to discharge, he had acute diastolic heart failure in the hospital all medications are reviewed, blood sugars are doing much better over the past week or so,    transtional care visit from repair LEFT FEM. --RECONTRUCTION, AND ATERECTOMY---SEPT 14, 2020, ---PERIPHERAL ASO ---Patient is having claudication and leg pain with weakness, arterial evaluation shows bilateral proximal level occlusive disease either just below the aorta or at the aortic repair level, ---Patient is status post left femoral iliac artery atherectomy grafting and angioplasty by Dr. Nam at [facility], September 14, 2020, patient is doing better overall,--------, carotid ultrasound shows no significant stenosis,----- August 2020 compared to previous and prior ultrasounds,-    transtional care , hosp 8/5/2020 for R LL PNEUMONIA, AND ACUTE DIASTOLIC CHF, LASHA ON CKD 3---Patient presented with acute hypercapnic respiratory failure, pneumonia, was treated with IV antibiotics, IV steroids in the hospital due to wheezing that developed, he had low potassium that was replaced his anemia was monitored he was kept on oxygen initially he had a follow-up chest x-ray August 7 showing no active disease his white count is still a little high at 13, he says his breathing well he is taking his breathing treatments currently, he has follow-up with pulmonology tomorrow August  12, his proBNP level is down from 6000 500-4600 during his hospital course, he is currently off antibiotics and off steroids currently, it seems to be getting back to baseline    BACK PAIN, SPINAL STENOSIS, DX DISCUSSE, AUG 2020     MILD DEPRESSION _pt stopped both Wellbutrin  mg daily---still depressed , could try   lexapro     Type 2 Diabetes , continues on Levemir 40 units daily and Metformin 500mg bid-    H/O Colon cancer PARTIAL COLECTOMY 2010 - , CEA elevated now at 7.7, -- Dr. Ibanez---Colonoscopy November 2018 unremarkable----CEA level 2.5 April 2020    Fatigue, Weight loss--- BETTER OFF BRILLINTA --continues ON PLAVIX NOW     sees sleep specialist - on CPAP;     Angina, cardiac catheter jan 2018, with 80-90% stenosis circumflex proximal to vein graft, LIMA graft to LAD was patent, normal ejection fraction January 2018, patient had elective semi-elective stent placement to the circumflex artery, JAN 23, 2018, --Patient currently off BRILLINTA and on Plavix with much improved and breathing status as of July 2018----previous echo October 2020 showed mild mitral valve annular calcification, no significant prolapse, or regurgitation, ejection fraction 55 to 60% diastolic dysfunction noted otherwise unremarkable, current echo shows some inferior wall basilar wall posterior wall hypokinesis with a slightly depressed ejection fraction of 46 to 50%, April 2022, mild mitral regurgitation and tricuspid regurgitation, discussed with patient wife April 14, 2022,    R HIP PAIN, GLUTEUS MUSCLE , PARASPINAL MUSCLE ATROPHY, S/P INFARCTION AFTER AAA REPAIR IN 3/13-, status post recent radiofrequency ablation X 2, by pain management much improved, continues on gabapentin 600 mg twice a day,and morphine long-acting twice a day, 30 mg    Elevated cholesterol--continues Lipitor 20 mg daily    CAROTID ASO, HAS YRLY F/U WITH VASC SURG ---    HYPOTHRYOIDISM--continues levothyroxine 0.125 mg qd     HTN----continues Norvasc 2.5  mg daily,  Lasix 40 mg QD  , metoprolol extended release 50 mg BID    CKD 3-- stable     GOUT, continues ALLOPURINOL 150 MG QD, colchicine,0.1 PRN ----Dr. Gonzalez rheumatology for second opinion               Follow Up   No follow-ups on file.  Patient was given instructions and counseling regarding his condition or for health maintenance advice. Please see specific information pulled into the AVS if appropriate.

## 2022-05-10 ENCOUNTER — LAB (OUTPATIENT)
Dept: LAB | Facility: HOSPITAL | Age: 72
End: 2022-05-10

## 2022-05-10 ENCOUNTER — TELEPHONE (OUTPATIENT)
Dept: INTERNAL MEDICINE | Facility: CLINIC | Age: 72
End: 2022-05-10

## 2022-05-10 DIAGNOSIS — I25.810 CORONARY ARTERY DISEASE INVOLVING CORONARY BYPASS GRAFT OF NATIVE HEART WITHOUT ANGINA PECTORIS: ICD-10-CM

## 2022-05-10 DIAGNOSIS — I50.32 CHRONIC DIASTOLIC CONGESTIVE HEART FAILURE: ICD-10-CM

## 2022-05-10 DIAGNOSIS — I10 HYPERTENSION, ESSENTIAL: ICD-10-CM

## 2022-05-10 DIAGNOSIS — N28.9 KIDNEY DISEASE: ICD-10-CM

## 2022-05-10 DIAGNOSIS — D50.8 IRON DEFICIENCY ANEMIA SECONDARY TO INADEQUATE DIETARY IRON INTAKE: Primary | ICD-10-CM

## 2022-05-10 DIAGNOSIS — E11.43 TYPE 2 DIABETES MELLITUS WITH DIABETIC AUTONOMIC NEUROPATHY, WITHOUT LONG-TERM CURRENT USE OF INSULIN: ICD-10-CM

## 2022-05-10 LAB
ANION GAP SERPL CALCULATED.3IONS-SCNC: 14.4 MMOL/L (ref 5–15)
BUN SERPL-MCNC: 28 MG/DL (ref 8–23)
BUN/CREAT SERPL: 13.3 (ref 7–25)
CALCIUM SPEC-SCNC: 9.9 MG/DL (ref 8.6–10.5)
CHLORIDE SERPL-SCNC: 103 MMOL/L (ref 98–107)
CO2 SERPL-SCNC: 25.6 MMOL/L (ref 22–29)
CREAT SERPL-MCNC: 2.11 MG/DL (ref 0.76–1.27)
EGFRCR SERPLBLD CKD-EPI 2021: 32.4 ML/MIN/1.73
GLUCOSE SERPL-MCNC: 76 MG/DL (ref 65–99)
NT-PROBNP SERPL-MCNC: 2841 PG/ML (ref 0–900)
POTASSIUM SERPL-SCNC: 4.6 MMOL/L (ref 3.5–5.2)
SODIUM SERPL-SCNC: 143 MMOL/L (ref 136–145)

## 2022-05-10 PROCEDURE — 80048 BASIC METABOLIC PNL TOTAL CA: CPT

## 2022-05-10 PROCEDURE — 36415 COLL VENOUS BLD VENIPUNCTURE: CPT

## 2022-05-10 PROCEDURE — 83880 ASSAY OF NATRIURETIC PEPTIDE: CPT

## 2022-05-10 NOTE — TELEPHONE ENCOUNTER
Patient thinks that he is supposed to get labs after one or two weeks after you saw him.  The lab order in the system says that the labs should be done in July and they will not draw them.  Should his labs be now or July?

## 2022-05-11 ENCOUNTER — TELEPHONE (OUTPATIENT)
Dept: INTERNAL MEDICINE | Facility: CLINIC | Age: 72
End: 2022-05-11

## 2022-05-11 DIAGNOSIS — G62.9 PERIPHERAL POLYNEUROPATHY: Primary | ICD-10-CM

## 2022-05-11 RX ORDER — GABAPENTIN 300 MG/1
300 CAPSULE ORAL 3 TIMES DAILY
Qty: 60 CAPSULE | Refills: 5 | Status: SHIPPED | OUTPATIENT
Start: 2022-05-11 | End: 2022-06-15

## 2022-05-11 NOTE — TELEPHONE ENCOUNTER
I called ptwith labs and worsening kidney numbers   And to stop losartan and cont norvasc   rec decrease gabapentin to 300 mg bid

## 2022-05-17 ENCOUNTER — TELEPHONE (OUTPATIENT)
Dept: CARDIOLOGY | Facility: CLINIC | Age: 72
End: 2022-05-17

## 2022-05-17 NOTE — TELEPHONE ENCOUNTER
----- Message from Talita Gonzalez MD sent at 5/13/2022  8:07 PM EDT -----  Renal function getting worse.  Please check with dr. ortiz

## 2022-05-17 NOTE — TELEPHONE ENCOUNTER
Per Epic encounter Dr. Loja has addressed worsening renal function as follows:    I called ptwith labs and worsening kidney numbers   And to stop losartan and cont norvasc   rec decrease gabapentin to 300 mg bid

## 2022-05-18 ENCOUNTER — TELEPHONE (OUTPATIENT)
Dept: CARDIOLOGY | Facility: CLINIC | Age: 72
End: 2022-05-18

## 2022-05-24 ENCOUNTER — TELEPHONE (OUTPATIENT)
Dept: CARDIOLOGY | Facility: CLINIC | Age: 72
End: 2022-05-24

## 2022-05-24 ENCOUNTER — LAB (OUTPATIENT)
Dept: LAB | Facility: HOSPITAL | Age: 72
End: 2022-05-24

## 2022-05-24 DIAGNOSIS — J43.2 CENTRILOBULAR EMPHYSEMA: ICD-10-CM

## 2022-05-24 DIAGNOSIS — E11.43 TYPE 2 DIABETES MELLITUS WITH DIABETIC AUTONOMIC NEUROPATHY, WITHOUT LONG-TERM CURRENT USE OF INSULIN: ICD-10-CM

## 2022-05-24 DIAGNOSIS — I73.9 PVD (PERIPHERAL VASCULAR DISEASE) WITH CLAUDICATION: ICD-10-CM

## 2022-05-24 DIAGNOSIS — E03.9 ACQUIRED HYPOTHYROIDISM: ICD-10-CM

## 2022-05-24 DIAGNOSIS — I25.810 CORONARY ARTERY DISEASE INVOLVING CORONARY BYPASS GRAFT OF NATIVE HEART WITHOUT ANGINA PECTORIS: ICD-10-CM

## 2022-05-24 DIAGNOSIS — I50.32 CHRONIC DIASTOLIC CONGESTIVE HEART FAILURE: ICD-10-CM

## 2022-05-24 DIAGNOSIS — I50.42 CHRONIC COMBINED SYSTOLIC AND DIASTOLIC CONGESTIVE HEART FAILURE: ICD-10-CM

## 2022-05-24 DIAGNOSIS — N28.9 KIDNEY DISEASE: ICD-10-CM

## 2022-05-24 DIAGNOSIS — E78.2 MIXED HYPERLIPIDEMIA: ICD-10-CM

## 2022-05-24 DIAGNOSIS — D50.8 IRON DEFICIENCY ANEMIA SECONDARY TO INADEQUATE DIETARY IRON INTAKE: ICD-10-CM

## 2022-05-24 LAB
ALBUMIN SERPL-MCNC: 3.9 G/DL (ref 3.5–5.2)
ALBUMIN/GLOB SERPL: 2 G/DL
ALP SERPL-CCNC: 41 U/L (ref 39–117)
ALT SERPL W P-5'-P-CCNC: 14 U/L (ref 1–41)
ANION GAP SERPL CALCULATED.3IONS-SCNC: 10 MMOL/L (ref 5–15)
AST SERPL-CCNC: 23 U/L (ref 1–40)
BASOPHILS # BLD AUTO: 0.06 10*3/MM3 (ref 0–0.2)
BASOPHILS NFR BLD AUTO: 0.7 % (ref 0–1.5)
BILIRUB SERPL-MCNC: 0.3 MG/DL (ref 0–1.2)
BUN SERPL-MCNC: 18 MG/DL (ref 8–23)
BUN/CREAT SERPL: 12.2 (ref 7–25)
CALCIUM SPEC-SCNC: 9.5 MG/DL (ref 8.6–10.5)
CHLORIDE SERPL-SCNC: 104 MMOL/L (ref 98–107)
CO2 SERPL-SCNC: 28 MMOL/L (ref 22–29)
CREAT SERPL-MCNC: 1.48 MG/DL (ref 0.76–1.27)
DEPRECATED RDW RBC AUTO: 47 FL (ref 37–54)
EGFRCR SERPLBLD CKD-EPI 2021: 49.6 ML/MIN/1.73
EOSINOPHIL # BLD AUTO: 0.52 10*3/MM3 (ref 0–0.4)
EOSINOPHIL NFR BLD AUTO: 5.8 % (ref 0.3–6.2)
ERYTHROCYTE [DISTWIDTH] IN BLOOD BY AUTOMATED COUNT: 15.5 % (ref 12.3–15.4)
GLOBULIN UR ELPH-MCNC: 2 GM/DL
GLUCOSE SERPL-MCNC: 87 MG/DL (ref 65–99)
HCT VFR BLD AUTO: 43.1 % (ref 37.5–51)
HGB BLD-MCNC: 13.6 G/DL (ref 13–17.7)
IMM GRANULOCYTES # BLD AUTO: 0.04 10*3/MM3 (ref 0–0.05)
IMM GRANULOCYTES NFR BLD AUTO: 0.4 % (ref 0–0.5)
LYMPHOCYTES # BLD AUTO: 2.24 10*3/MM3 (ref 0.7–3.1)
LYMPHOCYTES NFR BLD AUTO: 25 % (ref 19.6–45.3)
MCH RBC QN AUTO: 26.5 PG (ref 26.6–33)
MCHC RBC AUTO-ENTMCNC: 31.6 G/DL (ref 31.5–35.7)
MCV RBC AUTO: 84 FL (ref 79–97)
MONOCYTES # BLD AUTO: 0.78 10*3/MM3 (ref 0.1–0.9)
MONOCYTES NFR BLD AUTO: 8.7 % (ref 5–12)
NEUTROPHILS NFR BLD AUTO: 5.33 10*3/MM3 (ref 1.7–7)
NEUTROPHILS NFR BLD AUTO: 59.4 % (ref 42.7–76)
NRBC BLD AUTO-RTO: 0 /100 WBC (ref 0–0.2)
NT-PROBNP SERPL-MCNC: 1455 PG/ML (ref 0–900)
PLATELET # BLD AUTO: 195 10*3/MM3 (ref 140–450)
PMV BLD AUTO: 12.2 FL (ref 6–12)
POTASSIUM SERPL-SCNC: 4.9 MMOL/L (ref 3.5–5.2)
PROT SERPL-MCNC: 5.9 G/DL (ref 6–8.5)
RBC # BLD AUTO: 5.13 10*6/MM3 (ref 4.14–5.8)
SODIUM SERPL-SCNC: 142 MMOL/L (ref 136–145)
WBC NRBC COR # BLD: 8.97 10*3/MM3 (ref 3.4–10.8)

## 2022-05-24 PROCEDURE — 36415 COLL VENOUS BLD VENIPUNCTURE: CPT

## 2022-05-24 PROCEDURE — 80053 COMPREHEN METABOLIC PANEL: CPT

## 2022-05-24 PROCEDURE — 83880 ASSAY OF NATRIURETIC PEPTIDE: CPT

## 2022-05-24 PROCEDURE — 85025 COMPLETE CBC W/AUTO DIFF WBC: CPT

## 2022-06-13 RX ORDER — FOLIC ACID 1 MG/1
TABLET ORAL
Qty: 180 TABLET | Refills: 1 | Status: SHIPPED | OUTPATIENT
Start: 2022-06-13 | End: 2023-03-20

## 2022-06-27 ENCOUNTER — TELEPHONE (OUTPATIENT)
Dept: INTERNAL MEDICINE | Facility: CLINIC | Age: 72
End: 2022-06-27

## 2022-06-27 RX ORDER — PANTOPRAZOLE SODIUM 40 MG/1
TABLET, DELAYED RELEASE ORAL
Qty: 90 TABLET | Refills: 1 | Status: SHIPPED | OUTPATIENT
Start: 2022-06-27 | End: 2022-10-13

## 2022-06-27 RX ORDER — BUPROPION HYDROCHLORIDE 150 MG/1
TABLET ORAL
Qty: 90 TABLET | Refills: 1 | Status: SHIPPED | OUTPATIENT
Start: 2022-06-27 | End: 2022-10-13

## 2022-06-27 NOTE — TELEPHONE ENCOUNTER
Provider: DANTE KIM  Caller: YARIEL GREENBERG  Relationship to Patient: SELF  Pharmacy:   Phone Number: [phone]   Reason for Call: HAS A RASH AND WOULD LIKE TO SPEAK WITH DR. KIM ABOUT MEDICATION HE IS TAKING AND SLEEPING TOO MUCH

## 2022-06-29 ENCOUNTER — OFFICE VISIT (OUTPATIENT)
Dept: INTERNAL MEDICINE | Facility: CLINIC | Age: 72
End: 2022-06-29

## 2022-06-29 VITALS
BODY MASS INDEX: 33.56 KG/M2 | HEIGHT: 69 IN | SYSTOLIC BLOOD PRESSURE: 84 MMHG | HEART RATE: 69 BPM | DIASTOLIC BLOOD PRESSURE: 47 MMHG | WEIGHT: 226.6 LBS | OXYGEN SATURATION: 96 % | TEMPERATURE: 97.5 F

## 2022-06-29 DIAGNOSIS — R21 RASH IN ADULT: Primary | ICD-10-CM

## 2022-06-29 PROCEDURE — 99213 OFFICE O/P EST LOW 20 MIN: CPT | Performed by: INTERNAL MEDICINE

## 2022-06-29 RX ORDER — CETIRIZINE HYDROCHLORIDE 10 MG/1
10 TABLET ORAL 2 TIMES DAILY
Qty: 45 TABLET | Refills: 2 | Status: SHIPPED | OUTPATIENT
Start: 2022-06-29 | End: 2022-10-13

## 2022-06-29 RX ORDER — DOXYCYCLINE HYCLATE 100 MG/1
100 CAPSULE ORAL 2 TIMES DAILY
Qty: 20 CAPSULE | Refills: 1 | Status: SHIPPED | OUTPATIENT
Start: 2022-06-29 | End: 2022-09-13

## 2022-06-29 RX ORDER — FAMOTIDINE 20 MG/1
20 TABLET, FILM COATED ORAL 2 TIMES DAILY
Qty: 60 TABLET | Refills: 5 | Status: SHIPPED | OUTPATIENT
Start: 2022-06-29 | End: 2022-10-13

## 2022-06-29 RX ORDER — DOXEPIN HYDROCHLORIDE 10 MG/1
10 CAPSULE ORAL NIGHTLY
Qty: 30 CAPSULE | Refills: 2 | Status: SHIPPED | OUTPATIENT
Start: 2022-06-29 | End: 2022-09-13

## 2022-06-29 NOTE — PROGRESS NOTES
"Chief Complaint/ HPI: --- Rash developed right anterior chest right neck top of the shoulder about 1 month ago, has been to acute care twice has gotten steroids, topical creams, seems to help some but comes back, he has been picking at them also with some ulcerations noted, family is concerned,          Objective   Vital Signs  Vitals:    06/29/22 1315   BP: (!) 84/47   Pulse: 69   Temp: 97.5 °F (36.4 °C)   SpO2: 96%   Weight: 103 kg (226 lb 9.6 oz)   Height: 175.3 cm (69.02\")      Body mass index is 33.45 kg/m².  Review of Systems no fevers, no change in other clinical symptoms,,  Physical Exam patient with multiple areas of yellow based ulcers on the anterior pectoralis muscle right chest wall with some erythema surrounding that in the nipple area and breast area, he is also got some dried excoriated lesions on the right anterior neck supraclavicular area and posterior neck,  Result Review :   Lab Results   Component Value Date    PROBNP 1,455.0 (H) 05/24/2022    PROBNP 2,841.0 (H) 05/10/2022    PROBNP 2,157.0 (H) 11/30/2021    BNP 1937 (H) 03/10/2021    BNP 1128 (H) 11/02/2020    BNP 1802 (H) 10/14/2020     CMP    CMP 1/25/22 5/10/22 5/24/22   Glucose 79 76 87   BUN 21 28 (A) 18   Creatinine 1.53 (A) 2.11 (A) 1.48 (A)   eGFR Non  Am 45 (A)     Sodium 139 143 142   Potassium 4.2 4.6 4.9   Chloride 99 103 104   Calcium 9.5 9.9 9.5   Albumin 3.90  3.90   Total Bilirubin 0.3  0.3   Alkaline Phosphatase 53  41   AST (SGOT) 26  23   ALT (SGPT) 15  14   (A) Abnormal value            CBC w/diff    CBC w/Diff 11/30/21 12/8/21 5/24/22   WBC 9.08 12.60 (A) 8.97   RBC 5.14 5.11 5.13   Hemoglobin 13.7 14.0 13.6   Hematocrit 44.1 43.2 43.1   MCV 85.8 84.5 84.0   MCH 26.7 27.4 26.5 (A)   MCHC 31.1 (A) 32.4 31.6   RDW 15.8 (A) 15.6 (A) 15.5 (A)   Platelets 215 310 195   Neutrophil Rel % 63.2 65.2 59.4   Immature Granulocyte Rel % 0.3 0.7 (A) 0.4   Lymphocyte Rel % 21.6 20.4 25.0   Monocyte Rel % 11.5 8.6 8.7   Eosinophil " Rel % 2.8 4.4 5.8   Basophil Rel % 0.6 0.7 0.7   (A) Abnormal value             Lipid Panel    Lipid Panel 7/27/21 11/30/21 1/25/22   Total Cholesterol 129 123 120   Triglycerides 244 (A) 239 (A) 159 (A)   HDL Cholesterol 26 (A) 18 (A) 24 (A)   VLDL Cholesterol 40 39 28   LDL Cholesterol  63 66 68   LDL/HDL Ratio 2.08 3.18 2.68   (A) Abnormal value             Lab Results   Component Value Date    TSH 0.617 11/30/2021    TSH 1.530 07/27/2021    TSH 2.490 07/09/2021      Lab Results   Component Value Date    FREET4 1.99 (H) 11/30/2021    FREET4 1.44 07/27/2021    FREET4 1.29 07/09/2021      A1C Last 3 Results    HGBA1C Last 3 Results 7/9/21 7/27/21 11/30/21   Hemoglobin A1C 6.00 (A) 6.14 (A) 6.21 (A)   (A) Abnormal value             PSA    PSA 7/27/21   PSA 0.710                          Visit Diagnoses:    ICD-10-CM ICD-9-CM   1. Rash in adult  R21 782.1       Assessment and Plan   Diagnoses and all orders for this visit:    1. Rash in adult (Primary)  -     Ambulatory Referral to Dermatology    Other orders  -     famotidine (Pepcid) 20 MG tablet; Take 1 tablet by mouth 2 (Two) Times a Day.  Dispense: 60 tablet; Refill: 5  -     cetirizine (zyrTEC) 10 MG tablet; Take 1 tablet by mouth 2 (Two) Times a Day.  Dispense: 45 tablet; Refill: 2  -     doxycycline (VIBRAMYCIN) 100 MG capsule; Take 1 capsule by mouth 2 (Two) Times a Day.  Dispense: 20 capsule; Refill: 1  -     doxepin (SINEquan) 10 MG capsule; Take 1 capsule by mouth Every Night.  Dispense: 30 capsule; Refill: 2    Rash, atypical on the right anterior chest neck and posterior neck area, has been treated twice with steroids topical creams, improved some, other treatment options discussed June 29, 2022, dermatology referral may be needed,--  Possible shingles cannot be excluded although duration of the rash seems a little bit long, other possibilities, also has area of redness consistent with possible early cellulitis which we will treat with doxycycline, he  is getting it a prescription for Pepcid 20 twice a day Zyrtec 10 mg twice a day doxepin 10 mg nightly dermatology referral,            Follow Up   No follow-ups on file.  Patient was given instructions and counseling regarding his condition or for health maintenance advice. Please see specific information pulled into the AVS if appropriate.

## 2022-07-05 RX ORDER — ASCORBIC ACID 500 MG
TABLET ORAL
Qty: 90 TABLET | Refills: 1 | Status: SHIPPED | OUTPATIENT
Start: 2022-07-05

## 2022-07-05 RX ORDER — FERROUS SULFATE 325(65) MG
TABLET ORAL
Qty: 90 TABLET | Refills: 1 | Status: SHIPPED | OUTPATIENT
Start: 2022-07-05

## 2022-07-05 RX ORDER — ATORVASTATIN CALCIUM 20 MG/1
TABLET, FILM COATED ORAL
Qty: 90 TABLET | Refills: 1 | Status: SHIPPED | OUTPATIENT
Start: 2022-07-05

## 2022-07-18 RX ORDER — FENOFIBRATE 145 MG/1
145 TABLET, COATED ORAL DAILY
Qty: 90 TABLET | Refills: 3 | Status: SHIPPED | OUTPATIENT
Start: 2022-07-18

## 2022-07-18 RX ORDER — LANOLIN ALCOHOL/MO/W.PET/CERES
CREAM (GRAM) TOPICAL
Qty: 90 TABLET | Refills: 3 | Status: SHIPPED | OUTPATIENT
Start: 2022-07-18 | End: 2022-08-02 | Stop reason: SDUPTHER

## 2022-07-27 ENCOUNTER — LAB (OUTPATIENT)
Dept: LAB | Facility: HOSPITAL | Age: 72
End: 2022-07-27

## 2022-07-27 DIAGNOSIS — M13.0 POLYARTHROPATHY: ICD-10-CM

## 2022-07-27 DIAGNOSIS — M54.50 CHRONIC BILATERAL LOW BACK PAIN WITHOUT SCIATICA: ICD-10-CM

## 2022-07-27 DIAGNOSIS — I25.810 CORONARY ARTERY DISEASE INVOLVING CORONARY BYPASS GRAFT OF NATIVE HEART WITHOUT ANGINA PECTORIS: ICD-10-CM

## 2022-07-27 DIAGNOSIS — Z12.5 SCREENING PSA (PROSTATE SPECIFIC ANTIGEN): ICD-10-CM

## 2022-07-27 DIAGNOSIS — I50.32 CHRONIC DIASTOLIC CONGESTIVE HEART FAILURE: ICD-10-CM

## 2022-07-27 DIAGNOSIS — D50.8 IRON DEFICIENCY ANEMIA SECONDARY TO INADEQUATE DIETARY IRON INTAKE: ICD-10-CM

## 2022-07-27 DIAGNOSIS — I50.42 CHRONIC COMBINED SYSTOLIC AND DIASTOLIC CONGESTIVE HEART FAILURE: ICD-10-CM

## 2022-07-27 DIAGNOSIS — M1A.0790 CHRONIC GOUT OF FOOT, UNSPECIFIED CAUSE, UNSPECIFIED LATERALITY: ICD-10-CM

## 2022-07-27 DIAGNOSIS — G89.29 CHRONIC BILATERAL LOW BACK PAIN WITHOUT SCIATICA: ICD-10-CM

## 2022-07-27 DIAGNOSIS — I71.40 ABDOMINAL AORTIC ANEURYSM WITHOUT RUPTURE: ICD-10-CM

## 2022-07-27 DIAGNOSIS — I73.9 PVD (PERIPHERAL VASCULAR DISEASE) WITH CLAUDICATION: ICD-10-CM

## 2022-07-27 DIAGNOSIS — E03.9 ACQUIRED HYPOTHYROIDISM: ICD-10-CM

## 2022-07-27 DIAGNOSIS — E78.5 HYPERLIPIDEMIA LDL GOAL <70: ICD-10-CM

## 2022-07-27 DIAGNOSIS — F33.1 MAJOR DEPRESSIVE DISORDER, RECURRENT, MODERATE: ICD-10-CM

## 2022-07-27 DIAGNOSIS — E11.43 TYPE 2 DIABETES MELLITUS WITH DIABETIC AUTONOMIC NEUROPATHY, WITHOUT LONG-TERM CURRENT USE OF INSULIN: ICD-10-CM

## 2022-07-27 DIAGNOSIS — E78.2 MIXED HYPERLIPIDEMIA: ICD-10-CM

## 2022-07-27 DIAGNOSIS — J43.2 CENTRILOBULAR EMPHYSEMA: ICD-10-CM

## 2022-07-27 DIAGNOSIS — I25.10 CORONARY ARTERY DISEASE INVOLVING NATIVE CORONARY ARTERY OF NATIVE HEART WITHOUT ANGINA PECTORIS: ICD-10-CM

## 2022-07-27 DIAGNOSIS — N28.9 KIDNEY DISEASE: ICD-10-CM

## 2022-07-27 LAB
ALBUMIN SERPL-MCNC: 4.2 G/DL (ref 3.5–5.2)
ALBUMIN/GLOB SERPL: 1.9 G/DL
ALP SERPL-CCNC: 41 U/L (ref 39–117)
ALT SERPL W P-5'-P-CCNC: 11 U/L (ref 1–41)
ANION GAP SERPL CALCULATED.3IONS-SCNC: 12.7 MMOL/L (ref 5–15)
AST SERPL-CCNC: 22 U/L (ref 1–40)
BASOPHILS # BLD AUTO: 0.09 10*3/MM3 (ref 0–0.2)
BASOPHILS NFR BLD AUTO: 0.8 % (ref 0–1.5)
BILIRUB SERPL-MCNC: 0.5 MG/DL (ref 0–1.2)
BUN SERPL-MCNC: 21 MG/DL (ref 8–23)
BUN/CREAT SERPL: 11.5 (ref 7–25)
CALCIUM SPEC-SCNC: 9.6 MG/DL (ref 8.6–10.5)
CHLORIDE SERPL-SCNC: 106 MMOL/L (ref 98–107)
CHOLEST SERPL-MCNC: 151 MG/DL (ref 0–200)
CO2 SERPL-SCNC: 26.3 MMOL/L (ref 22–29)
CREAT SERPL-MCNC: 1.82 MG/DL (ref 0.76–1.27)
DEPRECATED RDW RBC AUTO: 44.3 FL (ref 37–54)
EGFRCR SERPLBLD CKD-EPI 2021: 38.7 ML/MIN/1.73
EOSINOPHIL # BLD AUTO: 0.47 10*3/MM3 (ref 0–0.4)
EOSINOPHIL NFR BLD AUTO: 4.2 % (ref 0.3–6.2)
ERYTHROCYTE [DISTWIDTH] IN BLOOD BY AUTOMATED COUNT: 15 % (ref 12.3–15.4)
GLOBULIN UR ELPH-MCNC: 2.2 GM/DL
GLUCOSE SERPL-MCNC: 76 MG/DL (ref 65–99)
HBA1C MFR BLD: 6.1 % (ref 4.8–5.6)
HCT VFR BLD AUTO: 47.5 % (ref 37.5–51)
HDLC SERPL-MCNC: 34 MG/DL (ref 40–60)
HGB BLD-MCNC: 15.4 G/DL (ref 13–17.7)
IMM GRANULOCYTES # BLD AUTO: 0.05 10*3/MM3 (ref 0–0.05)
IMM GRANULOCYTES NFR BLD AUTO: 0.4 % (ref 0–0.5)
LDLC SERPL CALC-MCNC: 94 MG/DL (ref 0–100)
LDLC/HDLC SERPL: 2.69 {RATIO}
LYMPHOCYTES # BLD AUTO: 3.59 10*3/MM3 (ref 0.7–3.1)
LYMPHOCYTES NFR BLD AUTO: 31.9 % (ref 19.6–45.3)
MCH RBC QN AUTO: 27 PG (ref 26.6–33)
MCHC RBC AUTO-ENTMCNC: 32.4 G/DL (ref 31.5–35.7)
MCV RBC AUTO: 83.2 FL (ref 79–97)
MONOCYTES # BLD AUTO: 1.03 10*3/MM3 (ref 0.1–0.9)
MONOCYTES NFR BLD AUTO: 9.1 % (ref 5–12)
NEUTROPHILS NFR BLD AUTO: 53.6 % (ref 42.7–76)
NEUTROPHILS NFR BLD AUTO: 6.03 10*3/MM3 (ref 1.7–7)
NRBC BLD AUTO-RTO: 0 /100 WBC (ref 0–0.2)
NT-PROBNP SERPL-MCNC: 915 PG/ML (ref 0–900)
PLATELET # BLD AUTO: 203 10*3/MM3 (ref 140–450)
PMV BLD AUTO: 11.9 FL (ref 6–12)
POTASSIUM SERPL-SCNC: 4.5 MMOL/L (ref 3.5–5.2)
PROT SERPL-MCNC: 6.4 G/DL (ref 6–8.5)
RBC # BLD AUTO: 5.71 10*6/MM3 (ref 4.14–5.8)
SODIUM SERPL-SCNC: 145 MMOL/L (ref 136–145)
T4 FREE SERPL-MCNC: 1.54 NG/DL (ref 0.93–1.7)
TRIGL SERPL-MCNC: 127 MG/DL (ref 0–150)
TSH SERPL DL<=0.05 MIU/L-ACNC: 3.29 UIU/ML (ref 0.27–4.2)
URATE SERPL-MCNC: 6.4 MG/DL (ref 3.4–7)
VLDLC SERPL-MCNC: 23 MG/DL (ref 5–40)
WBC NRBC COR # BLD: 11.26 10*3/MM3 (ref 3.4–10.8)

## 2022-07-27 PROCEDURE — 85025 COMPLETE CBC W/AUTO DIFF WBC: CPT

## 2022-07-27 PROCEDURE — 84439 ASSAY OF FREE THYROXINE: CPT

## 2022-07-27 PROCEDURE — 36415 COLL VENOUS BLD VENIPUNCTURE: CPT

## 2022-07-27 PROCEDURE — 84550 ASSAY OF BLOOD/URIC ACID: CPT

## 2022-07-27 PROCEDURE — 83036 HEMOGLOBIN GLYCOSYLATED A1C: CPT

## 2022-07-27 PROCEDURE — 80061 LIPID PANEL: CPT

## 2022-07-27 PROCEDURE — 80053 COMPREHEN METABOLIC PANEL: CPT

## 2022-07-27 PROCEDURE — 83880 ASSAY OF NATRIURETIC PEPTIDE: CPT

## 2022-07-27 PROCEDURE — 84443 ASSAY THYROID STIM HORMONE: CPT

## 2022-07-28 ENCOUNTER — LAB (OUTPATIENT)
Dept: LAB | Facility: HOSPITAL | Age: 72
End: 2022-07-28

## 2022-07-28 ENCOUNTER — TRANSCRIBE ORDERS (OUTPATIENT)
Dept: LAB | Facility: HOSPITAL | Age: 72
End: 2022-07-28

## 2022-07-28 DIAGNOSIS — J43.2 CENTRILOBULAR EMPHYSEMA: ICD-10-CM

## 2022-07-28 DIAGNOSIS — I71.40 ABDOMINAL AORTIC ANEURYSM WITHOUT RUPTURE: ICD-10-CM

## 2022-07-28 DIAGNOSIS — M54.50 CHRONIC BILATERAL LOW BACK PAIN WITHOUT SCIATICA: ICD-10-CM

## 2022-07-28 DIAGNOSIS — I50.32 CHRONIC DIASTOLIC CONGESTIVE HEART FAILURE: ICD-10-CM

## 2022-07-28 DIAGNOSIS — Z79.899 ENCOUNTER FOR LONG-TERM (CURRENT) USE OF OTHER MEDICATIONS: ICD-10-CM

## 2022-07-28 DIAGNOSIS — M13.0 POLYARTHROPATHY: ICD-10-CM

## 2022-07-28 DIAGNOSIS — E78.2 MIXED HYPERLIPIDEMIA: ICD-10-CM

## 2022-07-28 DIAGNOSIS — M1A.0790 CHRONIC GOUT OF FOOT, UNSPECIFIED CAUSE, UNSPECIFIED LATERALITY: ICD-10-CM

## 2022-07-28 DIAGNOSIS — E78.5 HYPERLIPIDEMIA LDL GOAL <70: ICD-10-CM

## 2022-07-28 DIAGNOSIS — I25.10 CORONARY ARTERY DISEASE INVOLVING NATIVE CORONARY ARTERY OF NATIVE HEART WITHOUT ANGINA PECTORIS: ICD-10-CM

## 2022-07-28 DIAGNOSIS — M10.9 GOUT, UNSPECIFIED CAUSE, UNSPECIFIED CHRONICITY, UNSPECIFIED SITE: Primary | ICD-10-CM

## 2022-07-28 DIAGNOSIS — Z12.5 SCREENING PSA (PROSTATE SPECIFIC ANTIGEN): ICD-10-CM

## 2022-07-28 DIAGNOSIS — F33.1 MAJOR DEPRESSIVE DISORDER, RECURRENT, MODERATE: ICD-10-CM

## 2022-07-28 DIAGNOSIS — M10.9 POLYARTICULAR GOUT: ICD-10-CM

## 2022-07-28 DIAGNOSIS — I73.9 PVD (PERIPHERAL VASCULAR DISEASE) WITH CLAUDICATION: ICD-10-CM

## 2022-07-28 DIAGNOSIS — E11.43 TYPE 2 DIABETES MELLITUS WITH DIABETIC AUTONOMIC NEUROPATHY, WITHOUT LONG-TERM CURRENT USE OF INSULIN: ICD-10-CM

## 2022-07-28 DIAGNOSIS — E03.9 ACQUIRED HYPOTHYROIDISM: ICD-10-CM

## 2022-07-28 DIAGNOSIS — D50.8 IRON DEFICIENCY ANEMIA SECONDARY TO INADEQUATE DIETARY IRON INTAKE: ICD-10-CM

## 2022-07-28 DIAGNOSIS — G89.29 CHRONIC BILATERAL LOW BACK PAIN WITHOUT SCIATICA: ICD-10-CM

## 2022-07-28 LAB
BUN SERPL-MCNC: 23 MG/DL (ref 8–23)
CREAT SERPL-MCNC: 1.72 MG/DL (ref 0.76–1.27)
EGFRCR SERPLBLD CKD-EPI 2021: 41.5 ML/MIN/1.73
PSA SERPL-MCNC: 0.68 NG/ML (ref 0–4)
URATE SERPL-MCNC: 6.6 MG/DL (ref 3.4–7)

## 2022-07-28 PROCEDURE — G0103 PSA SCREENING: HCPCS

## 2022-07-28 PROCEDURE — 84550 ASSAY OF BLOOD/URIC ACID: CPT

## 2022-07-28 PROCEDURE — 84520 ASSAY OF UREA NITROGEN: CPT

## 2022-07-28 PROCEDURE — 82565 ASSAY OF CREATININE: CPT

## 2022-08-01 RX ORDER — METOPROLOL SUCCINATE 50 MG/1
TABLET, EXTENDED RELEASE ORAL
Qty: 180 TABLET | Refills: 3 | Status: SHIPPED | OUTPATIENT
Start: 2022-08-01

## 2022-08-02 ENCOUNTER — OFFICE VISIT (OUTPATIENT)
Dept: INTERNAL MEDICINE | Facility: CLINIC | Age: 72
End: 2022-08-02

## 2022-08-02 VITALS
OXYGEN SATURATION: 97 % | HEIGHT: 69 IN | HEART RATE: 67 BPM | WEIGHT: 224.6 LBS | DIASTOLIC BLOOD PRESSURE: 74 MMHG | SYSTOLIC BLOOD PRESSURE: 151 MMHG | BODY MASS INDEX: 33.27 KG/M2 | TEMPERATURE: 97.6 F

## 2022-08-02 DIAGNOSIS — C80.1 CANCER: ICD-10-CM

## 2022-08-02 DIAGNOSIS — J43.2 CENTRILOBULAR EMPHYSEMA: ICD-10-CM

## 2022-08-02 DIAGNOSIS — E03.9 ACQUIRED HYPOTHYROIDISM: ICD-10-CM

## 2022-08-02 DIAGNOSIS — I71.40 ABDOMINAL AORTIC ANEURYSM (AAA) WITHOUT RUPTURE: ICD-10-CM

## 2022-08-02 DIAGNOSIS — I73.9 PVD (PERIPHERAL VASCULAR DISEASE) WITH CLAUDICATION: ICD-10-CM

## 2022-08-02 DIAGNOSIS — E11.43 TYPE 2 DIABETES MELLITUS WITH DIABETIC AUTONOMIC NEUROPATHY, WITHOUT LONG-TERM CURRENT USE OF INSULIN: ICD-10-CM

## 2022-08-02 DIAGNOSIS — E78.2 MIXED HYPERLIPIDEMIA: Primary | ICD-10-CM

## 2022-08-02 DIAGNOSIS — I50.32 CHRONIC DIASTOLIC CONGESTIVE HEART FAILURE: ICD-10-CM

## 2022-08-02 DIAGNOSIS — D50.8 IRON DEFICIENCY ANEMIA SECONDARY TO INADEQUATE DIETARY IRON INTAKE: ICD-10-CM

## 2022-08-02 DIAGNOSIS — I25.810 CORONARY ARTERY DISEASE INVOLVING CORONARY BYPASS GRAFT OF NATIVE HEART WITHOUT ANGINA PECTORIS: ICD-10-CM

## 2022-08-02 DIAGNOSIS — M1A.0790 CHRONIC GOUT OF FOOT, UNSPECIFIED CAUSE, UNSPECIFIED LATERALITY: ICD-10-CM

## 2022-08-02 DIAGNOSIS — Z12.5 SCREENING PSA (PROSTATE SPECIFIC ANTIGEN): ICD-10-CM

## 2022-08-02 PROCEDURE — 99214 OFFICE O/P EST MOD 30 MIN: CPT | Performed by: INTERNAL MEDICINE

## 2022-08-02 RX ORDER — PROBENECID 500 MG/1
500 TABLET, FILM COATED ORAL DAILY
COMMUNITY
Start: 2022-08-01

## 2022-08-02 RX ORDER — ESCITALOPRAM OXALATE 10 MG/1
10 TABLET ORAL DAILY
Qty: 90 TABLET | Refills: 3 | Status: SHIPPED | OUTPATIENT
Start: 2022-08-02 | End: 2022-09-13

## 2022-08-02 RX ORDER — CLOBETASOL PROPIONATE 0.5 MG/G
OINTMENT TOPICAL
COMMUNITY
End: 2022-08-02 | Stop reason: SDUPTHER

## 2022-08-02 RX ORDER — CLOBETASOL PROPIONATE 0.5 MG/G
OINTMENT TOPICAL
COMMUNITY
Start: 2022-07-08 | End: 2022-10-13

## 2022-08-02 RX ORDER — ROSUVASTATIN CALCIUM 20 MG/1
20 TABLET, COATED ORAL DAILY
Qty: 90 TABLET | Refills: 3 | Status: SHIPPED | OUTPATIENT
Start: 2022-08-02 | End: 2022-08-05 | Stop reason: SDUPTHER

## 2022-08-02 NOTE — PROGRESS NOTES
"Chief Complaint/ HPI: Patient is here to follow-up with coronary artery disease depression anxiety, breathing issues cholesterol issues gout, kidney disease, lab work is being reviewed, he seen his rheumatologist, he continues on his gout medications, and vitamin B12,          Objective   Vital Signs  Vitals:    08/02/22 1314   BP: 151/74   Pulse: 67   Temp: 97.6 °F (36.4 °C)   SpO2: 97%   Weight: 102 kg (224 lb 9.6 oz)   Height: 175.3 cm (69.02\")      Body mass index is 33.15 kg/m².  Review of Systems   Constitutional: Positive for unexpected weight loss.   HENT: Negative.    Eyes: Negative.    Respiratory: Negative.    Cardiovascular: Negative.    Gastrointestinal: Negative.    Endocrine: Negative.    Genitourinary: Negative.    Musculoskeletal: Negative.    Allergic/Immunologic: Negative.    Neurological: Positive for weakness.   Hematological: Negative.    Psychiatric/Behavioral: Negative.       Physical Exam  Constitutional:       General: He is not in acute distress.     Appearance: Normal appearance.   HENT:      Head: Normocephalic.      Mouth/Throat:      Mouth: Mucous membranes are moist.   Eyes:      Conjunctiva/sclera: Conjunctivae normal.      Pupils: Pupils are equal, round, and reactive to light.   Cardiovascular:      Rate and Rhythm: Normal rate and regular rhythm.      Pulses: Normal pulses.      Heart sounds: Normal heart sounds.   Pulmonary:      Effort: Pulmonary effort is normal.      Breath sounds: Normal breath sounds.   Abdominal:      General: Abdomen is flat. Bowel sounds are normal.      Palpations: Abdomen is soft.   Musculoskeletal:         General: No swelling. Normal range of motion.      Cervical back: Neck supple.   Skin:     General: Skin is warm and dry.      Coloration: Skin is not jaundiced.   Neurological:      General: No focal deficit present.      Mental Status: He is alert and oriented to person, place, and time. Mental status is at baseline.   Psychiatric:         Mood and " Affect: Mood normal.         Behavior: Behavior normal.         Thought Content: Thought content normal.         Judgment: Judgment normal.        Result Review :   Lab Results   Component Value Date    PROBNP 915.0 (H) 07/27/2022    PROBNP 1,455.0 (H) 05/24/2022    PROBNP 2,841.0 (H) 05/10/2022    BNP 1937 (H) 03/10/2021    BNP 1128 (H) 11/02/2020    BNP 1802 (H) 10/14/2020     CMP    CMP 5/24/22 7/27/22 7/28/22 7/28/22      0917 0917   Glucose 87 76     BUN 18 21  23   Creatinine 1.48 (A) 1.82 (A) 1.72 (A)    Sodium 142 145     Potassium 4.9 4.5     Chloride 104 106     Calcium 9.5 9.6     Albumin 3.90 4.20     Total Bilirubin 0.3 0.5     Alkaline Phosphatase 41 41     AST (SGOT) 23 22     ALT (SGPT) 14 11     (A) Abnormal value            CBC w/diff    CBC w/Diff 12/8/21 5/24/22 7/27/22   WBC 12.60 (A) 8.97 11.26 (A)   RBC 5.11 5.13 5.71   Hemoglobin 14.0 13.6 15.4   Hematocrit 43.2 43.1 47.5   MCV 84.5 84.0 83.2   MCH 27.4 26.5 (A) 27.0   MCHC 32.4 31.6 32.4   RDW 15.6 (A) 15.5 (A) 15.0   Platelets 310 195 203   Neutrophil Rel % 65.2 59.4 53.6   Immature Granulocyte Rel % 0.7 (A) 0.4 0.4   Lymphocyte Rel % 20.4 25.0 31.9   Monocyte Rel % 8.6 8.7 9.1   Eosinophil Rel % 4.4 5.8 4.2   Basophil Rel % 0.7 0.7 0.8   (A) Abnormal value             Lipid Panel    Lipid Panel 11/30/21 1/25/22 7/27/22   Total Cholesterol 123 120 151   Triglycerides 239 (A) 159 (A) 127   HDL Cholesterol 18 (A) 24 (A) 34 (A)   VLDL Cholesterol 39 28 23   LDL Cholesterol  66 68 94   LDL/HDL Ratio 3.18 2.68 2.69   (A) Abnormal value             Lab Results   Component Value Date    TSH 3.290 07/27/2022    TSH 0.617 11/30/2021    TSH 1.530 07/27/2021      Lab Results   Component Value Date    FREET4 1.54 07/27/2022    FREET4 1.99 (H) 11/30/2021    FREET4 1.44 07/27/2021      A1C Last 3 Results    HGBA1C Last 3 Results 11/30/21 7/27/22   Hemoglobin A1C 6.21 (A) 6.10 (A)   (A) Abnormal value             PSA    PSA 7/28/22   PSA 0.680                           Visit Diagnoses:    ICD-10-CM ICD-9-CM   1. Mixed hyperlipidemia  E78.2 272.2   2. Type 2 diabetes mellitus with diabetic autonomic neuropathy, without long-term current use of insulin ([facility])  E11.43 250.60     337.1   3. Centrilobular emphysema ([facility])  J43.2 492.8   4. Coronary artery disease involving coronary bypass graft of native heart without angina pectoris  I25.810 414.05   5. Abdominal aortic aneurysm (AAA) without rupture ([facility])  I71.4 441.4   6. PVD (peripheral vascular disease) with claudication ([facility])  I73.9 443.9   7. Chronic diastolic congestive heart failure ([facility])  I50.32 428.32     428.0   8. Iron deficiency anemia secondary to inadequate dietary iron intake  D50.8 280.1   9. Acquired hypothyroidism  E03.9 244.9   10. Screening PSA (prostate specific antigen)  Z12.5 V76.44   11. Chronic gout of foot, unspecified cause, unspecified laterality  M1A.0790 274.02   12. Cancer ([facility])  C80.1 199.1       Assessment and Plan   Diagnoses and all orders for this visit:    1. Mixed hyperlipidemia (Primary)  -     CBC & Differential; Future  -     Comprehensive Metabolic Panel; Future  -     Hemoglobin A1c; Future  -     Lipid Panel; Future  -     TSH+Free T4; Future    2. Type 2 diabetes mellitus with diabetic autonomic neuropathy, without long-term current use of insulin ([facility])  -     CBC & Differential; Future  -     Comprehensive Metabolic Panel; Future  -     Hemoglobin A1c; Future  -     Lipid Panel; Future  -     TSH+Free T4; Future    3. Centrilobular emphysema (HCC)  -     CBC & Differential; Future  -     Comprehensive Metabolic Panel; Future  -     Hemoglobin A1c; Future  -     Lipid Panel; Future  -     TSH+Free T4; Future    4. Coronary artery disease involving coronary bypass graft of native heart without angina pectoris  -     CBC & Differential; Future  -     Comprehensive Metabolic Panel; Future  -     Hemoglobin A1c; Future  -     Lipid Panel; Future  -     TSH+Free T4; Future    5.  Abdominal aortic aneurysm (AAA) without rupture (HCC)  -     CBC & Differential; Future  -     Comprehensive Metabolic Panel; Future  -     Hemoglobin A1c; Future  -     Lipid Panel; Future  -     TSH+Free T4; Future    6. PVD (peripheral vascular disease) with claudication (HCC)  -     CBC & Differential; Future  -     Comprehensive Metabolic Panel; Future  -     Hemoglobin A1c; Future  -     Lipid Panel; Future  -     TSH+Free T4; Future    7. Chronic diastolic congestive heart failure (HCC)  -     CBC & Differential; Future  -     Comprehensive Metabolic Panel; Future  -     Hemoglobin A1c; Future  -     Lipid Panel; Future  -     TSH+Free T4; Future    8. Iron deficiency anemia secondary to inadequate dietary iron intake  -     CBC & Differential; Future  -     Comprehensive Metabolic Panel; Future  -     Hemoglobin A1c; Future  -     Lipid Panel; Future  -     TSH+Free T4; Future    9. Acquired hypothyroidism  -     CBC & Differential; Future  -     Comprehensive Metabolic Panel; Future  -     Hemoglobin A1c; Future  -     Lipid Panel; Future  -     TSH+Free T4; Future    10. Screening PSA (prostate specific antigen)  -     CBC & Differential; Future  -     Comprehensive Metabolic Panel; Future  -     Hemoglobin A1c; Future  -     Lipid Panel; Future  -     TSH+Free T4; Future    11. Chronic gout of foot, unspecified cause, unspecified laterality  -     CBC & Differential; Future  -     Comprehensive Metabolic Panel; Future  -     Hemoglobin A1c; Future  -     Lipid Panel; Future  -     TSH+Free T4; Future    12. Cancer (HCC)  -     CBC & Differential; Future  -     Comprehensive Metabolic Panel; Future  -     Hemoglobin A1c; Future  -     Lipid Panel; Future  -     TSH+Free T4; Future    Other orders  -     rosuvastatin (Crestor) 20 MG tablet; Take 1 tablet by mouth Daily.  Dispense: 90 tablet; Refill: 3  -     escitalopram (Lexapro) 10 MG tablet; Take 1 tablet by mouth Daily.  Dispense: 90 tablet; Refill:  3    rash---, upper chest area, had been to the ER, had gotten creams, saw Dr. Mays dermatology got a shot of steroids,, change steroid creams, July 2022, does have a follow-up, August 2022, somewhat improved already,, patient can stop doxepin Pepcid and other medicines for itching    Patient concerns wife concerns about recent visit with cardiology and echocardiogram results, recently started on losartan 25 mg daily, discussed need for follow-up lab work in 1 to 2 weeks after starting due to renal issues CKD stage IIIa,    Obstructive jaundice, MRCP shows choledocholithiasis, intra and extrahepatic biliary duct dilation December 6, 2021,, patient underwent ERCP and had stone removal on 2 different occasions, once initially by Dr. Hernandez and subsequently by Dr. Nieves, with biliary stent initially, patient says he is doing better overall,    Hospital stay transitional care visit October 14 through October 20, 2021 with acute right lower lobe pneumonia, acute hypoxic respiratory failure shortness of breath CT scan showed groundglass opacity coronavirus testing ??2 was negative in the hospital, patient was put back on Lasix daily sent home on prednisone and antibiotics Levaquin for 7 days following this hospital stay, he did not qualify for home O2 with 6 minute walk test in the room prior to discharge, he had acute diastolic heart failure in the hospital all medications are reviewed, blood sugars are doing much better over the past week or so,    transtional care visit from repair LEFT FEM. --RECONTRUCTION, AND ATERECTOMY---SEPT 14, 2020, ---PERIPHERAL ASO ---Patient is having claudication and leg pain with weakness, arterial evaluation shows bilateral proximal level occlusive disease either just below the aorta or at the aortic repair level, ---Patient is status post left femoral iliac artery atherectomy grafting and angioplasty by Dr. Nam at [facility], September 14, 2020, patient is doing better  overall,--------, carotid ultrasound shows no significant stenosis,----- August 2020 compared to previous and prior ultrasounds,-    [facility] , hosp 8/5/2020 for R LL PNEUMONIA, AND ACUTE DIASTOLIC CHF, LASHA ON CKD 3---Patient presented with acute hypercapnic respiratory failure, pneumonia, was treated with IV antibiotics, IV steroids in the hospital due to wheezing that developed, he had low potassium that was replaced his anemia was monitored he was kept on oxygen initially he had a follow-up chest x-ray August 7 showing no active disease his white count is still a little high at 13, he says his breathing well he is taking his breathing treatments currently, he has follow-up with pulmonology tomorrow August 12, his proBNP level is down from 6000 500-4600 during his hospital course, he is currently off antibiotics and off steroids currently, it seems to be getting back to baseline    Back pain/ spinal stenosis --     MILD DEPRESSION _continues lexapro 5 mg daily, will increase dose, August 2, 2022    Type 2 Diabetes , continues on Levemir 40 units daily and Metformin 500mg bid-hemoglobin A1c 6.1 July 2022    H/O Colon cancer PARTIAL COLECTOMY 2010 - , CEA elevated now at 7.7, -- Dr. Ibanez---Colonoscopy November 2018 unremarkable----CEA level 2.5 April 2020    B12 deficiency continues on over-the-counter replacement therapy    sees sleep specialist - on CPAP;     Angina, cardiac catheter jan 2018, with 80-90% stenosis circumflex proximal to vein graft, LIMA graft to LAD was patent, normal ejection fraction January 2018, patient had elective semi-elective stent placement to the circumflex artery, JAN 23, 2018, --Patient currently off BRILLINTA and on Plavix with much improved and breathing status as of July 2018----previous echo October 2020 showed mild mitral valve annular calcification, no significant prolapse, or regurgitation, ejection fraction 55 to 60% diastolic dysfunction noted otherwise unremarkable,  current echo shows some inferior wall basilar wall posterior wall hypokinesis with a slightly depressed ejection fraction of 46 to 50%, April 2022, mild mitral regurgitation and tricuspid regurgitation, discussed with patient wife April 14, 2022,    R HIP PAIN, GLUTEUS MUSCLE , PARASPINAL MUSCLE ATROPHY, S/P INFARCTION AFTER AAA REPAIR IN 3/13-, status post recent radiofrequency ablation X 2, by pain management much improved, continues on gabapentin 600 mg twice a day,and morphine long-acting twice a day, 30 mg    Elevated cholesterol--continues Lipitor 20 mg daily, will change to Crestor 20, August 2, 2022, LDL cholesterol still 94    CAROTID ASO, HAS YRLY F/U WITH VASC SURG ---    HYPOTHRYOIDISM--continues levothyroxine 0.125 mg qd     HTN----continues Norvasc 2.5 mg daily,  Lasix 40 mg QD  , metoprolol extended release 50 mg BID    CKD 3-a- stable --, creatinine stable at 1.7, July 2022    GOUT, continues ALLOPURINOL 150 MG QD, colchicine,0.1 PRN ----Dr. Gonzalez rheumatology for second opinion uric acid stable at 6.6 July 2022    PSA at 0.6, July 2022          Follow Up   Return in about 4 months (around 12/2/2022).  Patient was given instructions and counseling regarding his condition or for health maintenance advice. Please see specific information pulled into the AVS if appropriate.

## 2022-08-05 RX ORDER — ROSUVASTATIN CALCIUM 20 MG/1
20 TABLET, COATED ORAL DAILY
Qty: 90 TABLET | Refills: 3 | Status: SHIPPED | OUTPATIENT
Start: 2022-08-05

## 2022-08-05 NOTE — TELEPHONE ENCOUNTER
Caller: KatarzynaSarahester SMILEY    Relationship: Self    Best call back number: [phone]    Requested Prescriptions:   Requested Prescriptions     Pending Prescriptions Disp Refills   • rosuvastatin (Crestor) 20 MG tablet 90 tablet 3     Sig: Take 1 tablet by mouth Daily.        Pharmacy where request should be sent: [id number] [place] [facility] [address] AT [facility] [phone] [facility] [phone] FX     Additional details provided by patient: PHARMACY NEVER RECEIVED THIS MEDICATION. PATIENT NEEDS THIS SENT ASAP    Does the patient have less than a 3 day supply:  [x] Yes  [] No    Thao Dominguez Rep   08/05/22 11:41 EDT

## 2022-09-07 ENCOUNTER — TELEPHONE (OUTPATIENT)
Dept: INTERNAL MEDICINE | Facility: CLINIC | Age: 72
End: 2022-09-07

## 2022-09-07 DIAGNOSIS — R41.82 ALTERED MENTAL STATUS, UNSPECIFIED ALTERED MENTAL STATUS TYPE: ICD-10-CM

## 2022-09-07 DIAGNOSIS — I25.10 CORONARY ARTERY DISEASE, UNSPECIFIED VESSEL OR LESION TYPE, UNSPECIFIED WHETHER ANGINA PRESENT, UNSPECIFIED WHETHER NATIVE OR TRANSPLANTED HEART: Primary | ICD-10-CM

## 2022-09-07 DIAGNOSIS — Z12.5 SPECIAL SCREENING FOR MALIGNANT NEOPLASM OF PROSTATE: ICD-10-CM

## 2022-09-07 DIAGNOSIS — E11.43 TYPE 2 DIABETES MELLITUS WITH DIABETIC AUTONOMIC NEUROPATHY, WITHOUT LONG-TERM CURRENT USE OF INSULIN: ICD-10-CM

## 2022-09-07 DIAGNOSIS — R53.83 LETHARGY: ICD-10-CM

## 2022-09-07 DIAGNOSIS — R53.83 FATIGUE, UNSPECIFIED TYPE: ICD-10-CM

## 2022-09-07 NOTE — TELEPHONE ENCOUNTER
Call patient's wife, tell her to do some blood test,    Put in an order for a CBC, PSA, comprehensive profile, TSH, free T4, B12, folate, hemoglobin A1c and have him do those, diagnosis==is altered mental status, lethargy fatigue, diabetes and coronary artery disease    Then make him an appointment to see me next week

## 2022-09-07 NOTE — TELEPHONE ENCOUNTER
Provider: DANTE KIM  Caller: KATHRYN GREENBERG  Relationship to Patient: WIFE  Pharmacy:   Phone Number: [phone]   Reason for Call: CALLER  STATED THAT SHE TALKED TO YOU ABOUT PATIENT SLEEPING A LOT AND VERY TIRED. SHE STATED YOU TOLD HER IF IT CONTINUED, TO CALL YOU AND YOU WOULD ORDER SOME TESTS. NOTHING HAS CHANGED    PLEASE CALL AND ADVISE

## 2022-09-07 NOTE — TELEPHONE ENCOUNTER
I called PT wife Sabrina got the PT scheduled & I let her know we were putting in orders for blood tests. The orders have been put in.

## 2022-09-08 ENCOUNTER — LAB (OUTPATIENT)
Dept: LAB | Facility: HOSPITAL | Age: 72
End: 2022-09-08

## 2022-09-08 DIAGNOSIS — Z12.5 SPECIAL SCREENING FOR MALIGNANT NEOPLASM OF PROSTATE: ICD-10-CM

## 2022-09-08 DIAGNOSIS — R53.83 FATIGUE, UNSPECIFIED TYPE: ICD-10-CM

## 2022-09-08 DIAGNOSIS — R53.83 LETHARGY: ICD-10-CM

## 2022-09-08 DIAGNOSIS — I25.10 CORONARY ARTERY DISEASE, UNSPECIFIED VESSEL OR LESION TYPE, UNSPECIFIED WHETHER ANGINA PRESENT, UNSPECIFIED WHETHER NATIVE OR TRANSPLANTED HEART: ICD-10-CM

## 2022-09-08 DIAGNOSIS — R41.82 ALTERED MENTAL STATUS, UNSPECIFIED ALTERED MENTAL STATUS TYPE: ICD-10-CM

## 2022-09-08 DIAGNOSIS — E11.43 TYPE 2 DIABETES MELLITUS WITH DIABETIC AUTONOMIC NEUROPATHY, WITHOUT LONG-TERM CURRENT USE OF INSULIN: ICD-10-CM

## 2022-09-08 LAB
ALBUMIN SERPL-MCNC: 3.7 G/DL (ref 3.5–5.2)
ALBUMIN/GLOB SERPL: 1.3 G/DL
ALP SERPL-CCNC: 35 U/L (ref 39–117)
ALT SERPL W P-5'-P-CCNC: 17 U/L (ref 1–41)
ANION GAP SERPL CALCULATED.3IONS-SCNC: 11.6 MMOL/L (ref 5–15)
AST SERPL-CCNC: 26 U/L (ref 1–40)
BASOPHILS # BLD AUTO: 0.08 10*3/MM3 (ref 0–0.2)
BASOPHILS NFR BLD AUTO: 0.8 % (ref 0–1.5)
BILIRUB SERPL-MCNC: 0.7 MG/DL (ref 0–1.2)
BUN SERPL-MCNC: 20 MG/DL (ref 8–23)
BUN/CREAT SERPL: 10.6 (ref 7–25)
CALCIUM SPEC-SCNC: 9.8 MG/DL (ref 8.6–10.5)
CHLORIDE SERPL-SCNC: 103 MMOL/L (ref 98–107)
CO2 SERPL-SCNC: 30.4 MMOL/L (ref 22–29)
CREAT SERPL-MCNC: 1.88 MG/DL (ref 0.76–1.27)
DEPRECATED RDW RBC AUTO: 45.8 FL (ref 37–54)
EGFRCR SERPLBLD CKD-EPI 2021: 37.3 ML/MIN/1.73
EOSINOPHIL # BLD AUTO: 0.23 10*3/MM3 (ref 0–0.4)
EOSINOPHIL NFR BLD AUTO: 2.2 % (ref 0.3–6.2)
ERYTHROCYTE [DISTWIDTH] IN BLOOD BY AUTOMATED COUNT: 15.3 % (ref 12.3–15.4)
FOLATE SERPL-MCNC: >20 NG/ML (ref 4.78–24.2)
GLOBULIN UR ELPH-MCNC: 2.8 GM/DL
GLUCOSE SERPL-MCNC: 62 MG/DL (ref 65–99)
HBA1C MFR BLD: 6.2 % (ref 4.8–5.6)
HCT VFR BLD AUTO: 45.7 % (ref 37.5–51)
HGB BLD-MCNC: 14.7 G/DL (ref 13–17.7)
IMM GRANULOCYTES # BLD AUTO: 0.05 10*3/MM3 (ref 0–0.05)
IMM GRANULOCYTES NFR BLD AUTO: 0.5 % (ref 0–0.5)
LYMPHOCYTES # BLD AUTO: 2.99 10*3/MM3 (ref 0.7–3.1)
LYMPHOCYTES NFR BLD AUTO: 28.1 % (ref 19.6–45.3)
MCH RBC QN AUTO: 27 PG (ref 26.6–33)
MCHC RBC AUTO-ENTMCNC: 32.2 G/DL (ref 31.5–35.7)
MCV RBC AUTO: 84 FL (ref 79–97)
MONOCYTES # BLD AUTO: 0.76 10*3/MM3 (ref 0.1–0.9)
MONOCYTES NFR BLD AUTO: 7.1 % (ref 5–12)
NEUTROPHILS NFR BLD AUTO: 6.52 10*3/MM3 (ref 1.7–7)
NEUTROPHILS NFR BLD AUTO: 61.3 % (ref 42.7–76)
NRBC BLD AUTO-RTO: 0 /100 WBC (ref 0–0.2)
PLATELET # BLD AUTO: 227 10*3/MM3 (ref 140–450)
PMV BLD AUTO: 12.4 FL (ref 6–12)
POTASSIUM SERPL-SCNC: 4.6 MMOL/L (ref 3.5–5.2)
PROT SERPL-MCNC: 6.5 G/DL (ref 6–8.5)
PSA SERPL-MCNC: 0.68 NG/ML (ref 0–4)
RBC # BLD AUTO: 5.44 10*6/MM3 (ref 4.14–5.8)
SODIUM SERPL-SCNC: 145 MMOL/L (ref 136–145)
T4 FREE SERPL-MCNC: 1.6 NG/DL (ref 0.93–1.7)
TSH SERPL DL<=0.05 MIU/L-ACNC: 1.16 UIU/ML (ref 0.27–4.2)
VIT B12 BLD-MCNC: 1333 PG/ML (ref 211–946)
WBC NRBC COR # BLD: 10.63 10*3/MM3 (ref 3.4–10.8)

## 2022-09-08 PROCEDURE — 36415 COLL VENOUS BLD VENIPUNCTURE: CPT

## 2022-09-08 PROCEDURE — 83036 HEMOGLOBIN GLYCOSYLATED A1C: CPT

## 2022-09-08 PROCEDURE — 85025 COMPLETE CBC W/AUTO DIFF WBC: CPT

## 2022-09-08 PROCEDURE — 80053 COMPREHEN METABOLIC PANEL: CPT

## 2022-09-08 PROCEDURE — 82607 VITAMIN B-12: CPT

## 2022-09-08 PROCEDURE — 84439 ASSAY OF FREE THYROXINE: CPT

## 2022-09-08 PROCEDURE — 82746 ASSAY OF FOLIC ACID SERUM: CPT

## 2022-09-08 PROCEDURE — 84443 ASSAY THYROID STIM HORMONE: CPT

## 2022-09-08 PROCEDURE — G0103 PSA SCREENING: HCPCS

## 2022-09-13 ENCOUNTER — OFFICE VISIT (OUTPATIENT)
Dept: INTERNAL MEDICINE | Facility: CLINIC | Age: 72
End: 2022-09-13

## 2022-09-13 VITALS
HEART RATE: 72 BPM | WEIGHT: 218.8 LBS | OXYGEN SATURATION: 98 % | SYSTOLIC BLOOD PRESSURE: 108 MMHG | TEMPERATURE: 97.2 F | HEIGHT: 69 IN | DIASTOLIC BLOOD PRESSURE: 68 MMHG | BODY MASS INDEX: 32.41 KG/M2

## 2022-09-13 DIAGNOSIS — I50.42 CHRONIC COMBINED SYSTOLIC AND DIASTOLIC CONGESTIVE HEART FAILURE: ICD-10-CM

## 2022-09-13 DIAGNOSIS — I73.9 PVD (PERIPHERAL VASCULAR DISEASE) WITH CLAUDICATION: ICD-10-CM

## 2022-09-13 DIAGNOSIS — R53.83 LETHARGY: ICD-10-CM

## 2022-09-13 DIAGNOSIS — E03.9 ACQUIRED HYPOTHYROIDISM: ICD-10-CM

## 2022-09-13 DIAGNOSIS — E78.2 MIXED HYPERLIPIDEMIA: ICD-10-CM

## 2022-09-13 DIAGNOSIS — R41.82 ALTERED MENTAL STATUS, UNSPECIFIED ALTERED MENTAL STATUS TYPE: ICD-10-CM

## 2022-09-13 DIAGNOSIS — R53.83 FATIGUE, UNSPECIFIED TYPE: ICD-10-CM

## 2022-09-13 DIAGNOSIS — I25.810 CORONARY ARTERY DISEASE INVOLVING CORONARY BYPASS GRAFT OF NATIVE HEART WITHOUT ANGINA PECTORIS: ICD-10-CM

## 2022-09-13 DIAGNOSIS — E03.4 ACQUIRED ATROPHY OF THYROID: ICD-10-CM

## 2022-09-13 DIAGNOSIS — J43.2 CENTRILOBULAR EMPHYSEMA: Primary | ICD-10-CM

## 2022-09-13 DIAGNOSIS — E11.43 TYPE 2 DIABETES MELLITUS WITH DIABETIC AUTONOMIC NEUROPATHY, WITHOUT LONG-TERM CURRENT USE OF INSULIN: ICD-10-CM

## 2022-09-13 PROCEDURE — 99214 OFFICE O/P EST MOD 30 MIN: CPT | Performed by: INTERNAL MEDICINE

## 2022-09-13 RX ORDER — BUPROPION HYDROCHLORIDE 300 MG/1
300 TABLET ORAL DAILY
Qty: 90 TABLET | Refills: 3 | Status: SHIPPED | OUTPATIENT
Start: 2022-09-13 | End: 2022-10-13

## 2022-09-13 NOTE — PROGRESS NOTES
"Chief Complaint/ HPI: Patient is here to follow-up with general malaise not feeling well no energy does not want to do much not sure if it is depression or fatigue, says the medicine we gave him previously Lexapro is not really helping much he was taking it doubled but that seemed to make him worse, so he cut it back to once a day, he continues to lose a little bit of weight his wife is concerned because he sweats in the morning,    Here to review labs,      Objective   Vital Signs  Vitals:    09/13/22 1230   BP: 108/68   Pulse: 72   Temp: 97.2 °F (36.2 °C)   SpO2: 98%   Weight: 99.2 kg (218 lb 12.8 oz)   Height: 175.3 cm (69.02\")      Body mass index is 32.3 kg/m².  Review of Systems   Constitutional: Positive for fatigue and unexpected weight loss.   Neurological: Positive for weakness.      Physical Exam  Constitutional:       General: He is not in acute distress.     Appearance: Normal appearance.   HENT:      Head: Normocephalic.   Eyes:      Conjunctiva/sclera: Conjunctivae normal.      Pupils: Pupils are equal, round, and reactive to light.   Cardiovascular:      Rate and Rhythm: Normal rate and regular rhythm.      Pulses: Normal pulses.      Heart sounds: Normal heart sounds.   Pulmonary:      Effort: Pulmonary effort is normal.      Breath sounds: Normal breath sounds.   Musculoskeletal:         General: No swelling. Normal range of motion.      Cervical back: Neck supple.   Skin:     General: Skin is warm and dry.      Coloration: Skin is not jaundiced.   Neurological:      General: No focal deficit present.      Mental Status: He is alert and oriented to person, place, and time. Mental status is at baseline.   Psychiatric:         Mood and Affect: Mood normal.         Behavior: Behavior normal.         Thought Content: Thought content normal.         Judgment: Judgment normal.        Result Review :   Lab Results   Component Value Date    PROBNP 915.0 (H) 07/27/2022    PROBNP 1,455.0 (H) 05/24/2022    " PROBNP 2,841.0 (H) 05/10/2022    BNP 1937 (H) 03/10/2021    BNP 1128 (H) 11/02/2020    BNP 1802 (H) 10/14/2020     CMP    CMP 7/27/22 7/28/22 7/28/22 9/8/22     0917 0917    Glucose 76   62 (A)   BUN 21  23 20   Creatinine 1.82 (A) 1.72 (A)  1.88 (A)   Sodium 145   145   Potassium 4.5   4.6   Chloride 106   103   Calcium 9.6   9.8   Albumin 4.20   3.70   Total Bilirubin 0.5   0.7   Alkaline Phosphatase 41   35 (A)   AST (SGOT) 22   26   ALT (SGPT) 11   17   (A) Abnormal value            CBC w/diff    CBC w/Diff 5/24/22 7/27/22 9/8/22   WBC 8.97 11.26 (A) 10.63   RBC 5.13 5.71 5.44   Hemoglobin 13.6 15.4 14.7   Hematocrit 43.1 47.5 45.7   MCV 84.0 83.2 84.0   MCH 26.5 (A) 27.0 27.0   MCHC 31.6 32.4 32.2   RDW 15.5 (A) 15.0 15.3   Platelets 195 203 227   Neutrophil Rel % 59.4 53.6 61.3   Immature Granulocyte Rel % 0.4 0.4 0.5   Lymphocyte Rel % 25.0 31.9 28.1   Monocyte Rel % 8.7 9.1 7.1   Eosinophil Rel % 5.8 4.2 2.2   Basophil Rel % 0.7 0.8 0.8   (A) Abnormal value             Lipid Panel    Lipid Panel 11/30/21 1/25/22 7/27/22   Total Cholesterol 123 120 151   Triglycerides 239 (A) 159 (A) 127   HDL Cholesterol 18 (A) 24 (A) 34 (A)   VLDL Cholesterol 39 28 23   LDL Cholesterol  66 68 94   LDL/HDL Ratio 3.18 2.68 2.69   (A) Abnormal value             Lab Results   Component Value Date    TSH 1.160 09/08/2022    TSH 3.290 07/27/2022    TSH 0.617 11/30/2021      Lab Results   Component Value Date    FREET4 1.60 09/08/2022    FREET4 1.54 07/27/2022    FREET4 1.99 (H) 11/30/2021      A1C Last 3 Results    HGBA1C Last 3 Results 11/30/21 7/27/22 9/8/22   Hemoglobin A1C 6.21 (A) 6.10 (A) 6.20 (A)   (A) Abnormal value             PSA    PSA 7/28/22 9/8/22   PSA 0.680 0.678                          Visit Diagnoses:    ICD-10-CM ICD-9-CM   1. Centrilobular emphysema (HCC)  J43.2 492.8   2. Coronary artery disease involving coronary bypass graft of native heart without angina pectoris  I25.810 414.05   3. PVD (peripheral  vascular disease) with claudication (HCC)  I73.9 443.9   4. Mixed hyperlipidemia  E78.2 272.2   5. Altered mental status, unspecified altered mental status type  R41.82 780.97   6. Lethargy  R53.83 780.79   7. Fatigue, unspecified type  R53.83 780.79   8. Type 2 diabetes mellitus with diabetic autonomic neuropathy, without long-term current use of insulin (HCC)  E11.43 250.60     337.1   9. Acquired atrophy of thyroid  E03.4 246.8   10. Chronic combined systolic and diastolic congestive heart failure (HCC)  I50.42 428.42     428.0   11. Acquired hypothyroidism  E03.9 244.9       Assessment and Plan   Diagnoses and all orders for this visit:    1. Centrilobular emphysema (HCC) (Primary)  -     Comprehensive Metabolic Panel; Future  -     CBC & Differential; Future  -     Hemoglobin A1c; Future  -     Lipid Panel; Future  -     TSH+Free T4; Future    2. Coronary artery disease involving coronary bypass graft of native heart without angina pectoris  -     Comprehensive Metabolic Panel; Future  -     CBC & Differential; Future  -     Hemoglobin A1c; Future  -     Lipid Panel; Future  -     TSH+Free T4; Future    3. PVD (peripheral vascular disease) with claudication (HCC)  -     Comprehensive Metabolic Panel; Future  -     CBC & Differential; Future  -     Hemoglobin A1c; Future  -     Lipid Panel; Future  -     TSH+Free T4; Future    4. Mixed hyperlipidemia  -     Comprehensive Metabolic Panel; Future  -     CBC & Differential; Future  -     Hemoglobin A1c; Future  -     Lipid Panel; Future  -     TSH+Free T4; Future    5. Altered mental status, unspecified altered mental status type  -     Comprehensive Metabolic Panel; Future  -     CBC & Differential; Future  -     Hemoglobin A1c; Future  -     Lipid Panel; Future  -     TSH+Free T4; Future    6. Lethargy  -     Comprehensive Metabolic Panel; Future  -     CBC & Differential; Future  -     Hemoglobin A1c; Future  -     Lipid Panel; Future  -     TSH+Free T4;  Future    7. Fatigue, unspecified type  -     Comprehensive Metabolic Panel; Future  -     CBC & Differential; Future  -     Hemoglobin A1c; Future  -     Lipid Panel; Future  -     TSH+Free T4; Future    8. Type 2 diabetes mellitus with diabetic autonomic neuropathy, without long-term current use of insulin (HCC)  -     Comprehensive Metabolic Panel; Future  -     CBC & Differential; Future  -     Hemoglobin A1c; Future  -     Lipid Panel; Future  -     TSH+Free T4; Future    9. Acquired atrophy of thyroid  -     Comprehensive Metabolic Panel; Future  -     CBC & Differential; Future  -     Hemoglobin A1c; Future  -     Lipid Panel; Future  -     TSH+Free T4; Future    10. Chronic combined systolic and diastolic congestive heart failure (HCC)  -     Comprehensive Metabolic Panel; Future  -     CBC & Differential; Future  -     Hemoglobin A1c; Future  -     Lipid Panel; Future  -     TSH+Free T4; Future    11. Acquired hypothyroidism  -     Comprehensive Metabolic Panel; Future  -     CBC & Differential; Future  -     Hemoglobin A1c; Future  -     Lipid Panel; Future  -     TSH+Free T4; Future    Other orders  -     buPROPion XL (Wellbutrin XL) 300 MG 24 hr tablet; Take 1 tablet by mouth Daily.  Dispense: 90 tablet; Refill: 3         Lethargy fatigue, depression, etiology is unclear medications reviewed September 13, 2022,--- lab work noted September 8, 2022----, patient also stopped gabapentin, could consider changing the Wellbutrin to the 300 mg daily and stopping the Lexapro September 13th, 2022    rash---, upper chest area, had been to the ER, had gotten creams, saw Dr. Mays dermatology got a shot of steroids,, change steroid creams, July 2022, does have a follow-up, August 2022, somewhat improved already,, patient can stop doxepin Pepcid and other medicines for itching---    Obstructive jaundice, MRCP shows choledocholithiasis, intra and extrahepatic biliary duct dilation December 6, 2021,, patient underwent  ERCP and had stone removal on 2 different occasions, once initially by Dr. Hernandez and subsequently by Dr. Nieves, with biliary stent initially, patient says he is doing better overall,    Hospital stay transitional care visit October 14 through October 20, 2021 with acute right lower lobe pneumonia, acute hypoxic respiratory failure shortness of breath CT scan showed groundglass opacity coronavirus testing ??2 was negative in the hospital, patient was put back on Lasix daily sent home on prednisone and antibiotics Levaquin for 7 days following this hospital stay, he did not qualify for home O2 with 6 minute walk test in the room prior to discharge, he had acute diastolic heart failure in the hospital all medications are reviewed, blood sugars are doing much better over the past week or so,    transtional care visit from repair LEFT FEM. --RECONTRUCTION, AND ATERECTOMY---SEPT 14, 2020, ---PERIPHERAL ASO ---Patient is having claudication and leg pain with weakness, arterial evaluation shows bilateral proximal level occlusive disease either just below the aorta or at the aortic repair level, ---Patient is status post left femoral iliac artery atherectomy grafting and angioplasty by Dr. Nam at [facility], September 14, 2020, patient is doing better overall,--------, carotid ultrasound shows no significant stenosis,----- August 2020 compared to previous and prior ultrasounds,-    transtional care , hosp 8/5/2020 for R LL PNEUMONIA, AND ACUTE DIASTOLIC CHF, LASHA ON CKD 3---Patient presented with acute hypercapnic respiratory failure, pneumonia, was treated with IV antibiotics, IV steroids in the hospital due to wheezing that developed, he had low potassium that was replaced his anemia was monitored he was kept on oxygen initially he had a follow-up chest x-ray August 7 showing no active disease his white count is still a little high at 13, he says his breathing well he is taking his breathing treatments  currently, he has follow-up with pulmonology tomorrow August 12, his proBNP level is down from 6000 500-4600 during his hospital course, he is currently off antibiotics and off steroids currently, it seems to be getting back to baseline    Back pain/ spinal stenosis --     MILD DEPRESSION _continues lexapro 5 mg daily, bupropion xl 150 mg qd     Type 2 Diabetes , continues on Levemir 40 units daily and Metformin 500mg bid-----hemoglobin A1c 6.1 July 2022, 6.2, September 8, 2022    H/O Colon cancer PARTIAL COLECTOMY 2010 - , CEA elevated now at 7.7, -- Dr. Ibanez---Colonoscopy November 2018 unremarkable----CEA level 2.5 April 2020    B12 deficiency continues on over-the-counter replacement therapy    GENET---sees sleep specialist - on CPAP;     Angina, cardiac catheter jan 2018, with 80-90% stenosis circumflex proximal to vein graft, LIMA graft to LAD was patent, normal ejection fraction January 2018, patient had elective semi-elective stent placement to the circumflex artery, JAN 23, 2018, --Patient currently off BRILLINTA and on Plavix with much improved and breathing status as of July 2018----previous echo October 2020 showed mild mitral valve annular calcification, no significant prolapse, or regurgitation, ejection fraction 55 to 60% diastolic dysfunction noted otherwise unremarkable, current echo shows some inferior wall basilar wall posterior wall hypokinesis with a slightly depressed ejection fraction of 46 to 50%, April 2022, mild mitral regurgitation and tricuspid regurgitation, discussed with patient wife April 14, 2022,    R HIP PAIN, GLUTEUS MUSCLE , PARASPINAL MUSCLE ATROPHY, S/P INFARCTION AFTER AAA REPAIR IN 3/13-, status post recent radiofrequency ablation X 2, by pain management much improved, continues on gabapentin 600 mg twice a day,and morphine long-acting twice a day, 30 mg    Elevated cholesterol--continues Lipitor 20 mg daily, will change to Crestor 20, August 2, 2022, LDL cholesterol still  94    CAROTID ASO, HAS YRLY F/U WITH VASC SURG ---    HYPOTHRYOIDISM--continues levothyroxine 0.125 mg qd     HTN----continues Norvasc 2.5 mg daily,  Lasix 40 mg QD  , metoprolol extended release 50 mg BID    CKD 3-a- stable --, creatinine stable at 1.7, July 2022, up to 1.88 September 2022    GOUT, continues ALLOPURINOL 150 MG QD, colchicine,0.1 PRN ----Dr. Gonzalez rheumatology for second opinion uric acid stable at 6.6 July 2022    PSA at 0.6, July 2022    Follow Up   Return in about 3 months (around 12/13/2022).  Patient was given instructions and counseling regarding his condition or for health maintenance advice. Please see specific information pulled into the AVS if appropriate.

## 2022-10-13 ENCOUNTER — TRANSCRIBE ORDERS (OUTPATIENT)
Dept: LAB | Facility: HOSPITAL | Age: 72
End: 2022-10-13

## 2022-10-13 ENCOUNTER — LAB (OUTPATIENT)
Dept: LAB | Facility: HOSPITAL | Age: 72
End: 2022-10-13

## 2022-10-13 ENCOUNTER — OFFICE VISIT (OUTPATIENT)
Dept: CARDIOLOGY | Facility: CLINIC | Age: 72
End: 2022-10-13

## 2022-10-13 VITALS
WEIGHT: 222.8 LBS | BODY MASS INDEX: 33 KG/M2 | DIASTOLIC BLOOD PRESSURE: 63 MMHG | HEART RATE: 67 BPM | SYSTOLIC BLOOD PRESSURE: 139 MMHG | HEIGHT: 69 IN

## 2022-10-13 DIAGNOSIS — J43.2 CENTRILOBULAR EMPHYSEMA: ICD-10-CM

## 2022-10-13 DIAGNOSIS — I25.10 CAD S/P PERCUTANEOUS CORONARY ANGIOPLASTY: Primary | ICD-10-CM

## 2022-10-13 DIAGNOSIS — R41.82 ALTERED MENTAL STATUS, UNSPECIFIED ALTERED MENTAL STATUS TYPE: ICD-10-CM

## 2022-10-13 DIAGNOSIS — I73.9 PVD (PERIPHERAL VASCULAR DISEASE) WITH CLAUDICATION: ICD-10-CM

## 2022-10-13 DIAGNOSIS — Z98.61 CAD S/P PERCUTANEOUS CORONARY ANGIOPLASTY: Primary | ICD-10-CM

## 2022-10-13 DIAGNOSIS — I10 HYPERTENSION, ESSENTIAL: Chronic | ICD-10-CM

## 2022-10-13 DIAGNOSIS — I71.40 ABDOMINAL AORTIC ANEURYSM (AAA) WITHOUT RUPTURE, UNSPECIFIED PART: ICD-10-CM

## 2022-10-13 DIAGNOSIS — Z79.899 ENCOUNTER FOR LONG-TERM (CURRENT) USE OF OTHER MEDICATIONS: ICD-10-CM

## 2022-10-13 DIAGNOSIS — M10.9 GOUT, UNSPECIFIED CAUSE, UNSPECIFIED CHRONICITY, UNSPECIFIED SITE: ICD-10-CM

## 2022-10-13 DIAGNOSIS — I50.42 CHRONIC COMBINED SYSTOLIC AND DIASTOLIC CONGESTIVE HEART FAILURE: ICD-10-CM

## 2022-10-13 DIAGNOSIS — E78.2 MIXED HYPERLIPIDEMIA: ICD-10-CM

## 2022-10-13 DIAGNOSIS — E11.43 TYPE 2 DIABETES MELLITUS WITH DIABETIC AUTONOMIC NEUROPATHY, WITHOUT LONG-TERM CURRENT USE OF INSULIN: ICD-10-CM

## 2022-10-13 DIAGNOSIS — E03.4 ACQUIRED ATROPHY OF THYROID: ICD-10-CM

## 2022-10-13 DIAGNOSIS — M10.9 GOUT, UNSPECIFIED CAUSE, UNSPECIFIED CHRONICITY, UNSPECIFIED SITE: Primary | ICD-10-CM

## 2022-10-13 DIAGNOSIS — R53.83 LETHARGY: ICD-10-CM

## 2022-10-13 DIAGNOSIS — E78.2 MIXED HYPERLIPIDEMIA: Chronic | ICD-10-CM

## 2022-10-13 DIAGNOSIS — E03.9 ACQUIRED HYPOTHYROIDISM: ICD-10-CM

## 2022-10-13 DIAGNOSIS — I25.810 CORONARY ARTERY DISEASE INVOLVING CORONARY BYPASS GRAFT OF NATIVE HEART WITHOUT ANGINA PECTORIS: ICD-10-CM

## 2022-10-13 DIAGNOSIS — R53.83 FATIGUE, UNSPECIFIED TYPE: ICD-10-CM

## 2022-10-13 PROBLEM — Z98.890 S/P BILATERAL CAROTID ENDARTERECTOMY: Status: ACTIVE | Noted: 2022-10-13

## 2022-10-13 PROCEDURE — 84520 ASSAY OF UREA NITROGEN: CPT

## 2022-10-13 PROCEDURE — 84443 ASSAY THYROID STIM HORMONE: CPT

## 2022-10-13 PROCEDURE — 36415 COLL VENOUS BLD VENIPUNCTURE: CPT

## 2022-10-13 PROCEDURE — 82565 ASSAY OF CREATININE: CPT

## 2022-10-13 PROCEDURE — 99214 OFFICE O/P EST MOD 30 MIN: CPT | Performed by: INTERNAL MEDICINE

## 2022-10-13 PROCEDURE — 84550 ASSAY OF BLOOD/URIC ACID: CPT

## 2022-10-13 PROCEDURE — 84439 ASSAY OF FREE THYROXINE: CPT

## 2022-10-13 NOTE — PROGRESS NOTES
Chief Complaint  Coronary Artery Disease, Follow-up, Congestive Heart Failure, and Hyperlipidemia    Subjective    Patient is doing very well symptomatically has a prior history of CAD with previous CABG stenting, bilateral bifemoral bypass, and dyslipidemia he has not been having chest pain shortness of breath or increased lower extremity edema no myalgias symptoms  Past Medical History:   Diagnosis Date   • Abdominal aortic aneurysm without rupture    • Acute renal failure (ARF) (HCC)     WAS SHORT TERM DIALYSIS, STAGE 3   • Aortic aneurysm (HCC)    • Arthritis    • Atherosclerosis of coronary artery    • Atherosclerotic heart disease of native coronary artery without angina pectoris    • Atrophy of thyroid (acquired)    • Bilateral carotid artery stenosis    • Cancer (HCC)     COLON    • Chronic airway obstruction (HCC)    • Chronic gouty arthritis    • Chronic kidney disease, stage 3 (HCC)    • Controlled diabetes mellitus type II without complication (HCC)    • COPD (chronic obstructive pulmonary disease) (HCC)    • Coronary artery disease    • Coronary artery disease involving coronary bypass graft of native heart without angina pectoris 3/28/2012    with previous bypass surgery (2005 x3) and subsequent stent/PCI of the native circumflex obtuse marginal branch in January 2018   • Depression    • Diabetes mellitus (HCC)    • Disease of liver    • Disease of thyroid gland    • Elevated carcinoembryonic antigen (CEA)    • GERD (gastroesophageal reflux disease)    • Hyperlipidemia    • Hypertension    • Hypertension, essential 7/14/2021   • Hypothyroidism    • Iron deficiency anemia    • Long term (current) use of opiate analgesic    • Lumbar spondylosis    • Malignant neoplasm of colon (HCC)    • Mixed hyperlipidemia 7/14/2021   • On long term drug therapy    • Peripheral artery disease (HCC)    • Polyarthropathy    • Pure hypercholesterolemia    • Rhabdomyolysis    • Sleep apnea     CPAP         Current  Outpatient Medications:   •  amLODIPine (NORVASC) 2.5 MG tablet, , Disp: , Rfl:   •  aspirin 81 MG EC tablet, Take 81 mg by mouth., Disp: , Rfl:   •  atorvastatin (LIPITOR) 20 MG tablet, TAKE 1 TABLET EVERY DAY, Disp: 90 tablet, Rfl: 1  •  Cholecalciferol 50 MCG (2000 UT) tablet, Take 1 tablet by mouth Daily., Disp: , Rfl:   •  clopidogrel (PLAVIX) 75 MG tablet, Take 1 tablet by mouth Daily., Disp: 90 tablet, Rfl: 1  •  co-enzyme Q-10 30 MG capsule, Take 30 mg by mouth Every Night., Disp: , Rfl:   •  colchicine 0.6 MG tablet, Take 0.6 mg by mouth As Needed., Disp: , Rfl:   •  cyanocobalamin (VITAMIN B-12) 1000 MCG tablet, Take 1 tablet by mouth Daily., Disp: , Rfl:   •  fenofibrate (TRICOR) 145 MG tablet, Take 1 tablet by mouth Daily., Disp: 90 tablet, Rfl: 3  •  FeroSul 325 (65 Fe) MG tablet, TAKE 1 TABLET EVERY DAY, Disp: 90 tablet, Rfl: 1  •  folic acid (FOLVITE) 1 MG tablet, TAKE 1 TABLET TWICE DAILY, Disp: 180 tablet, Rfl: 1  •  furosemide (LASIX) 40 MG tablet, Take 1 tablet by mouth Daily., Disp: 90 tablet, Rfl: 3  •  insulin NPH (humuLIN N,novoLIN N) 100 UNIT/ML injection, Inject 40 Units under the skin into the appropriate area as directed Every Night. HOLD INSULIN Wednesday NIGHT BLOOD SUGARS RUN LOW 85-95, Disp: , Rfl:   •  ipratropium-albuterol (DUO-NEB) 0.5-2.5 mg/3 ml nebulizer, As Needed., Disp: , Rfl:   •  levothyroxine (Synthroid) 125 MCG tablet, Take 1 tablet by mouth Daily., Disp: 90 tablet, Rfl: 3  •  metFORMIN (GLUCOPHAGE) 500 MG tablet, TAKE 1 TABLET TWICE DAILY WITH A MEAL, Disp: 180 tablet, Rfl: 1  •  metoprolol succinate XL (TOPROL-XL) 50 MG 24 hr tablet, TAKE 1 TABLET TWICE DAILY, Disp: 180 tablet, Rfl: 3  •  Morphine (MS CONTIN) 30 MG 12 hr tablet, 30 mg 2 (Two) Times a Day., Disp: , Rfl:   •  probenecid (BENEMID) 500 MG tablet, Take 500 mg by mouth Daily., Disp: , Rfl:   •  rosuvastatin (Crestor) 20 MG tablet, Take 1 tablet by mouth Daily., Disp: 90 tablet, Rfl: 3  •  vitamin C (ASCORBIC  "ACID) 500 MG tablet, TAKE 1 TABLET EVERY DAY, Disp: 90 tablet, Rfl: 1    Medications Discontinued During This Encounter   Medication Reason   • allopurinol (ZYLOPRIM) 300 MG tablet *Therapy completed   • budesonide (PULMICORT) 0.5 MG/2ML nebulizer solution *Therapy completed   • buPROPion XL (WELLBUTRIN XL) 150 MG 24 hr tablet *Therapy completed   • buPROPion XL (Wellbutrin XL) 300 MG 24 hr tablet *Therapy completed   • cetirizine (zyrTEC) 10 MG tablet *Therapy completed   • clobetasol (TEMOVATE) 0.05 % ointment *Therapy completed   • clotrimazole-betamethasone (LOTRISONE) 1-0.05 % cream *Therapy completed   • escitalopram (Lexapro) 5 MG tablet *Therapy completed   • famotidine (Pepcid) 20 MG tablet *Therapy completed   • losartan (COZAAR) 25 MG tablet *Therapy completed   • pantoprazole (PROTONIX) 40 MG EC tablet *Therapy completed   • triamcinolone (KENALOG) 0.1 % ointment *Therapy completed     Allergies   Allergen Reactions   • Penicillins Shortness Of Breath   • Ticagrelor Shortness Of Breath   • Penicillin G Provider Review Needed and Unknown - Low Severity        Social History     Tobacco Use   • Smoking status: Former     Packs/day: 1.00     Years: 45.00     Pack years: 45.00     Types: Cigarettes     Quit date: 2005     Years since quittin.0   • Smokeless tobacco: Never   Vaping Use   • Vaping Use: Never used   Substance Use Topics   • Alcohol use: Not Currently     Comment: RARE   • Drug use: Never       Family History   Problem Relation Age of Onset   • Heart failure Mother    • Malig Hyperthermia Neg Hx         Objective     /63   Pulse 67   Ht 175.3 cm (69.02\")   Wt 101 kg (222 lb 12.8 oz)   BMI 32.88 kg/m²       Physical Exam    General Appearance:   · no acute distress  · Alert and oriented x3  HENT:   · lips not cyanotic  · Atraumatic  Neck:  · No jvd   · supple  Respiratory:  · no respiratory distress  · normal breath sounds  · no rales  Cardiovascular:  · Regular rate and " rhythm  · no S3, no S4   · no murmur  · no rub  Extremities  · No cyanosis  · lower extremity edema: none    Skin:   · warm, dry  · No rashes      Result Review :     proBNP   Date Value Ref Range Status   07/27/2022 915.0 (H) 0.0 - 900.0 pg/mL Final     CMP    CMP 7/27/22 7/28/22 7/28/22 9/8/22     0917 0917    Glucose 76   62 (A)   BUN 21  23 20   Creatinine 1.82 (A) 1.72 (A)  1.88 (A)   Sodium 145   145   Potassium 4.5   4.6   Chloride 106   103   Calcium 9.6   9.8   Albumin 4.20   3.70   Total Bilirubin 0.5   0.7   Alkaline Phosphatase 41   35 (A)   AST (SGOT) 22   26   ALT (SGPT) 11   17   (A) Abnormal value            CBC w/diff    CBC w/Diff 5/24/22 7/27/22 9/8/22   WBC 8.97 11.26 (A) 10.63   RBC 5.13 5.71 5.44   Hemoglobin 13.6 15.4 14.7   Hematocrit 43.1 47.5 45.7   MCV 84.0 83.2 84.0   MCH 26.5 (A) 27.0 27.0   MCHC 31.6 32.4 32.2   RDW 15.5 (A) 15.0 15.3   Platelets 195 203 227   Neutrophil Rel % 59.4 53.6 61.3   Immature Granulocyte Rel % 0.4 0.4 0.5   Lymphocyte Rel % 25.0 31.9 28.1   Monocyte Rel % 8.7 9.1 7.1   Eosinophil Rel % 5.8 4.2 2.2   Basophil Rel % 0.7 0.8 0.8   (A) Abnormal value             Lab Results   Component Value Date    TSH 1.160 09/08/2022      Lab Results   Component Value Date    FREET4 1.60 09/08/2022      No results found for: DDIMERQUANT  Magnesium   Date Value Ref Range Status   01/25/2022 2.1 1.6 - 2.4 mg/dL Final      No results found for: DIGOXIN   Lab Results   Component Value Date    TROPONINT <0.01 10/14/2020           Lipid Panel    Lipid Panel 11/30/21 1/25/22 7/27/22   Total Cholesterol 123 120 151   Triglycerides 239 (A) 159 (A) 127   HDL Cholesterol 18 (A) 24 (A) 34 (A)   VLDL Cholesterol 39 28 23   LDL Cholesterol  66 68 94   LDL/HDL Ratio 3.18 2.68 2.69   (A) Abnormal value            No results found for: POCTROP    Results for orders placed in visit on 04/04/22    Adult Transthoracic Echo Complete W/ Cont if Necessary Per Protocol    Interpretation Summary  ·  Left ventricular ejection fraction appears to be 46 - 50%. Left ventricular systolic function is mildly decreased.  · The following left ventricular wall segments are hypokinetic: basal inferolateral, mid inferolateral, mid inferior, basal inferior and basal inferoseptal.  · Left ventricular diastolic function is consistent with (grade III w/high LAP) fixed restrictive pattern.  · The right ventricular cavity is borderline dilated.  · Estimated right ventricular systolic pressure from tricuspid regurgitation is mildly elevated (35-45 mmHg).  · Mild mitral regurgitation is noted  · Left atrial volume is mildly increased.                 Diagnoses and all orders for this visit:    1. CAD S/P percutaneous coronary angioplasty (Primary)  Assessment & Plan:  Patient is doing well no ongoing angina continue with chronic aspirin 81 mg daily and Plavix 75 daily        2. PVD (peripheral vascular disease) with claudication (HCC)  Assessment & Plan:  Bilateral iliofemoral bypass surgery 2013  Left lower extremity stent 2019      3. Abdominal aortic aneurysm (AAA) without rupture, unspecified part    4. Mixed hyperlipidemia  Assessment & Plan:  Patient is on Crestor 20 nightly tolerating well LDL is below 70      5. Hypertension, essential  Assessment & Plan:  Blood pressure in controlled range continue on Toprol 50 daily            Follow Up     Return in about 6 months (around 4/13/2023) for EKG with F/U, Follow with Nikky Swan.          Patient was given instructions and counseling regarding his condition or for health maintenance advice. Please see specific information pulled into the AVS if appropriate.

## 2022-10-14 LAB
BUN SERPL-MCNC: 22 MG/DL (ref 8–23)
CREAT SERPL-MCNC: 1.72 MG/DL (ref 0.76–1.27)
EGFRCR SERPLBLD CKD-EPI 2021: 41.5 ML/MIN/1.73
T4 FREE SERPL-MCNC: 1.48 NG/DL (ref 0.93–1.7)
TSH SERPL DL<=0.05 MIU/L-ACNC: 2.3 UIU/ML (ref 0.27–4.2)
URATE SERPL-MCNC: 5.4 MG/DL (ref 3.4–7)

## 2022-10-28 ENCOUNTER — OFFICE VISIT (OUTPATIENT)
Dept: SLEEP MEDICINE | Facility: HOSPITAL | Age: 72
End: 2022-10-28

## 2022-10-28 VITALS
WEIGHT: 223.4 LBS | SYSTOLIC BLOOD PRESSURE: 163 MMHG | OXYGEN SATURATION: 96 % | HEIGHT: 69 IN | HEART RATE: 41 BPM | BODY MASS INDEX: 33.09 KG/M2 | DIASTOLIC BLOOD PRESSURE: 79 MMHG

## 2022-10-28 DIAGNOSIS — G47.33 OSA ON CPAP: ICD-10-CM

## 2022-10-28 DIAGNOSIS — Z99.89 OSA ON CPAP: ICD-10-CM

## 2022-10-28 PROCEDURE — G0463 HOSPITAL OUTPT CLINIC VISIT: HCPCS

## 2022-10-28 RX ORDER — BUPROPION HYDROCHLORIDE 300 MG/1
TABLET ORAL
COMMUNITY

## 2022-10-28 NOTE — PROGRESS NOTES
[facility]                          Chief Complaint:   Follow up for obstructive sleep apnea    History of present illness:   Subjective      GENET:  PSG 12/05/2016: RDI= 23/h.  He has been on PAP therapy since then.    He was last seen in May 2021.    Reported using his machine regularly but stated that in the middle of the night, he wakes up and feels like his nose is bothering him and is too sensitive so he takes the mask off and then he would put it back on later on.  CPAP helps him overall.    Sleep schedule:: 10 PM-8:30 AM/10 AM      Mask: Nasal which does fit well.  No significant air leak or dry mouth  DME: [employer]    ESS: Total score: 9     HTN:  On Amlodipine, Lasix and Metoprolol.  Blood pressure is controlled.  Is compliant with meds.      REVIEW OF SYSTEMS:   HEENT: Nasal congestion  CARDIOVASCULAR: No chest pain, chest pressure or chest discomfort. No palpitations or edema.   RESPIRATORY: Shortness of breath..   GASTROINTESTINAL: No abdominal bloating or reflux   NEUROLOGICAL/PSYCHOATRY: No depression nor anxiety    Past Medical History:  Past Medical History:   Diagnosis Date   • Abdominal aortic aneurysm without rupture    • Acute renal failure (ARF) (HCC)     WAS SHORT TERM DIALYSIS, STAGE 3   • Aortic aneurysm (HCC)    • Arthritis    • Atherosclerosis of coronary artery    • Atherosclerotic heart disease of native coronary artery without angina pectoris    • Atrophy of thyroid (acquired)    • Bilateral carotid artery stenosis    • Cancer (HCC)     COLON    • Chronic airway obstruction (HCC)    • Chronic gouty arthritis    • Chronic kidney disease, stage 3 ([facility])    • Controlled diabetes mellitus type II without complication (HCC)    • COPD (chronic obstructive pulmonary disease) ([facility])    • Coronary artery disease    • Coronary artery disease involving coronary bypass graft of native heart without angina pectoris 3/28/2012    with previous bypass surgery (2005 x3) and  subsequent stent/PCI of the native circumflex obtuse marginal branch in January 2018   • Depression    • Diabetes mellitus (HCC)    • Disease of liver    • Disease of thyroid gland    • Elevated carcinoembryonic antigen (CEA)    • GERD (gastroesophageal reflux disease)    • Hyperlipidemia    • Hypertension    • Hypertension, essential 7/14/2021   • Hypothyroidism    • Iron deficiency anemia    • Long term (current) use of opiate analgesic    • Lumbar spondylosis    • Malignant neoplasm of colon (HCC)    • Mixed hyperlipidemia 7/14/2021   • On long term drug therapy    • Peripheral artery disease (HCC)    • Polyarthropathy    • Pure hypercholesterolemia    • Rhabdomyolysis    • Sleep apnea     CPAP   ,   Past Surgical History:   Procedure Laterality Date   • ABDOMINAL AORTIC ANEURYSM REPAIR     • ANGIOPLASTY FEMORAL ARTERY Left 9/14/2020    Procedure: AORTOILLOFEMORAL ARTERIOGRAM;  Surgeon: Chula Nam Jr., MD;  Location: [facility] 18/19;  Service: Vascular;  Laterality: Left;   • APPENDECTOMY     • CARDIAC CATHETERIZATION     • CAROTID ENDARTERECTOMY Left 2005   • CAROTID ENDARTERECTOMY Right 2010   • CHOLECYSTECTOMY OPEN     • COLON SURGERY      COLON RESECTION   • COLONOSCOPY  2004,11/2018    Dr. Lamas 2004,    • CORONARY ANGIOPLASTY WITH STENT PLACEMENT     • CORONARY ARTERY BYPASS GRAFT  2005   • ENDOSCOPY     • ERCP N/A 12/15/2021    Procedure: ENDOSCOPIC RETROGRADE CHOLANGIOPANCREATOGRAPHY WITH SPINCTEROTOMY, 9-12MM BALLOON SWEEP, INSERTION OF 10FR 9CM BILIARY STENT;  Surgeon: Eden Hernandez MD;  Location: [facility] ENDOSCOPY;  Service: Gastroenterology;  Laterality: N/A;  BILE DUCT STONES   • ERCP N/A 1/6/2022    Procedure: ENDOSCOPIC RETROGRADE CHOLANGIOPANCREATOGRAPHY WITH STENT REMOVAL, SPYGLASS, AUTOLITH, 8-10MM BALLOON DILATATION, 9-12 AND 12-15MM BALLOON SWEEP;  Surgeon: Wayne Zepeda MD;  Location: [facility] ENDOSCOPY;  Service: Gastroenterology;  Laterality: N/A;  BILE  DUCT STONES   • FEMORAL ENDARTERECTOMY Left 2020    Procedure: LEFT FEMORAL ENDARECTOMY WITHH PATCH ANGIOPLASTY;  Surgeon: Chula Nam Jr., MD;  Location: [facility] OR ;  Service: Vascular;  Laterality: Left;   • GALLBLADDER SURGERY     • RHINOPLASTY Bilateral     70-80% R, 50% L   ,   Social History     Socioeconomic History   • Marital status:    Tobacco Use   • Smoking status: Former     Packs/day: 1.00     Years: 45.00     Pack years: 45.00     Types: Cigarettes     Quit date: 2005     Years since quittin.1   • Smokeless tobacco: Never   Vaping Use   • Vaping Use: Never used   Substance and Sexual Activity   • Alcohol use: Not Currently     Comment: RARE   • Drug use: Never   • Sexual activity: Defer     E-cigarette/Vaping   • E-cigarette/Vaping Use Never User      E-cigarette/Vaping Substances   • Nicotine No    • THC No    • CBD No    • Flavoring No      E-cigarette/Vaping Devices   • Disposable No    • Pre-filled or Refillable Cartridge No    • Refillable Tank No    • Pre-filled Pod No         and Allergies:  Penicillins, Ticagrelor, and Penicillin g    Medication Review:     Current Outpatient Medications:   •  amLODIPine (NORVASC) 2.5 MG tablet, , Disp: , Rfl:   •  aspirin 81 MG EC tablet, Take 81 mg by mouth., Disp: , Rfl:   •  atorvastatin (LIPITOR) 20 MG tablet, TAKE 1 TABLET EVERY DAY, Disp: 90 tablet, Rfl: 1  •  buPROPion XL (WELLBUTRIN XL) 300 MG 24 hr tablet, bupropion HCl  mg 24 hr tablet, extended release  TAKE 1 TABLET BY MOUTH DAILY, Disp: , Rfl:   •  Cholecalciferol 50 MCG (2000 UT) tablet, Take 1 tablet by mouth Daily., Disp: , Rfl:   •  clopidogrel (PLAVIX) 75 MG tablet, Take 1 tablet by mouth Daily., Disp: 90 tablet, Rfl: 1  •  co-enzyme Q-10 30 MG capsule, Take 30 mg by mouth Every Night., Disp: , Rfl:   •  colchicine 0.6 MG tablet, Take 0.6 mg by mouth As Needed., Disp: , Rfl:   •  cyanocobalamin (VITAMIN B-12) 1000 MCG tablet, Take 1 tablet by  "mouth Daily., Disp: , Rfl:   •  fenofibrate (TRICOR) 145 MG tablet, Take 1 tablet by mouth Daily., Disp: 90 tablet, Rfl: 3  •  FeroSul 325 (65 Fe) MG tablet, TAKE 1 TABLET EVERY DAY, Disp: 90 tablet, Rfl: 1  •  folic acid (FOLVITE) 1 MG tablet, TAKE 1 TABLET TWICE DAILY, Disp: 180 tablet, Rfl: 1  •  furosemide (LASIX) 40 MG tablet, Take 1 tablet by mouth Daily., Disp: 90 tablet, Rfl: 3  •  insulin NPH (humuLIN N,novoLIN N) 100 UNIT/ML injection, Inject 40 Units under the skin into the appropriate area as directed Every Night. HOLD INSULIN Wednesday NIGHT BLOOD SUGARS RUN LOW 85-95, Disp: , Rfl:   •  ipratropium-albuterol (DUO-NEB) 0.5-2.5 mg/3 ml nebulizer, As Needed., Disp: , Rfl:   •  levothyroxine (Synthroid) 125 MCG tablet, Take 1 tablet by mouth Daily., Disp: 90 tablet, Rfl: 3  •  metFORMIN (GLUCOPHAGE) 500 MG tablet, TAKE 1 TABLET TWICE DAILY WITH A MEAL, Disp: 180 tablet, Rfl: 1  •  metoprolol succinate XL (TOPROL-XL) 50 MG 24 hr tablet, TAKE 1 TABLET TWICE DAILY, Disp: 180 tablet, Rfl: 3  •  Morphine (MS CONTIN) 30 MG 12 hr tablet, 30 mg 2 (Two) Times a Day., Disp: , Rfl:   •  probenecid (BENEMID) 500 MG tablet, Take 500 mg by mouth Daily., Disp: , Rfl:   •  rosuvastatin (Crestor) 20 MG tablet, Take 1 tablet by mouth Daily., Disp: 90 tablet, Rfl: 3  •  vitamin C (ASCORBIC ACID) 500 MG tablet, TAKE 1 TABLET EVERY DAY, Disp: 90 tablet, Rfl: 1      Objective   Vital Signs:  Vitals:    10/28/22 0900   BP: 163/79   Pulse: (!) 41   SpO2: 96%   Weight: 101 kg (223 lb 6.4 oz)   Height: 175.3 cm (69.02\")     Body mass index is 32.97 kg/m².          Physical Exam:   General Appearance:    Alert, cooperative, in no acute distress   ENMT:  Freidman score 4,   Neck:  Large. Trachea midline. No thyromegaly.   Lungs:     Clear to auscultation,respirations regular, even and                  unlabored    Heart:    Regular rhythm and normal rate, normal S1 and S2, no            Murmur.   Abdomen:     Obese.  Soft.  No " tenderness.  No HSM    Neuro:   Conscious, alert, oriented x3. Appropriate mood and affect.    Extremities:   Moves all extremities well, no edema, no cyanosis, no             Redness              Diagnostic data:    CPAP download showed:  Date: Last 30 days  Usage (days): 100%  Days used>4h: 63%  AHI: 4.8/h  Leak: 0 min and 58 seconds  Usage: 4 h and 38 min  P 90% : 11  Auto CPAP: 10-20 cm H2O    Lab Results   Component Value Date    HGBA1C 6.20 (H) 09/08/2022     Total Cholesterol   Date Value Ref Range Status   07/27/2022 151 0 - 200 mg/dL Final     Triglycerides   Date Value Ref Range Status   07/27/2022 127 0 - 150 mg/dL Final     HDL Cholesterol   Date Value Ref Range Status   07/27/2022 34 (L) 40 - 60 mg/dL Final     Hemoglobin   Date Value Ref Range Status   09/08/2022 14.7 13.0 - 17.7 g/dL Final     CO2   Date Value Ref Range Status   09/08/2022 30.4 (H) 22.0 - 29.0 mmol/L Final        Assessment   1. GENET, on CPAP  2. Obesity, BMI 32  3. HTN        PLAN:  Borderline compliant with therapy due to nasal discomfort with the current nasal mask and therefore we will refit him with a new mask, probably a full facemask.    Counseled for weight loss.  Encouraged to exercise regularly and cut down on carbohydrates.  Discussed that losing weight may decrease the severity of sleep apnea and obviate the need of CPAP therapy.    Discussed the association between obstructive sleep apnea and cardiovascular disease including HTN and the beneficial effect of Pap therapy in reducing the risk of major cardiovascular events.                  This note was dictated utilizing Dragon dictation

## 2022-11-23 RX ORDER — CLOPIDOGREL BISULFATE 75 MG/1
TABLET ORAL
Qty: 90 TABLET | Refills: 1 | Status: SHIPPED | OUTPATIENT
Start: 2022-11-23

## 2022-11-29 ENCOUNTER — LAB (OUTPATIENT)
Dept: LAB | Facility: HOSPITAL | Age: 72
End: 2022-11-29

## 2022-11-29 DIAGNOSIS — E03.9 ACQUIRED HYPOTHYROIDISM: ICD-10-CM

## 2022-11-29 DIAGNOSIS — J43.2 CENTRILOBULAR EMPHYSEMA: ICD-10-CM

## 2022-11-29 DIAGNOSIS — I50.32 CHRONIC DIASTOLIC CONGESTIVE HEART FAILURE: ICD-10-CM

## 2022-11-29 DIAGNOSIS — C80.1 CANCER: ICD-10-CM

## 2022-11-29 DIAGNOSIS — R41.82 ALTERED MENTAL STATUS, UNSPECIFIED ALTERED MENTAL STATUS TYPE: ICD-10-CM

## 2022-11-29 DIAGNOSIS — E78.2 MIXED HYPERLIPIDEMIA: ICD-10-CM

## 2022-11-29 DIAGNOSIS — R53.83 FATIGUE, UNSPECIFIED TYPE: ICD-10-CM

## 2022-11-29 DIAGNOSIS — R53.83 LETHARGY: ICD-10-CM

## 2022-11-29 DIAGNOSIS — I73.9 PVD (PERIPHERAL VASCULAR DISEASE) WITH CLAUDICATION: ICD-10-CM

## 2022-11-29 DIAGNOSIS — M1A.0790 CHRONIC GOUT OF FOOT, UNSPECIFIED CAUSE, UNSPECIFIED LATERALITY: ICD-10-CM

## 2022-11-29 DIAGNOSIS — D50.8 IRON DEFICIENCY ANEMIA SECONDARY TO INADEQUATE DIETARY IRON INTAKE: ICD-10-CM

## 2022-11-29 DIAGNOSIS — I71.40 ABDOMINAL AORTIC ANEURYSM (AAA) WITHOUT RUPTURE: ICD-10-CM

## 2022-11-29 DIAGNOSIS — I50.42 CHRONIC COMBINED SYSTOLIC AND DIASTOLIC CONGESTIVE HEART FAILURE: ICD-10-CM

## 2022-11-29 DIAGNOSIS — Z12.5 SCREENING PSA (PROSTATE SPECIFIC ANTIGEN): ICD-10-CM

## 2022-11-29 DIAGNOSIS — E03.4 ACQUIRED ATROPHY OF THYROID: ICD-10-CM

## 2022-11-29 DIAGNOSIS — E11.43 TYPE 2 DIABETES MELLITUS WITH DIABETIC AUTONOMIC NEUROPATHY, WITHOUT LONG-TERM CURRENT USE OF INSULIN: ICD-10-CM

## 2022-11-29 DIAGNOSIS — I25.810 CORONARY ARTERY DISEASE INVOLVING CORONARY BYPASS GRAFT OF NATIVE HEART WITHOUT ANGINA PECTORIS: ICD-10-CM

## 2022-11-29 LAB
BASOPHILS # BLD AUTO: 0.09 10*3/MM3 (ref 0–0.2)
BASOPHILS NFR BLD AUTO: 0.7 % (ref 0–1.5)
DEPRECATED RDW RBC AUTO: 46.3 FL (ref 37–54)
EOSINOPHIL # BLD AUTO: 0.44 10*3/MM3 (ref 0–0.4)
EOSINOPHIL NFR BLD AUTO: 3.3 % (ref 0.3–6.2)
ERYTHROCYTE [DISTWIDTH] IN BLOOD BY AUTOMATED COUNT: 14.6 % (ref 12.3–15.4)
HCT VFR BLD AUTO: 46.3 % (ref 37.5–51)
HGB BLD-MCNC: 15.1 G/DL (ref 13–17.7)
IMM GRANULOCYTES # BLD AUTO: 0.09 10*3/MM3 (ref 0–0.05)
IMM GRANULOCYTES NFR BLD AUTO: 0.7 % (ref 0–0.5)
LYMPHOCYTES # BLD AUTO: 3.24 10*3/MM3 (ref 0.7–3.1)
LYMPHOCYTES NFR BLD AUTO: 24.1 % (ref 19.6–45.3)
MCH RBC QN AUTO: 28.2 PG (ref 26.6–33)
MCHC RBC AUTO-ENTMCNC: 32.6 G/DL (ref 31.5–35.7)
MCV RBC AUTO: 86.4 FL (ref 79–97)
MONOCYTES # BLD AUTO: 1.04 10*3/MM3 (ref 0.1–0.9)
MONOCYTES NFR BLD AUTO: 7.7 % (ref 5–12)
NEUTROPHILS NFR BLD AUTO: 63.5 % (ref 42.7–76)
NEUTROPHILS NFR BLD AUTO: 8.56 10*3/MM3 (ref 1.7–7)
NRBC BLD AUTO-RTO: 0 /100 WBC (ref 0–0.2)
PLATELET # BLD AUTO: 215 10*3/MM3 (ref 140–450)
PMV BLD AUTO: 11.6 FL (ref 6–12)
RBC # BLD AUTO: 5.36 10*6/MM3 (ref 4.14–5.8)
T4 FREE SERPL-MCNC: 1.47 NG/DL (ref 0.93–1.7)
TSH SERPL DL<=0.05 MIU/L-ACNC: 3.66 UIU/ML (ref 0.27–4.2)
WBC NRBC COR # BLD: 13.46 10*3/MM3 (ref 3.4–10.8)

## 2022-11-29 PROCEDURE — 84443 ASSAY THYROID STIM HORMONE: CPT

## 2022-11-29 PROCEDURE — 83036 HEMOGLOBIN GLYCOSYLATED A1C: CPT

## 2022-11-29 PROCEDURE — 80053 COMPREHEN METABOLIC PANEL: CPT

## 2022-11-29 PROCEDURE — 85025 COMPLETE CBC W/AUTO DIFF WBC: CPT

## 2022-11-29 PROCEDURE — 80061 LIPID PANEL: CPT

## 2022-11-29 PROCEDURE — 84439 ASSAY OF FREE THYROXINE: CPT

## 2022-11-29 PROCEDURE — 36415 COLL VENOUS BLD VENIPUNCTURE: CPT

## 2022-11-30 LAB
ALBUMIN SERPL-MCNC: 3.9 G/DL (ref 3.5–5.2)
ALBUMIN/GLOB SERPL: 1.5 G/DL
ALP SERPL-CCNC: 45 U/L (ref 39–117)
ALT SERPL W P-5'-P-CCNC: 17 U/L (ref 1–41)
ANION GAP SERPL CALCULATED.3IONS-SCNC: 12.8 MMOL/L (ref 5–15)
AST SERPL-CCNC: 23 U/L (ref 1–40)
BILIRUB SERPL-MCNC: 0.3 MG/DL (ref 0–1.2)
BUN SERPL-MCNC: 25 MG/DL (ref 8–23)
BUN/CREAT SERPL: 14 (ref 7–25)
CALCIUM SPEC-SCNC: 9.7 MG/DL (ref 8.6–10.5)
CHLORIDE SERPL-SCNC: 102 MMOL/L (ref 98–107)
CHOLEST SERPL-MCNC: 124 MG/DL (ref 0–200)
CO2 SERPL-SCNC: 28.2 MMOL/L (ref 22–29)
CREAT SERPL-MCNC: 1.78 MG/DL (ref 0.76–1.27)
EGFRCR SERPLBLD CKD-EPI 2021: 39.5 ML/MIN/1.73
GLOBULIN UR ELPH-MCNC: 2.6 GM/DL
GLUCOSE SERPL-MCNC: 79 MG/DL (ref 65–99)
HBA1C MFR BLD: 5.6 % (ref 4.8–5.6)
HDLC SERPL-MCNC: 35 MG/DL (ref 40–60)
LDLC SERPL CALC-MCNC: 68 MG/DL (ref 0–100)
LDLC/HDLC SERPL: 1.9 {RATIO}
POTASSIUM SERPL-SCNC: 4.3 MMOL/L (ref 3.5–5.2)
PROT SERPL-MCNC: 6.5 G/DL (ref 6–8.5)
SODIUM SERPL-SCNC: 143 MMOL/L (ref 136–145)
TRIGL SERPL-MCNC: 112 MG/DL (ref 0–150)
VLDLC SERPL-MCNC: 21 MG/DL (ref 5–40)

## 2022-12-09 ENCOUNTER — OFFICE VISIT (OUTPATIENT)
Dept: INTERNAL MEDICINE | Facility: CLINIC | Age: 72
End: 2022-12-09

## 2022-12-09 VITALS
TEMPERATURE: 97.7 F | BODY MASS INDEX: 33.15 KG/M2 | OXYGEN SATURATION: 94 % | HEIGHT: 69 IN | HEART RATE: 75 BPM | SYSTOLIC BLOOD PRESSURE: 119 MMHG | DIASTOLIC BLOOD PRESSURE: 75 MMHG | WEIGHT: 223.8 LBS

## 2022-12-09 DIAGNOSIS — I25.810 CORONARY ARTERY DISEASE INVOLVING CORONARY BYPASS GRAFT OF NATIVE HEART WITHOUT ANGINA PECTORIS: ICD-10-CM

## 2022-12-09 DIAGNOSIS — Z98.61 CAD S/P PERCUTANEOUS CORONARY ANGIOPLASTY: ICD-10-CM

## 2022-12-09 DIAGNOSIS — E78.2 MIXED HYPERLIPIDEMIA: ICD-10-CM

## 2022-12-09 DIAGNOSIS — K21.9 GASTROESOPHAGEAL REFLUX DISEASE WITHOUT ESOPHAGITIS: ICD-10-CM

## 2022-12-09 DIAGNOSIS — I25.10 CAD S/P PERCUTANEOUS CORONARY ANGIOPLASTY: ICD-10-CM

## 2022-12-09 DIAGNOSIS — R53.83 LETHARGY: ICD-10-CM

## 2022-12-09 DIAGNOSIS — J43.2 CENTRILOBULAR EMPHYSEMA: ICD-10-CM

## 2022-12-09 DIAGNOSIS — E03.4 ACQUIRED ATROPHY OF THYROID: ICD-10-CM

## 2022-12-09 DIAGNOSIS — Z98.890 S/P BILATERAL CAROTID ENDARTERECTOMY: ICD-10-CM

## 2022-12-09 DIAGNOSIS — D72.829 LEUKOCYTOSIS, UNSPECIFIED TYPE: ICD-10-CM

## 2022-12-09 DIAGNOSIS — I73.9 PVD (PERIPHERAL VASCULAR DISEASE) WITH CLAUDICATION: ICD-10-CM

## 2022-12-09 DIAGNOSIS — C80.1 CANCER: ICD-10-CM

## 2022-12-09 DIAGNOSIS — Z12.5 SCREENING PSA (PROSTATE SPECIFIC ANTIGEN): ICD-10-CM

## 2022-12-09 DIAGNOSIS — N18.31 STAGE 3A CHRONIC KIDNEY DISEASE: Primary | ICD-10-CM

## 2022-12-09 DIAGNOSIS — R53.83 FATIGUE, UNSPECIFIED TYPE: ICD-10-CM

## 2022-12-09 DIAGNOSIS — I73.9 PERIPHERAL ARTERY DISEASE: ICD-10-CM

## 2022-12-09 DIAGNOSIS — R74.8 ELEVATED LIVER ENZYMES: ICD-10-CM

## 2022-12-09 DIAGNOSIS — I65.23 OCCLUSION AND STENOSIS OF BILATERAL CAROTID ARTERIES: ICD-10-CM

## 2022-12-09 DIAGNOSIS — E11.9 TYPE 2 DIABETES MELLITUS WITHOUT COMPLICATION, WITHOUT LONG-TERM CURRENT USE OF INSULIN: ICD-10-CM

## 2022-12-09 DIAGNOSIS — I50.42 CHRONIC COMBINED SYSTOLIC AND DIASTOLIC CONGESTIVE HEART FAILURE: ICD-10-CM

## 2022-12-09 DIAGNOSIS — E11.43 TYPE 2 DIABETES MELLITUS WITH DIABETIC AUTONOMIC NEUROPATHY, WITHOUT LONG-TERM CURRENT USE OF INSULIN: ICD-10-CM

## 2022-12-09 DIAGNOSIS — I71.40 ABDOMINAL AORTIC ANEURYSM (AAA) WITHOUT RUPTURE, UNSPECIFIED PART: ICD-10-CM

## 2022-12-09 DIAGNOSIS — I10 HYPERTENSION, ESSENTIAL: Chronic | ICD-10-CM

## 2022-12-09 PROCEDURE — 99214 OFFICE O/P EST MOD 30 MIN: CPT | Performed by: INTERNAL MEDICINE

## 2022-12-09 RX ORDER — CLOBETASOL PROPIONATE 0.5 MG/G
OINTMENT TOPICAL
COMMUNITY
Start: 2022-11-18

## 2023-02-20 RX ORDER — AMLODIPINE BESYLATE 2.5 MG/1
TABLET ORAL
Qty: 90 TABLET | Refills: 1 | Status: SHIPPED | OUTPATIENT
Start: 2023-02-20

## 2023-03-20 RX ORDER — FOLIC ACID 1 MG/1
TABLET ORAL
Qty: 180 TABLET | Refills: 1 | Status: SHIPPED | OUTPATIENT
Start: 2023-03-20

## 2023-03-20 RX ORDER — FUROSEMIDE 40 MG/1
TABLET ORAL
Qty: 90 TABLET | Refills: 3 | Status: SHIPPED | OUTPATIENT
Start: 2023-03-20

## 2023-03-22 ENCOUNTER — TELEPHONE (OUTPATIENT)
Dept: INTERNAL MEDICINE | Facility: CLINIC | Age: 73
End: 2023-03-22
Payer: MEDICARE

## 2023-03-22 DIAGNOSIS — F51.01 PRIMARY INSOMNIA: Primary | ICD-10-CM

## 2023-03-22 RX ORDER — TEMAZEPAM 15 MG/1
15 CAPSULE ORAL NIGHTLY PRN
Qty: 30 CAPSULE | Refills: 5 | Status: SHIPPED | OUTPATIENT
Start: 2023-03-22

## 2023-03-22 NOTE — TELEPHONE ENCOUNTER
Caller: Radhames Colón    Relationship: Self    Best call back number: [phone]    What medication are you requesting: UNKNOWN    What are your current symptoms: SLEEPLESS NIGHTS OR 3 TO 4 HOURS PER NIGHT     How long have you been experiencing symptoms: SINCE 3/18/23    Have you had these symptoms before:    [x] Yes  [] No    Have you been treated for these symptoms before:   [x] Yes  [] No    If a prescription is needed, what is your preferred pharmacy and phone number:    [id number] - [place], [place] - [address] AT [facility] [phone] [facility] [phone]     Additional notes:PATIENT STATED THAT HE HAS BEEN GIVEN A SLEEPING MEDICATION IN YEARS PAST AND THAT IT WAS NOT STRONG ENOUGH. HIS SPOUSE PASSED ON 3/13/23 AND HE HAS BEEN HAVING ISSUES SLEEPING WELL.

## 2023-03-22 NOTE — TELEPHONE ENCOUNTER
Call in Restoril 15 mg nightly #30 with 5 refills until I can see him in the office, and let them know that you call this in

## 2023-04-07 ENCOUNTER — LAB (OUTPATIENT)
Dept: INTERNAL MEDICINE | Facility: CLINIC | Age: 73
End: 2023-04-07
Payer: MEDICARE

## 2023-04-07 DIAGNOSIS — Z98.61 CAD S/P PERCUTANEOUS CORONARY ANGIOPLASTY: ICD-10-CM

## 2023-04-07 DIAGNOSIS — N18.31 STAGE 3A CHRONIC KIDNEY DISEASE: ICD-10-CM

## 2023-04-07 DIAGNOSIS — I71.40 ABDOMINAL AORTIC ANEURYSM (AAA) WITHOUT RUPTURE, UNSPECIFIED PART: ICD-10-CM

## 2023-04-07 DIAGNOSIS — C80.1 CANCER: ICD-10-CM

## 2023-04-07 DIAGNOSIS — Z51.81 ENCOUNTER FOR MEDICATION MONITORING: Primary | ICD-10-CM

## 2023-04-07 DIAGNOSIS — R53.83 FATIGUE, UNSPECIFIED TYPE: ICD-10-CM

## 2023-04-07 DIAGNOSIS — R53.83 LETHARGY: ICD-10-CM

## 2023-04-07 DIAGNOSIS — Z98.890 S/P BILATERAL CAROTID ENDARTERECTOMY: ICD-10-CM

## 2023-04-07 DIAGNOSIS — R74.8 ELEVATED LIVER ENZYMES: ICD-10-CM

## 2023-04-07 DIAGNOSIS — I73.9 PVD (PERIPHERAL VASCULAR DISEASE) WITH CLAUDICATION: ICD-10-CM

## 2023-04-07 DIAGNOSIS — J43.2 CENTRILOBULAR EMPHYSEMA: ICD-10-CM

## 2023-04-07 DIAGNOSIS — I73.9 PERIPHERAL ARTERY DISEASE: ICD-10-CM

## 2023-04-07 DIAGNOSIS — E03.4 ACQUIRED ATROPHY OF THYROID: ICD-10-CM

## 2023-04-07 DIAGNOSIS — I50.42 CHRONIC COMBINED SYSTOLIC AND DIASTOLIC CONGESTIVE HEART FAILURE: ICD-10-CM

## 2023-04-07 DIAGNOSIS — I25.810 CORONARY ARTERY DISEASE INVOLVING CORONARY BYPASS GRAFT OF NATIVE HEART WITHOUT ANGINA PECTORIS: ICD-10-CM

## 2023-04-07 DIAGNOSIS — Z12.5 SPECIAL SCREENING FOR MALIGNANT NEOPLASM OF PROSTATE: ICD-10-CM

## 2023-04-07 DIAGNOSIS — E78.2 MIXED HYPERLIPIDEMIA: ICD-10-CM

## 2023-04-07 DIAGNOSIS — Z12.5 SCREENING PSA (PROSTATE SPECIFIC ANTIGEN): ICD-10-CM

## 2023-04-07 DIAGNOSIS — I25.10 CAD S/P PERCUTANEOUS CORONARY ANGIOPLASTY: ICD-10-CM

## 2023-04-07 DIAGNOSIS — D72.829 LEUKOCYTOSIS, UNSPECIFIED TYPE: ICD-10-CM

## 2023-04-07 DIAGNOSIS — I10 HYPERTENSION, ESSENTIAL: ICD-10-CM

## 2023-04-07 DIAGNOSIS — I65.23 OCCLUSION AND STENOSIS OF BILATERAL CAROTID ARTERIES: ICD-10-CM

## 2023-04-07 DIAGNOSIS — E11.43 TYPE 2 DIABETES MELLITUS WITH DIABETIC AUTONOMIC NEUROPATHY, WITHOUT LONG-TERM CURRENT USE OF INSULIN: ICD-10-CM

## 2023-04-07 DIAGNOSIS — E11.9 TYPE 2 DIABETES MELLITUS WITHOUT COMPLICATION, WITHOUT LONG-TERM CURRENT USE OF INSULIN: ICD-10-CM

## 2023-04-07 DIAGNOSIS — K21.9 GASTROESOPHAGEAL REFLUX DISEASE WITHOUT ESOPHAGITIS: ICD-10-CM

## 2023-04-07 LAB
ALBUMIN SERPL-MCNC: 3.6 G/DL (ref 3.5–5.2)
ALBUMIN/GLOB SERPL: 1.2 G/DL
ALP SERPL-CCNC: 70 U/L (ref 39–117)
ALT SERPL W P-5'-P-CCNC: 21 U/L (ref 1–41)
ANION GAP SERPL CALCULATED.3IONS-SCNC: 11 MMOL/L (ref 5–15)
AST SERPL-CCNC: 28 U/L (ref 1–40)
BASOPHILS # BLD AUTO: 0.09 10*3/MM3 (ref 0–0.2)
BASOPHILS NFR BLD AUTO: 0.9 % (ref 0–1.5)
BILIRUB SERPL-MCNC: 0.5 MG/DL (ref 0–1.2)
BUN SERPL-MCNC: 14 MG/DL (ref 8–23)
BUN/CREAT SERPL: 12.4 (ref 7–25)
CALCIUM SPEC-SCNC: 9.3 MG/DL (ref 8.6–10.5)
CHLORIDE SERPL-SCNC: 104 MMOL/L (ref 98–107)
CHOLEST SERPL-MCNC: 123 MG/DL (ref 0–200)
CO2 SERPL-SCNC: 29 MMOL/L (ref 22–29)
CREAT SERPL-MCNC: 1.13 MG/DL (ref 0.76–1.27)
DEPRECATED RDW RBC AUTO: 46.2 FL (ref 37–54)
EGFRCR SERPLBLD CKD-EPI 2021: 68.2 ML/MIN/1.73
EOSINOPHIL # BLD AUTO: 0.55 10*3/MM3 (ref 0–0.4)
EOSINOPHIL NFR BLD AUTO: 5.6 % (ref 0.3–6.2)
ERYTHROCYTE [DISTWIDTH] IN BLOOD BY AUTOMATED COUNT: 15 % (ref 12.3–15.4)
ERYTHROCYTE [SEDIMENTATION RATE] IN BLOOD: 38 MM/HR (ref 0–20)
GLOBULIN UR ELPH-MCNC: 3 GM/DL
GLUCOSE SERPL-MCNC: 80 MG/DL (ref 65–99)
HCT VFR BLD AUTO: 43.9 % (ref 37.5–51)
HDLC SERPL-MCNC: 36 MG/DL (ref 40–60)
HGB BLD-MCNC: 13.7 G/DL (ref 13–17.7)
IMM GRANULOCYTES # BLD AUTO: 0.04 10*3/MM3 (ref 0–0.05)
IMM GRANULOCYTES NFR BLD AUTO: 0.4 % (ref 0–0.5)
LDLC SERPL CALC-MCNC: 62 MG/DL (ref 0–100)
LDLC/HDLC SERPL: 1.63 {RATIO}
LYMPHOCYTES # BLD AUTO: 2.39 10*3/MM3 (ref 0.7–3.1)
LYMPHOCYTES NFR BLD AUTO: 24.3 % (ref 19.6–45.3)
MCH RBC QN AUTO: 26.7 PG (ref 26.6–33)
MCHC RBC AUTO-ENTMCNC: 31.2 G/DL (ref 31.5–35.7)
MCV RBC AUTO: 85.4 FL (ref 79–97)
MONOCYTES # BLD AUTO: 0.8 10*3/MM3 (ref 0.1–0.9)
MONOCYTES NFR BLD AUTO: 8.1 % (ref 5–12)
NEUTROPHILS NFR BLD AUTO: 5.98 10*3/MM3 (ref 1.7–7)
NEUTROPHILS NFR BLD AUTO: 60.7 % (ref 42.7–76)
NRBC BLD AUTO-RTO: 0 /100 WBC (ref 0–0.2)
PLATELET # BLD AUTO: 177 10*3/MM3 (ref 140–450)
PMV BLD AUTO: 12.3 FL (ref 6–12)
POTASSIUM SERPL-SCNC: 4.3 MMOL/L (ref 3.5–5.2)
PROT SERPL-MCNC: 6.6 G/DL (ref 6–8.5)
PSA SERPL-MCNC: 1.04 NG/ML (ref 0–4)
RBC # BLD AUTO: 5.14 10*6/MM3 (ref 4.14–5.8)
SODIUM SERPL-SCNC: 144 MMOL/L (ref 136–145)
T4 FREE SERPL-MCNC: 1.44 NG/DL (ref 0.93–1.7)
TRIGL SERPL-MCNC: 142 MG/DL (ref 0–150)
TSH SERPL DL<=0.05 MIU/L-ACNC: 1.19 UIU/ML (ref 0.27–4.2)
URATE SERPL-MCNC: 6.2 MG/DL (ref 3.4–7)
VLDLC SERPL-MCNC: 25 MG/DL (ref 5–40)
WBC NRBC COR # BLD: 9.85 10*3/MM3 (ref 3.4–10.8)

## 2023-04-07 PROCEDURE — 84439 ASSAY OF FREE THYROXINE: CPT | Performed by: INTERNAL MEDICINE

## 2023-04-07 PROCEDURE — 84550 ASSAY OF BLOOD/URIC ACID: CPT | Performed by: INTERNAL MEDICINE

## 2023-04-07 PROCEDURE — 80053 COMPREHEN METABOLIC PANEL: CPT | Performed by: INTERNAL MEDICINE

## 2023-04-07 PROCEDURE — 85025 COMPLETE CBC W/AUTO DIFF WBC: CPT | Performed by: INTERNAL MEDICINE

## 2023-04-07 PROCEDURE — 80061 LIPID PANEL: CPT | Performed by: INTERNAL MEDICINE

## 2023-04-07 PROCEDURE — G0103 PSA SCREENING: HCPCS | Performed by: INTERNAL MEDICINE

## 2023-04-07 PROCEDURE — 84443 ASSAY THYROID STIM HORMONE: CPT | Performed by: INTERNAL MEDICINE

## 2023-04-07 PROCEDURE — 85652 RBC SED RATE AUTOMATED: CPT | Performed by: INTERNAL MEDICINE

## 2023-04-07 PROCEDURE — 36415 COLL VENOUS BLD VENIPUNCTURE: CPT | Performed by: INTERNAL MEDICINE

## 2023-04-11 RX ORDER — ASCORBIC ACID 500 MG
500 TABLET ORAL DAILY
Qty: 90 TABLET | Refills: 1 | Status: SHIPPED | OUTPATIENT
Start: 2023-04-11

## 2023-04-12 PROBLEM — I50.9 CONGESTIVE HEART FAILURE: Status: RESOLVED | Noted: 2021-07-14 | Resolved: 2023-04-12

## 2023-04-12 PROBLEM — R97.0 HIGH CARCINOEMBRYONIC ANTIGEN (CEA): Status: RESOLVED | Noted: 2021-07-14 | Resolved: 2023-04-12

## 2023-04-12 PROBLEM — G98.8 NEUROLOGIC DISORDER: Status: RESOLVED | Noted: 2021-07-14 | Resolved: 2023-04-12

## 2023-04-12 PROBLEM — R53.83 FATIGUE: Status: RESOLVED | Noted: 2022-09-13 | Resolved: 2023-04-12

## 2023-04-12 PROBLEM — I71.9 AORTIC ANEURYSM: Status: RESOLVED | Noted: 2021-04-05 | Resolved: 2023-04-12

## 2023-04-12 PROBLEM — R17 JAUNDICE: Status: RESOLVED | Noted: 2021-12-01 | Resolved: 2023-04-12

## 2023-04-12 PROBLEM — N28.9 KIDNEY DISEASE: Status: RESOLVED | Noted: 2021-07-14 | Resolved: 2023-04-12

## 2023-04-12 PROBLEM — R21 RASH IN ADULT: Status: RESOLVED | Noted: 2022-06-29 | Resolved: 2023-04-12

## 2023-04-12 PROBLEM — C80.1 CANCER: Status: RESOLVED | Noted: 2021-07-14 | Resolved: 2023-04-12

## 2023-04-12 PROBLEM — N19 RENAL FAILURE: Status: RESOLVED | Noted: 2021-07-14 | Resolved: 2023-04-12

## 2023-04-12 PROBLEM — K76.9 DISEASE OF LIVER: Status: RESOLVED | Noted: 2021-04-05 | Resolved: 2023-04-12

## 2023-04-12 PROBLEM — I50.22 CHRONIC HFREF (HEART FAILURE WITH REDUCED EJECTION FRACTION): Status: ACTIVE | Noted: 2023-04-12

## 2023-04-12 PROBLEM — M10.9 GOUT: Status: RESOLVED | Noted: 2020-09-30 | Resolved: 2023-04-12

## 2023-04-13 ENCOUNTER — OFFICE VISIT (OUTPATIENT)
Dept: CARDIOLOGY | Facility: CLINIC | Age: 73
End: 2023-04-13
Payer: MEDICARE

## 2023-04-13 VITALS
DIASTOLIC BLOOD PRESSURE: 77 MMHG | WEIGHT: 215 LBS | SYSTOLIC BLOOD PRESSURE: 138 MMHG | HEIGHT: 69 IN | HEART RATE: 74 BPM | BODY MASS INDEX: 31.84 KG/M2

## 2023-04-13 DIAGNOSIS — I50.22 CHRONIC HFREF (HEART FAILURE WITH REDUCED EJECTION FRACTION): ICD-10-CM

## 2023-04-13 DIAGNOSIS — I10 HYPERTENSION, ESSENTIAL: Chronic | ICD-10-CM

## 2023-04-13 DIAGNOSIS — I65.23 OCCLUSION AND STENOSIS OF BILATERAL CAROTID ARTERIES: ICD-10-CM

## 2023-04-13 DIAGNOSIS — Z98.61 CAD S/P PERCUTANEOUS CORONARY ANGIOPLASTY: Primary | ICD-10-CM

## 2023-04-13 DIAGNOSIS — E78.2 MIXED HYPERLIPIDEMIA: Chronic | ICD-10-CM

## 2023-04-13 DIAGNOSIS — I25.10 CAD S/P PERCUTANEOUS CORONARY ANGIOPLASTY: Primary | ICD-10-CM

## 2023-04-13 RX ORDER — CLOPIDOGREL BISULFATE 75 MG/1
75 TABLET ORAL DAILY
Qty: 90 TABLET | Refills: 3 | Status: SHIPPED | OUTPATIENT
Start: 2023-04-13

## 2023-04-13 RX ORDER — ASCORBIC ACID 500 MG
TABLET ORAL
Qty: 90 TABLET | Refills: 1 | OUTPATIENT
Start: 2023-04-13

## 2023-04-13 NOTE — PROGRESS NOTES
Chief Complaint  Coronary Artery Disease, Follow-up, Hypertension, and Hyperlipidemia    Subjective            History of Present Illness  Radhames Colón is a 72-year-old white/ male patient who presents to the office today for follow-up.  He has CAD with prior stenting, chronic HFrEF, hypertension, hyperlipidemia, and carotid artery stenosis.  He reports occasional bilateral lower extremity edema that goes away on its own.  He also reports chronic stable shortness of breath secondary to COPD.  He denies any chest pain, lightheadedness/dizziness, or palpitations.  He reports compliance with his medications.    PMH  Past Medical History:   Diagnosis Date   • Abdominal aortic aneurysm without rupture    • Acute renal failure (ARF)     WAS SHORT TERM DIALYSIS, STAGE 3   • Arthritis    • Atrophy of thyroid (acquired)    • Bilateral carotid artery stenosis    • Chronic airway obstruction    • Chronic gouty arthritis    • Chronic kidney disease, stage 3    • Controlled diabetes mellitus type II without complication    • COPD (chronic obstructive pulmonary disease)    • Coronary artery disease involving coronary bypass graft of native heart without angina pectoris 03/28/2012    with previous bypass surgery (2005 x3) and subsequent stent/PCI of the native circumflex obtuse marginal branch in January 2018   • Depression    • Disease of liver    • Elevated carcinoembryonic antigen (CEA)    • GERD (gastroesophageal reflux disease)    • Hypertension, essential 07/14/2021   • Hypothyroidism    • Iron deficiency anemia    • Long term (current) use of opiate analgesic    • Lumbar spondylosis    • Malignant neoplasm of colon    • Mixed hyperlipidemia 07/14/2021   • Peripheral artery disease    • Polyarthropathy    • Rhabdomyolysis    • Sleep apnea     CPAP       ALLERGY  Allergies   Allergen Reactions   • Penicillins Shortness Of Breath   • Ticagrelor Shortness Of Breath   • Penicillin G Provider Review Needed and Unknown -  Low Severity          SURGICALHX  Past Surgical History:   Procedure Laterality Date   • ABDOMINAL AORTIC ANEURYSM REPAIR     • ANGIOPLASTY FEMORAL ARTERY Left 2020    Procedure: AORTOILLOFEMORAL ARTERIOGRAM;  Surgeon: Chula Nam Jr., MD;  Location: [facility] ;  Service: Vascular;  Laterality: Left;   • APPENDECTOMY     • CARDIAC CATHETERIZATION     • CAROTID ENDARTERECTOMY Left    • CAROTID ENDARTERECTOMY Right    • CHOLECYSTECTOMY OPEN     • COLON SURGERY      COLON RESECTION   • COLONOSCOPY  ,2018    Dr. Lamas ,    • CORONARY ANGIOPLASTY WITH STENT PLACEMENT     • CORONARY ARTERY BYPASS GRAFT     • ENDOSCOPY     • ERCP N/A 12/15/2021    Procedure: ENDOSCOPIC RETROGRADE CHOLANGIOPANCREATOGRAPHY WITH SPINCTEROTOMY, 9-12MM BALLOON SWEEP, INSERTION OF 10FR 9CM BILIARY STENT;  Surgeon: Edne Hernandez MD;  Location: [facility] ENDOSCOPY;  Service: Gastroenterology;  Laterality: N/A;  BILE DUCT STONES   • ERCP N/A 2022    Procedure: ENDOSCOPIC RETROGRADE CHOLANGIOPANCREATOGRAPHY WITH STENT REMOVAL, SPYGLASS, AUTOLITH, 8-10MM BALLOON DILATATION, 9-12 AND 12-15MM BALLOON SWEEP;  Surgeon: Wayne Zepeda MD;  Location: [facility] ENDOSCOPY;  Service: Gastroenterology;  Laterality: N/A;  BILE DUCT STONES   • FEMORAL ENDARTERECTOMY Left 2020    Procedure: LEFT FEMORAL ENDARECTOMY WITHH PATCH ANGIOPLASTY;  Surgeon: Chula Nam Jr., MD;  Location: [facility] ;  Service: Vascular;  Laterality: Left;   • GALLBLADDER SURGERY     • RHINOPLASTY Bilateral     70-80% R, 50% L          SOC  Social History     Socioeconomic History   • Marital status:    Tobacco Use   • Smoking status: Former     Packs/day: 1.00     Years: 45.00     Pack years: 45.00     Types: Cigarettes     Quit date: 2005     Years since quittin.5   • Smokeless tobacco: Never   Vaping Use   • Vaping Use: Never used   Substance and Sexual Activity   • Alcohol  use: Not Currently     Comment: RARE   • Drug use: Never   • Sexual activity: Defer         FAMHX  Family History   Problem Relation Age of Onset   • Heart failure Mother    • Malig Hyperthermia Neg Hx           MEDSIGONLY  Current Outpatient Medications on File Prior to Visit   Medication Sig   • amLODIPine (NORVASC) 2.5 MG tablet TAKE 1 TABLET EVERY DAY   • aspirin 81 MG EC tablet Take 1 tablet by mouth.   • Cholecalciferol 50 MCG (2000 UT) tablet Take 1 tablet by mouth Daily.   • clobetasol (TEMOVATE) 0.05 % ointment    • clopidogrel (PLAVIX) 75 MG tablet TAKE 1 TABLET EVERY DAY   • co-enzyme Q-10 30 MG capsule Take 1 capsule by mouth Every Night.   • colchicine 0.6 MG tablet Take 1 tablet by mouth As Needed.   • cyanocobalamin (VITAMIN B-12) 1000 MCG tablet Take 1 tablet by mouth Daily.   • folic acid (FOLVITE) 1 MG tablet TAKE 1 TABLET TWICE DAILY   • furosemide (LASIX) 40 MG tablet TAKE 1 TABLET EVERY DAY   • insulin NPH (humuLIN N,novoLIN N) 100 UNIT/ML injection Inject 40 Units under the skin into the appropriate area as directed Every Night. HOLD INSULIN Wednesday NIGHT BLOOD SUGARS RUN LOW 85-95   • ipratropium-albuterol (DUO-NEB) 0.5-2.5 mg/3 ml nebulizer As Needed.   • levothyroxine (Synthroid) 125 MCG tablet Take 1 tablet by mouth Daily.   • metFORMIN (GLUCOPHAGE) 500 MG tablet TAKE 1 TABLET TWICE DAILY WITH A MEAL   • metoprolol succinate XL (TOPROL-XL) 50 MG 24 hr tablet TAKE 1 TABLET TWICE DAILY   • Morphine (MS CONTIN) 30 MG 12 hr tablet 1 tablet 2 (Two) Times a Day.   • probenecid (BENEMID) 500 MG tablet Take 1 tablet by mouth Daily.   • rosuvastatin (Crestor) 20 MG tablet Take 1 tablet by mouth Daily.   • temazepam (RESTORIL) 15 MG capsule Take 1 capsule by mouth At Night As Needed for Sleep.   • vitamin C (ASCORBIC ACID) 500 MG tablet Take 1 tablet by mouth Daily.   • buPROPion XL (WELLBUTRIN XL) 300 MG 24 hr tablet bupropion HCl  mg 24 hr tablet, extended release   TAKE 1 TABLET BY MOUTH  "DAILY   • fenofibrate (TRICOR) 145 MG tablet Take 1 tablet by mouth Daily.   • FeroSul 325 (65 Fe) MG tablet TAKE 1 TABLET EVERY DAY   • [DISCONTINUED] atorvastatin (LIPITOR) 20 MG tablet TAKE 1 TABLET EVERY DAY     No current facility-administered medications on file prior to visit.         Objective   /77   Pulse 74   Ht 175.3 cm (69.02\")   Wt 97.5 kg (215 lb)   BMI 31.73 kg/m²       Physical Exam  HENT:      Head: Normocephalic.   Neck:      Vascular: Carotid bruit present.   Cardiovascular:      Rate and Rhythm: Normal rate and regular rhythm.      Pulses: Normal pulses.      Heart sounds: Murmur heard.   Pulmonary:      Effort: Pulmonary effort is normal.      Breath sounds: Normal breath sounds.   Musculoskeletal:      Cervical back: Neck supple.      Right lower leg: No edema.      Left lower leg: No edema.   Skin:     General: Skin is dry.   Neurological:      Mental Status: He is alert and oriented to person, place, and time.   Psychiatric:         Behavior: Behavior normal.       ECG 12 Lead    Date/Time: 4/13/2023 11:33 AM  Performed by: Nikky Swan APRN  Authorized by: Nikky Swan APRN   Comparison: compared with previous ECG from 10/14/2020  Similar to previous ECG  Rhythm: sinus rhythm  Ectopy: atrial premature contractions  Rate: normal  BPM: 60  Conduction: conduction normal  ST Segments: ST segments normal  QRS axis: normal  Other findings: T wave abnormality    Clinical impression: non-specific ECG          Result Review :   The following data was reviewed by: MUKUL Mas on 04/13/2023:  proBNP   Date Value Ref Range Status   07/27/2022 915.0 (H) 0.0 - 900.0 pg/mL Final     CMP        4/7/2023    12:17   CMP   Glucose 80     BUN 14     Creatinine 1.13     EGFR 68.2     Sodium 144     Potassium 4.3     Chloride 104     Calcium 9.3     Total Protein 6.6     Albumin 3.6     Globulin 3.0     Total Bilirubin 0.5     Alkaline Phosphatase 70     AST (SGOT) 28   "   ALT (SGPT) 21     Albumin/Globulin Ratio 1.2     BUN/Creatinine Ratio 12.4     Anion Gap 11.0       CBC w/diff        4/7/2023    12:17   CBC w/Diff   WBC 9.85     RBC 5.14     Hemoglobin 13.7     Hematocrit 43.9     MCV 85.4     MCH 26.7     MCHC 31.2     RDW 15.0     Platelets 177     Neutrophil Rel % 60.7     Immature Granulocyte Rel % 0.4     Lymphocyte Rel % 24.3     Monocyte Rel % 8.1     Eosinophil Rel % 5.6     Basophil Rel % 0.9        Lab Results   Component Value Date    TSH 1.190 04/07/2023      Lab Results   Component Value Date    FREET4 1.44 04/07/2023      No results found for: DDIMERQUANT  Magnesium   Date Value Ref Range Status   01/25/2022 2.1 1.6 - 2.4 mg/dL Final      No results found for: DIGOXIN   Lab Results   Component Value Date    TROPONINT <0.01 10/14/2020           Lipid Panel        4/7/2023    12:17   Lipid Panel   Total Cholesterol 123     Triglycerides 142     HDL Cholesterol 36     VLDL Cholesterol 25     LDL Cholesterol  62     LDL/HDL Ratio 1.63         Results for orders placed in visit on 04/04/22    Adult Transthoracic Echo Complete W/ Cont if Necessary Per Protocol    Interpretation Summary  · Left ventricular ejection fraction appears to be 46 - 50%. Left ventricular systolic function is mildly decreased.  · The following left ventricular wall segments are hypokinetic: basal inferolateral, mid inferolateral, mid inferior, basal inferior and basal inferoseptal.  · Left ventricular diastolic function is consistent with (grade III w/high LAP) fixed restrictive pattern.  · The right ventricular cavity is borderline dilated.  · Estimated right ventricular systolic pressure from tricuspid regurgitation is mildly elevated (35-45 mmHg).  · Mild mitral regurgitation is noted  · Left atrial volume is mildly increased.         Assessment and Plan    Diagnoses and all orders for this visit:    1. CAD S/P percutaneous coronary angioplasty (Primary)  He denies any anginal symptoms,  continue aspirin 81 mg daily and Plavix 75 mg daily.  -     clopidogrel (PLAVIX) 75 MG tablet; Take 1 tablet by mouth Daily.  Dispense: 90 tablet; Refill: 3    2. Chronic HFrEF (heart failure with reduced ejection fraction)  Currently controlled and euvolemic on exam today, per PCP he is unable to be on ACE or ARB due to declining kidney function.  Continue Lasix 40 mg daily and metoprolol 50 mg twice daily.  He would probably be a good candidate for SGLT2 inhibitor later on.    3. Hypertension, essential  Currently controlled and without adverse effects from medication, continue amlodipine 2.5 mg daily.  Low-sodium diet discussed.    4. Mixed hyperlipidemia  Last lipid panel was 4/7/2023 with LDL 62 which is within his goal range, continue rosuvastatin 20 mg daily.    5. Occlusion and stenosis of bilateral carotid arteries  Last imaging was 2/16/2022 and shows right carotid with 15 to 49% stenosis and left carotid with 60 to 70% stenosis.  He has had bilateral carotid endarterectomy procedures performed.  Continue aspirin 81 mg daily and statin therapy.    Other orders  -     ECG 12 Lead        Follow Up   No follow-ups on file.    Patient was given instructions and counseling regarding his condition or for health maintenance advice. Please see specific information pulled into the AVS if appropriate.     Radhames BALJIT Colón  reports that he quit smoking about 17 years ago. His smoking use included cigarettes. He has a 45.00 pack-year smoking history. He has never used smokeless tobacco.         Nikky Swan, MUKUL  04/13/23  12:24 EDT    Dictated Utilizing Dragon Dictation

## 2023-04-15 DIAGNOSIS — R17 JAUNDICE: ICD-10-CM

## 2023-04-17 RX ORDER — LEVOTHYROXINE SODIUM 0.12 MG/1
TABLET ORAL
Qty: 90 TABLET | Refills: 3 | Status: SHIPPED | OUTPATIENT
Start: 2023-04-17

## 2023-04-24 ENCOUNTER — OFFICE VISIT (OUTPATIENT)
Dept: INTERNAL MEDICINE | Facility: CLINIC | Age: 73
End: 2023-04-24
Payer: MEDICARE

## 2023-04-24 VITALS
HEIGHT: 69 IN | OXYGEN SATURATION: 96 % | SYSTOLIC BLOOD PRESSURE: 170 MMHG | DIASTOLIC BLOOD PRESSURE: 81 MMHG | TEMPERATURE: 97.7 F | HEART RATE: 68 BPM | BODY MASS INDEX: 32.47 KG/M2 | WEIGHT: 219.2 LBS

## 2023-04-24 DIAGNOSIS — Z98.61 CAD S/P PERCUTANEOUS CORONARY ANGIOPLASTY: ICD-10-CM

## 2023-04-24 DIAGNOSIS — J43.2 CENTRILOBULAR EMPHYSEMA: ICD-10-CM

## 2023-04-24 DIAGNOSIS — I71.40 ABDOMINAL AORTIC ANEURYSM (AAA) WITHOUT RUPTURE, UNSPECIFIED PART: ICD-10-CM

## 2023-04-24 DIAGNOSIS — E11.43 TYPE 2 DIABETES MELLITUS WITH DIABETIC AUTONOMIC NEUROPATHY, WITHOUT LONG-TERM CURRENT USE OF INSULIN: ICD-10-CM

## 2023-04-24 DIAGNOSIS — I10 HYPERTENSION, ESSENTIAL: Chronic | ICD-10-CM

## 2023-04-24 DIAGNOSIS — I73.9 PVD (PERIPHERAL VASCULAR DISEASE) WITH CLAUDICATION: ICD-10-CM

## 2023-04-24 DIAGNOSIS — R73.01 IFG (IMPAIRED FASTING GLUCOSE): ICD-10-CM

## 2023-04-24 DIAGNOSIS — E03.9 ACQUIRED HYPOTHYROIDISM: ICD-10-CM

## 2023-04-24 DIAGNOSIS — E78.2 MIXED HYPERLIPIDEMIA: Chronic | ICD-10-CM

## 2023-04-24 DIAGNOSIS — N18.31 STAGE 3A CHRONIC KIDNEY DISEASE: ICD-10-CM

## 2023-04-24 DIAGNOSIS — Z00.00 MEDICARE ANNUAL WELLNESS VISIT, SUBSEQUENT: Primary | ICD-10-CM

## 2023-04-24 DIAGNOSIS — Z79.891 LONG-TERM CURRENT USE OF OPIATE ANALGESIC: ICD-10-CM

## 2023-04-24 DIAGNOSIS — K21.9 GASTROESOPHAGEAL REFLUX DISEASE WITHOUT ESOPHAGITIS: ICD-10-CM

## 2023-04-24 DIAGNOSIS — I25.10 CAD S/P PERCUTANEOUS CORONARY ANGIOPLASTY: ICD-10-CM

## 2023-04-24 NOTE — PROGRESS NOTES
The ABCs of the Annual Wellness Visit  Subsequent Medicare Wellness Visit    Subjective      Radhames Colón is a 72 y.o. male who presents for a Subsequent Medicare Wellness Visit.    The following portions of the patient's history were reviewed and   updated as appropriate: allergies, current medications, past family history, past medical history, past social history, past surgical history and problem list.    Compared to one year ago, the patient feels his physical   health is the same.    Compared to one year ago, the patient feels his mental   health is worse.    Recent Hospitalizations:  He was not admitted to the hospital during the last year.       Current Medical Providers:  Patient Care Team:  Mingo Loja MD as PCP - General (Internal Medicine)  Talita Gonzalez MD (Cardiology)  Royce Pappas DO (Pulmonary Disease)    Outpatient Medications Prior to Visit   Medication Sig Dispense Refill   • amLODIPine (NORVASC) 2.5 MG tablet TAKE 1 TABLET EVERY DAY 90 tablet 1   • aspirin 81 MG EC tablet Take 1 tablet by mouth.     • Cholecalciferol 50 MCG (2000 UT) tablet Take 1 tablet by mouth Daily.     • clobetasol (TEMOVATE) 0.05 % ointment      • clopidogrel (PLAVIX) 75 MG tablet Take 1 tablet by mouth Daily. 90 tablet 3   • co-enzyme Q-10 30 MG capsule Take 1 capsule by mouth Every Night.     • colchicine 0.6 MG tablet Take 1 tablet by mouth As Needed.     • cyanocobalamin (VITAMIN B-12) 1000 MCG tablet Take 1 tablet by mouth Daily.     • folic acid (FOLVITE) 1 MG tablet TAKE 1 TABLET TWICE DAILY 180 tablet 1   • furosemide (LASIX) 40 MG tablet TAKE 1 TABLET EVERY DAY 90 tablet 3   • insulin NPH (humuLIN N,novoLIN N) 100 UNIT/ML injection Inject 40 Units under the skin into the appropriate area as directed Every Night. HOLD INSULIN Wednesday NIGHT BLOOD SUGARS RUN LOW 85-95     • ipratropium-albuterol (DUO-NEB) 0.5-2.5 mg/3 ml nebulizer As Needed.     • levothyroxine (SYNTHROID, LEVOTHROID) 125  MCG tablet TAKE 1 TABLET EVERY DAY 90 tablet 3   • metFORMIN (GLUCOPHAGE) 500 MG tablet TAKE 1 TABLET TWICE DAILY WITH A MEAL 180 tablet 1   • metoprolol succinate XL (TOPROL-XL) 50 MG 24 hr tablet TAKE 1 TABLET TWICE DAILY 180 tablet 3   • Morphine (MS CONTIN) 30 MG 12 hr tablet 1 tablet 2 (Two) Times a Day.     • probenecid (BENEMID) 500 MG tablet Take 1 tablet by mouth Daily.     • rosuvastatin (Crestor) 20 MG tablet Take 1 tablet by mouth Daily. 90 tablet 3   • temazepam (RESTORIL) 15 MG capsule Take 1 capsule by mouth At Night As Needed for Sleep. 30 capsule 5   • vitamin C (ASCORBIC ACID) 500 MG tablet Take 1 tablet by mouth Daily. 90 tablet 1     No facility-administered medications prior to visit.       Opioid medication/s are on active medication list.  and I have evaluated his active treatment plan and pain score trends (see table).  There were no vitals filed for this visit.  I have reviewed the chart for potential of high risk medication and harmful drug interactions in the elderly.            Aspirin is on active medication list. Aspirin use is indicated based on review of current medical condition/s. Pros and cons of this therapy have been discussed today. Benefits of this medication outweigh potential harm.  Patient has been encouraged to continue taking this medication.  .      Patient Active Problem List   Diagnosis   • PVD (peripheral vascular disease) with claudication (HCC)   • Abdominal aortic aneurysm without rupture   • Acquired atrophy of thyroid   • Arthritis   • Polyarthropathy   • CAD S/P percutaneous coronary angioplasty   • Chronic gouty arthritis   • Chronic obstructive pulmonary disease   • Degeneration of lumbar intervertebral disc   • Type 2 diabetes mellitus with diabetic autonomic neuropathy, without long-term current use of insulin (HCC)   • Diverticulosis   • Elevated carcinoembryonic antigen (CEA)   • Encounter for long-term (current) use of insulin   • Medicare annual wellness  "visit, subsequent   • Mixed hyperlipidemia   • Hyperthyroidism   • Hypothyroidism   • Iron deficiency anemia secondary to inadequate dietary iron intake   • Long-term current use of opiate analgesic   • Lumbar spondylosis   • Malignant neoplasm of colon (HCC)   • Occlusion and stenosis of bilateral carotid arteries   • Rectal bleeding   • Hypertension, essential   • Peripheral artery disease   • GERD (gastroesophageal reflux disease)   • Chronic kidney disease, stage 3   • Sleep apnea   • IFG (impaired fasting glucose)   • Centrilobular emphysema   • Elevated liver enzymes   • Obstructive jaundice   • Choledocholithiasis   • Major depressive disorder, recurrent, moderate   • Altered mental status   • S/p bilateral carotid endarterectomy   • Leukocytosis   • Chronic HFrEF (heart failure with reduced ejection fraction)     Advance Care Planning   Advance Care Planning     Advance Directive is on file.  ACP discussion was held with the patient during this visit. Patient has an advance directive in EMR which is still valid.      Objective    Vitals:    04/24/23 1029   BP: 170/81   Pulse: 68   Temp: 97.7 °F (36.5 °C)   SpO2: 96%   Weight: 99.4 kg (219 lb 3.2 oz)   Height: 175.3 cm (69.02\")     Estimated body mass index is 32.36 kg/m² as calculated from the following:    Height as of this encounter: 175.3 cm (69.02\").    Weight as of this encounter: 99.4 kg (219 lb 3.2 oz).    BMI is >= 30 and <35. (Class 1 Obesity). The following options were offered after discussion;: nutrition counseling/recommendations      Does the patient have evidence of cognitive impairment?   No    Lab Results   Component Value Date    TRIG 142 04/07/2023    HDL 36 (L) 04/07/2023    LDL 62 04/07/2023    VLDL 25 04/07/2023          HEALTH RISK ASSESSMENT    Smoking Status:  Social History     Tobacco Use   Smoking Status Former   • Packs/day: 1.00   • Years: 45.00   • Pack years: 45.00   • Types: Cigarettes   • Quit date: 9/11/2005   • Years since " quittin.6   Smokeless Tobacco Never     Alcohol Consumption:  Social History     Substance and Sexual Activity   Alcohol Use Not Currently    Comment: RARE     Fall Risk Screen:    TWILAADI Fall Risk Assessment was completed, and patient is at LOW risk for falls.Assessment completed on:2023    Depression Screenin/24/2023    10:27 AM   PHQ-2/PHQ-9 Depression Screening   Little Interest or Pleasure in Doing Things 0-->not at all   Feeling Down, Depressed or Hopeless 0-->not at all   PHQ-9: Brief Depression Severity Measure Score 0       Health Habits and Functional and Cognitive Screenin/24/2023    10:00 AM   Functional & Cognitive Status   Do you have difficulty preparing food and eating? No   Do you have difficulty bathing yourself, getting dressed or grooming yourself? No   Do you have difficulty using the toilet? No   Do you have difficulty moving around from place to place? Yes   Do you have trouble with steps or getting out of a bed or a chair? No   Current Diet Well Balanced Diet   Dental Exam Not up to date   Eye Exam Up to date   Exercise (times per week) 0 times per week   Current Exercises Include No Regular Exercise   Do you need help using the phone?  No   Are you deaf or do you have serious difficulty hearing?  Yes   Do you need help with transportation? No   Do you need help shopping? No   Do you need help preparing meals?  No   Do you need help with housework?  No   Do you need help with laundry? No   Do you need help taking your medications? No   Do you need help managing money? No   Do you ever drive or ride in a car without wearing a seat belt? No   Do you have difficulty concentrating, remembering or making decisions? No       Age-appropriate Screening Schedule:  Refer to the list below for future screening recommendations based on patient's age, sex and/or medical conditions. Orders for these recommended tests are listed in the plan section. The patient has been provided  with a written plan.    Health Maintenance   Topic Date Due   • TDAP/TD VACCINES (1 - Tdap) Never done   • ZOSTER VACCINE (1 of 2) Never done   • DIABETIC FOOT EXAM  Never done   • URINE MICROALBUMIN  04/27/2021   • ANNUAL WELLNESS VISIT  03/15/2022   • DIABETIC EYE EXAM  03/29/2023   • HEMOGLOBIN A1C  05/29/2023   • INFLUENZA VACCINE  08/01/2023   • LIPID PANEL  04/07/2024   • HEPATITIS C SCREENING  Completed   • COVID-19 Vaccine  Completed   • Pneumococcal Vaccine 65+  Completed   • COLORECTAL CANCER SCREENING  Discontinued                  CMS Preventative Services Quick Reference  Risk Factors Identified During Encounter:    Depression/Dysphoria: Current medication is effective, no change recommended    The above risks/problems have been discussed with the patient.  Pertinent information has been shared with the patient in the After Visit Summary.    Diagnoses and all orders for this visit:    1. Medicare annual wellness visit, subsequent (Primary)    2. IFG (impaired fasting glucose)  -     Microalbumin / Creatinine Urine Ratio - Urine, Clean Catch; Future  -     Ambulatory Referral to Dermatology; Future  -     CBC & Differential; Future  -     Comprehensive Metabolic Panel; Future  -     Hemoglobin A1c; Future  -     Lipid Panel; Future  -     TSH+Free T4; Future  -     Uric Acid; Future    3. Centrilobular emphysema  -     Microalbumin / Creatinine Urine Ratio - Urine, Clean Catch; Future  -     Ambulatory Referral to Dermatology; Future  -     CBC & Differential; Future  -     Comprehensive Metabolic Panel; Future  -     Hemoglobin A1c; Future  -     Lipid Panel; Future  -     TSH+Free T4; Future  -     Uric Acid; Future    4. Stage 3a chronic kidney disease  -     Microalbumin / Creatinine Urine Ratio - Urine, Clean Catch; Future  -     Ambulatory Referral to Dermatology; Future  -     CBC & Differential; Future  -     Comprehensive Metabolic Panel; Future  -     Hemoglobin A1c; Future  -     Lipid Panel;  Future  -     TSH+Free T4; Future  -     Uric Acid; Future    5. Hypertension, essential  -     Microalbumin / Creatinine Urine Ratio - Urine, Clean Catch; Future  -     Ambulatory Referral to Dermatology; Future  -     CBC & Differential; Future  -     Comprehensive Metabolic Panel; Future  -     Hemoglobin A1c; Future  -     Lipid Panel; Future  -     TSH+Free T4; Future  -     Uric Acid; Future    6. Mixed hyperlipidemia  -     Microalbumin / Creatinine Urine Ratio - Urine, Clean Catch; Future  -     Ambulatory Referral to Dermatology; Future  -     CBC & Differential; Future  -     Comprehensive Metabolic Panel; Future  -     Hemoglobin A1c; Future  -     Lipid Panel; Future  -     TSH+Free T4; Future  -     Uric Acid; Future    7. Abdominal aortic aneurysm (AAA) without rupture, unspecified part  -     Microalbumin / Creatinine Urine Ratio - Urine, Clean Catch; Future  -     Ambulatory Referral to Dermatology; Future  -     CBC & Differential; Future  -     Comprehensive Metabolic Panel; Future  -     Hemoglobin A1c; Future  -     Lipid Panel; Future  -     TSH+Free T4; Future  -     Uric Acid; Future    8. CAD S/P percutaneous coronary angioplasty  -     Microalbumin / Creatinine Urine Ratio - Urine, Clean Catch; Future  -     Ambulatory Referral to Dermatology; Future  -     CBC & Differential; Future  -     Comprehensive Metabolic Panel; Future  -     Hemoglobin A1c; Future  -     Lipid Panel; Future  -     TSH+Free T4; Future  -     Uric Acid; Future    9. Type 2 diabetes mellitus with diabetic autonomic neuropathy, without long-term current use of insulin (HCC)  -     Microalbumin / Creatinine Urine Ratio - Urine, Clean Catch; Future  -     Ambulatory Referral to Dermatology; Future  -     CBC & Differential; Future  -     Comprehensive Metabolic Panel; Future  -     Hemoglobin A1c; Future  -     Lipid Panel; Future  -     TSH+Free T4; Future  -     Uric Acid; Future    10. Acquired hypothyroidism  -      Microalbumin / Creatinine Urine Ratio - Urine, Clean Catch; Future  -     Ambulatory Referral to Dermatology; Future  -     CBC & Differential; Future  -     Comprehensive Metabolic Panel; Future  -     Hemoglobin A1c; Future  -     Lipid Panel; Future  -     TSH+Free T4; Future  -     Uric Acid; Future    11. PVD (peripheral vascular disease) with claudication (HCC)  -     Microalbumin / Creatinine Urine Ratio - Urine, Clean Catch; Future  -     Ambulatory Referral to Dermatology; Future  -     CBC & Differential; Future  -     Comprehensive Metabolic Panel; Future  -     Hemoglobin A1c; Future  -     Lipid Panel; Future  -     TSH+Free T4; Future  -     Uric Acid; Future    12. Long-term current use of opiate analgesic  -     Microalbumin / Creatinine Urine Ratio - Urine, Clean Catch; Future  -     Ambulatory Referral to Dermatology; Future  -     CBC & Differential; Future  -     Comprehensive Metabolic Panel; Future  -     Hemoglobin A1c; Future  -     Lipid Panel; Future  -     TSH+Free T4; Future  -     Uric Acid; Future    13. Gastroesophageal reflux disease without esophagitis  -     Microalbumin / Creatinine Urine Ratio - Urine, Clean Catch; Future  -     Ambulatory Referral to Dermatology; Future  -     CBC & Differential; Future  -     Comprehensive Metabolic Panel; Future  -     Hemoglobin A1c; Future  -     Lipid Panel; Future  -     TSH+Free T4; Future  -     Uric Acid; Future        Follow Up:   Next Medicare Wellness visit to be scheduled in 1 year.      An After Visit Summary and PPPS were made available to the patient.

## 2023-04-24 NOTE — PROGRESS NOTES
"CHIEF COMPLAINT/ HPI:  Hyperlipidemia, Follow-up (Patient is here for a routine follow up), and Medicare Wellness-subsequent (Patient is also due for a medicare wellness, questionnaire completed )    Patient is here to follow-up with recent lab work he still misses his wife who passed away on March 13, 2023, he is maintaining he did lose some weight initially,          Objective   Vital Signs  Vitals:    04/24/23 1029   BP: 170/81   Pulse: 68   Temp: 97.7 °F (36.5 °C)   SpO2: 96%   Weight: 99.4 kg (219 lb 3.2 oz)   Height: 175.3 cm (69.02\")      Body mass index is 32.36 kg/m².  Review of Systems   Constitutional: Positive for unexpected weight loss.   HENT: Negative.    Eyes: Negative.    Respiratory: Negative.    Cardiovascular: Negative.    Gastrointestinal: Negative.    Endocrine: Negative.    Genitourinary: Negative.    Musculoskeletal: Negative.    Allergic/Immunologic: Negative.    Neurological: Negative.    Hematological: Negative.    Psychiatric/Behavioral: Negative.  Positive for depressed mood.      Physical Exam  Constitutional:       General: He is not in acute distress.     Appearance: Normal appearance.   HENT:      Head: Normocephalic.      Mouth/Throat:      Mouth: Mucous membranes are moist.   Eyes:      Conjunctiva/sclera: Conjunctivae normal.      Pupils: Pupils are equal, round, and reactive to light.   Cardiovascular:      Rate and Rhythm: Normal rate and regular rhythm.      Pulses: Normal pulses.      Heart sounds: Normal heart sounds.   Pulmonary:      Effort: Pulmonary effort is normal.      Breath sounds: Normal breath sounds.   Abdominal:      General: Abdomen is flat. Bowel sounds are normal.      Palpations: Abdomen is soft.   Musculoskeletal:         General: No swelling. Normal range of motion.      Cervical back: Neck supple.   Skin:     General: Skin is warm and dry.      Coloration: Skin is not jaundiced.   Neurological:      General: No focal deficit present.      Mental Status: He " is alert and oriented to person, place, and time. Mental status is at baseline.   Psychiatric:         Mood and Affect: Mood normal.         Behavior: Behavior normal.         Thought Content: Thought content normal.         Judgment: Judgment normal.        Result Review :   Lab Results   Component Value Date    PROBNP 915.0 (H) 07/27/2022    PROBNP 1,455.0 (H) 05/24/2022    PROBNP 2,841.0 (H) 05/10/2022    BNP 1937 (H) 03/10/2021    BNP 1128 (H) 11/02/2020    BNP 1802 (H) 10/14/2020     CMP        10/13/2022    13:00 11/29/2022    12:23 4/7/2023    12:17   CMP   Glucose  79   80     BUN 22   25   14     Creatinine 1.72   1.78   1.13     EGFR 41.5   39.5   68.2     Sodium  143   144     Potassium  4.3   4.3     Chloride  102   104     Calcium  9.7   9.3     Total Protein  6.5   6.6     Albumin  3.90   3.6     Globulin  2.6   3.0     Total Bilirubin  0.3   0.5     Alkaline Phosphatase  45   70     AST (SGOT)  23   28     ALT (SGPT)  17   21     Albumin/Globulin Ratio  1.5   1.2     BUN/Creatinine Ratio  14.0   12.4     Anion Gap  12.8   11.0       CBC w/diff        9/8/2022    10:20 11/29/2022    12:23 4/7/2023    12:17   CBC w/Diff   WBC 10.63   13.46   9.85     RBC 5.44   5.36   5.14     Hemoglobin 14.7   15.1   13.7     Hematocrit 45.7   46.3   43.9     MCV 84.0   86.4   85.4     MCH 27.0   28.2   26.7     MCHC 32.2   32.6   31.2     RDW 15.3   14.6   15.0     Platelets 227   215   177     Neutrophil Rel % 61.3   63.5   60.7     Immature Granulocyte Rel % 0.5   0.7   0.4     Lymphocyte Rel % 28.1   24.1   24.3     Monocyte Rel % 7.1   7.7   8.1     Eosinophil Rel % 2.2   3.3   5.6     Basophil Rel % 0.8   0.7   0.9        Lipid Panel        7/27/2022    10:02 11/29/2022    12:23 4/7/2023    12:17   Lipid Panel   Total Cholesterol 151   124   123     Triglycerides 127   112   142     HDL Cholesterol 34   35   36     VLDL Cholesterol 23   21   25     LDL Cholesterol  94   68   62     LDL/HDL Ratio 2.69   1.90    1.63        Lab Results   Component Value Date    TSH 1.190 04/07/2023    TSH 3.660 11/29/2022    TSH 2.300 10/13/2022      Lab Results   Component Value Date    FREET4 1.44 04/07/2023    FREET4 1.47 11/29/2022    FREET4 1.48 10/13/2022      A1C Last 3 Results        7/27/2022    10:02 9/8/2022    10:20 11/29/2022    12:23   HGBA1C Last 3 Results   Hemoglobin A1C 6.10   6.20   5.60        PSA        7/28/2022    09:17 9/8/2022    10:20 4/7/2023    12:16   PSA   PSA 0.680   0.678   1.040                      Visit Diagnoses:    ICD-10-CM ICD-9-CM   1. IFG (impaired fasting glucose)  R73.01 790.21   2. Centrilobular emphysema  J43.2 492.8   3. Stage 3a chronic kidney disease  N18.31 585.3   4. Hypertension, essential  I10 401.9   5. Mixed hyperlipidemia  E78.2 272.2   6. Abdominal aortic aneurysm (AAA) without rupture, unspecified part  I71.40 441.4   7. CAD S/P percutaneous coronary angioplasty  I25.10 414.01    Z98.61 V45.82   8. Type 2 diabetes mellitus with diabetic autonomic neuropathy, without long-term current use of insulin ([facility])  E11.43 250.60     337.1   9. Acquired hypothyroidism  E03.9 244.9   10. PVD (peripheral vascular disease) with claudication ([facility])  I73.9 443.9   11. Long-term current use of opiate analgesic  Z79.891 V58.69   12. Gastroesophageal reflux disease without esophagitis  K21.9 530.81       Assessment and Plan   Diagnoses and all orders for this visit:    1. IFG (impaired fasting glucose) (Primary)  -     Microalbumin / Creatinine Urine Ratio - Urine, Clean Catch; Future  -     Ambulatory Referral to Dermatology; Future  -     CBC & Differential; Future  -     Comprehensive Metabolic Panel; Future  -     Hemoglobin A1c; Future  -     Lipid Panel; Future  -     TSH+Free T4; Future  -     Uric Acid; Future    2. Centrilobular emphysema  -     Microalbumin / Creatinine Urine Ratio - Urine, Clean Catch; Future  -     Ambulatory Referral to Dermatology; Future  -     CBC & Differential;  Future  -     Comprehensive Metabolic Panel; Future  -     Hemoglobin A1c; Future  -     Lipid Panel; Future  -     TSH+Free T4; Future  -     Uric Acid; Future    3. Stage 3a chronic kidney disease  -     Microalbumin / Creatinine Urine Ratio - Urine, Clean Catch; Future  -     Ambulatory Referral to Dermatology; Future  -     CBC & Differential; Future  -     Comprehensive Metabolic Panel; Future  -     Hemoglobin A1c; Future  -     Lipid Panel; Future  -     TSH+Free T4; Future  -     Uric Acid; Future    4. Hypertension, essential  -     Microalbumin / Creatinine Urine Ratio - Urine, Clean Catch; Future  -     Ambulatory Referral to Dermatology; Future  -     CBC & Differential; Future  -     Comprehensive Metabolic Panel; Future  -     Hemoglobin A1c; Future  -     Lipid Panel; Future  -     TSH+Free T4; Future  -     Uric Acid; Future    5. Mixed hyperlipidemia  -     Microalbumin / Creatinine Urine Ratio - Urine, Clean Catch; Future  -     Ambulatory Referral to Dermatology; Future  -     CBC & Differential; Future  -     Comprehensive Metabolic Panel; Future  -     Hemoglobin A1c; Future  -     Lipid Panel; Future  -     TSH+Free T4; Future  -     Uric Acid; Future    6. Abdominal aortic aneurysm (AAA) without rupture, unspecified part  -     Microalbumin / Creatinine Urine Ratio - Urine, Clean Catch; Future  -     Ambulatory Referral to Dermatology; Future  -     CBC & Differential; Future  -     Comprehensive Metabolic Panel; Future  -     Hemoglobin A1c; Future  -     Lipid Panel; Future  -     TSH+Free T4; Future  -     Uric Acid; Future    7. CAD S/P percutaneous coronary angioplasty  -     Microalbumin / Creatinine Urine Ratio - Urine, Clean Catch; Future  -     Ambulatory Referral to Dermatology; Future  -     CBC & Differential; Future  -     Comprehensive Metabolic Panel; Future  -     Hemoglobin A1c; Future  -     Lipid Panel; Future  -     TSH+Free T4; Future  -     Uric Acid; Future    8. Type 2  diabetes mellitus with diabetic autonomic neuropathy, without long-term current use of insulin (HCC)  -     Microalbumin / Creatinine Urine Ratio - Urine, Clean Catch; Future  -     Ambulatory Referral to Dermatology; Future  -     CBC & Differential; Future  -     Comprehensive Metabolic Panel; Future  -     Hemoglobin A1c; Future  -     Lipid Panel; Future  -     TSH+Free T4; Future  -     Uric Acid; Future    9. Acquired hypothyroidism  -     Microalbumin / Creatinine Urine Ratio - Urine, Clean Catch; Future  -     Ambulatory Referral to Dermatology; Future  -     CBC & Differential; Future  -     Comprehensive Metabolic Panel; Future  -     Hemoglobin A1c; Future  -     Lipid Panel; Future  -     TSH+Free T4; Future  -     Uric Acid; Future    10. PVD (peripheral vascular disease) with claudication (HCC)  -     Microalbumin / Creatinine Urine Ratio - Urine, Clean Catch; Future  -     Ambulatory Referral to Dermatology; Future  -     CBC & Differential; Future  -     Comprehensive Metabolic Panel; Future  -     Hemoglobin A1c; Future  -     Lipid Panel; Future  -     TSH+Free T4; Future  -     Uric Acid; Future    11. Long-term current use of opiate analgesic  -     Microalbumin / Creatinine Urine Ratio - Urine, Clean Catch; Future  -     Ambulatory Referral to Dermatology; Future  -     CBC & Differential; Future  -     Comprehensive Metabolic Panel; Future  -     Hemoglobin A1c; Future  -     Lipid Panel; Future  -     TSH+Free T4; Future  -     Uric Acid; Future    12. Gastroesophageal reflux disease without esophagitis  -     Microalbumin / Creatinine Urine Ratio - Urine, Clean Catch; Future  -     Ambulatory Referral to Dermatology; Future  -     CBC & Differential; Future  -     Comprehensive Metabolic Panel; Future  -     Hemoglobin A1c; Future  -     Lipid Panel; Future  -     TSH+Free T4; Future  -     Uric Acid; Future             Rash, etiology is unclear currently going work-up with dermatology has  had biopsies, we have tried eliminating some medications he stopped some medications, discussed trying Zyrtec 10 mg at bedtime and Pepcid 20 twice daily December 9, 2022--- patient has stopped his gabapentin, Wellbutrin, Lexapro, ok to stop fenofibrate, iron sulfate, will refer to [facility] for second opinion April 24, 2023 at patient's request, he had seen Dr. Mays previously, but 1 second opinion    Lethargy fatigue, depression, --reactive with loss of wife, off meds    Obstructive jaundice, MRCP shows choledocholithiasis, intra and extrahepatic biliary duct dilation December 6, 2021,, patient underwent ERCP and had stone removal on 2 different occasions,  initially by Dr. Hernandez and subsequently by Dr. Zepeda, with biliary stent initially, patient says he is doing better overall,, December 2022, current liver and bilirubin levels are all normal    Hospital stay transitional care visit October 14 through October 20, 2021 with acute right lower lobe pneumonia, acute hypoxic respiratory failure shortness of breath ----CT scan showed groundglass opacity coronavirus testing ??2 was negative in the hospital,     transtional care visit from repair LEFT FEM. --RECONTRUCTION, AND ATERECTOMY---SEPT 14, 2020, ---PERIPHERAL ASO ---Patient is having claudication and leg pain with weakness, arterial evaluation shows bilateral proximal level occlusive disease either just below the aorta or at the aortic repair level, ---Patient is status post left femoral iliac artery atherectomy grafting and angioplasty by Dr. Nam at [facility], September 14, 2020, ---- carotid ultrasound shows no significant stenosis,----- August 2020 compared to previous and prior ultrasounds,-    Back pain/ spinal stenosis continues on MS Contin 30 mg twice a day,, stop gabapentin    MILD DEPRESSION --patient stopped lexapro 5 mg daily, bupropion xl 150 mg qd     Type 2 Diabetes , continues on Levemir 40 units daily and Metformin 500mg  bid-----hemoglobin A1c 5.6 November 29, 2022    Colon cancer PARTIAL COLECTOMY 2010 - , CEA elevated  7.7, -- Dr. Ibanez---Colonoscopy November 2018 unremarkable----CEA level 2.5 April 2020    B12 deficiency continues on over-the-counter replacement     GENET---sees sleep specialist -continues on CPAP;     Angina, cardiac catheter jan 2018, with 80-90% stenosis circumflex proximal to vein graft, LIMA graft to LAD was patent, normal ejection fraction January 2018, patient had elective semi-elective stent placement to the circumflex artery, JAN 23, 2018, --Patient currently off BRILLINTA and on Plavix with much improved and breathing status as of July 2018----previous echo October 2020 showed mild mitral valve annular calcification, no significant prolapse, or regurgitation, ejection fraction 55 to 60% diastolic dysfunction noted otherwise unremarkable, ----------------echo shows some inferior wall basilar wall posterior wall hypokinesis with a slightly depressed ejection fraction of 46 to 50%,--, mild mitral regurgitation and tricuspid regurgitation, discussed with patient wife April 14, 2022,    R HIP PAIN, GLUTEUS MUSCLE , PARASPINAL MUSCLE ATROPHY, S/P INFARCTION AFTER AAA REPAIR IN 3/13-, status post radiofrequency ablation X 2, by pain management --- continues morphine 30 mg er  twice a day,     Elevated cholesterol--continues Crestor 20,     CAROTID ASO, HAS YRLY F/U WITH VASC SURG --- Dr. Chula Alfaro    HYPOTHRYOIDISM--continues levothyroxine 0.125 mg qd     HTN----continues Norvasc 2.5 mg daily,  Lasix 40 mg QD  , metoprolol extended release 50 mg BID    CKD 3-a- --, improved creatinine down to 1.1 April 7, 2023    GOUT, continues ---probenicid 500 mg qd per dr urbina , colchicine,0.1 PRN ----stopped allopurinol sept 2022 but no improvement    PSA at 1.0 April 7, 2023      Follow Up   Return in about 6 months (around 10/24/2023).  Patient was given instructions and counseling regarding his condition  or for health maintenance advice. Please see specific information pulled into the AVS if appropriate.

## 2023-05-01 ENCOUNTER — TELEPHONE (OUTPATIENT)
Dept: INTERNAL MEDICINE | Facility: CLINIC | Age: 73
End: 2023-05-01
Payer: MEDICARE

## 2023-05-01 NOTE — TELEPHONE ENCOUNTER
Caller: Radhames Colón    Relationship to patient: Self    Best call back number: [phone]    Patient is needing: PATIENT CALLED WANTING AN UPDATE ON HIS DERMATOLOGY REFERRAL.

## 2023-05-02 ENCOUNTER — TELEPHONE (OUTPATIENT)
Dept: INTERNAL MEDICINE | Facility: CLINIC | Age: 73
End: 2023-05-02
Payer: MEDICARE

## 2023-05-02 NOTE — TELEPHONE ENCOUNTER
Patient says he is to get a second opinion on his rash he says that you are affiliated with [facility] and they have a Dermatologist ?  Could you verify the Dermatology office that you want him to go too please.

## 2023-05-02 NOTE — TELEPHONE ENCOUNTER
I called patient and the rferral was sent to [facility] and I called them and they said patient cancelled appointment in March and has appointment in December 12, 2023.  Patient says that he wants to go some where else that Dr. Miller is affiliated with I'm waiting for Dr. Miller to respond to a message I sent him.

## 2023-05-02 NOTE — TELEPHONE ENCOUNTER
Make him a referral to [facility] across the street with Brianne Mei for second opinion on a rash that he has across his abdomen

## 2023-05-03 NOTE — TELEPHONE ENCOUNTER
Referral made this morning to [facility] per Dr. Miller.  Patient called and notified.

## 2023-05-11 ENCOUNTER — TELEPHONE (OUTPATIENT)
Dept: CARDIOLOGY | Facility: CLINIC | Age: 73
End: 2023-05-11
Payer: MEDICARE

## 2023-05-11 NOTE — TELEPHONE ENCOUNTER
Spoke with Dr Oliveira regarding this, he says the patient can stop plavix as long as he takes aspirin 81 mg daily

## 2023-06-12 RX ORDER — ASCORBIC ACID 500 MG
TABLET ORAL
Qty: 90 TABLET | Refills: 1 | Status: SHIPPED | OUTPATIENT
Start: 2023-06-12

## 2023-08-08 ENCOUNTER — TRANSCRIBE ORDERS (OUTPATIENT)
Dept: LAB | Facility: HOSPITAL | Age: 73
End: 2023-08-08
Payer: MEDICARE

## 2023-08-08 ENCOUNTER — LAB (OUTPATIENT)
Dept: LAB | Facility: HOSPITAL | Age: 73
End: 2023-08-08
Payer: MEDICARE

## 2023-08-08 DIAGNOSIS — Z79.899 ENCOUNTER FOR LONG-TERM (CURRENT) USE OF OTHER MEDICATIONS: ICD-10-CM

## 2023-08-08 DIAGNOSIS — M10.9 POLYARTICULAR GOUT: ICD-10-CM

## 2023-08-08 DIAGNOSIS — M10.9 POLYARTICULAR GOUT: Primary | ICD-10-CM

## 2023-08-08 PROCEDURE — 84550 ASSAY OF BLOOD/URIC ACID: CPT

## 2023-08-08 PROCEDURE — 82565 ASSAY OF CREATININE: CPT

## 2023-08-08 PROCEDURE — 36415 COLL VENOUS BLD VENIPUNCTURE: CPT

## 2023-08-08 PROCEDURE — 84520 ASSAY OF UREA NITROGEN: CPT

## 2023-08-09 LAB
BUN SERPL-MCNC: 13 MG/DL (ref 8–23)
CREAT SERPL-MCNC: 1.03 MG/DL (ref 0.76–1.27)
EGFRCR SERPLBLD CKD-EPI 2021: 76.2 ML/MIN/1.73
URATE SERPL-MCNC: 4.9 MG/DL (ref 3.4–7)

## 2023-09-23 DIAGNOSIS — F51.01 PRIMARY INSOMNIA: ICD-10-CM

## 2023-09-25 RX ORDER — TEMAZEPAM 15 MG/1
15 CAPSULE ORAL NIGHTLY PRN
Qty: 30 CAPSULE | Refills: 5 | Status: SHIPPED | OUTPATIENT
Start: 2023-09-25

## 2023-09-26 ENCOUNTER — TELEPHONE (OUTPATIENT)
Dept: INTERNAL MEDICINE | Facility: CLINIC | Age: 73
End: 2023-09-26

## 2023-09-26 NOTE — TELEPHONE ENCOUNTER
Caller: Radhames Colón    Relationship: Self    Best call back number: [phone]     What is the best time to reach you: ANY    Who are you requesting to speak with (clinical staff, provider,  specific staff member): CLINICAL STAFF    What was the call regarding: PATIENT CALLED STATING THAT HE NEEDS A PRIOR AUTHORIZATION FOR HIS temazepam (RESTORIL) 15 MG capsule . HE WOULD LIKE A CALL WHEN THIS IS COMPLETED

## 2023-10-02 RX ORDER — METOPROLOL SUCCINATE 50 MG/1
TABLET, EXTENDED RELEASE ORAL
Qty: 180 TABLET | Refills: 3 | Status: SHIPPED | OUTPATIENT
Start: 2023-10-02

## 2023-10-02 RX ORDER — LANOLIN ALCOHOL/MO/W.PET/CERES
CREAM (GRAM) TOPICAL
Qty: 90 TABLET | Refills: 3 | Status: SHIPPED | OUTPATIENT
Start: 2023-10-02

## 2023-10-16 ENCOUNTER — LAB (OUTPATIENT)
Dept: LAB | Facility: HOSPITAL | Age: 73
End: 2023-10-16
Payer: MEDICARE

## 2023-10-16 DIAGNOSIS — J43.2 CENTRILOBULAR EMPHYSEMA: ICD-10-CM

## 2023-10-16 DIAGNOSIS — E11.43 TYPE 2 DIABETES MELLITUS WITH DIABETIC AUTONOMIC NEUROPATHY, WITHOUT LONG-TERM CURRENT USE OF INSULIN: ICD-10-CM

## 2023-10-16 DIAGNOSIS — I71.40 ABDOMINAL AORTIC ANEURYSM (AAA) WITHOUT RUPTURE, UNSPECIFIED PART: ICD-10-CM

## 2023-10-16 DIAGNOSIS — K21.9 GASTROESOPHAGEAL REFLUX DISEASE WITHOUT ESOPHAGITIS: ICD-10-CM

## 2023-10-16 DIAGNOSIS — Z98.61 CAD S/P PERCUTANEOUS CORONARY ANGIOPLASTY: ICD-10-CM

## 2023-10-16 DIAGNOSIS — E03.9 ACQUIRED HYPOTHYROIDISM: ICD-10-CM

## 2023-10-16 DIAGNOSIS — I73.9 PVD (PERIPHERAL VASCULAR DISEASE) WITH CLAUDICATION: ICD-10-CM

## 2023-10-16 DIAGNOSIS — N18.31 STAGE 3A CHRONIC KIDNEY DISEASE: ICD-10-CM

## 2023-10-16 DIAGNOSIS — I10 HYPERTENSION, ESSENTIAL: ICD-10-CM

## 2023-10-16 DIAGNOSIS — E78.2 MIXED HYPERLIPIDEMIA: ICD-10-CM

## 2023-10-16 DIAGNOSIS — I25.10 CAD S/P PERCUTANEOUS CORONARY ANGIOPLASTY: ICD-10-CM

## 2023-10-16 DIAGNOSIS — Z79.891 LONG-TERM CURRENT USE OF OPIATE ANALGESIC: ICD-10-CM

## 2023-10-16 DIAGNOSIS — R73.01 IFG (IMPAIRED FASTING GLUCOSE): ICD-10-CM

## 2023-10-16 LAB
ALBUMIN SERPL-MCNC: 3.8 G/DL (ref 3.5–5.2)
ALBUMIN UR-MCNC: 2.1 MG/DL
ALBUMIN/GLOB SERPL: 1.4 G/DL
ALP SERPL-CCNC: 78 U/L (ref 39–117)
ALT SERPL W P-5'-P-CCNC: 15 U/L (ref 1–41)
ANION GAP SERPL CALCULATED.3IONS-SCNC: 14.9 MMOL/L (ref 5–15)
AST SERPL-CCNC: 27 U/L (ref 1–40)
BASOPHILS # BLD AUTO: 0.03 10*3/MM3 (ref 0–0.2)
BASOPHILS NFR BLD AUTO: 0.3 % (ref 0–1.5)
BILIRUB SERPL-MCNC: 0.2 MG/DL (ref 0–1.2)
BUN SERPL-MCNC: 21 MG/DL (ref 8–23)
BUN/CREAT SERPL: 18.6 (ref 7–25)
CALCIUM SPEC-SCNC: 9.4 MG/DL (ref 8.6–10.5)
CHLORIDE SERPL-SCNC: 100 MMOL/L (ref 98–107)
CHOLEST SERPL-MCNC: 114 MG/DL (ref 0–200)
CO2 SERPL-SCNC: 26.1 MMOL/L (ref 22–29)
CREAT SERPL-MCNC: 1.13 MG/DL (ref 0.76–1.27)
CREAT UR-MCNC: 37.4 MG/DL
DEPRECATED RDW RBC AUTO: 45.6 FL (ref 37–54)
EGFRCR SERPLBLD CKD-EPI 2021: 68.2 ML/MIN/1.73
EOSINOPHIL # BLD AUTO: 0.04 10*3/MM3 (ref 0–0.4)
EOSINOPHIL NFR BLD AUTO: 0.4 % (ref 0.3–6.2)
ERYTHROCYTE [DISTWIDTH] IN BLOOD BY AUTOMATED COUNT: 15.4 % (ref 12.3–15.4)
GLOBULIN UR ELPH-MCNC: 2.7 GM/DL
GLUCOSE SERPL-MCNC: 114 MG/DL (ref 65–99)
HCT VFR BLD AUTO: 43 % (ref 37.5–51)
HDLC SERPL-MCNC: 35 MG/DL (ref 40–60)
HGB BLD-MCNC: 13.7 G/DL (ref 13–17.7)
LDLC SERPL CALC-MCNC: 44 MG/DL (ref 0–100)
LDLC/HDLC SERPL: 1.01 {RATIO}
LYMPHOCYTES # BLD AUTO: 1.52 10*3/MM3 (ref 0.7–3.1)
LYMPHOCYTES NFR BLD AUTO: 14.3 % (ref 19.6–45.3)
MCH RBC QN AUTO: 26.2 PG (ref 26.6–33)
MCHC RBC AUTO-ENTMCNC: 31.9 G/DL (ref 31.5–35.7)
MCV RBC AUTO: 82.2 FL (ref 79–97)
MICROALBUMIN/CREAT UR: 56.1 MG/G (ref 0–29)
MONOCYTES # BLD AUTO: 0.33 10*3/MM3 (ref 0.1–0.9)
MONOCYTES NFR BLD AUTO: 3.1 % (ref 5–12)
NEUTROPHILS NFR BLD AUTO: 8.69 10*3/MM3 (ref 1.7–7)
NEUTROPHILS NFR BLD AUTO: 81.5 % (ref 42.7–76)
PLATELET # BLD AUTO: 163 10*3/MM3 (ref 140–450)
PMV BLD AUTO: 13.3 FL (ref 6–12)
POTASSIUM SERPL-SCNC: 4.4 MMOL/L (ref 3.5–5.2)
PROT SERPL-MCNC: 6.5 G/DL (ref 6–8.5)
RBC # BLD AUTO: 5.23 10*6/MM3 (ref 4.14–5.8)
SODIUM SERPL-SCNC: 141 MMOL/L (ref 136–145)
TRIGL SERPL-MCNC: 219 MG/DL (ref 0–150)
URATE SERPL-MCNC: 5.2 MG/DL (ref 3.4–7)
VLDLC SERPL-MCNC: 35 MG/DL (ref 5–40)
WBC NRBC COR # BLD: 10.65 10*3/MM3 (ref 3.4–10.8)

## 2023-10-16 PROCEDURE — 84439 ASSAY OF FREE THYROXINE: CPT

## 2023-10-16 PROCEDURE — 80061 LIPID PANEL: CPT

## 2023-10-16 PROCEDURE — 85025 COMPLETE CBC W/AUTO DIFF WBC: CPT

## 2023-10-16 PROCEDURE — 83036 HEMOGLOBIN GLYCOSYLATED A1C: CPT

## 2023-10-16 PROCEDURE — 82043 UR ALBUMIN QUANTITATIVE: CPT

## 2023-10-16 PROCEDURE — 84443 ASSAY THYROID STIM HORMONE: CPT

## 2023-10-16 PROCEDURE — 36415 COLL VENOUS BLD VENIPUNCTURE: CPT

## 2023-10-16 PROCEDURE — 84550 ASSAY OF BLOOD/URIC ACID: CPT

## 2023-10-16 PROCEDURE — 80053 COMPREHEN METABOLIC PANEL: CPT

## 2023-10-16 PROCEDURE — 82570 ASSAY OF URINE CREATININE: CPT

## 2023-10-17 LAB
HBA1C MFR BLD: 6.1 % (ref 4.8–5.6)
T4 FREE SERPL-MCNC: 1.36 NG/DL (ref 0.93–1.7)
TSH SERPL DL<=0.05 MIU/L-ACNC: 0.21 UIU/ML (ref 0.27–4.2)

## 2023-10-24 ENCOUNTER — OFFICE VISIT (OUTPATIENT)
Dept: INTERNAL MEDICINE | Facility: CLINIC | Age: 73
End: 2023-10-24
Payer: MEDICARE

## 2023-10-24 VITALS
WEIGHT: 213 LBS | OXYGEN SATURATION: 92 % | SYSTOLIC BLOOD PRESSURE: 166 MMHG | TEMPERATURE: 98 F | BODY MASS INDEX: 31.55 KG/M2 | HEIGHT: 69 IN | HEART RATE: 87 BPM | DIASTOLIC BLOOD PRESSURE: 72 MMHG

## 2023-10-24 DIAGNOSIS — Z98.61 CAD S/P PERCUTANEOUS CORONARY ANGIOPLASTY: ICD-10-CM

## 2023-10-24 DIAGNOSIS — I25.10 CAD S/P PERCUTANEOUS CORONARY ANGIOPLASTY: ICD-10-CM

## 2023-10-24 DIAGNOSIS — Z98.890 S/P BILATERAL CAROTID ENDARTERECTOMY: ICD-10-CM

## 2023-10-24 DIAGNOSIS — F33.1 MAJOR DEPRESSIVE DISORDER, RECURRENT, MODERATE: ICD-10-CM

## 2023-10-24 DIAGNOSIS — N18.31 STAGE 3A CHRONIC KIDNEY DISEASE: ICD-10-CM

## 2023-10-24 DIAGNOSIS — I73.9 PERIPHERAL ARTERY DISEASE: ICD-10-CM

## 2023-10-24 DIAGNOSIS — I10 HYPERTENSION, ESSENTIAL: Chronic | ICD-10-CM

## 2023-10-24 DIAGNOSIS — M47.816 LUMBAR SPONDYLOSIS: ICD-10-CM

## 2023-10-24 DIAGNOSIS — E03.9 ACQUIRED HYPOTHYROIDISM: ICD-10-CM

## 2023-10-24 DIAGNOSIS — K21.9 GASTROESOPHAGEAL REFLUX DISEASE WITHOUT ESOPHAGITIS: ICD-10-CM

## 2023-10-24 DIAGNOSIS — J43.2 CENTRILOBULAR EMPHYSEMA: Primary | ICD-10-CM

## 2023-10-24 DIAGNOSIS — D50.8 IRON DEFICIENCY ANEMIA SECONDARY TO INADEQUATE DIETARY IRON INTAKE: ICD-10-CM

## 2023-10-24 DIAGNOSIS — I71.40 ABDOMINAL AORTIC ANEURYSM (AAA) WITHOUT RUPTURE, UNSPECIFIED PART: ICD-10-CM

## 2023-10-24 DIAGNOSIS — E11.43 TYPE 2 DIABETES MELLITUS WITH DIABETIC AUTONOMIC NEUROPATHY, WITHOUT LONG-TERM CURRENT USE OF INSULIN: ICD-10-CM

## 2023-10-24 DIAGNOSIS — R74.8 ELEVATED LIVER ENZYMES: ICD-10-CM

## 2023-10-24 NOTE — PROGRESS NOTES
"CHIEF COMPLAINT/ HPI:  Hypertension (Routine follow up. Lab follow up. Pt is requesting 90 day of sleep med ( needs to go to [facility] mail order). Metformin questions regarding liver and kidney. )    Right knee injury about 2 weeks ago went to urgent care got a brace, he is ambulating, he is doing some home rehab          Objective   Vital Signs  Vitals:    10/24/23 1227   BP: 166/72   Pulse: 87   Temp: 98 °F (36.7 °C)   SpO2: 92%   Weight: 96.6 kg (213 lb)   Height: 175.3 cm (69.02\")      Body mass index is 31.44 kg/m².  Review of Systems   Constitutional: Negative.    HENT: Negative.     Eyes: Negative.    Respiratory: Negative.     Cardiovascular: Negative.    Gastrointestinal: Negative.    Endocrine: Negative.    Genitourinary: Negative.    Musculoskeletal:  Positive for arthralgias, gait problem and joint swelling.   Allergic/Immunologic: Negative.    Hematological: Negative.    Psychiatric/Behavioral: Negative.        Physical Exam  Constitutional:       General: He is not in acute distress.     Appearance: Normal appearance.   HENT:      Head: Normocephalic.      Mouth/Throat:      Mouth: Mucous membranes are moist.   Eyes:      Conjunctiva/sclera: Conjunctivae normal.      Pupils: Pupils are equal, round, and reactive to light.   Cardiovascular:      Rate and Rhythm: Normal rate and regular rhythm.      Pulses: Normal pulses.      Heart sounds: Murmur (1/6 to 2/6 systolic murmur with occasional skipped beats,) heard.   Pulmonary:      Effort: Pulmonary effort is normal.      Breath sounds: Normal breath sounds.   Abdominal:      General: Bowel sounds are normal.      Palpations: Abdomen is soft.   Musculoskeletal:         General: No swelling. Normal range of motion.      Cervical back: Neck supple.   Skin:     General: Skin is warm and dry.      Coloration: Skin is not jaundiced.   Neurological:      General: No focal deficit present.      Mental Status: He is alert and oriented to person, place, and " time. Mental status is at baseline.   Psychiatric:         Mood and Affect: Mood normal.         Behavior: Behavior normal.         Thought Content: Thought content normal.         Judgment: Judgment normal.     Patient is diabetic foot exam completed, I could not palpate a good DP or PT pulse bilaterally, his toes are intact without any ulcerations, he is got a little bit of dry skin throughout, no significant swelling or edema to the feet, light touch is intact, he is ambulatory he walks, no ulcerations,  Result Review :   Lab Results   Component Value Date    PROBNP 915.0 (H) 07/27/2022    PROBNP 1,455.0 (H) 05/24/2022    PROBNP 2,841.0 (H) 05/10/2022    BNP 1937 (H) 03/10/2021    BNP 1128 (H) 11/02/2020    BNP 1802 (H) 10/14/2020     CMP          4/7/2023    12:17 8/8/2023    13:51 10/16/2023    12:20   CMP   Glucose 80   114    BUN 14  13  21    Creatinine 1.13  1.03  1.13    EGFR 68.2  76.2  68.2    Sodium 144   141    Potassium 4.3   4.4    Chloride 104   100    Calcium 9.3   9.4    Total Protein 6.6   6.5    Albumin 3.6   3.8    Globulin 3.0   2.7    Total Bilirubin 0.5   0.2    Alkaline Phosphatase 70   78    AST (SGOT) 28   27    ALT (SGPT) 21   15    Albumin/Globulin Ratio 1.2   1.4    BUN/Creatinine Ratio 12.4   18.6    Anion Gap 11.0   14.9      CBC w/diff          11/29/2022    12:23 4/7/2023    12:17 10/16/2023    12:20   CBC w/Diff   WBC 13.46  9.85  10.65    RBC 5.36  5.14  5.23    Hemoglobin 15.1  13.7  13.7    Hematocrit 46.3  43.9  43.0    MCV 86.4  85.4  82.2    MCH 28.2  26.7  26.2    MCHC 32.6  31.2  31.9    RDW 14.6  15.0  15.4    Platelets 215  177  163    Neutrophil Rel % 63.5  60.7  81.5    Immature Granulocyte Rel % 0.7  0.4     Lymphocyte Rel % 24.1  24.3  14.3    Monocyte Rel % 7.7  8.1  3.1    Eosinophil Rel % 3.3  5.6  0.4    Basophil Rel % 0.7  0.9  0.3       Lipid Panel          11/29/2022    12:23 4/7/2023    12:17 10/16/2023    12:20   Lipid Panel   Total Cholesterol 124  123  114     Triglycerides 112  142  219    HDL Cholesterol 35  36  35    VLDL Cholesterol 21  25  35    LDL Cholesterol  68  62  44    LDL/HDL Ratio 1.90  1.63  1.01       Lab Results   Component Value Date    TSH 0.213 (L) 10/16/2023    TSH 1.190 04/07/2023    TSH 3.660 11/29/2022      Lab Results   Component Value Date    FREET4 1.36 10/16/2023    FREET4 1.44 04/07/2023    FREET4 1.47 11/29/2022      A1C Last 3 Results          11/29/2022    12:23 10/16/2023    12:20   HGBA1C Last 3 Results   Hemoglobin A1C 5.60  6.10       PSA          4/7/2023    12:16   PSA   PSA 1.040                     Visit Diagnoses:    ICD-10-CM ICD-9-CM   1. Centrilobular emphysema  J43.2 492.8   2. Lumbar spondylosis  M47.816 721.3   3. Iron deficiency anemia secondary to inadequate dietary iron intake  D50.8 280.1   4. Elevated liver enzymes  R74.8 790.5   5. Peripheral artery disease  I73.9 443.9   6. Abdominal aortic aneurysm (AAA) without rupture, unspecified part  I71.40 441.4   7. Gastroesophageal reflux disease without esophagitis  K21.9 530.81   8. Hypertension, essential  I10 401.9   9. CAD S/P percutaneous coronary angioplasty  I25.10 414.01    Z98.61 V45.82   10. S/p bilateral carotid endarterectomy  Z98.890 V45.89   11. Type 2 diabetes mellitus with diabetic autonomic neuropathy, without long-term current use of insulin  E11.43 250.60     337.1   12. Acquired hypothyroidism  E03.9 244.9   13. Stage 3a chronic kidney disease  N18.31 585.3   14. Major depressive disorder, recurrent, moderate  F33.1 296.32       Assessment and Plan   Diagnoses and all orders for this visit:    1. Centrilobular emphysema (Primary)  -     Lipid Panel; Future  -     Hemoglobin A1c; Future  -     Comprehensive Metabolic Panel; Future  -     CBC & Differential; Future  -     Magnesium; Future  -     TSH+Free T4; Future    2. Lumbar spondylosis  -     Lipid Panel; Future  -     Hemoglobin A1c; Future  -     Comprehensive Metabolic Panel; Future  -     CBC &  Differential; Future  -     Magnesium; Future  -     TSH+Free T4; Future    3. Iron deficiency anemia secondary to inadequate dietary iron intake  -     Lipid Panel; Future  -     Hemoglobin A1c; Future  -     Comprehensive Metabolic Panel; Future  -     CBC & Differential; Future  -     Magnesium; Future  -     TSH+Free T4; Future    4. Elevated liver enzymes  -     Lipid Panel; Future  -     Hemoglobin A1c; Future  -     Comprehensive Metabolic Panel; Future  -     CBC & Differential; Future  -     Magnesium; Future  -     TSH+Free T4; Future    5. Peripheral artery disease  -     Lipid Panel; Future  -     Hemoglobin A1c; Future  -     Comprehensive Metabolic Panel; Future  -     CBC & Differential; Future  -     Magnesium; Future  -     TSH+Free T4; Future    6. Abdominal aortic aneurysm (AAA) without rupture, unspecified part  -     Lipid Panel; Future  -     Hemoglobin A1c; Future  -     Comprehensive Metabolic Panel; Future  -     CBC & Differential; Future  -     Magnesium; Future  -     TSH+Free T4; Future    7. Gastroesophageal reflux disease without esophagitis  -     Lipid Panel; Future  -     Hemoglobin A1c; Future  -     Comprehensive Metabolic Panel; Future  -     CBC & Differential; Future  -     Magnesium; Future  -     TSH+Free T4; Future    8. Hypertension, essential  -     Lipid Panel; Future  -     Hemoglobin A1c; Future  -     Comprehensive Metabolic Panel; Future  -     CBC & Differential; Future  -     Magnesium; Future  -     TSH+Free T4; Future    9. CAD S/P percutaneous coronary angioplasty  -     Lipid Panel; Future  -     Hemoglobin A1c; Future  -     Comprehensive Metabolic Panel; Future  -     CBC & Differential; Future  -     Magnesium; Future  -     TSH+Free T4; Future    10. S/p bilateral carotid endarterectomy  -     Lipid Panel; Future  -     Hemoglobin A1c; Future  -     Comprehensive Metabolic Panel; Future  -     CBC & Differential; Future  -     Magnesium; Future  -      TSH+Free T4; Future    11. Type 2 diabetes mellitus with diabetic autonomic neuropathy, without long-term current use of insulin  -     Lipid Panel; Future  -     Hemoglobin A1c; Future  -     Comprehensive Metabolic Panel; Future  -     CBC & Differential; Future  -     Magnesium; Future  -     TSH+Free T4; Future    12. Acquired hypothyroidism  -     Lipid Panel; Future  -     Hemoglobin A1c; Future  -     Comprehensive Metabolic Panel; Future  -     CBC & Differential; Future  -     Magnesium; Future  -     TSH+Free T4; Future    13. Stage 3a chronic kidney disease  -     Lipid Panel; Future  -     Hemoglobin A1c; Future  -     Comprehensive Metabolic Panel; Future  -     CBC & Differential; Future  -     Magnesium; Future  -     TSH+Free T4; Future    14. Major depressive disorder, recurrent, moderate  -     Lipid Panel; Future  -     Hemoglobin A1c; Future  -     Comprehensive Metabolic Panel; Future  -     CBC & Differential; Future  -     Magnesium; Future  -     TSH+Free T4; Future    R knee injury , --er visit, oct 2023, brace --possible torn discus,    Rash, etiology is unclear currently going work-up with dermatology has had biopsies, we have tried eliminating some medications he stopped some medications, discussed trying Zyrtec 10 mg at bedtime and Pepcid 20 twice daily December 9, 2022--- patient has stopped his gabapentin, Wellbutrin, Lexapro, ok to stop fenofibrate, iron sulfate, will refer to [facility] for second opinion April 24, 2023 at patient's request, he had seen Dr. Mays previously, but 1 second opinion--- rash went away around October 4, 2023    Lethargy fatigue, depression, --reactive with loss of wife, off meds    Obstructive jaundice, MRCP shows choledocholithiasis, intra and extrahepatic biliary duct dilation December 6, 2021,, patient underwent ERCP and had stone removal on 2 different occasions,  initially by Dr. Hernandez and subsequently by Dr. Zepeda, with biliary stent  initially, patient says he is doing better overall,, December 2022, current liver and bilirubin levels are all normal    Hospital stay transitional care visit October 14 through October 20, 2021 with acute right lower lobe pneumonia, acute hypoxic respiratory failure shortness of breath ----CT scan showed groundglass opacity coronavirus testing ??2 was negative in the hospital,     transtional care visit from repair LEFT FEM. --RECONTRUCTION, AND ATERECTOMY---SEPT 14, 2020, ---PERIPHERAL ASO ---Patient is having claudication and leg pain with weakness, arterial evaluation shows bilateral proximal level occlusive disease either just below the aorta or at the aortic repair level, ---Patient is status post left femoral iliac artery atherectomy grafting and angioplasty by Dr[facility], September 14, 2020, ---- carotid ultrasound shows no significant stenosis,----- August 2020 compared to previous and prior ultrasounds,-    Back pain/ spinal stenosis continues  MS Contin 30 mg twice a day,, stop gabapentin    Type 2 Diabetes , continues  Levemir 40 units daily and Metformin 500mg bid-----hemoglobin A1c 6.1, October 2023, urine for microalbumin was - October 16, 2023---diabetic foot exam complete, oct 24, 2023   ,  Colon cancer PARTIAL COLECTOMY 2010 - , CEA elevated  7.7, -- Dr. Ibanez---Colonoscopy November 2018 unremarkable----CEA level 2.5 April 2020    B12 deficiency continues on over-the-counter replacement     GENET---sees sleep specialist -continues on CPAP;     Angina, cardiac catheter jan 2018, with 80-90% stenosis circumflex proximal to vein graft, LIMA graft to LAD was patent, normal ejection fraction January 2018, patient had elective semi-elective stent placement to the circumflex artery, JAN 23, 2018, --Patient currently off BRILLINTA and on Plavix with much improved and breathing status as of July 2018----previous echo October 2020 showed mild mitral valve annular calcification, no  significant prolapse, or regurgitation, ejection fraction 55 to 60% diastolic dysfunction noted otherwise unremarkable, ----------------echo shows some inferior wall basilar wall posterior wall hypokinesis with a slightly depressed ejection fraction of 46 to 50%,--, mild mitral regurgitation and tricuspid regurgitation, discussed with patient wife April 14, 2022,    R HIP PAIN, GLUTEUS MUSCLE , PARASPINAL MUSCLE ATROPHY, S/P INFARCTION AFTER AAA REPAIR IN 3/13-, status post radiofrequency ablation X 2, by pain management --- continues morphine 30 mg er  twice a day,     Elevated cholesterol--continues Crestor 20,     CAROTID ASO, HAS YRLY F/U WITH VASC SURG --- Dr. Quiros [facility]    HYPOTHRYOIDISM--continues levothyroxine 0.125 mg qd     HTN----continues Norvasc 2.5 mg daily,  Lasix 40 mg QD  , metoprolol extended release 50 mg BID    GOUT, continues ---probenicid 500 mg qd per dr urbina , colchicine,0.1 PRN ----stopped allopurinol sept 2022 but no improvemen patient may be going back on allopurinol soon, October 2023    pSA at 1.0 April 7, 2023    Follow Up   Return in about 6 months (around 4/24/2024).  Patient was given instructions and counseling regarding his condition or for health maintenance advice. Please see specific information pulled into the AVS if appropriate.         Answers submitted by the patient for this visit:  Primary Reason for Visit (Submitted on 10/17/2023)  What is the primary reason for your visit?: Other  Other (Submitted on 10/17/2023)  Please describe your symptoms.: Regular check up review blood test  Have you had these symptoms before?: No  How long have you been having these symptoms?: 1-4 days  Please list any medications you are currently taking for this condition.: See chart  Please describe any probable cause for these symptoms. : None

## 2023-11-06 ENCOUNTER — TELEPHONE (OUTPATIENT)
Dept: INTERNAL MEDICINE | Facility: CLINIC | Age: 73
End: 2023-11-06
Payer: MEDICARE

## 2023-11-06 DIAGNOSIS — F51.01 PRIMARY INSOMNIA: ICD-10-CM

## 2023-11-06 RX ORDER — TEMAZEPAM 15 MG/1
15 CAPSULE ORAL NIGHTLY PRN
Qty: 30 CAPSULE | Refills: 5 | Status: SHIPPED | OUTPATIENT
Start: 2023-11-06

## 2023-11-06 NOTE — TELEPHONE ENCOUNTER
Caller: Radhames Colón BALJIT    Relationship: Self    Best call back number:     [phone]       Requested Prescriptions:   Requested Prescriptions     Pending Prescriptions Disp Refills    temazepam (RESTORIL) 15 MG capsule 30 capsule 5     Sig: Take 1 capsule by mouth At Night As Needed for Sleep.      90 DAY SUPPLY  Pharmacy where request should be sent: [facility] - [facility] [address] - [phone]  - [phone] FX     Last office visit with prescribing clinician: 10/24/2023   Last telemedicine visit with prescribing clinician: Visit date not found   Next office visit with prescribing clinician: 4/30/2024     Additional details provided by patient:     Does the patient have less than a 3 day supply:  [x] Yes  [] No    Would you like a call back once the refill request has been completed: [x] Yes [] No    If the office needs to give you a call back, can they leave a voicemail: [x] Yes [] No    Thao Mahoney Rep   11/06/23 09:30 EST

## 2023-11-09 ENCOUNTER — OFFICE VISIT (OUTPATIENT)
Dept: CARDIOLOGY | Facility: CLINIC | Age: 73
End: 2023-11-09
Payer: MEDICARE

## 2023-11-09 VITALS
HEART RATE: 70 BPM | WEIGHT: 208 LBS | SYSTOLIC BLOOD PRESSURE: 139 MMHG | DIASTOLIC BLOOD PRESSURE: 70 MMHG | BODY MASS INDEX: 30.81 KG/M2 | HEIGHT: 69 IN

## 2023-11-09 DIAGNOSIS — I25.10 CAD S/P PERCUTANEOUS CORONARY ANGIOPLASTY: Primary | ICD-10-CM

## 2023-11-09 DIAGNOSIS — Z98.61 CAD S/P PERCUTANEOUS CORONARY ANGIOPLASTY: Primary | ICD-10-CM

## 2023-11-09 DIAGNOSIS — E78.2 MIXED HYPERLIPIDEMIA: Chronic | ICD-10-CM

## 2023-11-09 DIAGNOSIS — I10 HYPERTENSION, ESSENTIAL: Chronic | ICD-10-CM

## 2023-11-09 PROCEDURE — 99214 OFFICE O/P EST MOD 30 MIN: CPT | Performed by: INTERNAL MEDICINE

## 2023-11-09 PROCEDURE — 3075F SYST BP GE 130 - 139MM HG: CPT | Performed by: INTERNAL MEDICINE

## 2023-11-09 PROCEDURE — 3078F DIAST BP <80 MM HG: CPT | Performed by: INTERNAL MEDICINE

## 2023-11-09 RX ORDER — ALLOPURINOL 300 MG/1
300 TABLET ORAL AS NEEDED
COMMUNITY
Start: 2023-10-24

## 2023-11-09 NOTE — PROGRESS NOTES
Chief Complaint  Follow-up, Coronary Artery Disease, Hypertension, and Hyperlipidemia    Subjective    Patient with no anginal discomfort or change in breathing capacity  Past Medical History:   Diagnosis Date    Abdominal aortic aneurysm without rupture     Acute renal failure (ARF)     WAS SHORT TERM DIALYSIS, STAGE 3    Arthritis     Atrophy of thyroid (acquired)     Bilateral carotid artery stenosis     Chronic airway obstruction     Chronic gouty arthritis     Chronic kidney disease, stage 3     Controlled diabetes mellitus type II without complication     COPD (chronic obstructive pulmonary disease)     Coronary artery disease involving coronary bypass graft of native heart without angina pectoris 03/28/2012    with previous bypass surgery (2005 x3) and subsequent stent/PCI of the native circumflex obtuse marginal branch in January 2018    Depression     Disease of liver     Elevated carcinoembryonic antigen (CEA)     GERD (gastroesophageal reflux disease)     Hypertension, essential 07/14/2021    Hypothyroidism     Iron deficiency anemia     Long term (current) use of opiate analgesic     Lumbar spondylosis     Malignant neoplasm of colon     Mixed hyperlipidemia 07/14/2021    Peripheral artery disease     Polyarthropathy     Rhabdomyolysis     Sleep apnea     CPAP         Current Outpatient Medications:     allopurinol (ZYLOPRIM) 300 MG tablet, Take 1 tablet by mouth As Needed., Disp: , Rfl:     amLODIPine (NORVASC) 2.5 MG tablet, TAKE 1 TABLET EVERY DAY, Disp: 90 tablet, Rfl: 1    aspirin 81 MG EC tablet, Take 1 tablet by mouth., Disp: , Rfl:     folic acid (FOLVITE) 1 MG tablet, TAKE 1 TABLET TWICE DAILY, Disp: 180 tablet, Rfl: 1    furosemide (LASIX) 40 MG tablet, TAKE 1 TABLET EVERY DAY, Disp: 90 tablet, Rfl: 3    insulin NPH (humuLIN N,novoLIN N) 100 UNIT/ML injection, Inject 40 Units under the skin into the appropriate area as directed Every Night. HOLD INSULIN Wednesday NIGHT BLOOD SUGARS RUN LOW  "85-, Disp: , Rfl:     levothyroxine (SYNTHROID, LEVOTHROID) 125 MCG tablet, TAKE 1 TABLET EVERY DAY, Disp: 90 tablet, Rfl: 3    metFORMIN (GLUCOPHAGE) 500 MG tablet, TAKE 1 TABLET TWICE DAILY WITH A MEAL, Disp: 180 tablet, Rfl: 1    metoprolol succinate XL (TOPROL-XL) 50 MG 24 hr tablet, TAKE 1 TABLET TWICE DAILY, Disp: 180 tablet, Rfl: 3    Morphine (MS CONTIN) 30 MG 12 hr tablet, 1 tablet 2 (Two) Times a Day., Disp: , Rfl:     rosuvastatin (CRESTOR) 20 MG tablet, TAKE 1 TABLET EVERY DAY, Disp: 90 tablet, Rfl: 3    temazepam (RESTORIL) 15 MG capsule, Take 1 capsule by mouth At Night As Needed for Sleep., Disp: 30 capsule, Rfl: 5    vitamin B-12 (CYANOCOBALAMIN) 1000 MCG tablet, TAKE 1 TABLET EVERY DAY, Disp: 90 tablet, Rfl: 3    vitamin C (ASCORBIC ACID) 500 MG tablet, TAKE 1 TABLET EVERY DAY, Disp: 90 tablet, Rfl: 1    Medications Discontinued During This Encounter   Medication Reason    Cholecalciferol 50 MCG (2000) tablet *Therapy completed    clobetasol (TEMOVATE) 0.05 % ointment *Therapy completed    ipratropium-albuterol (DUO-NEB) 0.5-2.5 mg/3 ml nebulizer *Therapy completed    probenecid (BENEMID) 500 MG tablet Alternate therapy     Allergies   Allergen Reactions    Penicillins Shortness Of Breath    Ticagrelor Shortness Of Breath    Penicillin G Provider Review Needed and Unknown - Low Severity        Social History     Tobacco Use    Smoking status: Former     Packs/day: 1.00     Years: 45.00     Additional pack years: 0.00     Total pack years: 45.00     Types: Cigarettes     Quit date: 2005     Years since quittin.1    Smokeless tobacco: Never   Vaping Use    Vaping Use: Never used   Substance Use Topics    Alcohol use: Not Currently     Comment: RARE    Drug use: Never       Family History   Problem Relation Age of Onset    Heart failure Mother     Malig Hyperthermia Neg Hx         Objective     /70   Pulse 70   Ht 175.3 cm (69.02\")   Wt 94.3 kg (208 lb)   BMI 30.70 kg/m²   " "    Physical Exam    General Appearance:   no acute distress  Alert and oriented x3  HENT:   lips not cyanotic  Atraumatic  Neck:  No jvd   supple  Respiratory:  no respiratory distress  normal breath sounds  no rales  Cardiovascular:  Regular rate and rhythm  no S3, no S4   no murmur  no rub  Extremities  No cyanosis  lower extremity edema: none    Skin:   warm, dry  No rashes      Result Review :     proBNP   Date Value Ref Range Status   07/27/2022 915.0 (H) 0.0 - 900.0 pg/mL Final     CMP          4/7/2023    12:17 8/8/2023    13:51 10/16/2023    12:20   CMP   Glucose 80   114    BUN 14  13  21    Creatinine 1.13  1.03  1.13    EGFR 68.2  76.2  68.2    Sodium 144   141    Potassium 4.3   4.4    Chloride 104   100    Calcium 9.3   9.4    Total Protein 6.6   6.5    Albumin 3.6   3.8    Globulin 3.0   2.7    Total Bilirubin 0.5   0.2    Alkaline Phosphatase 70   78    AST (SGOT) 28   27    ALT (SGPT) 21   15    Albumin/Globulin Ratio 1.2   1.4    BUN/Creatinine Ratio 12.4   18.6    Anion Gap 11.0   14.9      CBC w/diff          11/29/2022    12:23 4/7/2023    12:17 10/16/2023    12:20   CBC w/Diff   WBC 13.46  9.85  10.65    RBC 5.36  5.14  5.23    Hemoglobin 15.1  13.7  13.7    Hematocrit 46.3  43.9  43.0    MCV 86.4  85.4  82.2    MCH 28.2  26.7  26.2    MCHC 32.6  31.2  31.9    RDW 14.6  15.0  15.4    Platelets 215  177  163    Neutrophil Rel % 63.5  60.7  81.5    Immature Granulocyte Rel % 0.7  0.4     Lymphocyte Rel % 24.1  24.3  14.3    Monocyte Rel % 7.7  8.1  3.1    Eosinophil Rel % 3.3  5.6  0.4    Basophil Rel % 0.7  0.9  0.3       Lab Results   Component Value Date    TSH 0.213 (L) 10/16/2023      Lab Results   Component Value Date    FREET4 1.36 10/16/2023      No results found for: \"DDIMERQUANT\"  Magnesium   Date Value Ref Range Status   01/25/2022 2.1 1.6 - 2.4 mg/dL Final      No results found for: \"DIGOXIN\"   Lab Results   Component Value Date    TROPONINT <0.01 10/14/2020           Lipid Panel  " "        11/29/2022    12:23 4/7/2023    12:17 10/16/2023    12:20   Lipid Panel   Total Cholesterol 124  123  114    Triglycerides 112  142  219    HDL Cholesterol 35  36  35    VLDL Cholesterol 21  25  35    LDL Cholesterol  68  62  44    LDL/HDL Ratio 1.90  1.63  1.01      No results found for: \"POCTROP\"    Results for orders placed in visit on 04/04/22    Adult Transthoracic Echo Complete W/ Cont if Necessary Per Protocol    Interpretation Summary  · Left ventricular ejection fraction appears to be 46 - 50%. Left ventricular systolic function is mildly decreased.  · The following left ventricular wall segments are hypokinetic: basal inferolateral, mid inferolateral, mid inferior, basal inferior and basal inferoseptal.  · Left ventricular diastolic function is consistent with (grade III w/high LAP) fixed restrictive pattern.  · The right ventricular cavity is borderline dilated.  · Estimated right ventricular systolic pressure from tricuspid regurgitation is mildly elevated (35-45 mmHg).  · Mild mitral regurgitation is noted  · Left atrial volume is mildly increased.                 Diagnoses and all orders for this visit:    1. CAD S/P percutaneous coronary angioplasty (Primary)  Assessment & Plan:  Patient is doing well no ongoing angina continue with chronic aspirin 81 mg daily        2. Mixed hyperlipidemia  Assessment & Plan:  Continue with Crestor 20 nightly LDL is at goal no myalgias symptoms      3. Hypertension, essential  Assessment & Plan:  Blood pressure goal range on amlodipine 2.5 and Toprol 50 chelators              Follow Up     Return in about 6 months (around 5/9/2024) for Follow with Nikky Swan.          Patient was given instructions and counseling regarding his condition or for health maintenance advice. Please see specific information pulled into the AVS if appropriate.       "

## 2023-12-04 RX ORDER — AMLODIPINE BESYLATE 2.5 MG/1
TABLET ORAL
Qty: 90 TABLET | Refills: 3 | Status: SHIPPED | OUTPATIENT
Start: 2023-12-04

## 2023-12-04 RX ORDER — FOLIC ACID 1 MG/1
TABLET ORAL
Qty: 180 TABLET | Refills: 3 | Status: SHIPPED | OUTPATIENT
Start: 2023-12-04

## 2023-12-06 ENCOUNTER — OFFICE VISIT (OUTPATIENT)
Dept: INTERNAL MEDICINE | Facility: CLINIC | Age: 73
End: 2023-12-06
Payer: MEDICARE

## 2023-12-06 VITALS
HEART RATE: 76 BPM | BODY MASS INDEX: 29.92 KG/M2 | TEMPERATURE: 98.2 F | WEIGHT: 202 LBS | DIASTOLIC BLOOD PRESSURE: 73 MMHG | SYSTOLIC BLOOD PRESSURE: 122 MMHG | HEIGHT: 69 IN | OXYGEN SATURATION: 95 %

## 2023-12-06 DIAGNOSIS — F33.1 MAJOR DEPRESSIVE DISORDER, RECURRENT, MODERATE: Primary | ICD-10-CM

## 2023-12-06 DIAGNOSIS — F51.01 PRIMARY INSOMNIA: ICD-10-CM

## 2023-12-06 DIAGNOSIS — G47.30 SLEEP APNEA, UNSPECIFIED TYPE: ICD-10-CM

## 2023-12-06 PROCEDURE — 3044F HG A1C LEVEL LT 7.0%: CPT | Performed by: INTERNAL MEDICINE

## 2023-12-06 PROCEDURE — 99213 OFFICE O/P EST LOW 20 MIN: CPT | Performed by: INTERNAL MEDICINE

## 2023-12-06 PROCEDURE — 3074F SYST BP LT 130 MM HG: CPT | Performed by: INTERNAL MEDICINE

## 2023-12-06 PROCEDURE — 3078F DIAST BP <80 MM HG: CPT | Performed by: INTERNAL MEDICINE

## 2023-12-06 RX ORDER — SERTRALINE HYDROCHLORIDE 25 MG/1
50 TABLET, FILM COATED ORAL DAILY
Qty: 60 TABLET | Refills: 5 | Status: SHIPPED | OUTPATIENT
Start: 2023-12-06

## 2023-12-06 NOTE — PROGRESS NOTES
"CHIEF COMPLAINT/ HPI:  Depression (Pt states depression, loss of appetite , difficulty sleeping, wife passed away this past year. )      No appetite,    Sleep comes and goes,          Objective   Vital Signs  Vitals:    12/06/23 1025   BP: 122/73   Pulse: 76   Temp: 98.2 °F (36.8 °C)   SpO2: 95%   Weight: 91.6 kg (202 lb)   Height: 175.3 cm (69.02\")      Body mass index is 29.82 kg/m².  Review of Systems   Constitutional:  Positive for unexpected weight loss.   HENT: Negative.     Eyes: Negative.    Respiratory: Negative.     Cardiovascular: Negative.    Gastrointestinal: Negative.    Endocrine: Negative.    Genitourinary: Negative.    Musculoskeletal: Negative.    Allergic/Immunologic: Negative.    Neurological:  Positive for weakness.   Hematological: Negative.    Psychiatric/Behavioral: Negative.  Positive for depressed mood.       Physical Exam  Constitutional:       General: He is not in acute distress.     Appearance: Normal appearance.   HENT:      Head: Normocephalic.      Mouth/Throat:      Mouth: Mucous membranes are moist.   Eyes:      Conjunctiva/sclera: Conjunctivae normal.      Pupils: Pupils are equal, round, and reactive to light.   Cardiovascular:      Rate and Rhythm: Normal rate and regular rhythm.      Pulses: Normal pulses.      Heart sounds: Normal heart sounds.   Pulmonary:      Effort: Pulmonary effort is normal.      Breath sounds: Normal breath sounds.   Abdominal:      General: Abdomen is flat. Bowel sounds are normal.      Palpations: Abdomen is soft.   Musculoskeletal:         General: No swelling. Normal range of motion.      Cervical back: Neck supple.   Skin:     General: Skin is warm and dry.      Coloration: Skin is not jaundiced.   Neurological:      General: No focal deficit present.      Mental Status: He is alert and oriented to person, place, and time. Mental status is at baseline.   Psychiatric:         Mood and Affect: Mood normal.         Behavior: Behavior normal.         " Thought Content: Thought content normal.         Judgment: Judgment normal.        Result Review :   Lab Results   Component Value Date    PROBNP 915.0 (H) 07/27/2022    PROBNP 1,455.0 (H) 05/24/2022    PROBNP 2,841.0 (H) 05/10/2022    BNP 1937 (H) 03/10/2021    BNP 1128 (H) 11/02/2020    BNP 1802 (H) 10/14/2020     CMP          4/7/2023    12:17 8/8/2023    13:51 10/16/2023    12:20   CMP   Glucose 80   114    BUN 14  13  21    Creatinine 1.13  1.03  1.13    EGFR 68.2  76.2  68.2    Sodium 144   141    Potassium 4.3   4.4    Chloride 104   100    Calcium 9.3   9.4    Total Protein 6.6   6.5    Albumin 3.6   3.8    Globulin 3.0   2.7    Total Bilirubin 0.5   0.2    Alkaline Phosphatase 70   78    AST (SGOT) 28   27    ALT (SGPT) 21   15    Albumin/Globulin Ratio 1.2   1.4    BUN/Creatinine Ratio 12.4   18.6    Anion Gap 11.0   14.9      CBC w/diff          4/7/2023    12:17 10/16/2023    12:20   CBC w/Diff   WBC 9.85  10.65    RBC 5.14  5.23    Hemoglobin 13.7  13.7    Hematocrit 43.9  43.0    MCV 85.4  82.2    MCH 26.7  26.2    MCHC 31.2  31.9    RDW 15.0  15.4    Platelets 177  163    Neutrophil Rel % 60.7  81.5    Immature Granulocyte Rel % 0.4     Lymphocyte Rel % 24.3  14.3    Monocyte Rel % 8.1  3.1    Eosinophil Rel % 5.6  0.4    Basophil Rel % 0.9  0.3       Lipid Panel          4/7/2023    12:17 10/16/2023    12:20   Lipid Panel   Total Cholesterol 123  114    Triglycerides 142  219    HDL Cholesterol 36  35    VLDL Cholesterol 25  35    LDL Cholesterol  62  44    LDL/HDL Ratio 1.63  1.01       Lab Results   Component Value Date    TSH 0.213 (L) 10/16/2023    TSH 1.190 04/07/2023    TSH 3.660 11/29/2022      Lab Results   Component Value Date    FREET4 1.36 10/16/2023    FREET4 1.44 04/07/2023    FREET4 1.47 11/29/2022      A1C Last 3 Results          10/16/2023    12:20   HGBA1C Last 3 Results   Hemoglobin A1C 6.10       PSA          4/7/2023    12:16   PSA   PSA 1.040                     Visit  Diagnoses:    ICD-10-CM ICD-9-CM   1. Major depressive disorder, recurrent, moderate  F33.1 296.32   2. Sleep apnea, unspecified type  G47.30 780.57   3. Primary insomnia  F51.01 307.42       Assessment and Plan   Diagnoses and all orders for this visit:    1. Major depressive disorder, recurrent, moderate (Primary)    2. Sleep apnea, unspecified type    3. Primary insomnia    Other orders  -     sertraline (Zoloft) 25 MG tablet; Take 2 tablets by mouth Daily.  Dispense: 60 tablet; Refill: 5    Depression, reactive, still grieving over the loss of his wife, he cannot turn his mind off at night is having trouble with insomnia still, he is taking temazepam that helps some, he has been losing weight he says he has no appetite, discussed treatment options December 6, 2023--- will start with Zoloft 25 mg a day for a week or so and then he can increase it to 2 tablets at bedtime,    R knee injury , --er visit, oct 2023, brace --possible torn discus,    Rash, etiology is unclear currently going work-up with dermatology has had biopsies, we have tried eliminating some medications he stopped some medications, discussed trying Zyrtec 10 mg at bedtime and Pepcid 20 twice daily December 9, 2022--- patient has stopped his gabapentin, Wellbutrin, Lexapro, ok to stop fenofibrate, iron sulfate, will refer to [facility] for second opinion April 24, 2023 at patient's request, he had seen Dr. Mays previously, but 1 second opinion--- rash went away around October 4, 2023    Obstructive jaundice, MRCP shows choledocholithiasis, intra and extrahepatic biliary duct dilation December 6, 2021,, patient underwent ERCP and had stone removal on 2 different occasions,  initially by Dr. Hernandez and subsequently by Dr. Zepeda, with biliary stent initially, patient says he is doing better overall,, December 2022, current liver and bilirubin levels are all normal    Hospital stay transitional care visit October 14 through October 20, 2021  with acute right lower lobe pneumonia, acute hypoxic respiratory failure shortness of breath ----CT scan showed groundglass opacity coronavirus testing ??2 was negative in the hospital,     transtional care visit from repair LEFT FEM. --RECONTRUCTION, AND ATERECTOMY---SEPT 14, 2020, ---PERIPHERAL ASO ---Patient is having claudication and leg pain with weakness, arterial evaluation shows bilateral proximal level occlusive disease either just below the aorta or at the aortic repair level, ---Patient is status post left femoral iliac artery atherectomy grafting and angioplasty by Dr. Nam at [facility], September 14, 2020, ---- carotid ultrasound shows no significant stenosis,----- August 2020 compared to previous and prior ultrasounds,-    Back pain/ spinal stenosis continues  MS Contin 30 mg twice a day,, stop gabapentin    Type 2 Diabetes , continues  Levemir 40 units daily and Metformin 500mg bid-----hemoglobin A1c 6.1, October 2023, urine for microalbumin was - October 16, 2023---diabetic foot exam complete, oct 24, 2023   ,  Colon cancer PARTIAL COLECTOMY 2010 - , CEA elevated  7.7, -- Dr. Ibanez---Colonoscopy November 2018 unremarkable----CEA level 2.5 April 2020    B12 deficiency continues on over-the-counter replacement     GENET---sees sleep specialist -continues on CPAP;     Angina, cardiac catheter jan 2018, with 80-90% stenosis circumflex proximal to vein graft, LIMA graft to LAD was patent, normal ejection fraction January 2018, patient had elective semi-elective stent placement to the circumflex artery, JAN 23, 2018, --Patient currently off BRILLINTA and on Plavix with much improved and breathing status as of July 2018----previous echo October 2020 showed mild mitral valve annular calcification, no significant prolapse, or regurgitation, ejection fraction 55 to 60% diastolic dysfunction noted otherwise unremarkable, ----------------echo shows some inferior wall basilar wall posterior wall  hypokinesis with a slightly depressed ejection fraction of 46 to 50%,--, mild mitral regurgitation and tricuspid regurgitation, discussed with patient wife April 14, 2022,    R HIP PAIN, GLUTEUS MUSCLE , PARASPINAL MUSCLE ATROPHY, S/P INFARCTION AFTER AAA REPAIR IN 3/13-, status post radiofrequency ablation X 2, by pain management --- continues morphine 30 mg er  twice a day,     Elevated cholesterol--continues Crestor 20,     CAROTID ASO, HAS YRLY F/U WITH VASC SURG --- Dr. Quiros [facility]    HYPOTHRYOIDISM--continues levothyroxine 0.125 mg qd     HTN----continues Norvasc 2.5 mg daily,  Lasix 40 mg QD  , metoprolol extended release 50 mg BID    GOUT, continues ---probenicid 500 mg qd per dr urbina , colchicine,0.1 PRN ----stopped allopurinol sept 2022 but no improvemen patient may be going back on allopurinol soon, October 2023    pSA at 1.0 April 7, 2023                Follow Up   Return for Next scheduled follow up.  Patient was given instructions and counseling regarding his condition or for health maintenance advice. Please see specific information pulled into the AVS if appropriate.         Answers submitted by the patient for this visit:  Primary Reason for Visit (Submitted on 11/29/2023)  What is the primary reason for your visit?: Other  Other (Submitted on 11/29/2023)  Please describe your symptoms.: No. Appetite. Cant sleep possible no desire to do anything ! depression possibility! Lost my wife March 13t  Have you had these symptoms before?: Yes  How long have you been having these symptoms?: Greater than 2 weeks  Please list any medications you are currently taking for this condition.: You have on file  Please describe any probable cause for these symptoms. : Lost wife in March

## 2023-12-08 ENCOUNTER — OFFICE VISIT (OUTPATIENT)
Dept: SLEEP MEDICINE | Facility: HOSPITAL | Age: 73
End: 2023-12-08
Payer: MEDICARE

## 2023-12-08 VITALS
BODY MASS INDEX: 30.38 KG/M2 | HEIGHT: 69 IN | HEART RATE: 63 BPM | DIASTOLIC BLOOD PRESSURE: 64 MMHG | SYSTOLIC BLOOD PRESSURE: 128 MMHG | WEIGHT: 205.1 LBS | OXYGEN SATURATION: 95 %

## 2023-12-08 DIAGNOSIS — G47.33 OSA (OBSTRUCTIVE SLEEP APNEA): Primary | ICD-10-CM

## 2023-12-08 PROCEDURE — 3074F SYST BP LT 130 MM HG: CPT | Performed by: INTERNAL MEDICINE

## 2023-12-08 PROCEDURE — G0463 HOSPITAL OUTPT CLINIC VISIT: HCPCS

## 2023-12-08 PROCEDURE — 3078F DIAST BP <80 MM HG: CPT | Performed by: INTERNAL MEDICINE

## 2023-12-08 RX ORDER — TRIAMCINOLONE ACETONIDE 1 MG/G
CREAM TOPICAL
COMMUNITY

## 2023-12-08 NOTE — PROGRESS NOTES
[facility]                          Chief Complaint:   F/up GENET    History of present illness:   Subjective      GENET:  PSG 12/05/2016: RDI= 23/h.  He has been on PAP therapy since then.    He was last seen in October 2022. Compliance was 60%.    Reported using his machine regularly but stated that in the middle of the night, he wakes up and feels like his mouth is very dry and he has to take the mask off..  CPAP helps him overall. He said that sometimes he feels air is getting out of his mouth but he can still see water in the container and he does change it every 3 days. He said that his humidity level is 1.     Sleep schedule:: 9:30 PM-8:30 AM      Mask: Nasal which does fit well.  No significant air leak but dry mouth  DME: [facility]    ESS: Total score: 5     HTN:  On Amlodipine, Lasix and Metoprolol.  Blood pressure is controlled.  He's compliant with meds.      REVIEW OF SYSTEMS:   HEENT: Nasal congestion  CARDIOVASCULAR: No chest pain, chest pressure or chest discomfort. No palpitations or edema.   RESPIRATORY: Shortness of breath..   GASTROINTESTINAL: No abdominal bloating or reflux   NEUROLOGICAL/PSYCHOATRY: No depression nor anxiety    Past Medical History:  Past Medical History:   Diagnosis Date    Abdominal aortic aneurysm without rupture     Acute renal failure (ARF)     WAS SHORT TERM DIALYSIS, STAGE 3    Arthritis     Atrophy of thyroid (acquired)     Bilateral carotid artery stenosis     Chronic airway obstruction     Chronic gouty arthritis     Chronic kidney disease, stage 3     Controlled diabetes mellitus type II without complication     COPD (chronic obstructive pulmonary disease)     Coronary artery disease involving coronary bypass graft of native heart without angina pectoris 03/28/2012    with previous bypass surgery (2005 x3) and subsequent stent/PCI of the native circumflex obtuse marginal branch in January 2018    Depression     Disease of liver      Elevated carcinoembryonic antigen (CEA)     GERD (gastroesophageal reflux disease)     Hypertension, essential 07/14/2021    Hypothyroidism     Iron deficiency anemia     Long term (current) use of opiate analgesic     Lumbar spondylosis     Malignant neoplasm of colon     Mixed hyperlipidemia 07/14/2021    Peripheral artery disease     Polyarthropathy     Rhabdomyolysis     Sleep apnea     CPAP   ,   Past Surgical History:   Procedure Laterality Date    ABDOMINAL AORTIC ANEURYSM REPAIR      ANGIOPLASTY FEMORAL ARTERY Left 9/14/2020    Procedure: AORTOILLOFEMORAL ARTERIOGRAM;  Surgeon: Chula Nam Jr., MD;  [facility] 18/19;  Service: Vascular;  Laterality: Left;    APPENDECTOMY      CARDIAC CATHETERIZATION      CAROTID ENDARTERECTOMY Left 2005    CAROTID ENDARTERECTOMY Right 2010    CHOLECYSTECTOMY OPEN      COLON SURGERY      COLON RESECTION    COLONOSCOPY  2004,11/2018    Dr. Lamas 2004,     CORONARY ANGIOPLASTY WITH STENT PLACEMENT      CORONARY ARTERY BYPASS GRAFT  2005    ENDOSCOPY      ERCP N/A 12/15/2021    Procedure: ENDOSCOPIC RETROGRADE CHOLANGIOPANCREATOGRAPHY WITH SPINCTEROTOMY, 9-12MM BALLOON SWEEP, INSERTION OF 10FR 9CM BILIARY STENT;  Surgeon: Eden Hernandez MD;  Location: [facility] ENDOSCOPY;  Service: Gastroenterology;  Laterality: N/A;  BILE DUCT STONES    ERCP N/A 1/6/2022    Procedure: ENDOSCOPIC RETROGRADE CHOLANGIOPANCREATOGRAPHY WITH STENT REMOVAL, SPYGLASS, AUTOLITH, 8-10MM BALLOON DILATATION, 9-12 AND 12-15MM BALLOON SWEEP;  Surgeon: Wayne Zepeda MD;  Location: [facility] ENDOSCOPY;  Service: Gastroenterology;  Laterality: N/A;  BILE DUCT STONES    FEMORAL ENDARTERECTOMY Left 9/14/2020    Procedure: LEFT FEMORAL ENDARECTOMY WITHH PATCH ANGIOPLASTY;  Surgeon: Chula Nam Jr., MD;  Location: [facility] OR 18/19;  Service: Vascular;  Laterality: Left;    GALLBLADDER SURGERY      RHINOPLASTY Bilateral     70-80% R, 50% L   ,   Social History      Socioeconomic History    Marital status:    Tobacco Use    Smoking status: Former     Packs/day: 1.00     Years: 45.00     Additional pack years: 0.00     Total pack years: 45.00     Types: Cigarettes     Quit date: 2005     Years since quittin.2    Smokeless tobacco: Never   Vaping Use    Vaping Use: Never used   Substance and Sexual Activity    Alcohol use: Not Currently     Comment: RARE    Drug use: Never    Sexual activity: Defer     E-cigarette/Vaping    E-cigarette/Vaping Use Never User      E-cigarette/Vaping Substances    Nicotine No     THC No     CBD No     Flavoring No      E-cigarette/Vaping Devices    Disposable No     Pre-filled or Refillable Cartridge No     Refillable Tank No     Pre-filled Pod No         and Allergies:  Penicillins, Ticagrelor, and Penicillin g    Medication Review:     Current Outpatient Medications:     allopurinol (ZYLOPRIM) 300 MG tablet, Take 1 tablet by mouth As Needed., Disp: , Rfl:     amLODIPine (NORVASC) 2.5 MG tablet, TAKE 1 TABLET EVERY DAY, Disp: 90 tablet, Rfl: 3    aspirin 81 MG EC tablet, Take 1 tablet by mouth., Disp: , Rfl:     folic acid (FOLVITE) 1 MG tablet, TAKE 1 TABLET TWICE DAILY, Disp: 180 tablet, Rfl: 3    furosemide (LASIX) 40 MG tablet, TAKE 1 TABLET EVERY DAY, Disp: 90 tablet, Rfl: 3    insulin NPH (humuLIN N,novoLIN N) 100 UNIT/ML injection, Inject 40 Units under the skin into the appropriate area as directed Every Night. HOLD INSULIN Wednesday NIGHT BLOOD SUGARS RUN LOW 85-95, Disp: , Rfl:     levothyroxine (SYNTHROID, LEVOTHROID) 125 MCG tablet, TAKE 1 TABLET EVERY DAY, Disp: 90 tablet, Rfl: 3    metFORMIN (GLUCOPHAGE) 500 MG tablet, TAKE 1 TABLET TWICE DAILY WITH A MEAL, Disp: 180 tablet, Rfl: 1    metoprolol succinate XL (TOPROL-XL) 50 MG 24 hr tablet, TAKE 1 TABLET TWICE DAILY, Disp: 180 tablet, Rfl: 3    Morphine (MS CONTIN) 30 MG 12 hr tablet, 1 tablet 2 (Two) Times a Day., Disp: , Rfl:     rosuvastatin (CRESTOR) 20 MG  "tablet, TAKE 1 TABLET EVERY DAY, Disp: 90 tablet, Rfl: 3    sertraline (Zoloft) 25 MG tablet, Take 2 tablets by mouth Daily., Disp: 60 tablet, Rfl: 5    temazepam (RESTORIL) 15 MG capsule, Take 1 capsule by mouth At Night As Needed for Sleep., Disp: 30 capsule, Rfl: 5    triamcinolone (KENALOG) 0.1 % cream, APPLY TO RASH TWICE DAILY AS NEEDED, Disp: , Rfl:     vitamin B-12 (CYANOCOBALAMIN) 1000 MCG tablet, TAKE 1 TABLET EVERY DAY, Disp: 90 tablet, Rfl: 3    vitamin C (ASCORBIC ACID) 500 MG tablet, TAKE 1 TABLET EVERY DAY, Disp: 90 tablet, Rfl: 1      Objective   Vital Signs:  Vitals:    12/08/23 1100   Weight: 93 kg (205 lb 1.6 oz)   Height: 175.3 cm (69.02\")     Body mass index is 30.27 kg/m².          Physical Exam:   General Appearance:    Alert, cooperative, in no acute distress   ENMT:  Freidman score 4,   Neck:  Large. Trachea midline. No thyromegaly.   Lungs:     Clear to auscultation,respirations regular, even and  unlabored    Heart:    Regular rhythm and normal rate, normal S1 and S2, no  murmur.   Abdomen:     Obese.  Soft.  No tenderness.  No HSM    Neuro:   Conscious, alert, oriented x3. Appropriate mood and affect.    Extremities:   Moves all extremities well, no edema, no cyanosis, no  redness              Diagnostic data:    CPAP download showed:  Date: Last 30 days  Usage (days): 100%  Days used>4h: 90%  AHI: 3.2/h  Leak: 8 min  Usage: 6 h and 58 min  P 90% : 11  Auto CPAP: 10-20 cm H2O    Lab Results   Component Value Date    HGBA1C 6.10 (H) 10/16/2023     Total Cholesterol   Date Value Ref Range Status   10/16/2023 114 0 - 200 mg/dL Final     Triglycerides   Date Value Ref Range Status   10/16/2023 219 (H) 0 - 150 mg/dL Final     HDL Cholesterol   Date Value Ref Range Status   10/16/2023 35 (L) 40 - 60 mg/dL Final     Hemoglobin   Date Value Ref Range Status   10/16/2023 13.7 13.0 - 17.7 g/dL Final     CO2   Date Value Ref Range Status   10/16/2023 26.1 22.0 - 29.0 mmol/L Final        Assessment "   GENET, on CPAP  Obesity, BMI 30  HTN        PLAN:  Borderline compliant with therapy due to nasal discomfort with the current nasal mask and therefore we will refit him with a new mask, probably a full facemask.    Counseled for weight loss.  Encouraged to exercise regularly and cut down on carbohydrates.  Discussed that losing weight may decrease the severity of sleep apnea and obviate the need of CPAP therapy.    Discussed the association between obstructive sleep apnea and cardiovascular disease including HTN and the beneficial effect of Pap therapy in reducing the risk of major cardiovascular events.                  This note was dictated utilizing Dragon dictation

## 2024-01-18 ENCOUNTER — OFFICE VISIT (OUTPATIENT)
Dept: INTERNAL MEDICINE | Facility: CLINIC | Age: 74
End: 2024-01-18
Payer: MEDICARE

## 2024-01-18 VITALS
HEIGHT: 69 IN | SYSTOLIC BLOOD PRESSURE: 140 MMHG | TEMPERATURE: 98.2 F | OXYGEN SATURATION: 96 % | WEIGHT: 197 LBS | DIASTOLIC BLOOD PRESSURE: 70 MMHG | BODY MASS INDEX: 29.18 KG/M2 | HEART RATE: 79 BPM

## 2024-01-18 DIAGNOSIS — Z98.61 CAD S/P PERCUTANEOUS CORONARY ANGIOPLASTY: ICD-10-CM

## 2024-01-18 DIAGNOSIS — E11.43 TYPE 2 DIABETES MELLITUS WITH DIABETIC AUTONOMIC NEUROPATHY, WITHOUT LONG-TERM CURRENT USE OF INSULIN: ICD-10-CM

## 2024-01-18 DIAGNOSIS — I73.9 PVD (PERIPHERAL VASCULAR DISEASE) WITH CLAUDICATION: ICD-10-CM

## 2024-01-18 DIAGNOSIS — R63.4 WEIGHT LOSS, NON-INTENTIONAL: ICD-10-CM

## 2024-01-18 DIAGNOSIS — E03.9 ACQUIRED HYPOTHYROIDISM: ICD-10-CM

## 2024-01-18 DIAGNOSIS — J43.2 CENTRILOBULAR EMPHYSEMA: ICD-10-CM

## 2024-01-18 DIAGNOSIS — I10 HYPERTENSION, ESSENTIAL: Chronic | ICD-10-CM

## 2024-01-18 DIAGNOSIS — F51.01 PRIMARY INSOMNIA: ICD-10-CM

## 2024-01-18 DIAGNOSIS — F33.1 MAJOR DEPRESSIVE DISORDER, RECURRENT, MODERATE: Primary | ICD-10-CM

## 2024-01-18 DIAGNOSIS — D50.8 IRON DEFICIENCY ANEMIA SECONDARY TO INADEQUATE DIETARY IRON INTAKE: ICD-10-CM

## 2024-01-18 DIAGNOSIS — I25.10 CAD S/P PERCUTANEOUS CORONARY ANGIOPLASTY: ICD-10-CM

## 2024-01-18 PROCEDURE — 3078F DIAST BP <80 MM HG: CPT | Performed by: INTERNAL MEDICINE

## 2024-01-18 PROCEDURE — 99214 OFFICE O/P EST MOD 30 MIN: CPT | Performed by: INTERNAL MEDICINE

## 2024-01-18 PROCEDURE — 3077F SYST BP >= 140 MM HG: CPT | Performed by: INTERNAL MEDICINE

## 2024-01-18 RX ORDER — SERTRALINE HYDROCHLORIDE 25 MG/1
25 TABLET, FILM COATED ORAL DAILY
Qty: 90 TABLET | Refills: 3 | Status: SHIPPED | OUTPATIENT
Start: 2024-01-18

## 2024-01-18 RX ORDER — QUETIAPINE FUMARATE 25 MG/1
25 TABLET, FILM COATED ORAL NIGHTLY
Qty: 90 TABLET | Refills: 5 | Status: SHIPPED | OUTPATIENT
Start: 2024-01-18

## 2024-01-18 NOTE — PROGRESS NOTES
"CHIEF COMPLAINT/ HPI:  Depression (Follow up on sertraline, pt states he thinks the med may be working, loss of appetite. Pt states desire to do things is decreasing. Pt finds himself forcing something to eat.)              Objective   Vital Signs  Vitals:    01/18/24 1401   BP: 140/70   Pulse: 79   Temp: 98.2 °F (36.8 °C)   SpO2: 96%   Weight: 89.4 kg (197 lb)   Height: 175.3 cm (69.02\")      Body mass index is 29.08 kg/m².  Review of Systems   Constitutional:  Positive for unexpected weight loss.   Neurological:  Positive for weakness.   Psychiatric/Behavioral:  Positive for depressed mood.       Physical Exam  Constitutional:       Appearance: Normal appearance.   Cardiovascular:      Rate and Rhythm: Normal rate and regular rhythm.      Pulses: Normal pulses.      Heart sounds: Normal heart sounds.   Neurological:      General: No focal deficit present.      Mental Status: He is alert and oriented to person, place, and time.   Psychiatric:         Mood and Affect: Mood normal.         Behavior: Behavior normal.         Thought Content: Thought content normal.         Judgment: Judgment normal.        Result Review :   Lab Results   Component Value Date    PROBNP 915.0 (H) 07/27/2022    PROBNP 1,455.0 (H) 05/24/2022    PROBNP 2,841.0 (H) 05/10/2022    BNP 1937 (H) 03/10/2021    BNP 1128 (H) 11/02/2020    BNP 1802 (H) 10/14/2020     CMP          4/7/2023    12:17 8/8/2023    13:51 10/16/2023    12:20   CMP   Glucose 80   114    BUN 14  13  21    Creatinine 1.13  1.03  1.13    EGFR 68.2  76.2  68.2    Sodium 144   141    Potassium 4.3   4.4    Chloride 104   100    Calcium 9.3   9.4    Total Protein 6.6   6.5    Albumin 3.6   3.8    Globulin 3.0   2.7    Total Bilirubin 0.5   0.2    Alkaline Phosphatase 70   78    AST (SGOT) 28   27    ALT (SGPT) 21   15    Albumin/Globulin Ratio 1.2   1.4    BUN/Creatinine Ratio 12.4   18.6    Anion Gap 11.0   14.9      CBC w/diff          4/7/2023    12:17 10/16/2023    12:20 "   CBC w/Diff   WBC 9.85  10.65    RBC 5.14  5.23    Hemoglobin 13.7  13.7    Hematocrit 43.9  43.0    MCV 85.4  82.2    MCH 26.7  26.2    MCHC 31.2  31.9    RDW 15.0  15.4    Platelets 177  163    Neutrophil Rel % 60.7  81.5    Immature Granulocyte Rel % 0.4     Lymphocyte Rel % 24.3  14.3    Monocyte Rel % 8.1  3.1    Eosinophil Rel % 5.6  0.4    Basophil Rel % 0.9  0.3       Lipid Panel          4/7/2023    12:17 10/16/2023    12:20   Lipid Panel   Total Cholesterol 123  114    Triglycerides 142  219    HDL Cholesterol 36  35    VLDL Cholesterol 25  35    LDL Cholesterol  62  44    LDL/HDL Ratio 1.63  1.01       Lab Results   Component Value Date    TSH 0.213 (L) 10/16/2023    TSH 1.190 04/07/2023    TSH 3.660 11/29/2022      Lab Results   Component Value Date    FREET4 1.36 10/16/2023    FREET4 1.44 04/07/2023    FREET4 1.47 11/29/2022      A1C Last 3 Results          10/16/2023    12:20   HGBA1C Last 3 Results   Hemoglobin A1C 6.10       PSA          4/7/2023    12:16   PSA   PSA 1.040                     Visit Diagnoses:    ICD-10-CM ICD-9-CM   1. Major depressive disorder, recurrent, moderate  F33.1 296.32   2. Primary insomnia  F51.01 307.42   3. Weight loss, non-intentional  R63.4 783.21   4. Centrilobular emphysema  J43.2 492.8   5. Acquired hypothyroidism  E03.9 244.9   6. Type 2 diabetes mellitus with diabetic autonomic neuropathy, without long-term current use of insulin  E11.43 250.60     337.1   7. CAD S/P percutaneous coronary angioplasty  I25.10 414.01    Z98.61 V45.82   8. Hypertension, essential  I10 401.9   9. PVD (peripheral vascular disease) with claudication  I73.9 443.9   10. Iron deficiency anemia secondary to inadequate dietary iron intake  D50.8 280.1       Assessment and Plan   Diagnoses and all orders for this visit:    1. Major depressive disorder, recurrent, moderate (Primary)  -     Comprehensive Metabolic Panel; Future  -     CBC & Differential; Future  -     Magnesium; Future  -      TSH+Free T4; Future  -     Sedimentation Rate; Future  -     Vitamin B12 anemia; Future  -     Folate anemia; Future  -     Hemoglobin A1c; Future    2. Primary insomnia  -     Comprehensive Metabolic Panel; Future  -     CBC & Differential; Future  -     Magnesium; Future  -     TSH+Free T4; Future  -     Sedimentation Rate; Future  -     Vitamin B12 anemia; Future  -     Folate anemia; Future  -     Hemoglobin A1c; Future    3. Weight loss, non-intentional  -     Comprehensive Metabolic Panel; Future  -     CBC & Differential; Future  -     Magnesium; Future  -     TSH+Free T4; Future  -     Sedimentation Rate; Future  -     Vitamin B12 anemia; Future  -     Folate anemia; Future  -     Hemoglobin A1c; Future    4. Centrilobular emphysema  -     Comprehensive Metabolic Panel; Future  -     CBC & Differential; Future  -     Magnesium; Future  -     TSH+Free T4; Future  -     Sedimentation Rate; Future  -     Vitamin B12 anemia; Future  -     Folate anemia; Future  -     Hemoglobin A1c; Future    5. Acquired hypothyroidism  -     Comprehensive Metabolic Panel; Future  -     CBC & Differential; Future  -     Magnesium; Future  -     TSH+Free T4; Future  -     Sedimentation Rate; Future  -     Vitamin B12 anemia; Future  -     Folate anemia; Future  -     Hemoglobin A1c; Future    6. Type 2 diabetes mellitus with diabetic autonomic neuropathy, without long-term current use of insulin  -     Comprehensive Metabolic Panel; Future  -     CBC & Differential; Future  -     Magnesium; Future  -     TSH+Free T4; Future  -     Sedimentation Rate; Future  -     Vitamin B12 anemia; Future  -     Folate anemia; Future  -     Hemoglobin A1c; Future    7. CAD S/P percutaneous coronary angioplasty  -     Comprehensive Metabolic Panel; Future  -     CBC & Differential; Future  -     Magnesium; Future  -     TSH+Free T4; Future  -     Sedimentation Rate; Future  -     Vitamin B12 anemia; Future  -     Folate anemia; Future  -      Hemoglobin A1c; Future    8. Hypertension, essential  -     Comprehensive Metabolic Panel; Future  -     CBC & Differential; Future  -     Magnesium; Future  -     TSH+Free T4; Future  -     Sedimentation Rate; Future  -     Vitamin B12 anemia; Future  -     Folate anemia; Future  -     Hemoglobin A1c; Future    9. PVD (peripheral vascular disease) with claudication  -     Comprehensive Metabolic Panel; Future  -     CBC & Differential; Future  -     Magnesium; Future  -     TSH+Free T4; Future  -     Sedimentation Rate; Future  -     Vitamin B12 anemia; Future  -     Folate anemia; Future  -     Hemoglobin A1c; Future    10. Iron deficiency anemia secondary to inadequate dietary iron intake  -     Comprehensive Metabolic Panel; Future  -     CBC & Differential; Future  -     Magnesium; Future  -     TSH+Free T4; Future  -     Sedimentation Rate; Future  -     Vitamin B12 anemia; Future  -     Folate anemia; Future  -     Hemoglobin A1c; Future    Other orders  -     QUEtiapine (SEROquel) 25 MG tablet; Take 1 tablet by mouth Every Night.  Dispense: 90 tablet; Refill: 5  -     sertraline (Zoloft) 50 MG tablet; Take 1 tablet by mouth Daily.  Dispense: 90 tablet; Refill: 3  -     sertraline (Zoloft) 25 MG tablet; Take 1 tablet by mouth Daily.  Dispense: 90 tablet; Refill: 3        Depression, reactive, still grieving over the loss of his wife, he cannot turn his mind off at night is having trouble with insomnia still, he is taking temazepam that helps some, he has been losing weight he says he has no appetite, discussed treatment options December 6, 2023--- will start with Zoloft 25 mg a day for a week or so and then he can increase it to 2 tablets at bedtime,, patient is still feeling depressed losing weight not overall doing as well as he would like, no energy, consider treatment options to include adding Remeron at bedtime again increasing Zoloft or adding Rexulti Vraylar combination, consider counseling,--jan 2024  --will do seroquel 25 mg qhs and increase zoloft to 75 mg qd     R knee injury , --er visit, oct 2023, brace --possible torn discus,    Rash, etiology is unclear currently going work-up with dermatology has had biopsies, we have tried eliminating some medications he stopped some medications, discussed trying Zyrtec 10 mg at bedtime and Pepcid 20 twice daily December 9, 2022--- patient has stopped his gabapentin, Wellbutrin, Lexapro, ok to stop fenofibrate, iron sulfate, will refer to [facility] for second opinion April 24, 2023 at patient's request, he had seen Dr. Mays previously, but 1 second opinion--- rash went away around October 4, 2023    Obstructive jaundice, MRCP shows choledocholithiasis, intra and extrahepatic biliary duct dilation December 6, 2021,, patient underwent ERCP and had stone removal on 2 different occasions,  initially by Dr. Hernandez and subsequently by Dr. Zepeda, with biliary stent initially, patient says he is doing better overall,, December 2022, current liver and bilirubin levels are all normal    Hospital stay transitional care visit October 14 through October 20, 2021 with acute right lower lobe pneumonia, acute hypoxic respiratory failure shortness of breath ----CT scan showed groundglass opacity coronavirus testing ??2 was negative in the hospital,     transtional care visit from repair LEFT FEM. --RECONTRUCTION, AND ATERECTOMY---SEPT 14, 2020, ---PERIPHERAL ASO ---Patient is having claudication and leg pain with weakness, arterial evaluation shows bilateral proximal level occlusive disease either just below the aorta or at the aortic repair level, ---Patient is status post left femoral iliac artery atherectomy grafting and angioplasty by Dr. Nam at [facility], September 14, 2020, ---- carotid ultrasound shows no significant stenosis,----- August 2020 compared to previous and prior ultrasounds,-    Back pain/ spinal stenosis continues  MS Contin 30 mg twice a day,,  stop gabapentin    Type 2 Diabetes , continues  Levemir 40 units daily and Metformin 500mg bid-----hemoglobin A1c 6.1, October 2023, urine for microalbumin was - October 16, 2023---diabetic foot exam complete, oct 24, 2023   ,  Colon cancer PARTIAL COLECTOMY 2010 - , CEA elevated  7.7, -- Dr. Ibanez---Colonoscopy November 2018 unremarkable----CEA level 2.5 April 2020    B12 deficiency continues on over-the-counter replacement     GENET---sees sleep specialist -continues on CPAP;     Angina, cardiac catheter jan 2018, with 80-90% stenosis circumflex proximal to vein graft, LIMA graft to LAD was patent, normal ejection fraction January 2018, patient had elective semi-elective stent placement to the circumflex artery, JAN 23, 2018, --Patient currently off BRILLINTA and on Plavix with much improved and breathing status as of July 2018----previous echo October 2020 showed mild mitral valve annular calcification, no significant prolapse, or regurgitation, ejection fraction 55 to 60% diastolic dysfunction noted otherwise unremarkable, ----------------echo shows some inferior wall basilar wall posterior wall hypokinesis with a slightly depressed ejection fraction of 46 to 50%,--, mild mitral regurgitation and tricuspid regurgitation, discussed with patient wife April 14, 2022,    R HIP PAIN, GLUTEUS MUSCLE , PARASPINAL MUSCLE ATROPHY, S/P INFARCTION AFTER AAA REPAIR IN 3/13-, status post radiofrequency ablation X 2, by pain management --- continues morphine 30 mg er  twice a day,     Elevated cholesterol--continues Crestor 20,     CAROTID ASO, HAS YRLY F/U WITH VASC SURG --- Dr. Chula Nam [place]    HYPOTHRYOIDISM--continues levothyroxine 0.125 mg qd     HTN----continues Norvasc 2.5 mg daily,  Lasix 40 mg QD  , metoprolol extended release 50 mg BID    GOUT, continues ---probenicid 500 mg qd per dr urbina , colchicine,0.1 PRN ----stopped allopurinol sept 2022 but no improvemen patient may be going back on allopurinol  soon, October 2023    pSA at 1.0 April 7, 2023                     Follow Up   No follow-ups on file.  Patient was given instructions and counseling regarding his condition or for health maintenance advice. Please see specific information pulled into the AVS if appropriate.         Answers submitted by the patient for this visit:  Primary Reason for Visit (Submitted on 1/11/2024)  What is the primary reason for your visit?: Other  Other (Submitted on 1/11/2024)  Please describe your symptoms.: No  appetizte, dont have any desire to do snythiing go anywhere! Lost mote weight!     Stuff head head clogged up last 3 weeks  Have you had these symptoms before?: Yes  How long have you been having these symptoms?: Greater than 2 weeks  Please describe any probable cause for these symptoms. : Weight loss state of mind to do nothing!

## 2024-02-04 ENCOUNTER — APPOINTMENT (OUTPATIENT)
Dept: CT IMAGING | Facility: HOSPITAL | Age: 74
End: 2024-02-04
Payer: MEDICARE

## 2024-02-04 ENCOUNTER — APPOINTMENT (OUTPATIENT)
Dept: GENERAL RADIOLOGY | Facility: HOSPITAL | Age: 74
End: 2024-02-04
Payer: MEDICARE

## 2024-02-04 ENCOUNTER — HOSPITAL ENCOUNTER (INPATIENT)
Facility: HOSPITAL | Age: 74
LOS: 10 days | Discharge: HOSPICE | End: 2024-02-15
Attending: EMERGENCY MEDICINE | Admitting: FAMILY MEDICINE
Payer: MEDICARE

## 2024-02-04 DIAGNOSIS — K55.1 CHRONIC MESENTERIC ISCHEMIA: ICD-10-CM

## 2024-02-04 DIAGNOSIS — R63.4 WEIGHT LOSS, NON-INTENTIONAL: ICD-10-CM

## 2024-02-04 DIAGNOSIS — K52.9 COLITIS: ICD-10-CM

## 2024-02-04 DIAGNOSIS — K52.9 ENTEROCOLITIS: Primary | ICD-10-CM

## 2024-02-04 DIAGNOSIS — M79.89 OTHER SPECIFIED SOFT TISSUE DISORDERS: ICD-10-CM

## 2024-02-04 LAB
ALBUMIN SERPL-MCNC: 3.7 G/DL (ref 3.5–5.2)
ALBUMIN/GLOB SERPL: 1.4 G/DL
ALP SERPL-CCNC: 87 U/L (ref 39–117)
ALT SERPL W P-5'-P-CCNC: 53 U/L (ref 1–41)
ANION GAP SERPL CALCULATED.3IONS-SCNC: 18.8 MMOL/L (ref 5–15)
AST SERPL-CCNC: 93 U/L (ref 1–40)
BACTERIA UR QL AUTO: ABNORMAL /HPF
BASOPHILS # BLD AUTO: 0.07 10*3/MM3 (ref 0–0.2)
BASOPHILS NFR BLD AUTO: 0.5 % (ref 0–1.5)
BILIRUB SERPL-MCNC: 0.8 MG/DL (ref 0–1.2)
BILIRUB UR QL STRIP: NEGATIVE
BUN SERPL-MCNC: 15 MG/DL (ref 8–23)
BUN/CREAT SERPL: 10.2 (ref 7–25)
CALCIUM SPEC-SCNC: 9.7 MG/DL (ref 8.6–10.5)
CHLORIDE SERPL-SCNC: 94 MMOL/L (ref 98–107)
CLARITY UR: ABNORMAL
CO2 SERPL-SCNC: 27.2 MMOL/L (ref 22–29)
COLOR UR: YELLOW
CREAT SERPL-MCNC: 1.47 MG/DL (ref 0.76–1.27)
D-LACTATE SERPL-SCNC: 2 MMOL/L (ref 0.5–2)
DEPRECATED RDW RBC AUTO: 47.2 FL (ref 37–54)
EGFRCR SERPLBLD CKD-EPI 2021: 49.4 ML/MIN/1.73
EOSINOPHIL # BLD AUTO: 0.11 10*3/MM3 (ref 0–0.4)
EOSINOPHIL NFR BLD AUTO: 0.8 % (ref 0.3–6.2)
ERYTHROCYTE [DISTWIDTH] IN BLOOD BY AUTOMATED COUNT: 17.5 % (ref 12.3–15.4)
ETHANOL BLD-MCNC: <10 MG/DL (ref 0–10)
ETHANOL UR QL: <0.01 %
FLUAV SUBTYP SPEC NAA+PROBE: NOT DETECTED
FLUBV RNA ISLT QL NAA+PROBE: NOT DETECTED
GLOBULIN UR ELPH-MCNC: 2.7 GM/DL
GLUCOSE SERPL-MCNC: 94 MG/DL (ref 65–99)
GLUCOSE UR STRIP-MCNC: NEGATIVE MG/DL
HCT VFR BLD AUTO: 46.3 % (ref 37.5–51)
HGB BLD-MCNC: 14.7 G/DL (ref 13–17.7)
HGB UR QL STRIP.AUTO: NEGATIVE
HOLD SPECIMEN: NORMAL
HOLD SPECIMEN: NORMAL
HYALINE CASTS UR QL AUTO: ABNORMAL /LPF
IMM GRANULOCYTES # BLD AUTO: 0.06 10*3/MM3 (ref 0–0.05)
IMM GRANULOCYTES NFR BLD AUTO: 0.4 % (ref 0–0.5)
KETONES UR QL STRIP: NEGATIVE
LEUKOCYTE ESTERASE UR QL STRIP.AUTO: NEGATIVE
LYMPHOCYTES # BLD AUTO: 3.22 10*3/MM3 (ref 0.7–3.1)
LYMPHOCYTES NFR BLD AUTO: 23.7 % (ref 19.6–45.3)
MAGNESIUM SERPL-MCNC: 1.4 MG/DL (ref 1.6–2.4)
MCH RBC QN AUTO: 25.3 PG (ref 26.6–33)
MCHC RBC AUTO-ENTMCNC: 31.7 G/DL (ref 31.5–35.7)
MCV RBC AUTO: 79.7 FL (ref 79–97)
MONOCYTES # BLD AUTO: 1 10*3/MM3 (ref 0.1–0.9)
MONOCYTES NFR BLD AUTO: 7.4 % (ref 5–12)
NEUTROPHILS NFR BLD AUTO: 67.2 % (ref 42.7–76)
NEUTROPHILS NFR BLD AUTO: 9.12 10*3/MM3 (ref 1.7–7)
NITRITE UR QL STRIP: NEGATIVE
NRBC BLD AUTO-RTO: 0 /100 WBC (ref 0–0.2)
NT-PROBNP SERPL-MCNC: 1523 PG/ML (ref 0–1800)
PH UR STRIP.AUTO: 5.5 [PH] (ref 5–8)
PLATELET # BLD AUTO: 200 10*3/MM3 (ref 140–450)
PMV BLD AUTO: 11.7 FL (ref 6–12)
POTASSIUM SERPL-SCNC: 3.1 MMOL/L (ref 3.5–5.2)
PROT SERPL-MCNC: 6.4 G/DL (ref 6–8.5)
PROT UR QL STRIP: ABNORMAL
RBC # BLD AUTO: 5.81 10*6/MM3 (ref 4.14–5.8)
RBC # UR STRIP: ABNORMAL /HPF
REF LAB TEST METHOD: ABNORMAL
RSV RNA NPH QL NAA+NON-PROBE: NOT DETECTED
SARS-COV-2 RNA RESP QL NAA+PROBE: NOT DETECTED
SODIUM SERPL-SCNC: 140 MMOL/L (ref 136–145)
SP GR UR STRIP: 1.01 (ref 1–1.03)
SQUAMOUS #/AREA URNS HPF: ABNORMAL /HPF
TROPONIN T SERPL HS-MCNC: 47 NG/L
UROBILINOGEN UR QL STRIP: ABNORMAL
WBC # UR STRIP: ABNORMAL /HPF
WBC NRBC COR # BLD AUTO: 13.58 10*3/MM3 (ref 3.4–10.8)
WHOLE BLOOD HOLD COAG: NORMAL
WHOLE BLOOD HOLD SPECIMEN: NORMAL

## 2024-02-04 PROCEDURE — 93005 ELECTROCARDIOGRAM TRACING: CPT | Performed by: EMERGENCY MEDICINE

## 2024-02-04 PROCEDURE — 83605 ASSAY OF LACTIC ACID: CPT | Performed by: EMERGENCY MEDICINE

## 2024-02-04 PROCEDURE — 74176 CT ABD & PELVIS W/O CONTRAST: CPT

## 2024-02-04 PROCEDURE — 25810000003 SODIUM CHLORIDE 0.9 % SOLUTION: Performed by: EMERGENCY MEDICINE

## 2024-02-04 PROCEDURE — 82077 ASSAY SPEC XCP UR&BREATH IA: CPT | Performed by: EMERGENCY MEDICINE

## 2024-02-04 PROCEDURE — 87040 BLOOD CULTURE FOR BACTERIA: CPT | Performed by: EMERGENCY MEDICINE

## 2024-02-04 PROCEDURE — 36415 COLL VENOUS BLD VENIPUNCTURE: CPT

## 2024-02-04 PROCEDURE — 25510000001 IOPAMIDOL PER 1 ML: Performed by: EMERGENCY MEDICINE

## 2024-02-04 PROCEDURE — 99285 EMERGENCY DEPT VISIT HI MDM: CPT

## 2024-02-04 PROCEDURE — 70450 CT HEAD/BRAIN W/O DYE: CPT

## 2024-02-04 PROCEDURE — 83880 ASSAY OF NATRIURETIC PEPTIDE: CPT

## 2024-02-04 PROCEDURE — 93010 ELECTROCARDIOGRAM REPORT: CPT | Performed by: INTERNAL MEDICINE

## 2024-02-04 PROCEDURE — 75635 CT ANGIO ABDOMINAL ARTERIES: CPT

## 2024-02-04 PROCEDURE — 71045 X-RAY EXAM CHEST 1 VIEW: CPT

## 2024-02-04 PROCEDURE — 99222 1ST HOSP IP/OBS MODERATE 55: CPT | Performed by: FAMILY MEDICINE

## 2024-02-04 PROCEDURE — 85025 COMPLETE CBC W/AUTO DIFF WBC: CPT

## 2024-02-04 PROCEDURE — 99284 EMERGENCY DEPT VISIT MOD MDM: CPT | Performed by: SURGERY

## 2024-02-04 PROCEDURE — 84484 ASSAY OF TROPONIN QUANT: CPT

## 2024-02-04 PROCEDURE — 93005 ELECTROCARDIOGRAM TRACING: CPT

## 2024-02-04 PROCEDURE — 25010000002 CEFEPIME PER 500 MG: Performed by: EMERGENCY MEDICINE

## 2024-02-04 PROCEDURE — 83735 ASSAY OF MAGNESIUM: CPT

## 2024-02-04 PROCEDURE — 87637 SARSCOV2&INF A&B&RSV AMP PRB: CPT

## 2024-02-04 PROCEDURE — 81001 URINALYSIS AUTO W/SCOPE: CPT

## 2024-02-04 PROCEDURE — 80053 COMPREHEN METABOLIC PANEL: CPT

## 2024-02-04 RX ORDER — METRONIDAZOLE 500 MG/100ML
500 INJECTION, SOLUTION INTRAVENOUS ONCE
Status: COMPLETED | OUTPATIENT
Start: 2024-02-04 | End: 2024-02-05

## 2024-02-04 RX ORDER — SODIUM CHLORIDE 0.9 % (FLUSH) 0.9 %
10 SYRINGE (ML) INJECTION AS NEEDED
Status: DISCONTINUED | OUTPATIENT
Start: 2024-02-04 | End: 2024-02-15 | Stop reason: HOSPADM

## 2024-02-04 RX ADMIN — CEFEPIME 2000 MG: 2 INJECTION, POWDER, FOR SOLUTION INTRAVENOUS at 22:28

## 2024-02-04 RX ADMIN — IOPAMIDOL 100 ML: 755 INJECTION, SOLUTION INTRAVENOUS at 22:47

## 2024-02-04 RX ADMIN — SODIUM CHLORIDE 1000 ML: 9 INJECTION, SOLUTION INTRAVENOUS at 17:48

## 2024-02-04 NOTE — Clinical Note
Hemostasis started on the right femoral artery. Manual pressure applied to vessel. Manual pressure was held by lorraine. Manual pressure was held for 15 min. Hemostasis achieved successfully.

## 2024-02-04 NOTE — ED PROVIDER NOTES
Time: 2:14 PM EST  Date of encounter:  2/4/2024  Independent Historian/Clinical History and Information was obtained by:   Patient and Family    History is limited by: N/A    Chief Complaint   Patient presents with    Shortness of Breath    Dizziness    Altered Mental Status         History of Present Illness:  Patient is a 73 y.o. year old male who presents to the emergency department for evaluation of dyspnea.  Patient states for the past 3 days he has been feeling short of breath.  Patient also states for the last month or so he has had decreased appetite and has lost significant amount of weight.  Patient is currently denying any pain.  Patient states that he is having associated dizziness with the dyspnea over the last 3 days.  Patient states that he has had medication changes recently as he has been started on multiple antidepressants after losing his wife.  (Provider in triage, Harsh Causey PA-C)    Patient Care Team  Primary Care Provider: Mingo Loja MD    Past Medical History:     Allergies   Allergen Reactions    Penicillins Shortness Of Breath    Ticagrelor Shortness Of Breath    Penicillin G Provider Review Needed and Unknown - Low Severity     Past Medical History:   Diagnosis Date    Abdominal aortic aneurysm without rupture     Acute renal failure (ARF)     WAS SHORT TERM DIALYSIS, STAGE 3    Arthritis     Atrophy of thyroid (acquired)     Bilateral carotid artery stenosis     Chronic airway obstruction     Chronic gouty arthritis     Chronic kidney disease, stage 3     Controlled diabetes mellitus type II without complication     COPD (chronic obstructive pulmonary disease)     Coronary artery disease involving coronary bypass graft of native heart without angina pectoris 03/28/2012    with previous bypass surgery (2005 x3) and subsequent stent/PCI of the native circumflex obtuse marginal branch in January 2018    Depression     Disease of liver     Elevated carcinoembryonic antigen  (CEA)     GERD (gastroesophageal reflux disease)     Hypertension, essential 07/14/2021    Hypothyroidism     Iron deficiency anemia     Long term (current) use of opiate analgesic     Lumbar spondylosis     Malignant neoplasm of colon     Mixed hyperlipidemia 07/14/2021    Peripheral artery disease     Polyarthropathy     Rhabdomyolysis     Sleep apnea     CPAP     Past Surgical History:   Procedure Laterality Date    ABDOMINAL AORTIC ANEURYSM REPAIR      ANGIOPLASTY FEMORAL ARTERY Left 9/14/2020    Procedure: AORTOILLOFEMORAL ARTERIOGRAM;  Surgeon: Chula Nam Jr., MD;  Location: [facility] 18/19;  Service: Vascular;  Laterality: Left;    APPENDECTOMY      CARDIAC CATHETERIZATION      CAROTID ENDARTERECTOMY Left 2005    CAROTID ENDARTERECTOMY Right 2010    CHOLECYSTECTOMY OPEN      COLON SURGERY      COLON RESECTION    COLONOSCOPY  2004,11/2018    Dr. Lamas 2004,     CORONARY ANGIOPLASTY WITH STENT PLACEMENT      CORONARY ARTERY BYPASS GRAFT  2005    ENDOSCOPY      ERCP N/A 12/15/2021    Procedure: ENDOSCOPIC RETROGRADE CHOLANGIOPANCREATOGRAPHY WITH SPINCTEROTOMY, 9-12MM BALLOON SWEEP, INSERTION OF 10FR 9CM BILIARY STENT;  Surgeon: Eden Hernandez MD;  Location: [facility] ENDOSCOPY;  Service: Gastroenterology;  Laterality: N/A;  BILE DUCT STONES    ERCP N/A 1/6/2022    Procedure: ENDOSCOPIC RETROGRADE CHOLANGIOPANCREATOGRAPHY WITH STENT REMOVAL, SPYGLASS, AUTOLITH, 8-10MM BALLOON DILATATION, 9-12 AND 12-15MM BALLOON SWEEP;  Surgeon: Wayne Zepeda MD;  Location: [facility] ENDOSCOPY;  Service: Gastroenterology;  Laterality: N/A;  BILE DUCT STONES    FEMORAL ENDARTERECTOMY Left 9/14/2020    Procedure: LEFT FEMORAL ENDARECTOMY WITHH PATCH ANGIOPLASTY;  Surgeon: Chula Nam Jr., MD;  Location: [facility] OR 18/19;  Service: Vascular;  Laterality: Left;    GALLBLADDER SURGERY      RHINOPLASTY Bilateral     70-80% R, 50% L     Family History   Problem Relation Age of Onset    Heart  failure Mother     Malig Hyperthermia Neg Hx        Home Medications:  Prior to Admission medications    Medication Sig Start Date End Date Taking? Authorizing Provider   allopurinol (ZYLOPRIM) 300 MG tablet Take 1 tablet by mouth As Needed. 10/24/23   Katheryn Montana MD   amLODIPine (NORVASC) 2.5 MG tablet TAKE 1 TABLET EVERY DAY 12/4/23   Mingo Loja MD   aspirin 81 MG EC tablet Take 1 tablet by mouth.    Emergency, Nurse Epic, RN   folic acid (FOLVITE) 1 MG tablet TAKE 1 TABLET TWICE DAILY 12/4/23   Mingo Loja MD   furosemide (LASIX) 40 MG tablet TAKE 1 TABLET EVERY DAY 3/20/23   Mingo Loja MD   insulin NPH (humuLIN N,novoLIN N) 100 UNIT/ML injection Inject 40 Units under the skin into the appropriate area as directed Every Night. HOLD INSULIN Wednesday NIGHT BLOOD SUGARS RUN LOW 85-95    Katheryn Montana MD   levothyroxine (SYNTHROID, LEVOTHROID) 125 MCG tablet TAKE 1 TABLET EVERY DAY 4/17/23   Mingo Loja MD   metFORMIN (GLUCOPHAGE) 500 MG tablet TAKE 1 TABLET TWICE DAILY WITH A MEAL 8/14/23   Mingo Loja MD   metoprolol succinate XL (TOPROL-XL) 50 MG 24 hr tablet TAKE 1 TABLET TWICE DAILY 10/2/23   Mingo Loja MD   Morphine (MS CONTIN) 30 MG 12 hr tablet 1 tablet 2 (Two) Times a Day. 8/29/20   Katheryn Montana MD   QUEtiapine (SEROquel) 25 MG tablet Take 1 tablet by mouth Every Night. 1/18/24   Mingo Loja MD   rosuvastatin (CRESTOR) 20 MG tablet TAKE 1 TABLET EVERY DAY 6/28/23   Mingo Loja MD   sertraline (Zoloft) 25 MG tablet Take 2 tablets by mouth Daily. 12/6/23   Mingo oLja MD   sertraline (Zoloft) 25 MG tablet Take 1 tablet by mouth Daily. 1/18/24   Mingo Loja MD   sertraline (Zoloft) 50 MG tablet Take 1 tablet by mouth Daily. 1/18/24   Mingo Loja MD   temazepam (RESTORIL) 15 MG capsule Take 1 capsule by mouth At Night As Needed for Sleep. 11/6/23    "Mingo Loja MD   triamcinolone (KENALOG) 0.1 % cream APPLY TO RASH TWICE DAILY AS NEEDED    Provider, MD Katheryn   vitamin B-12 (CYANOCOBALAMIN) 1000 MCG tablet TAKE 1 TABLET EVERY DAY 10/2/23   Mingo Loja MD   vitamin C (ASCORBIC ACID) 500 MG tablet TAKE 1 TABLET EVERY DAY 23   Mingo Loja MD        Social History:   Social History     Tobacco Use    Smoking status: Former     Packs/day: 1.00     Years: 45.00     Additional pack years: 0.00     Total pack years: 45.00     Types: Cigarettes     Quit date: 2005     Years since quittin.4    Smokeless tobacco: Never   Vaping Use    Vaping Use: Never used   Substance Use Topics    Alcohol use: Not Currently     Comment: RARE    Drug use: Never         Review of Systems:  Review of Systems   Constitutional:  Positive for unexpected weight change. Negative for chills and fever.   HENT:  Negative for congestion, rhinorrhea and sore throat.    Eyes:  Negative for photophobia.   Respiratory:  Positive for shortness of breath. Negative for apnea, cough and chest tightness.    Cardiovascular:  Negative for chest pain and palpitations.   Gastrointestinal:  Negative for abdominal pain, blood in stool, diarrhea, nausea and vomiting.   Endocrine: Negative.    Genitourinary:  Negative for difficulty urinating and dysuria.   Musculoskeletal:  Negative for back pain, joint swelling and myalgias.   Skin:  Negative for color change and wound.   Allergic/Immunologic: Negative.    Neurological:  Positive for dizziness and light-headedness. Negative for seizures, numbness and headaches.   Psychiatric/Behavioral:  Positive for confusion and hallucinations.    All other systems reviewed and are negative.       Physical Exam:  /70 (Patient Position: Lying)   Pulse 57   Temp 98.7 °F (37.1 °C) (Oral)   Resp 19   Ht 175.3 cm (69\")   Wt 84.4 kg (186 lb 1.1 oz)   SpO2 98%   BMI 27.48 kg/m²         Physical Exam  Constitutional:  "      Appearance: Normal appearance.   HENT:      Head: Normocephalic.   Eyes:      Extraocular Movements: Extraocular movements intact.      Conjunctiva/sclera: Conjunctivae normal.   Pulmonary:      Effort: Pulmonary effort is normal.   Abdominal:      General: There is no distension.   Skin:     General: Skin is warm.      Coloration: Skin is not cyanotic.   Neurological:      Mental Status: He is alert and oriented to person, place, and time.   Psychiatric:         Attention and Perception: Attention and perception normal.         Mood and Affect: Mood normal.                      Procedures:  Procedures      Medical Decision Making:      Comorbidities that affect care:    COPD, diabetes, hypothyroidism, GERD, acute renal failure    External Notes reviewed:    Previous Clinic Note: Office visit 1/18/2024 with internal medicine Dr. Loja.  Description: Major depressive disorder      The following orders were placed and all results were independently analyzed by me:  Orders Placed This Encounter   Procedures    COVID PRE-OP / PRE-PROCEDURE SCREENING ORDER (NO ISOLATION) - Swab, Nasopharynx    COVID-19, FLU A/B, RSV PCR 1 HR TAT - Swab, Nasopharynx    Blood Culture - Blood,    Blood Culture - Blood,    XR Chest 1 View    CT Abdomen Pelvis Without Contrast    CT Head Without Contrast    CT Angio Abdominal Aorta Bilateral Iliofem Runoff    [facility] Draw    Comprehensive Metabolic Panel    Single High Sensitivity Troponin T    Magnesium    Urinalysis With Microscopic If Indicated (No Culture) - Urine, Clean Catch    CBC Auto Differential    BNP    Urinalysis, Microscopic Only - Urine, Clean Catch    Ethanol    Lactic Acid, Plasma    Comprehensive Metabolic Panel    CBC Auto Differential    Lactic Acid, Plasma    NPO Diet NPO Type: Strict NPO    Undress & Gown    Continuous Pulse Oximetry    Vital Signs    Repeat lactic acid after additional 1 L bolus completed please  Nursing Communication    Vital Signs    Intake &  Output    Weigh Patient    Oral Care    Place Sequential Compression Device    Maintain Sequential Compression Device    Telemetry - Maintain IV Access    Telemetry - Place Orders & Notify Provider of Results When Patient Experiences Acute Chest Pain, Dysrhythmia or Respiratory Distress    Activity - Ad Kellen    Notify Provider (With Default Parameters)    Code Status and Medical Interventions:    Hospitalist (on-call MD unless specified)    Gastroenterology (on-call MD unless specified)    IP General Consult (Use specialty-specific consult if known)    Surgery (on-call MD unless specified)    Inpatient General Surgery Consult    Oxygen Therapy- Nasal Cannula; Titrate 1-6 LPM Per SpO2; 90 - 95%    Oxygen Therapy- Nasal Cannula; Titrate 1-6 LPM Per SpO2; 90 - 95%    ECG 12 Lead ED Triage Standing Order; Weak / Dizzy / AMS    Insert Peripheral IV    Insert Peripheral IV    Inpatient Admission    Fall Precautions    CBC & Differential    Green Top (Gel)    Lavender Top    Gold Top - SST    Light Blue Top       Medications Given in the Emergency Department:  Medications   sodium chloride 0.9 % flush 10 mL (has no administration in time range)   sodium chloride 0.9 % infusion (has no administration in time range)   sodium chloride 0.9 % flush 10 mL (has no administration in time range)   sodium chloride 0.9 % flush 10 mL (has no administration in time range)   sodium chloride 0.9 % infusion 40 mL (has no administration in time range)   sennosides-docusate (PERICOLACE) 8.6-50 MG per tablet 2 tablet (has no administration in time range)     And   polyethylene glycol (MIRALAX) packet 17 g (has no administration in time range)     And   bisacodyl (DULCOLAX) EC tablet 5 mg (has no administration in time range)     And   bisacodyl (DULCOLAX) suppository 10 mg (has no administration in time range)   nitroglycerin (NITROSTAT) SL tablet 0.4 mg (has no administration in time range)   sodium chloride 0.9 % infusion (125 mL/hr  Intravenous New Bag 2/5/24 0203)   morphine injection 1 mg (1 mg Intravenous Given 2/5/24 0208)     And   naloxone (NARCAN) injection 0.4 mg (has no administration in time range)   ondansetron (ZOFRAN) injection 4 mg (has no administration in time range)   cefepime (MAXIPIME) 2000 mg/100 mL 0.9% NS (mbp) (has no administration in time range)   metroNIDAZOLE (FLAGYL) IVPB 500 mg (has no administration in time range)   sodium chloride 0.9 % bolus 1,000 mL (0 mL Intravenous Stopped 2/4/24 1840)   cefepime (MAXIPIME) 2000 mg/100 mL 0.9% NS (mbp) (0 mg Intravenous Stopped 2/4/24 2258)   metroNIDAZOLE (FLAGYL) IVPB 500 mg (0 mg Intravenous Stopped 2/5/24 0104)   iopamidol (ISOVUE-370) 76 % injection 100 mL (100 mL Intravenous Given 2/4/24 2247)   sodium chloride 0.9 % bolus 1,000 mL (1,000 mL Intravenous New Bag 2/5/24 0042)        ED Course:    The patient was initially evaluated in the triage area where orders were placed. The patient was later dispositioned by Job Felix MD.      The patient was advised to stay for completion of workup which includes but is not limited to communication of labs and radiological results, reassessment and plan. The patient was advised that leaving prior to disposition by a provider could result in critical findings that are not communicated to the patient.     ED Course as of 02/05/24 0215   Sun Feb 04, 2024   1415 PROVIDER IN TRIAGE  Patient was evaluated by me in triage, Harsh Causey PA-C.  Orders were placed and patient is currently awaiting final results and disposition.  [MD]   1704 I have personally interpreted the EKG today and it shows no evidence of any acute ischemia or heart arrhythmia. [RP]   0134 Dr. Monzon has reviewed CTA findings and does not feel there is any indication for emergent vascular surgery.  He has discussed this case personally with Dr. De Leon and they are in agreement about her plan for admission. [RP]   Mon Feb 05, 2024   0107 At the time of  admission patient does not meet SIRS criteria and has no confirmed or presumed source of bacterial infection for the diagnosis of sepsis. [RP]      ED Course User Index  [MD] Harsh Causey PA-C  [RP] Job Felix MD       Labs:    Lab Results (last 24 hours)       Procedure Component Value Units Date/Time    CBC & Differential [[id number]]  (Abnormal) Collected: 02/04/24 1428    Specimen: Blood from Arm, Right Updated: 02/04/24 1447    Narrative:      The following orders were created for panel order CBC & Differential.  Procedure                               Abnormality         Status                     ---------                               -----------         ------                     CBC Auto Differential[[id number]]        Abnormal            Final result                 Please view results for these tests on the individual orders.    Comprehensive Metabolic Panel [[id number]]  (Abnormal) Collected: 02/04/24 1428    Specimen: Blood from Arm, Right Updated: 02/04/24 1505     Glucose 94 mg/dL      BUN 15 mg/dL      Creatinine 1.47 mg/dL      Sodium 140 mmol/L      Potassium 3.1 mmol/L      Chloride 94 mmol/L      CO2 27.2 mmol/L      Calcium 9.7 mg/dL      Total Protein 6.4 g/dL      Albumin 3.7 g/dL      ALT (SGPT) 53 U/L      AST (SGOT) 93 U/L      Alkaline Phosphatase 87 U/L      Total Bilirubin 0.8 mg/dL      Globulin 2.7 gm/dL      A/G Ratio 1.4 g/dL      BUN/Creatinine Ratio 10.2     Anion Gap 18.8 mmol/L      eGFR 49.4 mL/min/1.73     Narrative:      GFR Normal >60  Chronic Kidney Disease <60  Kidney Failure <15    The GFR formula is only valid for adults with stable renal function between ages 18 and 70.    Single High Sensitivity Troponin T [[id number]]  (Abnormal) Collected: 02/04/24 1428    Specimen: Blood from Arm, Right Updated: 02/04/24 1505     HS Troponin T 47 ng/L     Narrative:      High Sensitive Troponin T Reference Range:  <14.0 ng/L- Negative Female for AMI  <22.0 ng/L- Negative  Male for AMI  >=14 - Abnormal Female indicating possible myocardial injury.  >=22 - Abnormal Male indicating possible myocardial injury.   Clinicians would have to utilize clinical acumen, EKG, Troponin, and serial changes to determine if it is an Acute Myocardial Infarction or myocardial injury due to an underlying chronic condition.         Magnesium [[id number]]  (Abnormal) Collected: 02/04/24 1428    Specimen: Blood from Arm, Right Updated: 02/04/24 1505     Magnesium 1.4 mg/dL     CBC Auto Differential [[id number]]  (Abnormal) Collected: 02/04/24 1428    Specimen: Blood from Arm, Right Updated: 02/04/24 1447     WBC 13.58 10*3/mm3      RBC 5.81 10*6/mm3      Hemoglobin 14.7 g/dL      Hematocrit 46.3 %      MCV 79.7 fL      MCH 25.3 pg      MCHC 31.7 g/dL      RDW 17.5 %      RDW-SD 47.2 fl      MPV 11.7 fL      Platelets 200 10*3/mm3      Neutrophil % 67.2 %      Lymphocyte % 23.7 %      Monocyte % 7.4 %      Eosinophil % 0.8 %      Basophil % 0.5 %      Immature Grans % 0.4 %      Neutrophils, Absolute 9.12 10*3/mm3      Lymphocytes, Absolute 3.22 10*3/mm3      Monocytes, Absolute 1.00 10*3/mm3      Eosinophils, Absolute 0.11 10*3/mm3      Basophils, Absolute 0.07 10*3/mm3      Immature Grans, Absolute 0.06 10*3/mm3      nRBC 0.0 /100 WBC     BNP [[id number]]  (Normal) Collected: 02/04/24 1428    Specimen: Blood from Arm, Right Updated: 02/04/24 1502     proBNP 1,523.0 pg/mL     Narrative:      This assay is used as an aid in the diagnosis of individuals suspected of having heart failure. It can be used as an aid in the diagnosis of acute decompensated heart failure (ADHF) in patients presenting with signs and symptoms of ADHF to the emergency department (ED). In addition, NT-proBNP of <300 pg/mL indicates ADHF is not likely.    Age Range Result Interpretation  NT-proBNP Concentration (pg/mL:      <50             Positive            >450                   Gray                 300-450                    Negative              <300    50-75           Positive            >900                  Gray                300-900                  Negative            <300      >75             Positive            >1800                  Gray                300-1800                  Negative            <300    Ethanol [[id number]] Collected: 02/04/24 1428    Specimen: Blood from Arm, Right Updated: 02/04/24 1711     Ethanol <10 mg/dL      Ethanol % <0.010 %     Narrative:      Ethanol (Plasma)  <10 Essentially Negative    Toxic Concentrations           mg/dL    Flushing, slowing of reflexes    Impaired visual activity         Depression of CNS              >100  Possible Coma                  >300       Urinalysis With Microscopic If Indicated (No Culture) - Urine, Clean Catch [[id number]]  (Abnormal) Collected: 02/04/24 1513    Specimen: Urine, Clean Catch Updated: 02/04/24 1550     Color, UA Yellow     Appearance, UA Cloudy     pH, UA 5.5     Specific Gravity, UA 1.014     Glucose, UA Negative     Ketones, UA Negative     Bilirubin, UA Negative     Blood, UA Negative     Protein,  mg/dL (2+)     Leuk Esterase, UA Negative     Nitrite, UA Negative     Urobilinogen, UA 1.0 E.U./dL    Urinalysis, Microscopic Only - Urine, Clean Catch [[id number]]  (Abnormal) Collected: 02/04/24 1513    Specimen: Urine, Clean Catch Updated: 02/04/24 1602     RBC, UA None Seen /HPF      WBC, UA 3-5 /HPF      Bacteria, UA Trace /HPF      Squamous Epithelial Cells, UA 0-2 /HPF      Hyaline Casts, UA 3-6 /LPF      Methodology Manual Light Microscopy    COVID PRE-OP / PRE-PROCEDURE SCREENING ORDER (NO ISOLATION) - Swab, Nasopharynx [[id number]]  (Normal) Collected: 02/04/24 1644    Specimen: Swab from Nasopharynx Updated: 02/04/24 1737    Narrative:      The following orders were created for panel order COVID PRE-OP / PRE-PROCEDURE SCREENING ORDER (NO ISOLATION) - Swab, Nasopharynx.  Procedure                               Abnormality         Status                      ---------                               -----------         ------                     COVID-19, FLU A/B, RSV P...[[id number]]  Normal              Final result                 Please view results for these tests on the individual orders.    COVID-19, FLU A/B, RSV PCR 1 HR TAT - Swab, Nasopharynx [[id number]]  (Normal) Collected: 02/04/24 1644    Specimen: Swab from Nasopharynx Updated: 02/04/24 1737     COVID19 Not Detected     Influenza A PCR Not Detected     Influenza B PCR Not Detected     RSV, PCR Not Detected    Narrative:      Fact sheet for providers: https://www.fda.gov/media/217396/download    Fact sheet for patients: https://www.fda.gov/media/745387/download    Test performed by PCR.    Lactic Acid, Plasma [[id number]]  (Normal) Collected: 02/04/24 2046    Specimen: Blood Updated: 02/04/24 2117     Lactate 2.0 mmol/L     Blood Culture - Blood, Arm, Left [[id number]] Collected: 02/04/24 2046    Specimen: Blood from Arm, Left Updated: 02/04/24 2059    Blood Culture - Blood, Arm, Left [[id number]] Collected: 02/04/24 2046    Specimen: Blood from Arm, Left Updated: 02/04/24 2059    Lactic Acid, Plasma [[id number]]  (Normal) Collected: 02/05/24 0139    Specimen: Blood Updated: 02/05/24 0201     Lactate 1.9 mmol/L              Imaging:    CT Angio Abdominal Aorta Bilateral Iliofem Runoff    Result Date: 2/5/2024  PROCEDURE: CT ANGIO ABDOMINAL AORTA BILAT ILIOFEM RUNOFF W WO CONTRAST  COMPARISON: 2/04/2024, 18:30.  INDICATIONS: ISCHEMIC BOWEL.  TECHNIQUE: After obtaining the patient's consent, 2,309 CT/CTA images of the abdomen, pelvis, and lower extremities were obtained with non-ionic intravenous contrast material. Multi-planar reformatted/3-D images were created to optimize visualization of vascular anatomy.   PROTOCOL:   Standard imaging protocol performed.    RADIATION:   Total DLP: 1,011.2 mGy*cm.   Automated exposure control was utilized to minimize radiation dose. CONTRAST: 93cc mL Isovue 370  I.V. LABS:   eGFR: >60 mL/min/1.73m^2.  FINDINGS:  Extensive atherosclerotic changes are seen throughout the imaged arterial tree.  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  The proximal inferior mesenteric artery (FUENTES) is occluded.  It reconstitutes distally.  These findings support the diagnosis of chronic mesenteric ischemia.  Please correlate clinically.  No emergent large vessel occlusion is suggested.  There are single bilateral renal arteries.  Probably moderate-to-severe origin stenosis involves the right renal artery, as seen on series 4015. Moderate stenosis involves the origin of the left renal artery (as on series 4014).  Portal venous gas persists, especially involving the left lobe of the liver.  Diffusely thickened colonic wall is seen, as described previously.  No definite pneumatosis is identified currently.  No pneumoperitoneum is appreciated.  The patient has undergone aortobifemoral bypass graft surgery the aortobifemoral bypass graft is widely patent.   On the LEFT, the left superficial femoral artery (SFA) is occluded at its origin.  The left deep femoral artery is patent.  There is distal reconstitution of the left SFA via left deep femoral collateral arteries.  The left popliteal artery is continuously patent with moderate to severe atherosclerotic change.  Its trifurcation is patent.  The left-sided infrapopliteal arteries are patent.  There is single-vessel runoff across the left ankle into the left foot via the left posterior tibial artery.  The left anterior tibial and left peroneal arteries are small in caliber at the level of the left ankle.   Similar findings are seen contralaterally with origin occlusion of the RIGHT superficial femoral artery (SFA).  There may be luminal stenosis at the anastomosis of the distal right-sided arterial limb (of the aortobifemoral arterial bypass graft) with the native right common femoral  artery.  This finding is best seen on series 4016, such is seen image 7 and adjacent images.  The right deep femoral artery is patent.  There is distal reconstitution of the distal right superficial femoral artery (SFA) via the right deep femoral system at about the level of the junction of the right superficial femoral and the right popliteal artery (i.e., Sandeep's canal).  Moderate-to-severe atherosclerotic change probably involves the right popliteal artery, which is thought to be continuously patent.  Its trifurcation is patent.  The right infrapopliteal arteries are patent.  There is probably at least single-vessel runoff across the right ankle into the right foot via the right posterior tibial artery.  A small caliber right anterior tibial artery is also patent.  The distal right peroneal artery is patent to just above the level of the right ankle.   The other findings are as described in the prior CT exam report from 2/4/2024 at 1830 hours, allowing for technique differences.         1. Extensive atherosclerotic changes are seen throughout the imaged arterial tree, as detailed above.   2. 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  The proximal inferior mesenteric artery (FUENTES) is occluded.  It reconstitutes distally via mesenteric collaterals.  These findings support the diagnosis of chronic mesenteric ischemia.  Please correlate clinically.   3. No emergent large vessel occlusion is suggested.   4. Portal venous gas persists, especially involving the left lobe of the liver, suggesting ischemic bowel, such as seen colitis.  Diffusely thickened colonic wall is seen, as described previously.  No definite pneumatosis is identified currently.  No pneumoperitoneum is appreciated.   5. The patient has undergone aortobifemoral bypass graft surgery the aortobifemoral bypass graft is widely patent.     6. 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   7. There is at least single-vessel runoff across the bilateral ankles into the bilateral feet predominantly via the bilateral posterior tibial arteries, as discussed.  8. Please see above comments for further detail.    Please note that portions of this note were completed with a voice recognition program.  ALVARO ZAPATA JR, MD       Electronically Signed and Approved By: ALVARO ZAPATA JR, MD on 2/05/2024 at 1:34              CT Abdomen Pelvis Without Contrast    Result Date: 2/4/2024  PROCEDURE: CT ABDOMEN PELVIS WO CONTRAST  COMPARISONS: 10/15/2020 (chest CT); 9/26/2018 (C/A/P CTs).  INDICATIONS: Weight loss; dizziness; abnormal LFTs; h/o colorectal carcinoma.  TECHNIQUE: CT images were created without intravenous contrast.   PROTOCOL:   Standard CT imaging protocol performed.    RADIATION:   Total DLP: 567.8 mGy*cm.   Automated exposure control was utilized to minimize radiation dose.  FINDINGS:   A new small amount of portal venous gas is seen, especially involving the left lobe of the liver, such as seen on image 50 of series 201 and adjacent images.  This finding is abnormal and suggests ischemic enterocolitis.  There is circumferential mural thickening of the remaining colon, which may represent infectious/inflammatory or ischemic colitis.  No definite pneumatosis is seen.  No pneumoperitoneum.  Minimal if any small bowel wall thickening is identified.  There is an anastomosis of small bowel and remaining right colon in the right abdomen; that is, there has been partial resection of the right colon, seen previously.  No definite superior mesenteric venous (SMV) gas is seen.  Extensive atherosclerotic changes are noted.  Chronic mesenteric ischemia is possible.  The patient has undergone aortobifemoral bypass graft surgery, seen previously.   No mechanical bowel obstruction.  No pericolonic fluid collections are identified to suggest abscess.  Myositis ossificans involves the right paraspinal musculature, as seen on image 102 of series 201 and adjacent images.  Myriad other chronic incidental nonemergent findings are seen, as described in prior exam reports.      The study is ABNORMAL.  There is new portal venous gas identified, especially involving the left lobe of the liver.  This finding is abnormal and suggests ischemic enterocolitis.  There is diffuse circumferential mural thickening involving the remaining colon, which may represent ischemic colitis.  Infectious/inflammatory colitis cannot be excluded.  Minimal, if any, mural thickening of the small bowel is seen.  No gastric emphysema is identified.  No mechanical bowel obstruction.  No pneumoperitoneum.  No definite pneumatosis.  No pericolonic fluid collection is seen to suggest abscess.  Please see above comments for further detail.   Pertinent findings were phoned to Dr. Felix (ordering/attending [facility] ED Physician) at approximately 2004 hours on 2/4/2024.  Please note that portions of this note were completed with a voice recognition program.  ALVARO ZAPATA JR, MD       Electronically Signed and Approved By: ALVARO ZAPATA JR, MD on 2/04/2024 at 20:04           CT Head Without Contrast    Result Date: 2/4/2024  PROCEDURE: CT HEAD WO CONTRAST  COMPARISON: None.  INDICATIONS: Increasing confusion, weight loss, & dizziness.  PROTOCOL:   Standard CT imaging protocol performed.    RADIATION:   Total DLP: 1,082.2 mGy*cm   MA and/or KV were/was adjusted to minimize radiation dose.    TECHNIQUE: After obtaining the patient's consent, 470 multiplanar CT images were obtained without non-ionic intravenous contrast material.  DISCUSSION: A routine nonenhanced head CT was performed. No acute brain abnormality is identified. No acute intracranial hemorrhage. No acute infarction. No acute skull fracture.  No midline shift or acute intracranial mass effect is seen.  Mild chronic small vessel ischemia/infarction is suspected, including involvement of the brainstem. There are arterial calcifications. The extra-axial spaces and the ventricular system are prominent, suggesting global atrophy.  Mineralized tiny (5 mm) pilar cysts involve the scalp, such as seen in the high left fronto-parietal region on image 50 of series 201 and adjacent images.  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  Streak artifact from dental amalgam obscures detail.  Mild chronic age-indeterminate congestive and/or inflammatory change involves the right mastoid air cell complex.  Degenerative changes involve the imaged spine and bilateral temporomandibular joints (TMJs).       No acute brain abnormality is seen.   Please note that portions of this note were completed with a voice recognition program.  ALVARO ZAPATA JR, MD       Electronically Signed and Approved By: ALVARO ZAPATA JR, MD on 2/04/2024 at 19:44              XR Chest 1 View    Result Date: 2/4/2024  PROCEDURE: XR CHEST 1 VW  COMPARISON: [facility], , CHEST AP/PA 1 VIEW, 10/14/2020, 10:49.  INDICATIONS: SHORTNESS OF BREATH; DIZZINESS; AMS  FINDINGS:  Patient is status post median sternotomy.  The heart mediastinal contours appear within normal limits.  There is blunting at the left costophrenic angle.  Lungs are otherwise grossly clear.  Osseous structures demonstrate no acute abnormality        1. Small left-sided pleural effusion 2. Clear lungs       SHARIF DOWNING MD       Electronically Signed and Approved By: SHARIF DOWNING MD on 2/04/2024 at 15:20                Differential Diagnosis and Discussion:      Dyspnea: Differential diagnosis includes but is not limited to metabolic acidosis, neurological disorders, psychogenic, asthma, pneumothorax, upper  "airway obstruction, COPD, pneumonia, noncardiogenic pulmonary edema, interstitial lung disease, anemia, congestive heart failure, and pulmonary embolism    All labs were reviewed and interpreted by me.  All X-rays impressions were independently interpreted by me.  EKG was interpreted by me.  CT scan radiology impression was interpreted by me.    MDM     Amount and/or Complexity of Data Reviewed  Clinical lab tests: reviewed  Tests in the radiology section of CPT®: reviewed  Tests in the medicine section of CPT®: reviewed  Decide to obtain previous medical records or to obtain history from someone other than the patient: yes                 Patient Care Considerations:    MRI: I considered ordering an MRI however patient has no focal neurologic deficits by history or physical exam.  Family pulls me aside outside of the room and states that he has some chronic \"cognitive issues\" that are worsening.  This does not sound like an acute event.      Consultants/Shared Management Plan:    Hospitalist: I have discussed the case with Dr. Waite who agrees to accept the patient for admission.  Consultant: I have discussed the case with Dr. De Leon who agrees to consult on the patient.  Consultant: I have discussed the case with Dr. Monzon who states he recommends CTA emergently here and based on results may need emergency surgery tonight.    Social Determinants of Health:    Patient is independent, reliable, and has access to care.       Disposition and Care Coordination:    Admit:   Through independent evaluation of the patient's history, physical, and imperical data, the patient meets criteria for inpatient admission to the hospital.        Final diagnoses:   Enterocolitis        ED Disposition       ED Disposition   Decision to Admit    Condition   --    Comment   Level of Care: Telemetry [5]   Diagnosis: Colitis [023034]   Admitting Physician: RITCHIE WAITE [[id number]]   Certification: I Certify That Inpatient Hospital " Services Are Medically Necessary For Greater Than 2 Midnights                 This medical record created using voice recognition software.             Job Felix MD  02/05/24 0215

## 2024-02-05 PROBLEM — K52.9 COLITIS: Status: ACTIVE | Noted: 2024-02-05

## 2024-02-05 LAB
ALBUMIN SERPL-MCNC: 3.2 G/DL (ref 3.5–5.2)
ALBUMIN/GLOB SERPL: 1.3 G/DL
ALP SERPL-CCNC: 72 U/L (ref 39–117)
ALT SERPL W P-5'-P-CCNC: 40 U/L (ref 1–41)
ANION GAP SERPL CALCULATED.3IONS-SCNC: 15.9 MMOL/L (ref 5–15)
AST SERPL-CCNC: 62 U/L (ref 1–40)
BASOPHILS # BLD AUTO: 0.08 10*3/MM3 (ref 0–0.2)
BASOPHILS NFR BLD AUTO: 0.7 % (ref 0–1.5)
BILIRUB SERPL-MCNC: 0.8 MG/DL (ref 0–1.2)
BUN SERPL-MCNC: 19 MG/DL (ref 8–23)
BUN/CREAT SERPL: 15.6 (ref 7–25)
CALCIUM SPEC-SCNC: 8.6 MG/DL (ref 8.6–10.5)
CHLORIDE SERPL-SCNC: 99 MMOL/L (ref 98–107)
CO2 SERPL-SCNC: 26.1 MMOL/L (ref 22–29)
CREAT SERPL-MCNC: 1.22 MG/DL (ref 0.76–1.27)
D-LACTATE SERPL-SCNC: 1.9 MMOL/L (ref 0.5–2)
DEPRECATED RDW RBC AUTO: 46.6 FL (ref 37–54)
EGFRCR SERPLBLD CKD-EPI 2021: 61.8 ML/MIN/1.73
EOSINOPHIL # BLD AUTO: 0.26 10*3/MM3 (ref 0–0.4)
EOSINOPHIL NFR BLD AUTO: 2.2 % (ref 0.3–6.2)
ERYTHROCYTE [DISTWIDTH] IN BLOOD BY AUTOMATED COUNT: 16.5 % (ref 12.3–15.4)
GLOBULIN UR ELPH-MCNC: 2.4 GM/DL
GLUCOSE SERPL-MCNC: 59 MG/DL (ref 65–99)
HCT VFR BLD AUTO: 40.9 % (ref 37.5–51)
HGB BLD-MCNC: 12.8 G/DL (ref 13–17.7)
IMM GRANULOCYTES # BLD AUTO: 0.06 10*3/MM3 (ref 0–0.05)
IMM GRANULOCYTES NFR BLD AUTO: 0.5 % (ref 0–0.5)
LYMPHOCYTES # BLD AUTO: 3.11 10*3/MM3 (ref 0.7–3.1)
LYMPHOCYTES NFR BLD AUTO: 26.2 % (ref 19.6–45.3)
MCH RBC QN AUTO: 25.1 PG (ref 26.6–33)
MCHC RBC AUTO-ENTMCNC: 31.3 G/DL (ref 31.5–35.7)
MCV RBC AUTO: 80.2 FL (ref 79–97)
MONOCYTES # BLD AUTO: 1.1 10*3/MM3 (ref 0.1–0.9)
MONOCYTES NFR BLD AUTO: 9.3 % (ref 5–12)
NEUTROPHILS NFR BLD AUTO: 61.1 % (ref 42.7–76)
NEUTROPHILS NFR BLD AUTO: 7.27 10*3/MM3 (ref 1.7–7)
NRBC BLD AUTO-RTO: 0 /100 WBC (ref 0–0.2)
PLATELET # BLD AUTO: 133 10*3/MM3 (ref 140–450)
PMV BLD AUTO: 12.7 FL (ref 6–12)
POTASSIUM SERPL-SCNC: 2.8 MMOL/L (ref 3.5–5.2)
PROT SERPL-MCNC: 5.6 G/DL (ref 6–8.5)
RBC # BLD AUTO: 5.1 10*6/MM3 (ref 4.14–5.8)
SODIUM SERPL-SCNC: 141 MMOL/L (ref 136–145)
WBC NRBC COR # BLD AUTO: 11.88 10*3/MM3 (ref 3.4–10.8)

## 2024-02-05 PROCEDURE — 83605 ASSAY OF LACTIC ACID: CPT | Performed by: EMERGENCY MEDICINE

## 2024-02-05 PROCEDURE — 25810000003 SODIUM CHLORIDE 0.9 % SOLUTION: Performed by: EMERGENCY MEDICINE

## 2024-02-05 PROCEDURE — 99221 1ST HOSP IP/OBS SF/LOW 40: CPT | Performed by: INTERNAL MEDICINE

## 2024-02-05 PROCEDURE — 99232 SBSQ HOSP IP/OBS MODERATE 35: CPT | Performed by: SURGERY

## 2024-02-05 PROCEDURE — 25010000002 CEFEPIME PER 500 MG: Performed by: FAMILY MEDICINE

## 2024-02-05 PROCEDURE — 25010000002 METRONIDAZOLE 500 MG/100ML SOLUTION: Performed by: FAMILY MEDICINE

## 2024-02-05 PROCEDURE — 80053 COMPREHEN METABOLIC PANEL: CPT | Performed by: FAMILY MEDICINE

## 2024-02-05 PROCEDURE — 94799 UNLISTED PULMONARY SVC/PX: CPT

## 2024-02-05 PROCEDURE — 99222 1ST HOSP IP/OBS MODERATE 55: CPT | Performed by: SURGERY

## 2024-02-05 PROCEDURE — 99233 SBSQ HOSP IP/OBS HIGH 50: CPT | Performed by: INTERNAL MEDICINE

## 2024-02-05 PROCEDURE — 25010000002 POTASSIUM CHLORIDE 10 MEQ/100ML SOLUTION: Performed by: INTERNAL MEDICINE

## 2024-02-05 PROCEDURE — 85025 COMPLETE CBC W/AUTO DIFF WBC: CPT | Performed by: FAMILY MEDICINE

## 2024-02-05 PROCEDURE — 25010000002 MORPHINE PER 10 MG: Performed by: FAMILY MEDICINE

## 2024-02-05 PROCEDURE — 25810000003 SODIUM CHLORIDE 0.9 % SOLUTION: Performed by: FAMILY MEDICINE

## 2024-02-05 PROCEDURE — 25010000002 METRONIDAZOLE 500 MG/100ML SOLUTION: Performed by: EMERGENCY MEDICINE

## 2024-02-05 RX ORDER — METRONIDAZOLE 500 MG/100ML
500 INJECTION, SOLUTION INTRAVENOUS EVERY 8 HOURS
Qty: 2100 ML | Refills: 0 | Status: COMPLETED | OUTPATIENT
Start: 2024-02-05 | End: 2024-02-12

## 2024-02-05 RX ORDER — MORPHINE SULFATE 2 MG/ML
1 INJECTION, SOLUTION INTRAMUSCULAR; INTRAVENOUS EVERY 4 HOURS PRN
Status: DISCONTINUED | OUTPATIENT
Start: 2024-02-05 | End: 2024-02-06

## 2024-02-05 RX ORDER — POLYETHYLENE GLYCOL 3350 17 G/17G
17 POWDER, FOR SOLUTION ORAL DAILY PRN
Status: DISCONTINUED | OUTPATIENT
Start: 2024-02-05 | End: 2024-02-15 | Stop reason: HOSPADM

## 2024-02-05 RX ORDER — SODIUM CHLORIDE 9 MG/ML
40 INJECTION, SOLUTION INTRAVENOUS AS NEEDED
Status: DISCONTINUED | OUTPATIENT
Start: 2024-02-05 | End: 2024-02-15 | Stop reason: HOSPADM

## 2024-02-05 RX ORDER — ACETAMINOPHEN 160 MG
2000 TABLET,DISINTEGRATING ORAL DAILY
COMMUNITY

## 2024-02-05 RX ORDER — NALOXONE HCL 0.4 MG/ML
0.4 VIAL (ML) INJECTION
Status: DISCONTINUED | OUTPATIENT
Start: 2024-02-05 | End: 2024-02-15 | Stop reason: HOSPADM

## 2024-02-05 RX ORDER — AMOXICILLIN 250 MG
2 CAPSULE ORAL 2 TIMES DAILY
Status: DISCONTINUED | OUTPATIENT
Start: 2024-02-05 | End: 2024-02-15 | Stop reason: HOSPADM

## 2024-02-05 RX ORDER — BISACODYL 5 MG/1
5 TABLET, DELAYED RELEASE ORAL DAILY PRN
Status: DISCONTINUED | OUTPATIENT
Start: 2024-02-05 | End: 2024-02-15 | Stop reason: HOSPADM

## 2024-02-05 RX ORDER — BISACODYL 10 MG
10 SUPPOSITORY, RECTAL RECTAL DAILY PRN
Status: DISCONTINUED | OUTPATIENT
Start: 2024-02-05 | End: 2024-02-15 | Stop reason: HOSPADM

## 2024-02-05 RX ORDER — SODIUM CHLORIDE 0.9 % (FLUSH) 0.9 %
10 SYRINGE (ML) INJECTION AS NEEDED
Status: DISCONTINUED | OUTPATIENT
Start: 2024-02-05 | End: 2024-02-15 | Stop reason: HOSPADM

## 2024-02-05 RX ORDER — METOPROLOL SUCCINATE 50 MG/1
50 TABLET, EXTENDED RELEASE ORAL 2 TIMES DAILY
Status: DISCONTINUED | OUTPATIENT
Start: 2024-02-05 | End: 2024-02-10

## 2024-02-05 RX ORDER — SODIUM CHLORIDE 0.9 % (FLUSH) 0.9 %
10 SYRINGE (ML) INJECTION EVERY 12 HOURS SCHEDULED
Status: DISCONTINUED | OUTPATIENT
Start: 2024-02-05 | End: 2024-02-15 | Stop reason: HOSPADM

## 2024-02-05 RX ORDER — POTASSIUM CHLORIDE 7.45 MG/ML
10 INJECTION INTRAVENOUS
Qty: 400 ML | Refills: 0 | Status: COMPLETED | OUTPATIENT
Start: 2024-02-05 | End: 2024-02-05

## 2024-02-05 RX ORDER — NITROGLYCERIN 0.4 MG/1
0.4 TABLET SUBLINGUAL
Status: DISCONTINUED | OUTPATIENT
Start: 2024-02-05 | End: 2024-02-15 | Stop reason: HOSPADM

## 2024-02-05 RX ORDER — DILTIAZEM HCL IN NACL,ISO-OSM 125 MG/125
5-15 PLASTIC BAG, INJECTION (ML) INTRAVENOUS
Status: CANCELLED | OUTPATIENT
Start: 2024-02-05

## 2024-02-05 RX ORDER — ONDANSETRON 2 MG/ML
4 INJECTION INTRAMUSCULAR; INTRAVENOUS EVERY 6 HOURS PRN
Status: DISCONTINUED | OUTPATIENT
Start: 2024-02-05 | End: 2024-02-15 | Stop reason: HOSPADM

## 2024-02-05 RX ORDER — SODIUM CHLORIDE 9 MG/ML
100 INJECTION, SOLUTION INTRAVENOUS CONTINUOUS
Status: DISCONTINUED | OUTPATIENT
Start: 2024-02-05 | End: 2024-02-06

## 2024-02-05 RX ORDER — SODIUM CHLORIDE 9 MG/ML
125 INJECTION, SOLUTION INTRAVENOUS CONTINUOUS
Status: ACTIVE | OUTPATIENT
Start: 2024-02-05 | End: 2024-02-05

## 2024-02-05 RX ADMIN — CEFEPIME 2000 MG: 2 INJECTION, POWDER, FOR SOLUTION INTRAVENOUS at 21:07

## 2024-02-05 RX ADMIN — POTASSIUM CHLORIDE 10 MEQ: 7.46 INJECTION, SOLUTION INTRAVENOUS at 10:14

## 2024-02-05 RX ADMIN — METRONIDAZOLE 500 MG: 500 INJECTION, SOLUTION INTRAVENOUS at 09:18

## 2024-02-05 RX ADMIN — CEFEPIME 2000 MG: 2 INJECTION, POWDER, FOR SOLUTION INTRAVENOUS at 12:30

## 2024-02-05 RX ADMIN — Medication 10 ML: at 09:18

## 2024-02-05 RX ADMIN — METRONIDAZOLE 500 MG: 500 INJECTION, SOLUTION INTRAVENOUS at 16:56

## 2024-02-05 RX ADMIN — CEFEPIME 2000 MG: 2 INJECTION, POWDER, FOR SOLUTION INTRAVENOUS at 05:14

## 2024-02-05 RX ADMIN — MORPHINE SULFATE 1 MG: 2 INJECTION, SOLUTION INTRAMUSCULAR; INTRAVENOUS at 18:32

## 2024-02-05 RX ADMIN — POTASSIUM CHLORIDE 10 MEQ: 7.46 INJECTION, SOLUTION INTRAVENOUS at 12:22

## 2024-02-05 RX ADMIN — POTASSIUM CHLORIDE 10 MEQ: 7.46 INJECTION, SOLUTION INTRAVENOUS at 11:21

## 2024-02-05 RX ADMIN — MORPHINE SULFATE 1 MG: 2 INJECTION, SOLUTION INTRAMUSCULAR; INTRAVENOUS at 02:08

## 2024-02-05 RX ADMIN — MORPHINE SULFATE 1 MG: 2 INJECTION, SOLUTION INTRAMUSCULAR; INTRAVENOUS at 22:41

## 2024-02-05 RX ADMIN — POTASSIUM CHLORIDE 10 MEQ: 7.46 INJECTION, SOLUTION INTRAVENOUS at 09:18

## 2024-02-05 RX ADMIN — SODIUM CHLORIDE 1000 ML: 9 INJECTION, SOLUTION INTRAVENOUS at 00:42

## 2024-02-05 RX ADMIN — SODIUM CHLORIDE 125 ML/HR: 9 INJECTION, SOLUTION INTRAVENOUS at 02:03

## 2024-02-05 RX ADMIN — METOPROLOL SUCCINATE 50 MG: 50 TABLET, EXTENDED RELEASE ORAL at 18:32

## 2024-02-05 RX ADMIN — METRONIDAZOLE 500 MG: 5 INJECTION, SOLUTION INTRAVENOUS at 00:04

## 2024-02-05 NOTE — SIGNIFICANT NOTE
02/05/24 1130   Plan   Plan RANDOLPH, RN met with patient, daughter Nenita, and son at bed side.  Patient lives by himself and has good support if needed.  Provides own IADL's.  Reports no financial needs.  Facesheet verified.  Patient is agreeable to home health or rehab if needed but is unsure at this time.  Will continue to monitor for discharge needs.

## 2024-02-05 NOTE — PLAN OF CARE
Goal Outcome Evaluation:  Plan of Care Reviewed With: patient        Progress: no change   Pt is a&ox4, vss, pt has been running a-fib on the monitor with PVC's. Pt is able to ambulate the bathroom with one assist due to dizziness. Pt is on clear liquids.

## 2024-02-05 NOTE — PROGRESS NOTES
[facility]     Progress Note    Patient Name: Radhames Colón  : 1950  MRN: [id number]  Primary Care Physician:  Mingo Loja MD  Date of admission: 2024    Subjective   Subjective     Follow-up for question of bowel ischemia.    Doing okay.  No significant pain.    Objective   Objective     Vitals:   Temp:  [97.2 °F (36.2 °C)-98.7 °F (37.1 °C)] 97.2 °F (36.2 °C)  Heart Rate:  [57-78] 72  Resp:  [16-22] 16  BP: ()/(52-74) 161/70    Physical Exam   General: Alert, no acute distress.  HEENT: PERRLA  Abdomen: Benign  Extremities: Symmetric.  Neuro: No gross deficits    Diagnostic studies: CTA done on admission reviewed by me.  Agree with interpretation by Dr. Monzon.  Severe celiac axis stenosis, less than 50% SMA stenosis.      Assessment & Plan   Assessment / Plan     Assessment/Plan:    Patient with no significant peripheral vascular as well as mesenteric disease.  SMA is patent with mild, perhaps mild to moderate stenosis.  Conservative therapy.    Active Hospital Problems:  Active Hospital Problems    Diagnosis     **Colitis            Electronically signed by Abelino Gonzales MD, 24, 12:54 PM EST.

## 2024-02-05 NOTE — CONSULTS
Chief Complaint: Shortness of Breath, Dizziness, and Altered Mental Status    Subjective         History of Present Illness  Radhames Colón is a 73 y.o. male presents to [facility] after being seen in the ER for not feeling well for several months and losing weight.  He has lost about 35 lbs.  He denies any abdominal pain but feels that the weight loss has been due to a decrease in appetite.  His wife passed in March of last year and since that time his oral intake has decreased leading to a dx of depression and this has worsened recently.   Over the last two weeks he has been taking in even less food and began to have diarrhea.  The diarrhea was non-bloody and was not accompanied by abdominal pain.   He presented to the ER and was found to have portal venous gas with no pneumatosis but some mural thickening of colon that was felt to be chronic mesenteric ischemia.  Vascular was consulted and a cta was ordered after the ct scan of the abdomen.  Both reads are shown below:   \    Narrative & Impression   PROCEDURE:  CT ABDOMEN PELVIS WO CONTRAST     COMPARISONS:           10/15/2020 (chest CT); 9/26/2018 (C/A/P CTs).     INDICATIONS:  Weight loss; dizziness; abnormal LFTs; h/o colorectal carcinoma.     TECHNIQUE:    CT images were created without intravenous contrast.       PROTOCOL:     Standard CT imaging protocol performed.                 RADIATION:      Total DLP: 567.8 mGy*cm.               Automated exposure control was utilized to minimize radiation dose.      FINDINGS:   A new small amount of portal venous gas is seen, especially involving the left lobe of   the liver, such as seen on image 50 of series 201 and adjacent images.  This finding is abnormal   and suggests ischemic enterocolitis.  There is circumferential mural thickening of the remaining   colon, which may represent infectious/inflammatory or ischemic colitis.  No definite pneumatosis is   seen.  No  pneumoperitoneum.  Minimal if any small bowel wall thickening is identified.  There is an   anastomosis of small bowel and remaining right colon in the right abdomen; that is, there has been   partial resection of the right colon, seen previously.  No definite superior mesenteric venous   (SMV) gas is seen.  Extensive atherosclerotic changes are noted.  Chronic mesenteric ischemia is   possible.  The patient has undergone aortobifemoral bypass graft surgery, seen previously.  No   mechanical bowel obstruction.  No pericolonic fluid collections are identified to suggest abscess.    Myositis ossificans involves the right paraspinal musculature, as seen on image 102 of series 201   and adjacent images.  Myriad other chronic incidental nonemergent findings are seen, as described   in prior exam reports.     IMPRESSION:  The study is ABNORMAL.  There is new portal venous gas identified, especially   involving the left lobe of the liver.  This finding is abnormal and suggests ischemic   enterocolitis.  There is diffuse circumferential mural thickening involving the remaining colon,   which may represent ischemic colitis.  Infectious/inflammatory colitis cannot be excluded.    Minimal, if any, mural thickening of the small bowel is seen.  No gastric emphysema is identified.    No mechanical bowel obstruction.  No pneumoperitoneum.  No definite pneumatosis.  No pericolonic   fluid collection is seen to suggest abscess.  Please see above comments for further detail.           Narrative & Impression   PROCEDURE:  CT ANGIO ABDOMINAL AORTA BILAT ILIOFEM RUNOFF W WO CONTRAST     COMPARISON: 2/04/2024, 18:30.     INDICATIONS:  ISCHEMIC BOWEL.     TECHNIQUE:    After obtaining the patient's consent, 2,309 CT/CTA images of the abdomen, pelvis, and   lower extremities were obtained with non-ionic intravenous contrast material. Multi-planar   reformatted/3-D images were created to optimize visualization of vascular anatomy.        PROTOCOL:     Standard imaging protocol performed.                 RADIATION:      Total DLP: 1,011.2 mGy*cm.               Automated exposure control was utilized to minimize radiation dose.   CONTRAST:      93cc mL Isovue 370 I.V.  LABS:   eGFR: >60 mL/min/1.73m^2.     FINDINGS:          Extensive atherosclerotic changes are seen throughout the imaged arterial tree.  Moderate-to-severe   origin stenosis involves the celiac trunk and the superior mesenteric artery (SMA).  The proximal   inferior mesenteric artery (FUENTES) is occluded.  It reconstitutes distally.  These findings support   the diagnosis of chronic mesenteric ischemia.  Please correlate clinically.  No emergent large   vessel occlusion is suggested.  There are single bilateral renal arteries.  Probably   moderate-to-severe origin stenosis involves the right renal artery, as seen on series 4015.   Moderate stenosis involves the origin of the left renal artery (as on series 4014).      Portal venous gas persists, especially involving the left lobe of the liver.  Diffusely thickened   colonic wall is seen, as described previously.  No definite pneumatosis is identified currently.    No pneumoperitoneum is appreciated.  The patient has undergone aortobifemoral bypass graft surgery   the aortobifemoral bypass graft is widely patent.       On the LEFT, the left superficial femoral artery (SFA) is occluded at its origin.  The left deep   femoral artery is patent.  There is distal reconstitution of the left SFA via left deep femoral   collateral arteries.  The left popliteal artery is continuously patent with moderate to severe   atherosclerotic change.  Its trifurcation is patent.  The left-sided infrapopliteal arteries are   patent.  There is single-vessel runoff across the left ankle into the left foot via the left   posterior tibial artery.  The left anterior tibial and left peroneal arteries are small in caliber   at the level of the left ankle.        Similar findings are seen contralaterally with origin occlusion of the RIGHT superficial femoral   artery (SFA).  There may be luminal stenosis at the anastomosis of the distal right-sided arterial   limb (of the aortobifemoral arterial bypass graft) with the native right common femoral artery.    This finding is best seen on series 4016, such is seen image 7 and adjacent images.  The right deep   femoral artery is patent.  There is distal reconstitution of the distal right superficial femoral   artery (SFA) via the right deep femoral system at about the level of the junction of the right   superficial femoral and the right popliteal artery (i.e., Sandeep's canal).  Moderate-to-severe   atherosclerotic change probably involves the right popliteal artery, which is thought to be   continuously patent.  Its trifurcation is patent.  The right infrapopliteal arteries are patent.    There is probably at least single-vessel runoff across the right ankle into the right foot via the   right posterior tibial artery.  A small caliber right anterior tibial artery is also patent.  The   distal right peroneal artery is patent to just above the level of the right ankle.       The other findings are as described in the prior CT exam report from 2/4/2024 at 1830 hours,   allowing for technique differences.     IMPRESSION:                    1. Extensive atherosclerotic changes are seen throughout the imaged arterial tree, as detailed   above.       2. Moderate-to-severe origin stenosis involves the celiac trunk and the superior mesenteric artery   (SMA).  The proximal inferior mesenteric artery (FUENTES) is occluded.  It reconstitutes distally via   mesenteric collaterals.  These findings support the diagnosis of chronic mesenteric ischemia.    Please correlate clinically.       3. No emergent large vessel occlusion is suggested.       4. Portal venous gas persists, especially involving the left lobe of the liver, suggesting ischemic    bowel, such as seen colitis.  Diffusely thickened colonic wall is seen, as described previously.    No definite pneumatosis is identified currently.  No pneumoperitoneum is appreciated.       5. The patient has undergone aortobifemoral bypass graft surgery the aortobifemoral bypass graft is   widely patent.         6. There is bilateral origin occlusion of the native superficial femoral arteries (SFAs); they   reconstitute distally at about the level of the bilateral popliteal arteries via a deep femoral   arterial system collaterals.       7. There is at least single-vessel runoff across the bilateral ankles into the bilateral feet   predominantly via the bilateral posterior tibial arteries, as discussed.     8. Please see above comments for further detail.          On exam he has no tenderness and his lactic acid is normal.     Objective     Past Medical History:   Diagnosis Date    Abdominal aortic aneurysm without rupture     Acute renal failure (ARF)     WAS SHORT TERM DIALYSIS, STAGE 3    Arthritis     Atrophy of thyroid (acquired)     Bilateral carotid artery stenosis     Chronic airway obstruction     Chronic gouty arthritis     Chronic kidney disease, stage 3     Controlled diabetes mellitus type II without complication     COPD (chronic obstructive pulmonary disease)     Coronary artery disease involving coronary bypass graft of native heart without angina pectoris 03/28/2012    with previous bypass surgery (2005 x3) and subsequent stent/PCI of the native circumflex obtuse marginal branch in January 2018    Depression     Disease of liver     Elevated carcinoembryonic antigen (CEA)     GERD (gastroesophageal reflux disease)     Hypertension, essential 07/14/2021    Hypothyroidism     Iron deficiency anemia     Long term (current) use of opiate analgesic     Lumbar spondylosis     Malignant neoplasm of colon     Mixed hyperlipidemia 07/14/2021    Peripheral artery disease     Polyarthropathy      Rhabdomyolysis     Sleep apnea     CPAP       Past Surgical History:   Procedure Laterality Date    ABDOMINAL AORTIC ANEURYSM REPAIR      ANGIOPLASTY FEMORAL ARTERY Left 9/14/2020    Procedure: AORTOILLOFEMORAL ARTERIOGRAM;  Surgeon: Chula Nam Jr., MD;  Location: [facility] 18/19;  Service: Vascular;  Laterality: Left;    APPENDECTOMY      CARDIAC CATHETERIZATION      CAROTID ENDARTERECTOMY Left 2005    CAROTID ENDARTERECTOMY Right 2010    CHOLECYSTECTOMY OPEN      COLON SURGERY      COLON RESECTION    COLONOSCOPY  2004,11/2018    Dr. Lamas 2004,     CORONARY ANGIOPLASTY WITH STENT PLACEMENT      CORONARY ARTERY BYPASS GRAFT  2005    ENDOSCOPY      ERCP N/A 12/15/2021    Procedure: ENDOSCOPIC RETROGRADE CHOLANGIOPANCREATOGRAPHY WITH SPINCTEROTOMY, 9-12MM BALLOON SWEEP, INSERTION OF 10FR 9CM BILIARY STENT;  Surgeon: Eden Hernandez MD;  Location: [facility] ENDOSCOPY;  Service: Gastroenterology;  Laterality: N/A;  BILE DUCT STONES    ERCP N/A 1/6/2022    Procedure: ENDOSCOPIC RETROGRADE CHOLANGIOPANCREATOGRAPHY WITH STENT REMOVAL, SPYGLASS, AUTOLITH, 8-10MM BALLOON DILATATION, 9-12 AND 12-15MM BALLOON SWEEP;  Surgeon: Wayne Zepeda MD;  Location: [facility] ENDOSCOPY;  Service: Gastroenterology;  Laterality: N/A;  BILE DUCT STONES    FEMORAL ENDARTERECTOMY Left 9/14/2020    Procedure: LEFT FEMORAL ENDARECTOMY WITHH PATCH ANGIOPLASTY;  Surgeon: Chula Nam Jr., MD;  Location: [facility] 18/19;  Service: Vascular;  Laterality: Left;    GALLBLADDER SURGERY      RHINOPLASTY Bilateral     70-80% R, 50% L         Current Facility-Administered Medications:     sennosides-docusate (PERICOLACE) 8.6-50 MG per tablet 2 tablet, 2 tablet, Oral, BID **AND** polyethylene glycol (MIRALAX) packet 17 g, 17 g, Oral, Daily PRN **AND** bisacodyl (DULCOLAX) EC tablet 5 mg, 5 mg, Oral, Daily PRN **AND** bisacodyl (DULCOLAX) suppository 10 mg, 10 mg, Rectal, Daily PRN, Garrett Olguin DO     cefepime (MAXIPIME) 2000 mg/100 mL 0.9% NS (mbp), 2,000 mg, Intravenous, Q8H, Garrett Olguin DO, 2,000 mg at 24 0514    metroNIDAZOLE (FLAGYL) IVPB 500 mg, 500 mg, Intravenous, Q8H, Garrett Olguin DO    morphine injection 1 mg, 1 mg, Intravenous, Q4H PRN, 1 mg at 24 0208 **AND** naloxone (NARCAN) injection 0.4 mg, 0.4 mg, Intravenous, Q5 Min PRN, Garrett Olguin DO    nitroglycerin (NITROSTAT) SL tablet 0.4 mg, 0.4 mg, Sublingual, Q5 Min PRN, Garrett Olguin DO    ondansetron (ZOFRAN) injection 4 mg, 4 mg, Intravenous, Q6H PRN, Garrett Olguin DO    sodium chloride 0.9 % flush 10 mL, 10 mL, Intravenous, PRN, Job Felix MD    sodium chloride 0.9 % flush 10 mL, 10 mL, Intravenous, Q12H, Garrett Olguin DO    sodium chloride 0.9 % flush 10 mL, 10 mL, Intravenous, PRN, Garrett Olguin DO    sodium chloride 0.9 % infusion 40 mL, 40 mL, Intravenous, PRN, Garrett Olguin DO    sodium chloride 0.9 % infusion, 100 mL/hr, Intravenous, Continuous, Job Felix MD    sodium chloride 0.9 % infusion, 125 mL/hr, Intravenous, Continuous, Garrett Olguin DO, Last Rate: 125 mL/hr at 24 0203, 125 mL/hr at 24 0203    Allergies   Allergen Reactions    Penicillins Shortness Of Breath    Ticagrelor Shortness Of Breath    Penicillin G Provider Review Needed and Unknown - Low Severity        Family History   Problem Relation Age of Onset    Heart failure Mother     Malig Hyperthermia Neg Hx        Social History     Socioeconomic History    Marital status:    Tobacco Use    Smoking status: Former     Packs/day: 1.00     Years: 45.00     Additional pack years: 0.00     Total pack years: 45.00     Types: Cigarettes     Quit date: 2005     Years since quittin.4    Smokeless tobacco: Never   Vaping Use    Vaping Use: Never used   Substance and Sexual Activity    Alcohol use: Not Currently     Comment: RARE    Drug use: Never    Sexual activity: Defer       Vital Signs:   /52 (BP  "Location: Right arm, Patient Position: Lying)   Pulse 57   Temp 97.7 °F (36.5 °C) (Oral)   Resp 18   Ht 175.3 cm (69\")   Wt 84.4 kg (186 lb 1.1 oz)   SpO2 95%   BMI 27.48 kg/m²    Review of Systems   Constitutional:  Positive for appetite change, fatigue and unexpected weight loss. Negative for fever.   HENT:  Negative for ear pain and sore throat.    Eyes:  Negative for blurred vision.   Respiratory:  Negative for cough and shortness of breath.    Cardiovascular:  Negative for chest pain and leg swelling.   Gastrointestinal:  Negative for abdominal pain, constipation, diarrhea, nausea and vomiting.   Musculoskeletal:  Negative for arthralgias and myalgias.   Skin:  Negative for rash and skin lesions.   Neurological:  Positive for weakness. Negative for headache.   Hematological:  Negative for adenopathy. Does not bruise/bleed easily.   Psychiatric/Behavioral:  Negative for sleep disturbance and depressed mood.        Physical Exam  Vitals and nursing note reviewed.   Constitutional:       General: He is not in acute distress.     Appearance: Normal appearance. He is well-developed.   HENT:      Head: Normocephalic and atraumatic.   Eyes:      Extraocular Movements: Extraocular movements intact.      Pupils: Pupils are equal, round, and reactive to light.   Cardiovascular:      Pulses: Normal pulses.   Pulmonary:      Effort: Pulmonary effort is normal. No retractions.      Breath sounds: Normal air entry. No wheezing.   Abdominal:      General: There is no distension.      Palpations: Abdomen is soft.      Tenderness: There is no abdominal tenderness.      Hernia: No hernia is present.   Musculoskeletal:         General: No swelling or deformity.      Cervical back: Neck supple.   Skin:     General: Skin is warm and dry.      Findings: No erythema.   Neurological:      General: No focal deficit present.      Mental Status: He is alert and oriented to person, place, and time.      Motor: Motor function is " intact.   Psychiatric:         Mood and Affect: Mood normal.         Thought Content: Thought content normal.          Result Review :               Assessment and Plan    Diagnoses and all orders for this visit:    1. Enterocolitis (Primary)    Other orders  Continue NPO  IVF resuscitation  Monitor lactic acid and abdominal symptoms- discussed with patient that he may need to have colon resection if clinical picture changed at all  IV abx  Appreciate vascular assistance - CTA read differs from vascular read so have discussed with vascular surgeon on call for today and will have him weigh in on whether or not revascularization is necessary      This document has been electronically signed by Kaley De Leon MD  February 5, 2024 06:58 EST

## 2024-02-05 NOTE — H&P (VIEW-ONLY)
Chief Complaint  Shortness of Breath, Dizziness, and Altered Mental Status    History of Present Illness  Radhames Colón is a 73 y.o. male with history of aortic aneurysm, renal failure, arthritis, hypothyroidism, peripheral vascular disease, COPD, gout, chronic kidney disease, diabetes, depression, chronic liver disease, hypothyroidism, hypertension, history of colon cancer in the past who presents to [facility] on referral from No ref. provider found for a gastroenterology evaluation of abdominal pain and abnormal CT scan of the colon.  Patient had his CT scan done emergency room yesterday which showed enterocolitis with air in the portal vein.  Patient mesenteric vasculature is also very abnormal.  Per patient lactate level was normal and symptoms are improving.  The vascular surgeon does not believe that any intervention urgently would help.  Patient denies any nausea vomiting, heartburn, rectal bleeding, diarrhea, weight        Labs Result Review Imaging    Past Medical History:   Diagnosis Date    Abdominal aortic aneurysm without rupture     Acute renal failure (ARF)     WAS SHORT TERM DIALYSIS, STAGE 3    Arthritis     Atrophy of thyroid (acquired)     Bilateral carotid artery stenosis     Chronic airway obstruction     Chronic gouty arthritis     Chronic kidney disease, stage 3     Controlled diabetes mellitus type II without complication     COPD (chronic obstructive pulmonary disease)     Coronary artery disease involving coronary bypass graft of native heart without angina pectoris 03/28/2012    with previous bypass surgery (2005 x3) and subsequent stent/PCI of the native circumflex obtuse marginal branch in January 2018    Depression     Disease of liver     Elevated carcinoembryonic antigen (CEA)     GERD (gastroesophageal reflux disease)     Hypertension, essential 07/14/2021    Hypothyroidism     Iron deficiency anemia     Long term (current) use of opiate  analgesic     Lumbar spondylosis     Malignant neoplasm of colon     Mixed hyperlipidemia 07/14/2021    Peripheral artery disease     Polyarthropathy     Rhabdomyolysis     Sleep apnea     CPAP       Past Surgical History:   Procedure Laterality Date    ABDOMINAL AORTIC ANEURYSM REPAIR      ANGIOPLASTY FEMORAL ARTERY Left 9/14/2020    Procedure: AORTOILLOFEMORAL ARTERIOGRAM;  Surgeon: Chula Nam Jr., MD;  Location: [facility] 18/19;  Service: Vascular;  Laterality: Left;    APPENDECTOMY      CARDIAC CATHETERIZATION      CAROTID ENDARTERECTOMY Left 2005    CAROTID ENDARTERECTOMY Right 2010    CHOLECYSTECTOMY OPEN      COLON SURGERY      COLON RESECTION    COLONOSCOPY  2004,11/2018    Dr. Lamas 2004,     CORONARY ANGIOPLASTY WITH STENT PLACEMENT      CORONARY ARTERY BYPASS GRAFT  2005    ENDOSCOPY      ERCP N/A 12/15/2021    Procedure: ENDOSCOPIC RETROGRADE CHOLANGIOPANCREATOGRAPHY WITH SPINCTEROTOMY, 9-12MM BALLOON SWEEP, INSERTION OF 10FR 9CM BILIARY STENT;  Surgeon: Eden Hernandez MD;  Location: [facility] ENDOSCOPY;  Service: Gastroenterology;  Laterality: N/A;  BILE DUCT STONES    ERCP N/A 1/6/2022    Procedure: ENDOSCOPIC RETROGRADE CHOLANGIOPANCREATOGRAPHY WITH STENT REMOVAL, SPYGLASS, AUTOLITH, 8-10MM BALLOON DILATATION, 9-12 AND 12-15MM BALLOON SWEEP;  Surgeon: Wayne Zepeda MD;  Location: [facility] ENDOSCOPY;  Service: Gastroenterology;  Laterality: N/A;  BILE DUCT STONES    FEMORAL ENDARTERECTOMY Left 9/14/2020    Procedure: LEFT FEMORAL ENDARECTOMY WITHH PATCH ANGIOPLASTY;  Surgeon: Chula Nam Jr., MD;  Location: [facility] 18/19;  Service: Vascular;  Laterality: Left;    GALLBLADDER SURGERY      RHINOPLASTY Bilateral     70-80% R, 50% L         Current Facility-Administered Medications:     sennosides-docusate (PERICOLACE) 8.6-50 MG per tablet 2 tablet, 2 tablet, Oral, BID **AND** polyethylene glycol (MIRALAX) packet 17 g, 17 g, Oral, Daily PRN **AND**  bisacodyl (DULCOLAX) EC tablet 5 mg, 5 mg, Oral, Daily PRN **AND** bisacodyl (DULCOLAX) suppository 10 mg, 10 mg, Rectal, Daily PRN, Garrett Olguin DO    cefepime (MAXIPIME) 2000 mg/100 mL 0.9% NS (mbp), 2,000 mg, Intravenous, Q8H, Garrett Olguin DO, 2,000 mg at 02/05/24 1230    metroNIDAZOLE (FLAGYL) IVPB 500 mg, 500 mg, Intravenous, Q8H, Garrett Olguin DO, Last Rate: 100 mL/hr at 02/05/24 0918, 500 mg at 02/05/24 0918    morphine injection 1 mg, 1 mg, Intravenous, Q4H PRN, 1 mg at 02/05/24 0208 **AND** naloxone (NARCAN) injection 0.4 mg, 0.4 mg, Intravenous, Q5 Min PRN, Garrett Olguin DO    nitroglycerin (NITROSTAT) SL tablet 0.4 mg, 0.4 mg, Sublingual, Q5 Min PRN, Garrett Olguin DO    ondansetron (ZOFRAN) injection 4 mg, 4 mg, Intravenous, Q6H PRN, Garrett Olguin DO    sodium chloride 0.9 % flush 10 mL, 10 mL, Intravenous, PRN, Job Felix MD    sodium chloride 0.9 % flush 10 mL, 10 mL, Intravenous, Q12H, Garrett Olguin DO, 10 mL at 02/05/24 0918    sodium chloride 0.9 % flush 10 mL, 10 mL, Intravenous, PRN, Garrett Olguin DO    sodium chloride 0.9 % infusion 40 mL, 40 mL, Intravenous, PRN, Garrett Olguin DO    sodium chloride 0.9 % infusion, 100 mL/hr, Intravenous, Continuous, Job Felix MD     Allergies   Allergen Reactions    Penicillins Shortness Of Breath    Ticagrelor Shortness Of Breath    Penicillin G Provider Review Needed and Unknown - Low Severity       Family History   Problem Relation Age of Onset    Heart failure Mother     Malig Hyperthermia Neg Hx         Social History     Social History Narrative    Not on file   Social is negative for smoking, alcohol abuse, drug    Immunization:  Immunization History   Administered Date(s) Administered    COVID-19 (MODERNA) 1st,2nd,3rd Dose Monovalent 01/01/2021, 02/13/2021, 03/14/2021, 11/17/2021    COVID-19 (MODERNA) BIVALENT 12+YRS 09/20/2022    COVID-19 F23 (MODERNA) 12YRS+ (SPIKEVAX) 09/18/2023    Fluzone High Dose =>65 Years  "(Vaxcare ONLY) 10/05/2020    Fluzone High-Dose 65+yrs 11/17/2021, 09/20/2022    Pneumococcal Conjugate 13-Valent (PCV13) 10/30/2020, 11/05/2020    Pneumococcal Polysaccharide (PPSV23) 10/24/2014        Objective     Review of Systems 10 system review is negative except was mentioned HPI    Vital Signs:   /59 (BP Location: Right arm, Patient Position: Lying)   Pulse 79   Temp 98.1 °F (36.7 °C) (Oral)   Resp 16   Ht 175.3 cm (69\")   Wt 84.4 kg (186 lb 1.1 oz)   SpO2 95%   BMI 27.48 kg/m²       Physical Exam  Constitutional:       General: He is awake. He is not in acute distress.     Appearance: Normal appearance. He is well-developed and well-groomed.   HENT:      Head: Normocephalic and atraumatic.      Mouth/Throat:      Mouth: Mucous membranes are moist.      Comments: Misha dental hygiene is good  Eyes:      General: Lids are normal.      Conjunctiva/sclera: Conjunctivae normal.      Pupils: Pupils are equal, round, and reactive to light.   Neck:      Thyroid: No thyroid mass.      Trachea: Trachea normal.   Cardiovascular:      Rate and Rhythm: Normal rate and regular rhythm.      Heart sounds: Normal heart sounds.   Pulmonary:      Effort: Pulmonary effort is normal.      Breath sounds: Normal breath sounds and air entry.   Abdominal:      General: Abdomen is flat. Bowel sounds are normal. There is no distension.      Palpations: Abdomen is soft. There is no mass.      Tenderness: There is no abdominal tenderness. There is no guarding.   Musculoskeletal:      Cervical back: Neck supple.      Right lower leg: No edema.      Left lower leg: No edema.   Skin:     General: Skin is warm and moist.      Coloration: Skin is not cyanotic, jaundiced or pale.      Findings: No rash.      Nails: There is no clubbing.   Neurological:      Mental Status: He is alert and oriented to person, place, and time.   Psychiatric:         Attention and Perception: Attention normal.         Mood and Affect: Mood and affect " normal.         Speech: Speech normal.         Labs:  Results from last 7 days   Lab Units 02/05/24  0419 02/04/24  1428   WBC 10*3/mm3 11.88* 13.58*   HEMOGLOBIN g/dL 12.8* 14.7   HEMATOCRIT % 40.9 46.3   PLATELETS 10*3/mm3 133* 200      Results from last 7 days   Lab Units 02/05/24  0419 02/04/24  1428   SODIUM mmol/L 141 140   POTASSIUM mmol/L 2.8* 3.1*   CHLORIDE mmol/L 99 94*   CO2 mmol/L 26.1 27.2   BUN mg/dL 19 15   CREATININE mg/dL 1.22 1.47*   CALCIUM mg/dL 8.6 9.7   BILIRUBIN mg/dL 0.8 0.8   ALK PHOS U/L 72 87   ALT (SGPT) U/L 40 53*   AST (SGOT) U/L 62* 93*   GLUCOSE mg/dL 59* 94             Assessment & Plan:  Acute colitis possibly ischemic  Plan to watch patient clinically.  Possible colonoscopy next few days.  Will check stool studies to rule out infectious causes.  Patient was given instructions and counseling regarding his condition or for health maintenance advice. Please see specific information pulled into the AVS if appropriate.        Signed:  Daryl Zepeda MD  02/05/24  16:16 EST

## 2024-02-05 NOTE — CONSULTS
Chief Complaint  Shortness of Breath, Dizziness, and Altered Mental Status    History of Present Illness  Radhames Colón is a 73 y.o. male with history of aortic aneurysm, renal failure, arthritis, hypothyroidism, peripheral vascular disease, COPD, gout, chronic kidney disease, diabetes, depression, chronic liver disease, hypothyroidism, hypertension, history of colon cancer in the past who presents to [facility] on referral from No ref. provider found for a gastroenterology evaluation of abdominal pain and abnormal CT scan of the colon.  Patient had his CT scan done emergency room yesterday which showed enterocolitis with air in the portal vein.  Patient mesenteric vasculature is also very abnormal.  Per patient lactate level was normal and symptoms are improving.  The vascular surgeon does not believe that any intervention urgently would help.  Patient denies any nausea vomiting, heartburn, rectal bleeding, diarrhea, weight        Labs Result Review Imaging    Past Medical History:   Diagnosis Date    Abdominal aortic aneurysm without rupture     Acute renal failure (ARF)     WAS SHORT TERM DIALYSIS, STAGE 3    Arthritis     Atrophy of thyroid (acquired)     Bilateral carotid artery stenosis     Chronic airway obstruction     Chronic gouty arthritis     Chronic kidney disease, stage 3     Controlled diabetes mellitus type II without complication     COPD (chronic obstructive pulmonary disease)     Coronary artery disease involving coronary bypass graft of native heart without angina pectoris 03/28/2012    with previous bypass surgery (2005 x3) and subsequent stent/PCI of the native circumflex obtuse marginal branch in January 2018    Depression     Disease of liver     Elevated carcinoembryonic antigen (CEA)     GERD (gastroesophageal reflux disease)     Hypertension, essential 07/14/2021    Hypothyroidism     Iron deficiency anemia     Long term (current) use of opiate  analgesic     Lumbar spondylosis     Malignant neoplasm of colon     Mixed hyperlipidemia 07/14/2021    Peripheral artery disease     Polyarthropathy     Rhabdomyolysis     Sleep apnea     CPAP       Past Surgical History:   Procedure Laterality Date    ABDOMINAL AORTIC ANEURYSM REPAIR      ANGIOPLASTY FEMORAL ARTERY Left 9/14/2020    Procedure: AORTOILLOFEMORAL ARTERIOGRAM;  Surgeon: Chula Nam Jr., MD;  Location: [facility] 18/19;  Service: Vascular;  Laterality: Left;    APPENDECTOMY      CARDIAC CATHETERIZATION      CAROTID ENDARTERECTOMY Left 2005    CAROTID ENDARTERECTOMY Right 2010    CHOLECYSTECTOMY OPEN      COLON SURGERY      COLON RESECTION    COLONOSCOPY  2004,11/2018    Dr. Lamas 2004,     CORONARY ANGIOPLASTY WITH STENT PLACEMENT      CORONARY ARTERY BYPASS GRAFT  2005    ENDOSCOPY      ERCP N/A 12/15/2021    Procedure: ENDOSCOPIC RETROGRADE CHOLANGIOPANCREATOGRAPHY WITH SPINCTEROTOMY, 9-12MM BALLOON SWEEP, INSERTION OF 10FR 9CM BILIARY STENT;  Surgeon: Eden Hernandez MD;  Location: [facility] ENDOSCOPY;  Service: Gastroenterology;  Laterality: N/A;  BILE DUCT STONES    ERCP N/A 1/6/2022    Procedure: ENDOSCOPIC RETROGRADE CHOLANGIOPANCREATOGRAPHY WITH STENT REMOVAL, SPYGLASS, AUTOLITH, 8-10MM BALLOON DILATATION, 9-12 AND 12-15MM BALLOON SWEEP;  Surgeon: Wayne Zepeda MD;  Location: [facility] ENDOSCOPY;  Service: Gastroenterology;  Laterality: N/A;  BILE DUCT STONES    FEMORAL ENDARTERECTOMY Left 9/14/2020    Procedure: LEFT FEMORAL ENDARECTOMY WITHH PATCH ANGIOPLASTY;  Surgeon: Chula Nam Jr., MD;  Location: [facility] 18/19;  Service: Vascular;  Laterality: Left;    GALLBLADDER SURGERY      RHINOPLASTY Bilateral     70-80% R, 50% L         Current Facility-Administered Medications:     sennosides-docusate (PERICOLACE) 8.6-50 MG per tablet 2 tablet, 2 tablet, Oral, BID **AND** polyethylene glycol (MIRALAX) packet 17 g, 17 g, Oral, Daily PRN **AND**  bisacodyl (DULCOLAX) EC tablet 5 mg, 5 mg, Oral, Daily PRN **AND** bisacodyl (DULCOLAX) suppository 10 mg, 10 mg, Rectal, Daily PRN, Garrett Olguin DO    cefepime (MAXIPIME) 2000 mg/100 mL 0.9% NS (mbp), 2,000 mg, Intravenous, Q8H, Garrett Olguin DO, 2,000 mg at 02/05/24 1230    metroNIDAZOLE (FLAGYL) IVPB 500 mg, 500 mg, Intravenous, Q8H, Garrett Olguin DO, Last Rate: 100 mL/hr at 02/05/24 0918, 500 mg at 02/05/24 0918    morphine injection 1 mg, 1 mg, Intravenous, Q4H PRN, 1 mg at 02/05/24 0208 **AND** naloxone (NARCAN) injection 0.4 mg, 0.4 mg, Intravenous, Q5 Min PRN, Garrett Olguin DO    nitroglycerin (NITROSTAT) SL tablet 0.4 mg, 0.4 mg, Sublingual, Q5 Min PRN, Garrett Olguin DO    ondansetron (ZOFRAN) injection 4 mg, 4 mg, Intravenous, Q6H PRN, Garrett Olguin DO    sodium chloride 0.9 % flush 10 mL, 10 mL, Intravenous, PRN, Job Felix MD    sodium chloride 0.9 % flush 10 mL, 10 mL, Intravenous, Q12H, Garrett Olguin DO, 10 mL at 02/05/24 0918    sodium chloride 0.9 % flush 10 mL, 10 mL, Intravenous, PRN, Garrett Olguin DO    sodium chloride 0.9 % infusion 40 mL, 40 mL, Intravenous, PRN, Garrett Olguin DO    sodium chloride 0.9 % infusion, 100 mL/hr, Intravenous, Continuous, Job Felix MD     Allergies   Allergen Reactions    Penicillins Shortness Of Breath    Ticagrelor Shortness Of Breath    Penicillin G Provider Review Needed and Unknown - Low Severity       Family History   Problem Relation Age of Onset    Heart failure Mother     Malig Hyperthermia Neg Hx         Social History     Social History Narrative    Not on file   Social is negative for smoking, alcohol abuse, drug    Immunization:  Immunization History   Administered Date(s) Administered    COVID-19 (MODERNA) 1st,2nd,3rd Dose Monovalent 01/01/2021, 02/13/2021, 03/14/2021, 11/17/2021    COVID-19 (MODERNA) BIVALENT 12+YRS 09/20/2022    COVID-19 F23 (MODERNA) 12YRS+ (SPIKEVAX) 09/18/2023    Fluzone High Dose =>65 Years  "(Vaxcare ONLY) 10/05/2020    Fluzone High-Dose 65+yrs 11/17/2021, 09/20/2022    Pneumococcal Conjugate 13-Valent (PCV13) 10/30/2020, 11/05/2020    Pneumococcal Polysaccharide (PPSV23) 10/24/2014        Objective     Review of Systems 10 system review is negative except was mentioned HPI    Vital Signs:   /59 (BP Location: Right arm, Patient Position: Lying)   Pulse 79   Temp 98.1 °F (36.7 °C) (Oral)   Resp 16   Ht 175.3 cm (69\")   Wt 84.4 kg (186 lb 1.1 oz)   SpO2 95%   BMI 27.48 kg/m²       Physical Exam  Constitutional:       General: He is awake. He is not in acute distress.     Appearance: Normal appearance. He is well-developed and well-groomed.   HENT:      Head: Normocephalic and atraumatic.      Mouth/Throat:      Mouth: Mucous membranes are moist.      Comments: Misha dental hygiene is good  Eyes:      General: Lids are normal.      Conjunctiva/sclera: Conjunctivae normal.      Pupils: Pupils are equal, round, and reactive to light.   Neck:      Thyroid: No thyroid mass.      Trachea: Trachea normal.   Cardiovascular:      Rate and Rhythm: Normal rate and regular rhythm.      Heart sounds: Normal heart sounds.   Pulmonary:      Effort: Pulmonary effort is normal.      Breath sounds: Normal breath sounds and air entry.   Abdominal:      General: Abdomen is flat. Bowel sounds are normal. There is no distension.      Palpations: Abdomen is soft. There is no mass.      Tenderness: There is no abdominal tenderness. There is no guarding.   Musculoskeletal:      Cervical back: Neck supple.      Right lower leg: No edema.      Left lower leg: No edema.   Skin:     General: Skin is warm and moist.      Coloration: Skin is not cyanotic, jaundiced or pale.      Findings: No rash.      Nails: There is no clubbing.   Neurological:      Mental Status: He is alert and oriented to person, place, and time.   Psychiatric:         Attention and Perception: Attention normal.         Mood and Affect: Mood and affect " normal.         Speech: Speech normal.         Labs:  Results from last 7 days   Lab Units 02/05/24  0419 02/04/24  1428   WBC 10*3/mm3 11.88* 13.58*   HEMOGLOBIN g/dL 12.8* 14.7   HEMATOCRIT % 40.9 46.3   PLATELETS 10*3/mm3 133* 200      Results from last 7 days   Lab Units 02/05/24  0419 02/04/24  1428   SODIUM mmol/L 141 140   POTASSIUM mmol/L 2.8* 3.1*   CHLORIDE mmol/L 99 94*   CO2 mmol/L 26.1 27.2   BUN mg/dL 19 15   CREATININE mg/dL 1.22 1.47*   CALCIUM mg/dL 8.6 9.7   BILIRUBIN mg/dL 0.8 0.8   ALK PHOS U/L 72 87   ALT (SGPT) U/L 40 53*   AST (SGOT) U/L 62* 93*   GLUCOSE mg/dL 59* 94             Assessment & Plan:  Acute colitis possibly ischemic  Plan to watch patient clinically.  Possible colonoscopy next few days.  Will check stool studies to rule out infectious causes.  Patient was given instructions and counseling regarding his condition or for health maintenance advice. Please see specific information pulled into the AVS if appropriate.        Signed:  Daryl Zepeda MD  02/05/24  16:16 EST

## 2024-02-05 NOTE — H&P
[facility]   HISTORY AND PHYSICAL    Patient Name: Radhames Colón  : 1950  MRN: [id number]  Primary Care Physician:  Mingo Loja MD  Date of admission: 2024    Subjective   Subjective     Chief Complaint: Decreased appetite, nausea    History of Present Illness  Patient is a 73-year-old male with past medical history of chronic kidney disease stage III, CAD, PAD, COPD, hypertension, hyperlipidemia, bilateral carotid artery stenosis, polyarthropathy and sleep apnea.  The patient presented to the ED complaining of decreased appetite, nausea without vomiting.  Patient also states that he has been feeling short of breath for the past 3 days.  Patient's decreased appetite has been for almost 4 months.  Patient's wife passed away approximately 4 months ago and the patient felt that the weight loss and decrease in appetite was secondary to depression.  He has been on multiple antidepressants none of which have helped his appetite.  Patient denies abdominal pain even after food.  Here in the ED a CT scan showed portal venous gas with gas extending into the liver.  There is pancolitis on CT as well.  The patient denies any real bowel habit changes.  The case was discussed initially with gastroenterology who did not feel comfortable keeping the patient here.  Vascular surgery was then contacted as the patient expressed his wishes to stay at [facility] instead of being transferred to [place].  The vascular surgeon agreed to see the patient in consultation and felt that he could treat the underlying problem.  He asked for a CTA of the abdomen and is coming in to see the patient.  The vascular surgeon did ask for the general surgeon to be notified of the case in the event that the patient would require abdominal surgery such as partial colectomy.  The general surgeon tentatively agreed based on the CTA and the plan that vascular surgeon put in place.  Review of Systems   General-weakness and  fatigue  GI-nausea decreased appetite  Personal History     Past Medical History:   Diagnosis Date    Abdominal aortic aneurysm without rupture     Acute renal failure (ARF)     WAS SHORT TERM DIALYSIS, STAGE 3    Arthritis     Atrophy of thyroid (acquired)     Bilateral carotid artery stenosis     Chronic airway obstruction     Chronic gouty arthritis     Chronic kidney disease, stage 3     Controlled diabetes mellitus type II without complication     COPD (chronic obstructive pulmonary disease)     Coronary artery disease involving coronary bypass graft of native heart without angina pectoris 03/28/2012    with previous bypass surgery (2005 x3) and subsequent stent/PCI of the native circumflex obtuse marginal branch in January 2018    Depression     Disease of liver     Elevated carcinoembryonic antigen (CEA)     GERD (gastroesophageal reflux disease)     Hypertension, essential 07/14/2021    Hypothyroidism     Iron deficiency anemia     Long term (current) use of opiate analgesic     Lumbar spondylosis     Malignant neoplasm of colon     Mixed hyperlipidemia 07/14/2021    Peripheral artery disease     Polyarthropathy     Rhabdomyolysis     Sleep apnea     CPAP       Past Surgical History:   Procedure Laterality Date    ABDOMINAL AORTIC ANEURYSM REPAIR      ANGIOPLASTY FEMORAL ARTERY Left 9/14/2020    Procedure: AORTOILLOFEMORAL ARTERIOGRAM;  Surgeon: Chula Nam Jr., MD;  Location: [facility] 18/19;  Service: Vascular;  Laterality: Left;    APPENDECTOMY      CARDIAC CATHETERIZATION      CAROTID ENDARTERECTOMY Left 2005    CAROTID ENDARTERECTOMY Right 2010    CHOLECYSTECTOMY OPEN      COLON SURGERY      COLON RESECTION    COLONOSCOPY  2004,11/2018    Dr. Lamas 2004,     CORONARY ANGIOPLASTY WITH STENT PLACEMENT      CORONARY ARTERY BYPASS GRAFT  2005    ENDOSCOPY      ERCP N/A 12/15/2021    Procedure: ENDOSCOPIC RETROGRADE CHOLANGIOPANCREATOGRAPHY WITH SPINCTEROTOMY, 9-12MM BALLOON SWEEP,  INSERTION OF 10FR 9CM BILIARY STENT;  Surgeon: Eden Hernandez MD;  Location: [facility] ENDOSCOPY;  Service: Gastroenterology;  Laterality: N/A;  BILE DUCT STONES    ERCP N/A 1/6/2022    Procedure: ENDOSCOPIC RETROGRADE CHOLANGIOPANCREATOGRAPHY WITH STENT REMOVAL, SPYGLASS, AUTOLITH, 8-10MM BALLOON DILATATION, 9-12 AND 12-15MM BALLOON SWEEP;  Surgeon: Wayne Zepeda MD;  Location: [facility] ENDOSCOPY;  Service: Gastroenterology;  Laterality: N/A;  BILE DUCT STONES    FEMORAL ENDARTERECTOMY Left 9/14/2020    Procedure: LEFT FEMORAL ENDARECTOMY WITHH PATCH ANGIOPLASTY;  Surgeon: Chula Nam Jr., MD;  Location: [facility] OR 18/19;  Service: Vascular;  Laterality: Left;    GALLBLADDER SURGERY      RHINOPLASTY Bilateral     70-80% R, 50% L       Family History: family history includes Heart failure in his mother. Otherwise pertinent FHx was reviewed and not pertinent to current issue.    Social History:  reports that he quit smoking about 18 years ago. His smoking use included cigarettes. He has a 45.00 pack-year smoking history. He has never used smokeless tobacco. He reports that he does not currently use alcohol. He reports that he does not use drugs.    Home Medications:  Morphine, QUEtiapine, allopurinol, amLODIPine, aspirin, folic acid, furosemide, insulin NPH, levothyroxine, metFORMIN, metoprolol succinate XL, rosuvastatin, sertraline, temazepam, triamcinolone, vitamin B-12, and vitamin C    Allergies:  Allergies   Allergen Reactions    Penicillins Shortness Of Breath    Ticagrelor Shortness Of Breath    Penicillin G Provider Review Needed and Unknown - Low Severity       Objective    Objective     Vitals:   Temp:  [97.3 °F (36.3 °C)] 97.3 °F (36.3 °C)  Heart Rate:  [58-78] 62  Resp:  [20] 20  BP: (93)/(62) 93/62    Physical Exam  Constitutional:  Well-developed and well-nourished.  No acute distress.      HENT:  Head:  Normocephalic and atraumatic.  Mouth:  Moist mucous membranes.    Eyes:   Conjunctivae and EOM are normal. No scleral icterus.    Neck:  Neck supple.  No JVD present.    Cardiovascular:  Normal rate, regular rhythm and normal heart sounds with no murmur.  Pulmonary/Chest:  No respiratory distress, no wheezes, no crackles, with normal breath sounds and good air movement.  Abdominal:  Soft. No distension and no tenderness.   Musculoskeletal:  No tenderness, and no deformity.  No red or swollen joints anywhere.    Neurological:  Alert and oriented to person, place, and time.  No cranial nerve deficit.    Skin:  Skin is warm and dry. No rash noted. No pallor.   Peripheral vascular:  No clubbing, no cyanosis, no edema.  Psychiatric: Appropriate mood and affect  : Deferred  Result Review    Result Review:  I have personally reviewed the results from the time of this admission to 2/4/2024 22:09 EST and agree with these findings:  [x]  Laboratory list / accordion  []  Microbiology  [x]  Radiology  []  EKG/Telemetry   []  Cardiology/Vascular   []  Pathology  []  Old records  []  Other:  Most notable findings include:       Assessment & Plan   Assessment / Plan     Brief Patient Summary:  Radhames Colón is a 73 y.o. male who presented to the ED with decreased appetite, weakness and some shortness of breath.  He was found to what appears to be ischemic colitis and we are awaiting CTA and determination by both vascular and general surgeon of their plan    Active Hospital Problems:  1.  Ischemic colitis acute versus chronic  2.  Chronic renal failure stage III  3.  Hypokalemia  4.  Leukocytosis without sepsis.  5.  Chronic conditions including CAD, PAD, COPD    Plan:   Patient will be admitted to the hospital service  We are awaiting a CTA of the abdomen  Vascular surgery has already been consulted and has asked to see the CTA and the patient before devising a plan  General surgery has also been consulted and is awaiting vascular's plan to make any clinical decisions  Will start the patient on IV Zosyn  for any possible underlying bacterial infection  Will continue to hydrate with normal saline    DVT prophylaxis:  No DVT prophylaxis order currently exists.        CODE STATUS:       Admission Status:  I believe this patient meets inpatient status.    Garrett Olguin, DO

## 2024-02-05 NOTE — H&P (VIEW-ONLY)
[facility]   VASCULAR SURGERY CONSULT    Patient Name: Radhames Colón  : 1950  MRN: [id number]  Primary Care Physician:  Mingo Loja MD  Date of admission: 2024    Subjective   Subjective     Chief Complaint: Concern for bowel ischemia    HPI:    Radhames Colón is a 73 y.o. male who presented to the emergency department because of not feeling well.  His family was concerned that he has not been eating or drinking much and is dehydrated.  The patient has had poor appetite for almost 6 months.  He had significant depression after the loss of his spouse.  He thought initially it was grieving but his appetite has progressively gotten worse.  He has lost about 35 pounds in 6 months.  Even within the last 2 weeks he has lost about most about 10 pounds he thinks.  He denies any abdominal pain.  He says he just does not have an appetite.  If he is eating he does not have any abdominal pain.  For the last 2 days he has noted some diarrhea.  Prior to that he did not have any other abdominal symptoms.  The patient does have chronic claudication.  He has chronic back pain.  These 2 are his main limiting factors from mobility standpoint.  He has a prior aortobifem bypass by Dr. Badillo.  He has been followed by vascular surgery in [place].  He has been told that he was very stable and his carotid stenosis was moderate did not need intervention.  Also conservative management was being done for lower extremities.  He is on antiplatelet therapy and statin.  He does have a history of chronic kidney disease.  He has diabetes.  Does not monitor diet.  Does take insulin.  He has prior colon resection.  Also prior coronary artery bypass graft surgery.    Review of Systems    As above.  He did have some nausea and vomiting today.  No blood in the stool.  No hematemesis.  He thinks the nausea was related to the medication stress.  He has not had this before in the last 6 months.  No other symptoms from GI  standpoint.    Personal History     Past Medical History:   Diagnosis Date    Abdominal aortic aneurysm without rupture     Acute renal failure (ARF)     WAS SHORT TERM DIALYSIS, STAGE 3    Arthritis     Atrophy of thyroid (acquired)     Bilateral carotid artery stenosis     Chronic airway obstruction     Chronic gouty arthritis     Chronic kidney disease, stage 3     Controlled diabetes mellitus type II without complication     COPD (chronic obstructive pulmonary disease)     Coronary artery disease involving coronary bypass graft of native heart without angina pectoris 03/28/2012    with previous bypass surgery (2005 x3) and subsequent stent/PCI of the native circumflex obtuse marginal branch in January 2018    Depression     Disease of liver     Elevated carcinoembryonic antigen (CEA)     GERD (gastroesophageal reflux disease)     Hypertension, essential 07/14/2021    Hypothyroidism     Iron deficiency anemia     Long term (current) use of opiate analgesic     Lumbar spondylosis     Malignant neoplasm of colon     Mixed hyperlipidemia 07/14/2021    Peripheral artery disease     Polyarthropathy     Rhabdomyolysis     Sleep apnea     CPAP       Past Surgical History:   Procedure Laterality Date    ABDOMINAL AORTIC ANEURYSM REPAIR      ANGIOPLASTY FEMORAL ARTERY Left 9/14/2020    Procedure: AORTOILLOFEMORAL ARTERIOGRAM;  Surgeon: Chula Nam Jr., MD;  Location: [facility] 18/19;  Service: Vascular;  Laterality: Left;    APPENDECTOMY      CARDIAC CATHETERIZATION      CAROTID ENDARTERECTOMY Left 2005    CAROTID ENDARTERECTOMY Right 2010    CHOLECYSTECTOMY OPEN      COLON SURGERY      COLON RESECTION    COLONOSCOPY  2004,11/2018    Dr. Lamas 2004,     CORONARY ANGIOPLASTY WITH STENT PLACEMENT      CORONARY ARTERY BYPASS GRAFT  2005    ENDOSCOPY      ERCP N/A 12/15/2021    Procedure: ENDOSCOPIC RETROGRADE CHOLANGIOPANCREATOGRAPHY WITH SPINCTEROTOMY, 9-12MM BALLOON SWEEP, INSERTION OF 10FR 9CM BILIARY  STENT;  Surgeon: Eden Hernandez MD;  Location: [facility] ENDOSCOPY;  Service: Gastroenterology;  Laterality: N/A;  BILE DUCT STONES    ERCP N/A 1/6/2022    Procedure: ENDOSCOPIC RETROGRADE CHOLANGIOPANCREATOGRAPHY WITH STENT REMOVAL, SPYGLASS, AUTOLITH, 8-10MM BALLOON DILATATION, 9-12 AND 12-15MM BALLOON SWEEP;  Surgeon: Wayne Zepeda MD;  Location: [facility] ENDOSCOPY;  Service: Gastroenterology;  Laterality: N/A;  BILE DUCT STONES    FEMORAL ENDARTERECTOMY Left 9/14/2020    Procedure: LEFT FEMORAL ENDARECTOMY WITHH PATCH ANGIOPLASTY;  Surgeon: Chula Nam Jr., MD;  Location: [facility] OR 18/19;  Service: Vascular;  Laterality: Left;    GALLBLADDER SURGERY      RHINOPLASTY Bilateral     70-80% R, 50% L       Family History: family history includes Heart failure in his mother. Otherwise pertinent FHx was reviewed and not pertinent to current issue.    Social History:  reports that he quit smoking about 18 years ago. His smoking use included cigarettes. He has a 45.00 pack-year smoking history. He has never used smokeless tobacco. He reports that he does not currently use alcohol. He reports that he does not use drugs.    Home Medications:  Morphine, QUEtiapine, allopurinol, amLODIPine, aspirin, folic acid, furosemide, insulin NPH, levothyroxine, metFORMIN, metoprolol succinate XL, rosuvastatin, sertraline, temazepam, triamcinolone, vitamin B-12, and vitamin C    Allergies:  Allergies   Allergen Reactions    Penicillins Shortness Of Breath    Ticagrelor Shortness Of Breath    Penicillin G Provider Review Needed and Unknown - Low Severity       Objective   Objective     Vitals:   Temp:  [97.3 °F (36.3 °C)-98.7 °F (37.1 °C)] 98.7 °F (37.1 °C)  Heart Rate:  [58-78] 67  Resp:  [20-22] 22  BP: (91-93)/(62-74) 91/74    Physical Exam   Patient is awake.  He responds appropriate to questions.  Appears stated age.  Overweight.  Appears uncomfortable.  Rhythm is irregular.  He is hypotensive in the ED  with a systolic pressure of 91.  Abdomen is soft.  He has no tenderness even to deep palpation.  No peritoneal signs.  He has weakly palpable femoral pulses.  Well-healed midline incision and also femoral incisions.  No pedal pulses are palpable.  Small healing wound of the right great toe.  Cap refill is sluggish.  No other open nonhealing wounds obvious.    Diagnostic studies: I reviewed the images of his CTA.  He has a prior aortobifem which is patent.  His left internal iliac artery is patent.  Moderate femoral anastomosis stenosis.  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  SMA has less than 50% stenosis.  Replaced hepatic artery noted arising from the SMA.  Renal arteries have moderate to severe stenosis.  .  Thickening of the colon noted.    Labs:      Results from last 7 days   Lab Units 02/04/24  1428   WBC 10*3/mm3 13.58*   HEMOGLOBIN g/dL 14.7   HEMATOCRIT % 46.3   PLATELETS 10*3/mm3 200         Results from last 7 days   Lab Units 02/04/24  1428   CREATININE mg/dL 1.47*                    Lab Results   Component Value Date    LDL 44 10/16/2023              Assessment & Plan   Assessment / Plan     Active Hospital Problems:  Possible ischemic versus infectious enterocolitis.    Assessment/plan:   73-year-old male with multi territorial atherosclerotic vascular disease.  He does have portal venous gas in the left hepatic lobe.  This may be ischemic in origin with bowel ischemia as an underlying cause.  Fortunately his SMA is patent and he should have adequate perfusion to heal it when it requires bowel resection.  There is no obvious large vessel occlusion that would explain his symptoms.  He may have had a micro emboli or just dehydration related bowel ischemia from hypotension.  General surgery is consulted.  They will evaluate to decide if he needs any exploration later on.  I do not think he requires any emergent exploration.  I discussed his current abdominal exam with them.  The patient does not  require any vascular intervention at this time.  He will require ongoing vascular surveillance.  If he has any irregular rhythm on the monitor that is ongoing, may require cardiology evaluation for that.  Discussed with the emergency department team.  Explained the findings and the plan to the patient and family.  They understand      Electronically signed by Asher Monzon MD, 02/04/24, 11:51 PM EST.

## 2024-02-05 NOTE — CONSULTS
[facility]   VASCULAR SURGERY CONSULT    Patient Name: Radhames Colón  : 1950  MRN: [id number]  Primary Care Physician:  Mingo Loja MD  Date of admission: 2024    Subjective   Subjective     Chief Complaint: Concern for bowel ischemia    HPI:    Radhames Colón is a 73 y.o. male who presented to the emergency department because of not feeling well.  His family was concerned that he has not been eating or drinking much and is dehydrated.  The patient has had poor appetite for almost 6 months.  He had significant depression after the loss of his spouse.  He thought initially it was grieving but his appetite has progressively gotten worse.  He has lost about 35 pounds in 6 months.  Even within the last 2 weeks he has lost about most about 10 pounds he thinks.  He denies any abdominal pain.  He says he just does not have an appetite.  If he is eating he does not have any abdominal pain.  For the last 2 days he has noted some diarrhea.  Prior to that he did not have any other abdominal symptoms.  The patient does have chronic claudication.  He has chronic back pain.  These 2 are his main limiting factors from mobility standpoint.  He has a prior aortobifem bypass by Dr. Badillo.  He has been followed by vascular surgery in [place].  He has been told that he was very stable and his carotid stenosis was moderate did not need intervention.  Also conservative management was being done for lower extremities.  He is on antiplatelet therapy and statin.  He does have a history of chronic kidney disease.  He has diabetes.  Does not monitor diet.  Does take insulin.  He has prior colon resection.  Also prior coronary artery bypass graft surgery.    Review of Systems    As above.  He did have some nausea and vomiting today.  No blood in the stool.  No hematemesis.  He thinks the nausea was related to the medication stress.  He has not had this before in the last 6 months.  No other symptoms from GI  standpoint.    Personal History     Past Medical History:   Diagnosis Date    Abdominal aortic aneurysm without rupture     Acute renal failure (ARF)     WAS SHORT TERM DIALYSIS, STAGE 3    Arthritis     Atrophy of thyroid (acquired)     Bilateral carotid artery stenosis     Chronic airway obstruction     Chronic gouty arthritis     Chronic kidney disease, stage 3     Controlled diabetes mellitus type II without complication     COPD (chronic obstructive pulmonary disease)     Coronary artery disease involving coronary bypass graft of native heart without angina pectoris 03/28/2012    with previous bypass surgery (2005 x3) and subsequent stent/PCI of the native circumflex obtuse marginal branch in January 2018    Depression     Disease of liver     Elevated carcinoembryonic antigen (CEA)     GERD (gastroesophageal reflux disease)     Hypertension, essential 07/14/2021    Hypothyroidism     Iron deficiency anemia     Long term (current) use of opiate analgesic     Lumbar spondylosis     Malignant neoplasm of colon     Mixed hyperlipidemia 07/14/2021    Peripheral artery disease     Polyarthropathy     Rhabdomyolysis     Sleep apnea     CPAP       Past Surgical History:   Procedure Laterality Date    ABDOMINAL AORTIC ANEURYSM REPAIR      ANGIOPLASTY FEMORAL ARTERY Left 9/14/2020    Procedure: AORTOILLOFEMORAL ARTERIOGRAM;  Surgeon: Chula Nam Jr., MD;  Location: [facility] 18/19;  Service: Vascular;  Laterality: Left;    APPENDECTOMY      CARDIAC CATHETERIZATION      CAROTID ENDARTERECTOMY Left 2005    CAROTID ENDARTERECTOMY Right 2010    CHOLECYSTECTOMY OPEN      COLON SURGERY      COLON RESECTION    COLONOSCOPY  2004,11/2018    Dr. Lamas 2004,     CORONARY ANGIOPLASTY WITH STENT PLACEMENT      CORONARY ARTERY BYPASS GRAFT  2005    ENDOSCOPY      ERCP N/A 12/15/2021    Procedure: ENDOSCOPIC RETROGRADE CHOLANGIOPANCREATOGRAPHY WITH SPINCTEROTOMY, 9-12MM BALLOON SWEEP, INSERTION OF 10FR 9CM BILIARY  STENT;  Surgeon: Eden Hernandez MD;  Location: [facility] ENDOSCOPY;  Service: Gastroenterology;  Laterality: N/A;  BILE DUCT STONES    ERCP N/A 1/6/2022    Procedure: ENDOSCOPIC RETROGRADE CHOLANGIOPANCREATOGRAPHY WITH STENT REMOVAL, SPYGLASS, AUTOLITH, 8-10MM BALLOON DILATATION, 9-12 AND 12-15MM BALLOON SWEEP;  Surgeon: Wayne Zepeda MD;  Location: [facility] ENDOSCOPY;  Service: Gastroenterology;  Laterality: N/A;  BILE DUCT STONES    FEMORAL ENDARTERECTOMY Left 9/14/2020    Procedure: LEFT FEMORAL ENDARECTOMY WITHH PATCH ANGIOPLASTY;  Surgeon: Chula Nam Jr., MD;  Location: [facility] 18/19;  Service: Vascular;  Laterality: Left;    GALLBLADDER SURGERY      RHINOPLASTY Bilateral     70-80% R, 50% L       Family History: family history includes Heart failure in his mother. Otherwise pertinent FHx was reviewed and not pertinent to current issue.    Social History:  reports that he quit smoking about 18 years ago. His smoking use included cigarettes. He has a 45.00 pack-year smoking history. He has never used smokeless tobacco. He reports that he does not currently use alcohol. He reports that he does not use drugs.    Home Medications:  Morphine, QUEtiapine, allopurinol, amLODIPine, aspirin, folic acid, furosemide, insulin NPH, levothyroxine, metFORMIN, metoprolol succinate XL, rosuvastatin, sertraline, temazepam, triamcinolone, vitamin B-12, and vitamin C    Allergies:  Allergies   Allergen Reactions    Penicillins Shortness Of Breath    Ticagrelor Shortness Of Breath    Penicillin G Provider Review Needed and Unknown - Low Severity       Objective   Objective     Vitals:   Temp:  [97.3 °F (36.3 °C)-98.7 °F (37.1 °C)] 98.7 °F (37.1 °C)  Heart Rate:  [58-78] 67  Resp:  [20-22] 22  BP: (91-93)/(62-74) 91/74    Physical Exam   Patient is awake.  He responds appropriate to questions.  Appears stated age.  Overweight.  Appears uncomfortable.  Rhythm is irregular.  He is hypotensive in the ED  with a systolic pressure of 91.  Abdomen is soft.  He has no tenderness even to deep palpation.  No peritoneal signs.  He has weakly palpable femoral pulses.  Well-healed midline incision and also femoral incisions.  No pedal pulses are palpable.  Small healing wound of the right great toe.  Cap refill is sluggish.  No other open nonhealing wounds obvious.    Diagnostic studies: I reviewed the images of his CTA.  He has a prior aortobifem which is patent.  His left internal iliac artery is patent.  Moderate femoral anastomosis stenosis.  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  SMA has less than 50% stenosis.  Replaced hepatic artery noted arising from the SMA.  Renal arteries have moderate to severe stenosis.  .  Thickening of the colon noted.    Labs:      Results from last 7 days   Lab Units 02/04/24  1428   WBC 10*3/mm3 13.58*   HEMOGLOBIN g/dL 14.7   HEMATOCRIT % 46.3   PLATELETS 10*3/mm3 200         Results from last 7 days   Lab Units 02/04/24  1428   CREATININE mg/dL 1.47*                    Lab Results   Component Value Date    LDL 44 10/16/2023              Assessment & Plan   Assessment / Plan     Active Hospital Problems:  Possible ischemic versus infectious enterocolitis.    Assessment/plan:   73-year-old male with multi territorial atherosclerotic vascular disease.  He does have portal venous gas in the left hepatic lobe.  This may be ischemic in origin with bowel ischemia as an underlying cause.  Fortunately his SMA is patent and he should have adequate perfusion to heal it when it requires bowel resection.  There is no obvious large vessel occlusion that would explain his symptoms.  He may have had a micro emboli or just dehydration related bowel ischemia from hypotension.  General surgery is consulted.  They will evaluate to decide if he needs any exploration later on.  I do not think he requires any emergent exploration.  I discussed his current abdominal exam with them.  The patient does not  require any vascular intervention at this time.  He will require ongoing vascular surveillance.  If he has any irregular rhythm on the monitor that is ongoing, may require cardiology evaluation for that.  Discussed with the emergency department team.  Explained the findings and the plan to the patient and family.  They understand      Electronically signed by Asehr Monzon MD, 02/04/24, 11:51 PM EST.

## 2024-02-05 NOTE — PROGRESS NOTES
[facility]   Hospitalist Progress Note  Date: 2024  Patient Name: Radhames Colón  : 1950  MRN: [id number]  Date of admission: 2024  Consultants:   -General Surgery: Dr. Kaley De Leon  -Vascular Surgery: Dr. Asher Monzon, Dr. Abelino Gonzales    Subjective   Subjective     Chief Complaint: Decreased appetite, nausea    Summary:   Radhames Colón is a 73 y.o. male with CKD stage IIIa, CAD, PAD, COPD, essential hypertension, hyperlipidemia, bilateral carotid artery stenosis, polyarthropathy sleep apnea who presented to ED with decreased appetite and nausea without vomiting.  Patient had experienced significant weight loss over the past few months, of note patient's wife passed away approximately 4 months ago and he initially, he felt weight loss was due to decreased appetite secondary to depression, however, his appetite and weight loss have progressively worsened.  Eval in ED significant for CT scan showing portal venous gas with gas extending into the liver and pancolitis.  Case was discussed initially with gastroenterology who did not feel comfortable giving patient had [facility], however, vascular surgery was contacted due to patient wishes to stay at [facility] for care and not being transferred to [place]. Vascular surgery reviewed imaging and it noted that patient's SMA is patent with mild perhaps mild to moderate stenosis and recommended conservative therapy.  General Surgery evaluated patient and recommended continued monitoring and no indication for urgent surgical intervention at this time.    Interval Followup:   Patient still with some abdominal discomfort.  He denies any chest pain or shortness of breath.  Continues on IV fluids.  Resting comfortably.  Patient has received IV morphine for pain management.  Nursing with no additional acute issues to report.    Antibiotics:   -Cefepime    Pain Medication:   -IV morphine    Objective   Objective     Vitals:   Temp:   [97.2 °F (36.2 °C)-98.7 °F (37.1 °C)] 97.2 °F (36.2 °C)  Heart Rate:  [57-78] 72  Resp:  [16-22] 16  BP: ()/(52-74) 161/70  Physical Exam   Gen: No acute distress, Conversant, Pleasant, lying in bed  Resp: CTAB, No w/r/r, No respiratory distress appreciated  Card: RRR, No m/r/g  Abd: Soft, Nontender, Nondistended, + bowel sounds    Result Review    Result Review:  I have personally reviewed the results as below and agree with these findings:  []  Laboratory:   CMP          10/16/2023    12:20 2/4/2024    14:28 2/5/2024    04:19   CMP   Glucose 114  94  59    BUN 21  15  19    Creatinine 1.13  1.47  1.22    EGFR 68.2  49.4  61.8    Sodium 141  140  141    Potassium 4.4  3.1  2.8    Chloride 100  94  99    Calcium 9.4  9.7  8.6    Total Protein 6.5  6.4  5.6    Albumin 3.8  3.7  3.2    Globulin 2.7  2.7  2.4    Total Bilirubin 0.2  0.8  0.8    Alkaline Phosphatase 78  87  72    AST (SGOT) 27  93  62    ALT (SGPT) 15  53  40    Albumin/Globulin Ratio 1.4  1.4  1.3    BUN/Creatinine Ratio 18.6  10.2  15.6    Anion Gap 14.9  18.8  15.9      CBC          10/16/2023    12:20 2/4/2024    14:28 2/5/2024    04:19   CBC   WBC 10.65  13.58  11.88    RBC 5.23  5.81  5.10    Hemoglobin 13.7  14.7  12.8    Hematocrit 43.0  46.3  40.9    MCV 82.2  79.7  80.2    MCH 26.2  25.3  25.1    MCHC 31.9  31.7  31.3    RDW 15.4  17.5  16.5    Platelets 163  200  133    Lactate: 1.9  [x]  Microbiology: Blood culture (02/04/2024): Pending.    []  Radiology:   [x]  EKG/Telemetry:    []  Cardiology/Vascular:    []  Pathology:  []  Old records:  []  Other:    Assessment & Plan   Assessment / Plan     Assessment:  Enterocolitis  CKD stage IIIa  Hypokalemia, recurrent  Leukocytosis.  Follow-up COPD, not acutely exacerbated  CAD  PAD    Plan:  -General Surgery and Vascular Surgery consulted and following, appreciate assistance and recommendations in the care of this patient.  -Continue IV fluids  -Continue cefepime and Flagyl.  Tailor based on  results of infectious workup  -Pain management as needed IV morphine  -Monitor lactic acid and abdominal symptoms. If patient's condition were to worsen after discussion with General Surgery patient will likely have to have colon resection  -Patient to remain NPO.  -Will monitor electrolytes and renal function with BMP and magnesium level in the AM  -Will monitor WBC and Hgb with CBC in the AM  -Clinical course will dictate further management     DVT Prophylaxis: SCDs  Diet: NPO  Dispo: Home when medically appropriate for discharge  Code Status: Full code     Personally reviewed patients labs and imaging, discussed with patient and nurse at bedside. Discussed management with the following consultants: General Surgery and Vascular Surgery.     Part of this note may be an electronic transcription/translation of spoken language to printed text using the Dragon dictation system.    DVT prophylaxis:  Mechanical DVT prophylaxis orders are present.        CODE STATUS:   Code Status (Patient has no pulse and is not breathing): CPR (Attempt to Resuscitate)  Medical Interventions (Patient has pulse or is breathing): Full Support        Electronically signed by Chandu Suh MD, 02/05/24, 1:39 PM EST.

## 2024-02-06 ENCOUNTER — TELEPHONE (OUTPATIENT)
Dept: INTERNAL MEDICINE | Facility: CLINIC | Age: 74
End: 2024-02-06
Payer: MEDICARE

## 2024-02-06 DIAGNOSIS — F51.01 PRIMARY INSOMNIA: ICD-10-CM

## 2024-02-06 LAB
ANION GAP SERPL CALCULATED.3IONS-SCNC: 17.6 MMOL/L (ref 5–15)
BUN SERPL-MCNC: 13 MG/DL (ref 8–23)
BUN/CREAT SERPL: 12.7 (ref 7–25)
CALCIUM SPEC-SCNC: 7.9 MG/DL (ref 8.6–10.5)
CHLORIDE SERPL-SCNC: 105 MMOL/L (ref 98–107)
CO2 SERPL-SCNC: 19.4 MMOL/L (ref 22–29)
CREAT SERPL-MCNC: 1.02 MG/DL (ref 0.76–1.27)
DEPRECATED RDW RBC AUTO: 48.1 FL (ref 37–54)
EGFRCR SERPLBLD CKD-EPI 2021: 76.6 ML/MIN/1.73
ERYTHROCYTE [DISTWIDTH] IN BLOOD BY AUTOMATED COUNT: 16.8 % (ref 12.3–15.4)
GLUCOSE BLDC GLUCOMTR-MCNC: 149 MG/DL (ref 70–99)
GLUCOSE BLDC GLUCOMTR-MCNC: 154 MG/DL (ref 70–99)
GLUCOSE BLDC GLUCOMTR-MCNC: 203 MG/DL (ref 70–99)
GLUCOSE SERPL-MCNC: 139 MG/DL (ref 65–99)
HCT VFR BLD AUTO: 36.8 % (ref 37.5–51)
HGB BLD-MCNC: 11.6 G/DL (ref 13–17.7)
MAGNESIUM SERPL-MCNC: 1.8 MG/DL (ref 1.6–2.4)
MCH RBC QN AUTO: 25.4 PG (ref 26.6–33)
MCHC RBC AUTO-ENTMCNC: 31.5 G/DL (ref 31.5–35.7)
MCV RBC AUTO: 80.5 FL (ref 79–97)
PHOSPHATE SERPL-MCNC: 3 MG/DL (ref 2.5–4.5)
PLATELET # BLD AUTO: 145 10*3/MM3 (ref 140–450)
PMV BLD AUTO: 13 FL (ref 6–12)
POTASSIUM SERPL-SCNC: 3.1 MMOL/L (ref 3.5–5.2)
RBC # BLD AUTO: 4.57 10*6/MM3 (ref 4.14–5.8)
SODIUM SERPL-SCNC: 142 MMOL/L (ref 136–145)
WBC NRBC COR # BLD AUTO: 11.31 10*3/MM3 (ref 3.4–10.8)

## 2024-02-06 PROCEDURE — 84100 ASSAY OF PHOSPHORUS: CPT | Performed by: INTERNAL MEDICINE

## 2024-02-06 PROCEDURE — 83735 ASSAY OF MAGNESIUM: CPT | Performed by: INTERNAL MEDICINE

## 2024-02-06 PROCEDURE — 25810000003 LACTATED RINGERS PER 1000 ML: Performed by: INTERNAL MEDICINE

## 2024-02-06 PROCEDURE — 25810000003 SODIUM CHLORIDE 0.9 % SOLUTION: Performed by: EMERGENCY MEDICINE

## 2024-02-06 PROCEDURE — 25010000002 POTASSIUM CHLORIDE 10 MEQ/100ML SOLUTION: Performed by: INTERNAL MEDICINE

## 2024-02-06 PROCEDURE — 99231 SBSQ HOSP IP/OBS SF/LOW 25: CPT | Performed by: NURSE PRACTITIONER

## 2024-02-06 PROCEDURE — 82948 REAGENT STRIP/BLOOD GLUCOSE: CPT

## 2024-02-06 PROCEDURE — 85027 COMPLETE CBC AUTOMATED: CPT | Performed by: INTERNAL MEDICINE

## 2024-02-06 PROCEDURE — 25010000002 METRONIDAZOLE 500 MG/100ML SOLUTION: Performed by: FAMILY MEDICINE

## 2024-02-06 PROCEDURE — 80048 BASIC METABOLIC PNL TOTAL CA: CPT | Performed by: INTERNAL MEDICINE

## 2024-02-06 PROCEDURE — 25010000002 MAGNESIUM SULFATE 2 GM/50ML SOLUTION: Performed by: INTERNAL MEDICINE

## 2024-02-06 PROCEDURE — 99232 SBSQ HOSP IP/OBS MODERATE 35: CPT | Performed by: INTERNAL MEDICINE

## 2024-02-06 PROCEDURE — 25010000002 CEFEPIME PER 500 MG: Performed by: FAMILY MEDICINE

## 2024-02-06 PROCEDURE — 99231 SBSQ HOSP IP/OBS SF/LOW 25: CPT | Performed by: SURGERY

## 2024-02-06 RX ORDER — INSULIN LISPRO 100 [IU]/ML
2-9 INJECTION, SOLUTION INTRAVENOUS; SUBCUTANEOUS
Status: DISCONTINUED | OUTPATIENT
Start: 2024-02-06 | End: 2024-02-14

## 2024-02-06 RX ORDER — IBUPROFEN 600 MG/1
1 TABLET ORAL
Status: DISCONTINUED | OUTPATIENT
Start: 2024-02-06 | End: 2024-02-15 | Stop reason: HOSPADM

## 2024-02-06 RX ORDER — FOLIC ACID 1 MG/1
1 TABLET ORAL 2 TIMES DAILY
Status: DISCONTINUED | OUTPATIENT
Start: 2024-02-06 | End: 2024-02-15 | Stop reason: HOSPADM

## 2024-02-06 RX ORDER — NICOTINE POLACRILEX 4 MG
15 LOZENGE BUCCAL
Status: DISCONTINUED | OUTPATIENT
Start: 2024-02-06 | End: 2024-02-15 | Stop reason: HOSPADM

## 2024-02-06 RX ORDER — TEMAZEPAM 15 MG/1
15 CAPSULE ORAL NIGHTLY PRN
Qty: 30 CAPSULE | Refills: 5 | Status: SHIPPED | OUTPATIENT
Start: 2024-02-06

## 2024-02-06 RX ORDER — MORPHINE SULFATE 30 MG/1
30 TABLET, FILM COATED, EXTENDED RELEASE ORAL 2 TIMES DAILY
Status: DISCONTINUED | OUTPATIENT
Start: 2024-02-06 | End: 2024-02-06

## 2024-02-06 RX ORDER — SODIUM CHLORIDE, SODIUM LACTATE, POTASSIUM CHLORIDE, CALCIUM CHLORIDE 600; 310; 30; 20 MG/100ML; MG/100ML; MG/100ML; MG/100ML
75 INJECTION, SOLUTION INTRAVENOUS CONTINUOUS
Status: DISCONTINUED | OUTPATIENT
Start: 2024-02-06 | End: 2024-02-10

## 2024-02-06 RX ORDER — LEVOTHYROXINE SODIUM 0.12 MG/1
125 TABLET ORAL
Status: DISCONTINUED | OUTPATIENT
Start: 2024-02-06 | End: 2024-02-15 | Stop reason: HOSPADM

## 2024-02-06 RX ORDER — DEXTROSE MONOHYDRATE 25 G/50ML
25 INJECTION, SOLUTION INTRAVENOUS
Status: DISCONTINUED | OUTPATIENT
Start: 2024-02-06 | End: 2024-02-15 | Stop reason: HOSPADM

## 2024-02-06 RX ORDER — MORPHINE SULFATE 30 MG/1
30 TABLET, FILM COATED, EXTENDED RELEASE ORAL 2 TIMES DAILY
Status: DISCONTINUED | OUTPATIENT
Start: 2024-02-06 | End: 2024-02-15 | Stop reason: HOSPADM

## 2024-02-06 RX ORDER — ALLOPURINOL 300 MG/1
300 TABLET ORAL DAILY
Status: DISCONTINUED | OUTPATIENT
Start: 2024-02-06 | End: 2024-02-15 | Stop reason: HOSPADM

## 2024-02-06 RX ORDER — AMLODIPINE BESYLATE 2.5 MG/1
2.5 TABLET ORAL DAILY
Status: DISCONTINUED | OUTPATIENT
Start: 2024-02-06 | End: 2024-02-11

## 2024-02-06 RX ORDER — SERTRALINE HYDROCHLORIDE 25 MG/1
75 TABLET, FILM COATED ORAL DAILY
Status: DISCONTINUED | OUTPATIENT
Start: 2024-02-06 | End: 2024-02-15 | Stop reason: HOSPADM

## 2024-02-06 RX ORDER — POTASSIUM CHLORIDE 7.45 MG/ML
10 INJECTION INTRAVENOUS
Status: COMPLETED | OUTPATIENT
Start: 2024-02-06 | End: 2024-02-06

## 2024-02-06 RX ORDER — MAGNESIUM SULFATE HEPTAHYDRATE 40 MG/ML
2 INJECTION, SOLUTION INTRAVENOUS ONCE
Status: COMPLETED | OUTPATIENT
Start: 2024-02-06 | End: 2024-02-06

## 2024-02-06 RX ORDER — ROSUVASTATIN CALCIUM 20 MG/1
20 TABLET, COATED ORAL NIGHTLY
Status: DISCONTINUED | OUTPATIENT
Start: 2024-02-06 | End: 2024-02-15 | Stop reason: HOSPADM

## 2024-02-06 RX ADMIN — CEFEPIME 2000 MG: 2 INJECTION, POWDER, FOR SOLUTION INTRAVENOUS at 14:17

## 2024-02-06 RX ADMIN — METRONIDAZOLE 500 MG: 500 INJECTION, SOLUTION INTRAVENOUS at 08:19

## 2024-02-06 RX ADMIN — FOLIC ACID 1 MG: 1 TABLET ORAL at 22:36

## 2024-02-06 RX ADMIN — METOPROLOL SUCCINATE 50 MG: 50 TABLET, EXTENDED RELEASE ORAL at 22:36

## 2024-02-06 RX ADMIN — SODIUM CHLORIDE 100 ML/HR: 9 INJECTION, SOLUTION INTRAVENOUS at 00:35

## 2024-02-06 RX ADMIN — CEFEPIME 2000 MG: 2 INJECTION, POWDER, FOR SOLUTION INTRAVENOUS at 05:29

## 2024-02-06 RX ADMIN — SERTRALINE HYDROCHLORIDE 75 MG: 50 TABLET ORAL at 11:57

## 2024-02-06 RX ADMIN — MORPHINE SULFATE 30 MG: 30 TABLET, FILM COATED, EXTENDED RELEASE ORAL at 01:26

## 2024-02-06 RX ADMIN — METOPROLOL SUCCINATE 50 MG: 50 TABLET, EXTENDED RELEASE ORAL at 08:19

## 2024-02-06 RX ADMIN — METRONIDAZOLE 500 MG: 500 INJECTION, SOLUTION INTRAVENOUS at 17:07

## 2024-02-06 RX ADMIN — MORPHINE SULFATE 30 MG: 30 TABLET, FILM COATED, EXTENDED RELEASE ORAL at 08:19

## 2024-02-06 RX ADMIN — MAGNESIUM SULFATE HEPTAHYDRATE 2 G: 2 INJECTION, SOLUTION INTRAVENOUS at 11:57

## 2024-02-06 RX ADMIN — POTASSIUM CHLORIDE 10 MEQ: 7.46 INJECTION, SOLUTION INTRAVENOUS at 16:08

## 2024-02-06 RX ADMIN — POTASSIUM CHLORIDE 10 MEQ: 7.46 INJECTION, SOLUTION INTRAVENOUS at 11:58

## 2024-02-06 RX ADMIN — CEFEPIME 2000 MG: 2 INJECTION, POWDER, FOR SOLUTION INTRAVENOUS at 23:00

## 2024-02-06 RX ADMIN — METRONIDAZOLE 500 MG: 500 INJECTION, SOLUTION INTRAVENOUS at 01:27

## 2024-02-06 RX ADMIN — AMLODIPINE BESYLATE 2.5 MG: 2.5 TABLET ORAL at 11:57

## 2024-02-06 RX ADMIN — ROSUVASTATIN CALCIUM 20 MG: 20 TABLET, FILM COATED ORAL at 22:36

## 2024-02-06 RX ADMIN — ALLOPURINOL 300 MG: 300 TABLET ORAL at 11:57

## 2024-02-06 RX ADMIN — MORPHINE SULFATE 30 MG: 30 TABLET, FILM COATED, EXTENDED RELEASE ORAL at 22:36

## 2024-02-06 RX ADMIN — Medication 10 ML: at 23:01

## 2024-02-06 RX ADMIN — SODIUM CHLORIDE 100 ML/HR: 9 INJECTION, SOLUTION INTRAVENOUS at 11:38

## 2024-02-06 RX ADMIN — POTASSIUM CHLORIDE 10 MEQ: 7.46 INJECTION, SOLUTION INTRAVENOUS at 13:46

## 2024-02-06 RX ADMIN — SODIUM CHLORIDE, POTASSIUM CHLORIDE, SODIUM LACTATE AND CALCIUM CHLORIDE 75 ML/HR: 600; 310; 30; 20 INJECTION, SOLUTION INTRAVENOUS at 14:12

## 2024-02-06 RX ADMIN — LEVOTHYROXINE SODIUM 125 MCG: 125 TABLET ORAL at 11:57

## 2024-02-06 RX ADMIN — POTASSIUM CHLORIDE 10 MEQ: 7.46 INJECTION, SOLUTION INTRAVENOUS at 15:01

## 2024-02-06 RX ADMIN — FOLIC ACID 1 MG: 1 TABLET ORAL at 11:57

## 2024-02-06 NOTE — PROGRESS NOTES
"PROGRESS NOTE     Patient Name:  Radhames Colón  YOB: 1950  [id number]   LOS: 1 day   * No surgery found *  Patient Care Team:  Mingo Loja MD as PCP - General (Internal Medicine)  Talita Gonzalez MD (Cardiology)  Royce Pappas DO (Pulmonary Disease)      Reason for Consult/Chief complaint: dizziness, SOA, altered mental status    Subjective     Interval History: VSS, afebrile, no abdominal pain, reports he still has a poor appetite, tolerating clear liquid diet      Review of Systems:      A complete review of systems was performed and all are negative except what is documented in the HPI.       Objective         Physical Exam:     Constitutional:  well nourished, no acute distress, appears stated age /71 (BP Location: Right arm, Patient Position: Lying)   Pulse 59   Temp 98.4 °F (36.9 °C) (Oral)   Resp 20   Ht 175.3 cm (69\")   Wt 84.4 kg (186 lb 1.1 oz)   SpO2 97%   BMI 27.48 kg/m²    Eyes:  anicteric sclerae, moist conjunctivae, no lid lag, PERRLA  ENMT:  oropharynx clear, moist mucous membranes, good dentition  Neck:   full ROM, trachea midline, no thyromegaly  Cardiovascular: RRR, S1 and S2 present, no MRG, heart rate 59, no pedal edema  Respiratory: lungs CTA, respirations even and unlabored  GI:  Abdomen soft, nontender, nondistended, no HSM     Skin:  warm and dry, normal turgor, no rashes  Psychiatric:  alert and oriented x3, intact judgment and insight, cooperative    Results Review:      I reviewed the patient's new clinical results including CBC, BMP.  LABS:  WBC   Date Value Ref Range Status   02/06/2024 11.31 (H) 3.40 - 10.80 10*3/mm3 Final     RBC   Date Value Ref Range Status   02/06/2024 4.57 4.14 - 5.80 10*6/mm3 Final     Hemoglobin   Date Value Ref Range Status   02/06/2024 11.6 (L) 13.0 - 17.7 g/dL Final     Hematocrit   Date Value Ref Range Status   02/06/2024 36.8 (L) 37.5 - 51.0 % Final     MCV   Date Value Ref Range Status   02/06/2024 80.5 " 79.0 - 97.0 fL Final     MCH   Date Value Ref Range Status   02/06/2024 25.4 (L) 26.6 - 33.0 pg Final     MCHC   Date Value Ref Range Status   02/06/2024 31.5 31.5 - 35.7 g/dL Final     RDW   Date Value Ref Range Status   02/06/2024 16.8 (H) 12.3 - 15.4 % Final     RDW-SD   Date Value Ref Range Status   02/06/2024 48.1 37.0 - 54.0 fl Final     MPV   Date Value Ref Range Status   02/06/2024 13.0 (H) 6.0 - 12.0 fL Final     Platelets   Date Value Ref Range Status   02/06/2024 145 140 - 450 10*3/mm3 Final     Neutrophil %   Date Value Ref Range Status   02/05/2024 61.1 42.7 - 76.0 % Final     Lymphocyte %   Date Value Ref Range Status   02/05/2024 26.2 19.6 - 45.3 % Final     Monocyte %   Date Value Ref Range Status   02/05/2024 9.3 5.0 - 12.0 % Final     Eosinophil %   Date Value Ref Range Status   02/05/2024 2.2 0.3 - 6.2 % Final     Basophil %   Date Value Ref Range Status   02/05/2024 0.7 0.0 - 1.5 % Final     Immature Grans %   Date Value Ref Range Status   02/05/2024 0.5 0.0 - 0.5 % Final     Neutrophils, Absolute   Date Value Ref Range Status   02/05/2024 7.27 (H) 1.70 - 7.00 10*3/mm3 Final     Lymphocytes, Absolute   Date Value Ref Range Status   02/05/2024 3.11 (H) 0.70 - 3.10 10*3/mm3 Final     Monocytes, Absolute   Date Value Ref Range Status   02/05/2024 1.10 (H) 0.10 - 0.90 10*3/mm3 Final     Eosinophils, Absolute   Date Value Ref Range Status   02/05/2024 0.26 0.00 - 0.40 10*3/mm3 Final     Basophils, Absolute   Date Value Ref Range Status   02/05/2024 0.08 0.00 - 0.20 10*3/mm3 Final     Immature Grans, Absolute   Date Value Ref Range Status   02/05/2024 0.06 (H) 0.00 - 0.05 10*3/mm3 Final     nRBC   Date Value Ref Range Status   02/05/2024 0.0 0.0 - 0.2 /100 WBC Final         Basic Metabolic Panel    Sodium Sodium   Date Value Ref Range Status   02/06/2024 142 136 - 145 mmol/L Final   02/05/2024 141 136 - 145 mmol/L Final   02/04/2024 140 136 - 145 mmol/L Final      Potassium Potassium   Date Value Ref  "Range Status   02/06/2024 3.1 (L) 3.5 - 5.2 mmol/L Final   02/05/2024 2.8 (L) 3.5 - 5.2 mmol/L Final   02/04/2024 3.1 (L) 3.5 - 5.2 mmol/L Final      Chloride Chloride   Date Value Ref Range Status   02/06/2024 105 98 - 107 mmol/L Final   02/05/2024 99 98 - 107 mmol/L Final   02/04/2024 94 (L) 98 - 107 mmol/L Final      Bicarbonate No results found for: \"PLASMABICARB\"   BUN BUN   Date Value Ref Range Status   02/06/2024 13 8 - 23 mg/dL Final   02/05/2024 19 8 - 23 mg/dL Final   02/04/2024 15 8 - 23 mg/dL Final      Creatinine Creatinine   Date Value Ref Range Status   02/06/2024 1.02 0.76 - 1.27 mg/dL Final   02/05/2024 1.22 0.76 - 1.27 mg/dL Final   02/04/2024 1.47 (H) 0.76 - 1.27 mg/dL Final      Calcium Calcium   Date Value Ref Range Status   02/06/2024 7.9 (L) 8.6 - 10.5 mg/dL Final   02/05/2024 8.6 8.6 - 10.5 mg/dL Final   02/04/2024 9.7 8.6 - 10.5 mg/dL Final      Glucose      No components found for: \"GLUCOSE.*\"         IMAGING:  Imaging Results (Last 72 Hours)       Procedure Component Value Units Date/Time    CT Angio Abdominal Aorta Bilateral Iliofem Runoff [[id number]] Collected: 02/05/24 0134     Updated: 02/05/24 0137    Narrative:      PROCEDURE: CT ANGIO ABDOMINAL AORTA BILAT ILIOFEM RUNOFF W WO CONTRAST     COMPARISON: 2/04/2024, 18:30.     INDICATIONS: ISCHEMIC BOWEL.     TECHNIQUE: After obtaining the patient's consent, 2,309 CT/CTA images of the abdomen, pelvis, and   lower extremities were obtained with non-ionic intravenous contrast material. Multi-planar   reformatted/3-D images were created to optimize visualization of vascular anatomy.       PROTOCOL:   Standard imaging protocol performed.      RADIATION:   Total DLP: 1,011.2 mGy*cm.    Automated exposure control was utilized to minimize radiation dose.   CONTRAST: 93cc mL Isovue 370 I.V.  LABS:   eGFR: >60 mL/min/1.73m^2.     FINDINGS:   Extensive atherosclerotic changes are seen throughout the imaged arterial tree.  Moderate-to-severe "   origin stenosis involves the celiac trunk and the superior mesenteric artery (SMA).  The proximal   inferior mesenteric artery (FUENTES) is occluded.  It reconstitutes distally.  These findings support   the diagnosis of chronic mesenteric ischemia.  Please correlate clinically.  No emergent large   vessel occlusion is suggested.  There are single bilateral renal arteries.  Probably   moderate-to-severe origin stenosis involves the right renal artery, as seen on series 4015.   Moderate stenosis involves the origin of the left renal artery (as on series 4014).      Portal venous gas persists, especially involving the left lobe of the liver.  Diffusely thickened   colonic wall is seen, as described previously.  No definite pneumatosis is identified currently.    No pneumoperitoneum is appreciated.  The patient has undergone aortobifemoral bypass graft surgery   the aortobifemoral bypass graft is widely patent.       On the LEFT, the left superficial femoral artery (SFA) is occluded at its origin.  The left deep   femoral artery is patent.  There is distal reconstitution of the left SFA via left deep femoral   collateral arteries.  The left popliteal artery is continuously patent with moderate to severe   atherosclerotic change.  Its trifurcation is patent.  The left-sided infrapopliteal arteries are   patent.  There is single-vessel runoff across the left ankle into the left foot via the left   posterior tibial artery.  The left anterior tibial and left peroneal arteries are small in caliber   at the level of the left ankle.       Similar findings are seen contralaterally with origin occlusion of the RIGHT superficial femoral   artery (SFA).  There may be luminal stenosis at the anastomosis of the distal right-sided arterial   limb (of the aortobifemoral arterial bypass graft) with the native right common femoral artery.    This finding is best seen on series 4016, such is seen image 7 and adjacent images.  The right  deep   femoral artery is patent.  There is distal reconstitution of the distal right superficial femoral   artery (SFA) via the right deep femoral system at about the level of the junction of the right   superficial femoral and the right popliteal artery (i.e., Sandeep's canal).  Moderate-to-severe   atherosclerotic change probably involves the right popliteal artery, which is thought to be   continuously patent.  Its trifurcation is patent.  The right infrapopliteal arteries are patent.    There is probably at least single-vessel runoff across the right ankle into the right foot via the   right posterior tibial artery.  A small caliber right anterior tibial artery is also patent.  The   distal right peroneal artery is patent to just above the level of the right ankle.       The other findings are as described in the prior CT exam report from 2/4/2024 at 1830 hours,   allowing for technique differences.       Impression:            1. Extensive atherosclerotic changes are seen throughout the imaged arterial tree, as detailed   above.       2. Moderate-to-severe origin stenosis involves the celiac trunk and the superior mesenteric artery   (SMA).  The proximal inferior mesenteric artery (FUENTES) is occluded.  It reconstitutes distally via   mesenteric collaterals.  These findings support the diagnosis of chronic mesenteric ischemia.    Please correlate clinically.       3. No emergent large vessel occlusion is suggested.       4. Portal venous gas persists, especially involving the left lobe of the liver, suggesting ischemic   bowel, such as seen colitis.  Diffusely thickened colonic wall is seen, as described previously.    No definite pneumatosis is identified currently.  No pneumoperitoneum is appreciated.       5. The patient has undergone aortobifemoral bypass graft surgery the aortobifemoral bypass graft is   widely patent.         6. There is bilateral origin occlusion of the native superficial femoral arteries  (SFAs); they   reconstitute distally at about the level of the bilateral popliteal arteries via a deep femoral   arterial system collaterals.       7. There is at least single-vessel runoff across the bilateral ankles into the bilateral feet   predominantly via the bilateral posterior tibial arteries, as discussed.     8. Please see above comments for further detail.          Please note that portions of this note were completed with a voice recognition program.     ALVARO ZAPATA JR, MD         Electronically Signed and Approved By: ALVARO ZAPATA JR, MD on 2/05/2024 at 1:34                        CT Abdomen Pelvis Without Contrast [[id number]] Collected: 02/04/24 2005     Updated: 02/04/24 2008    Narrative:      PROCEDURE: CT ABDOMEN PELVIS WO CONTRAST     COMPARISONS: 10/15/2020 (chest CT); 9/26/2018 (C/A/P CTs).     INDICATIONS: Weight loss; dizziness; abnormal LFTs; h/o colorectal carcinoma.     TECHNIQUE: CT images were created without intravenous contrast.       PROTOCOL:   Standard CT imaging protocol performed.      RADIATION:   Total DLP: 567.8 mGy*cm.    Automated exposure control was utilized to minimize radiation dose.      FINDINGS:   A new small amount of portal venous gas is seen, especially involving the left lobe of   the liver, such as seen on image 50 of series 201 and adjacent images.  This finding is abnormal   and suggests ischemic enterocolitis.  There is circumferential mural thickening of the remaining   colon, which may represent infectious/inflammatory or ischemic colitis.  No definite pneumatosis is   seen.  No pneumoperitoneum.  Minimal if any small bowel wall thickening is identified.  There is an   anastomosis of small bowel and remaining right colon in the right abdomen; that is, there has been   partial resection of the right colon, seen previously.  No definite superior mesenteric venous   (SMV) gas is seen.  Extensive atherosclerotic changes are noted.  Chronic mesenteric ischemia  is   possible.  The patient has undergone aortobifemoral bypass graft surgery, seen previously.  No   mechanical bowel obstruction.  No pericolonic fluid collections are identified to suggest abscess.    Myositis ossificans involves the right paraspinal musculature, as seen on image 102 of series 201   and adjacent images.  Myriad other chronic incidental nonemergent findings are seen, as described   in prior exam reports.       Impression:      The study is ABNORMAL.  There is new portal venous gas identified, especially   involving the left lobe of the liver.  This finding is abnormal and suggests ischemic   enterocolitis.  There is diffuse circumferential mural thickening involving the remaining colon,   which may represent ischemic colitis.  Infectious/inflammatory colitis cannot be excluded.    Minimal, if any, mural thickening of the small bowel is seen.  No gastric emphysema is identified.    No mechanical bowel obstruction.  No pneumoperitoneum.  No definite pneumatosis.  No pericolonic   fluid collection is seen to suggest abscess.  Please see above comments for further detail.        Pertinent findings were phoned to Dr. Felix (ordering/attending [facility] ED Physician) at   approximately 2004 hours on 2/4/2024.     Please note that portions of this note were completed with a voice recognition program.     ALVARO ZAPATA JR, MD         Electronically Signed and Approved By: ALVARO ZAPATA JR, MD on 2/04/2024 at 20:04                   CT Head Without Contrast [[id number]] Collected: 02/04/24 1944     Updated: 02/04/24 1947    Narrative:      PROCEDURE: CT HEAD WO CONTRAST     COMPARISON: None.     INDICATIONS: Increasing confusion, weight loss, & dizziness.     PROTOCOL:   Standard CT imaging protocol performed.      RADIATION:   Total DLP: 1,082.2 mGy*cm    MA and/or KV were/was adjusted to minimize radiation dose.       TECHNIQUE: After obtaining the patient's consent, 470 multiplanar CT images were  obtained without   non-ionic intravenous contrast material.      DISCUSSION: A routine nonenhanced head CT was performed. No acute brain abnormality is identified.   No acute intracranial hemorrhage. No acute infarction. No acute skull fracture. No midline shift or   acute intracranial mass effect is seen.  Mild chronic small vessel ischemia/infarction is   suspected, including involvement of the brainstem. There are arterial calcifications. The   extra-axial spaces and the ventricular system are prominent, suggesting global atrophy.    Mineralized tiny (5 mm) pilar cysts involve the scalp, such as seen in the high left   fronto-parietal region on image 50 of series 201 and adjacent images.  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  Streak artifact from dental amalgam obscures detail.  Mild chronic   age-indeterminate congestive and/or inflammatory change involves the right mastoid air cell   complex.  Degenerative changes involve the imaged spine and bilateral temporomandibular joints   (TMJs).       Impression:       No acute brain abnormality is seen.        Please note that portions of this note were completed with a voice recognition program.     ALVARO ZAPATA JR, MD         Electronically Signed and Approved By: ALVARO ZAPATA JR, MD on 2/04/2024 at 19:44                        XR Chest 1 View [[id number]] Collected: 02/04/24 1520     Updated: 02/04/24 1523    Narrative:      PROCEDURE: XR CHEST 1 VW     COMPARISON: [facility], , CHEST AP/PA 1 VIEW, 10/14/2020, 10:49.     INDICATIONS: SHORTNESS OF BREATH; DIZZINESS; AMS     FINDINGS:   Patient is status post median sternotomy.  The heart mediastinal contours appear within normal   limits.  There is blunting at the left costophrenic angle.  Lungs are otherwise grossly clear.    Osseous structures demonstrate no acute abnormality        Impression:         1. Small left-sided pleural effusion  2. Clear lungs                  SHARIF DOWNING MD         Electronically Signed and Approved By: SHARIF DOWNING MD on 2/04/2024 at 15:20                             Medications:    Current Facility-Administered Medications:     allopurinol (ZYLOPRIM) tablet 300 mg, 300 mg, Oral, Daily, Chandu Suh MD, 300 mg at 02/06/24 1157    amLODIPine (NORVASC) tablet 2.5 mg, 2.5 mg, Oral, Daily, Chandu Suh MD, 2.5 mg at 02/06/24 1157    sennosides-docusate (PERICOLACE) 8.6-50 MG per tablet 2 tablet, 2 tablet, Oral, BID **AND** polyethylene glycol (MIRALAX) packet 17 g, 17 g, Oral, Daily PRN **AND** bisacodyl (DULCOLAX) EC tablet 5 mg, 5 mg, Oral, Daily PRN **AND** bisacodyl (DULCOLAX) suppository 10 mg, 10 mg, Rectal, Daily PRN, Garrett Olguin DO    cefepime (MAXIPIME) 2000 mg/100 mL 0.9% NS (mbp), 2,000 mg, Intravenous, Q8H, Garrett Olguin DO, 2,000 mg at 02/06/24 0529    folic acid (FOLVITE) tablet 1 mg, 1 mg, Oral, BID, Chandu Suh MD, 1 mg at 02/06/24 1157    levothyroxine (SYNTHROID, LEVOTHROID) tablet 125 mcg, 125 mcg, Oral, Q AM, Chandu Suh MD, 125 mcg at 02/06/24 1157    metoprolol succinate XL (TOPROL-XL) 24 hr tablet 50 mg, 50 mg, Oral, BID, Chandu Suh MD, 50 mg at 02/06/24 0819    metroNIDAZOLE (FLAGYL) IVPB 500 mg, 500 mg, Intravenous, Q8H, Garrett Olguin DO, Last Rate: 100 mL/hr at 02/06/24 0819, 500 mg at 02/06/24 0819    Morphine (MS CONTIN) 12 hr tablet 30 mg, 30 mg, Oral, BID, Garrett Olguin DO, 30 mg at 02/06/24 0819    [DISCONTINUED] morphine injection 1 mg, 1 mg, Intravenous, Q4H PRN, 1 mg at 02/05/24 2241 **AND** naloxone (NARCAN) injection 0.4 mg, 0.4 mg, Intravenous, Q5 Min PRN, Garrett Olguin DO    nitroglycerin (NITROSTAT) SL tablet 0.4 mg, 0.4 mg, Sublingual, Q5 Min PRN, Garrett Olguin DO    ondansetron (ZOFRAN) injection 4 mg, 4 mg, Intravenous, Q6H PRN, Garrett Olguin DO    potassium chloride 10 mEq in 100 mL  IVPB, 10 mEq, Intravenous, Q1H, Chandu Suh MD, Last Rate: 100 mL/hr at 02/06/24 1158, 10 mEq at 02/06/24 1158    rosuvastatin (CRESTOR) tablet 20 mg, 20 mg, Oral, Nightly, Chandu Suh MD    sertraline (ZOLOFT) tablet 75 mg, 75 mg, Oral, Daily, Chandu Suh MD, 75 mg at 02/06/24 1157    sodium chloride 0.9 % flush 10 mL, 10 mL, Intravenous, PRN, Job Felix MD    sodium chloride 0.9 % flush 10 mL, 10 mL, Intravenous, Q12H, Garrett Olguin DO, 10 mL at 02/05/24 0918    sodium chloride 0.9 % flush 10 mL, 10 mL, Intravenous, PRN, Garrett Olguin DO    sodium chloride 0.9 % infusion 40 mL, 40 mL, Intravenous, PRN, Garrett Olguin DO    sodium chloride 0.9 % infusion, 100 mL/hr, Intravenous, Continuous, Job Felix MD, Last Rate: 100 mL/hr at 02/06/24 1138, 100 mL/hr at 02/06/24 1138    Assessment & Plan       Colitis   Continue current care   Will follow      MUKUL Neri  02/06/24  12:12 EST    Electronically signed by MUKUL Neri, 02/06/24, 12:12 PM EST.

## 2024-02-06 NOTE — PROGRESS NOTES
[facility]     Progress Note    Patient Name: Radhames Colón  : 1950  MRN: [id number]  Primary Care Physician:  Mingo Loja MD  Date of admission: 2024    Subjective   Subjective     Follow-up for question of bowel ischemia.    Doing okay.  He denies any pain.  He indicates that sometimes he feels some discomfort across the lower abdomen which feels gas-like, and if he passes gas it relieves the discomfort.    Objective   Objective     Vitals:   Temp:  [97.5 °F (36.4 °C)-98.8 °F (37.1 °C)] 98.8 °F (37.1 °C)  Heart Rate:  [55-66] 55  Resp:  [16-20] 20  BP: (129-160)/(53-76) 129/53    Physical Exam   General: Alert, no acute distress.  HEENT: PERRLA  Abdomen: Benign  Extremities: Symmetric.  Neuro: No gross deficits      Assessment & Plan   Assessment / Plan     Assessment/Plan:    Colitis.  Will continue to follow peripherally.    Active Hospital Problems:  Active Hospital Problems    Diagnosis     **Colitis            Electronically signed by Abelino Gonzales MD, 24, 12:54 PM EST.

## 2024-02-06 NOTE — PLAN OF CARE
"Goal Outcome Evaluation:   Pt able to ambulate. No complaints of pain. Some complaints of not feeling hungry, but \"knows I need to eat.\" Advanced diet, no reports of nausea. K+ runs and Magnesium replacements as well as antibiotics administered. Pt resting in bed. Call light and bedside table within reach.                                           "

## 2024-02-06 NOTE — PLAN OF CARE
Goal Outcome Evaluation:           Progress: improving  Outcome Evaluation: Patient tolerated clear liquids. Oral morphine resumed per order. Calos Pritchard RN

## 2024-02-06 NOTE — PROGRESS NOTES
[facility]   Hospitalist Progress Note  Date: 2024  Patient Name: Radhames Colón  : 1950  MRN: [id number]  Date of admission: 2024  Consultants:   -General Surgery: Dr. Kaley De Leon  -Vascular Surgery: Dr. Asher Monzon, Dr. Abelino Gonzales  -Gastroenterology: Dr. Chalino Zepeda    Subjective   Subjective     Chief Complaint: Decreased appetite, nausea    Summary:   Radhames Colón is a 73 y.o. male with CKD stage IIIa, CAD, PAD, COPD, essential hypertension, hyperlipidemia, bilateral carotid artery stenosis, polyarthropathy sleep apnea who presented to ED with decreased appetite and nausea without vomiting.  Patient had experienced significant weight loss over the past few months, of note patient's wife passed away approximately 4 months ago and he initially, he felt weight loss was due to decreased appetite secondary to depression, however, his appetite and weight loss have progressively worsened.  Eval in ED significant for CT scan showing portal venous gas with gas extending into the liver and pancolitis.  Case was discussed initially with gastroenterology who did not feel comfortable giving patient had [facility], however, vascular surgery was contacted due to patient wishes to stay at [facility] for care and not being transferred to [place]. Vascular surgery reviewed imaging and it noted that patient's SMA is patent with mild perhaps mild to moderate stenosis and recommended conservative therapy.  General Surgery evaluated patient and recommended continued monitoring and no indication for urgent surgical intervention at this time.  Gastroenterology consulted and evaluated patient.    Interval Followup:   No acute issues overnight.  Patient denies any abdominal pain or discomfort and has not required any as needed pain medications.  Patient's diet has been advanced to clear liquid but patient stated that he does not like broth or clear liquids and so has not had an appetite  to try any intake.  He denies any chest pain or shortness of breath.  Still says he just does not have an appetite.  Nursing with no additional acute issues to report.    Antibiotics:   -Cefepime  -Flagyl    Pain Medication:   -IV morphine    Objective   Objective     Vitals:   Temp:  [97.5 °F (36.4 °C)-98.4 °F (36.9 °C)] 98.4 °F (36.9 °C)  Heart Rate:  [59-79] 59  Resp:  [16-20] 20  BP: (132-160)/(59-76) 135/73  Physical Exam   Gen: No acute distress, lying in bed, conversant, pleasant  Resp: CTAB, No w/r/r, good aeration, equal chest rise bilaterally  Card: RRR, No m/r/g  Abd: Soft, Nontender, Nondistended, + bowel sounds    Result Review    Result Review:  I have personally reviewed the results as below and agree with these findings:  []  Laboratory:   CMP          2/4/2024    14:28 2/5/2024    04:19 2/6/2024    04:29   CMP   Glucose 94  59  139    BUN 15  19  13    Creatinine 1.47  1.22  1.02    EGFR 49.4  61.8  76.6    Sodium 140  141  142    Potassium 3.1  2.8  3.1    Chloride 94  99  105    Calcium 9.7  8.6  7.9    Total Protein 6.4  5.6     Albumin 3.7  3.2     Globulin 2.7  2.4     Total Bilirubin 0.8  0.8     Alkaline Phosphatase 87  72     AST (SGOT) 93  62     ALT (SGPT) 53  40     Albumin/Globulin Ratio 1.4  1.3     BUN/Creatinine Ratio 10.2  15.6  12.7    Anion Gap 18.8  15.9  17.6      CBC          2/4/2024    14:28 2/5/2024    04:19 2/6/2024    04:29   CBC   WBC 13.58  11.88  11.31    RBC 5.81  5.10  4.57    Hemoglobin 14.7  12.8  11.6    Hematocrit 46.3  40.9  36.8    MCV 79.7  80.2  80.5    MCH 25.3  25.1  25.4    MCHC 31.7  31.3  31.5    RDW 17.5  16.5  16.8    Platelets 200  133  145    Magnesium level low.  Phosphorus within normal limits.  [x]  Microbiology: Blood culture (02/04/2024): No growth to date.    []  Radiology:   [x]  EKG/Telemetry:    []  Cardiology/Vascular:    []  Pathology:  []  Old records:  []  Other:    Assessment & Plan   Assessment / Plan     Assessment:  Enterocolitis  CKD  stage IIIa  Hypokalemia, recurrent  Leukocytosis.  Follow-up COPD, not acutely exacerbated  CAD  PAD    Plan:  -General Surgery, Gastroenterology and Vascular Surgery consulted and following, appreciate assistance and recommendations in the care of this patient.  -Continue IV fluids  -Continue cefepime and Flagyl.  Tailor based on results of infectious workup  -Pain management as needed IV morphine  -Management discussed with Gastroenterology.  Patient will likely require colonoscopy in the next few days.  Stool studies ordered to rule out infectious causes.  -Patient on clear liquid diet.  Will discuss with gastroenterology about advancing.  -Can consider appetite stimulant pending further workup  -Replace potassium and magnesium IV  -Monitor electrolytes and renal function with BMP, phosphorus level and magnesium level in the AM  -Monitor WBC and Hgb with CBC in the AM  -Clinical course will dictate further management     DVT Prophylaxis: SCDs  Diet: Clear liquid  Dispo: Home when medically appropriate for discharge  Code Status: Full code     Personally reviewed patients labs and imaging, discussed with patient and nurse at bedside. Discussed management with the following consultants: Gastroenterology, General Surgery and Vascular Surgery.     Part of this note may be an electronic transcription/translation of spoken language to printed text using the Dragon dictation system.    DVT prophylaxis:  Mechanical DVT prophylaxis orders are present.        CODE STATUS:   Code Status (Patient has no pulse and is not breathing): CPR (Attempt to Resuscitate)  Medical Interventions (Patient has pulse or is breathing): Full Support      Electronically signed by Chandu Suh MD, 02/06/24, 12:49 PM EST.

## 2024-02-07 LAB
ANION GAP SERPL CALCULATED.3IONS-SCNC: 10.2 MMOL/L (ref 5–15)
BUN SERPL-MCNC: 8 MG/DL (ref 8–23)
BUN/CREAT SERPL: 10 (ref 7–25)
CALCIUM SPEC-SCNC: 8.1 MG/DL (ref 8.6–10.5)
CHLORIDE SERPL-SCNC: 107 MMOL/L (ref 98–107)
CO2 SERPL-SCNC: 22.8 MMOL/L (ref 22–29)
CREAT SERPL-MCNC: 0.8 MG/DL (ref 0.76–1.27)
DEPRECATED RDW RBC AUTO: 49 FL (ref 37–54)
EGFRCR SERPLBLD CKD-EPI 2021: 92.3 ML/MIN/1.73
ERYTHROCYTE [DISTWIDTH] IN BLOOD BY AUTOMATED COUNT: 17.2 % (ref 12.3–15.4)
GLUCOSE BLDC GLUCOMTR-MCNC: 124 MG/DL (ref 70–99)
GLUCOSE BLDC GLUCOMTR-MCNC: 126 MG/DL (ref 70–99)
GLUCOSE BLDC GLUCOMTR-MCNC: 131 MG/DL (ref 70–99)
GLUCOSE BLDC GLUCOMTR-MCNC: 137 MG/DL (ref 70–99)
GLUCOSE SERPL-MCNC: 132 MG/DL (ref 65–99)
HCT VFR BLD AUTO: 36.8 % (ref 37.5–51)
HGB BLD-MCNC: 11.9 G/DL (ref 13–17.7)
MAGNESIUM SERPL-MCNC: 1.8 MG/DL (ref 1.6–2.4)
MCH RBC QN AUTO: 25.8 PG (ref 26.6–33)
MCHC RBC AUTO-ENTMCNC: 32.3 G/DL (ref 31.5–35.7)
MCV RBC AUTO: 79.7 FL (ref 79–97)
PHOSPHATE SERPL-MCNC: 1.5 MG/DL (ref 2.5–4.5)
PLATELET # BLD AUTO: 134 10*3/MM3 (ref 140–450)
PMV BLD AUTO: 12.5 FL (ref 6–12)
POTASSIUM SERPL-SCNC: 3 MMOL/L (ref 3.5–5.2)
RBC # BLD AUTO: 4.62 10*6/MM3 (ref 4.14–5.8)
SODIUM SERPL-SCNC: 140 MMOL/L (ref 136–145)
WBC NRBC COR # BLD AUTO: 9.87 10*3/MM3 (ref 3.4–10.8)

## 2024-02-07 PROCEDURE — 25810000003 SODIUM CHLORIDE 0.9 % SOLUTION: Performed by: INTERNAL MEDICINE

## 2024-02-07 PROCEDURE — 84100 ASSAY OF PHOSPHORUS: CPT | Performed by: INTERNAL MEDICINE

## 2024-02-07 PROCEDURE — 80048 BASIC METABOLIC PNL TOTAL CA: CPT | Performed by: INTERNAL MEDICINE

## 2024-02-07 PROCEDURE — 83735 ASSAY OF MAGNESIUM: CPT | Performed by: INTERNAL MEDICINE

## 2024-02-07 PROCEDURE — 25010000002 CEFEPIME PER 500 MG: Performed by: FAMILY MEDICINE

## 2024-02-07 PROCEDURE — 25810000003 LACTATED RINGERS PER 1000 ML: Performed by: INTERNAL MEDICINE

## 2024-02-07 PROCEDURE — 99231 SBSQ HOSP IP/OBS SF/LOW 25: CPT | Performed by: NURSE PRACTITIONER

## 2024-02-07 PROCEDURE — 85027 COMPLETE CBC AUTOMATED: CPT | Performed by: INTERNAL MEDICINE

## 2024-02-07 PROCEDURE — 82948 REAGENT STRIP/BLOOD GLUCOSE: CPT

## 2024-02-07 PROCEDURE — 99232 SBSQ HOSP IP/OBS MODERATE 35: CPT | Performed by: SURGERY

## 2024-02-07 PROCEDURE — 25010000002 METRONIDAZOLE 500 MG/100ML SOLUTION: Performed by: FAMILY MEDICINE

## 2024-02-07 PROCEDURE — 99233 SBSQ HOSP IP/OBS HIGH 50: CPT | Performed by: INTERNAL MEDICINE

## 2024-02-07 PROCEDURE — 82948 REAGENT STRIP/BLOOD GLUCOSE: CPT | Performed by: INTERNAL MEDICINE

## 2024-02-07 RX ORDER — POTASSIUM CHLORIDE 750 MG/1
40 CAPSULE, EXTENDED RELEASE ORAL DAILY
Status: DISCONTINUED | OUTPATIENT
Start: 2024-02-07 | End: 2024-02-12

## 2024-02-07 RX ORDER — POTASSIUM CHLORIDE 7.45 MG/ML
10 INJECTION INTRAVENOUS
Status: DISCONTINUED | OUTPATIENT
Start: 2024-02-07 | End: 2024-02-07

## 2024-02-07 RX ORDER — FENTANYL/ROPIVACAINE/NS/PF 2-625MCG/1
15 PLASTIC BAG, INJECTION (ML) EPIDURAL
Qty: 500 ML | Refills: 0 | Status: COMPLETED | OUTPATIENT
Start: 2024-02-07 | End: 2024-02-07

## 2024-02-07 RX ORDER — FENTANYL/ROPIVACAINE/NS/PF 2-625MCG/1
15 PLASTIC BAG, INJECTION (ML) EPIDURAL ONCE
Status: DISCONTINUED | OUTPATIENT
Start: 2024-02-07 | End: 2024-02-07

## 2024-02-07 RX ADMIN — SODIUM CHLORIDE, POTASSIUM CHLORIDE, SODIUM LACTATE AND CALCIUM CHLORIDE 75 ML/HR: 600; 310; 30; 20 INJECTION, SOLUTION INTRAVENOUS at 02:15

## 2024-02-07 RX ADMIN — SERTRALINE HYDROCHLORIDE 75 MG: 50 TABLET ORAL at 09:31

## 2024-02-07 RX ADMIN — POTASSIUM CHLORIDE 40 MEQ: 10 CAPSULE, COATED, EXTENDED RELEASE ORAL at 10:40

## 2024-02-07 RX ADMIN — MORPHINE SULFATE 30 MG: 30 TABLET, FILM COATED, EXTENDED RELEASE ORAL at 09:31

## 2024-02-07 RX ADMIN — ROSUVASTATIN CALCIUM 20 MG: 20 TABLET, FILM COATED ORAL at 21:00

## 2024-02-07 RX ADMIN — CEFEPIME 2000 MG: 2 INJECTION, POWDER, FOR SOLUTION INTRAVENOUS at 21:00

## 2024-02-07 RX ADMIN — METOPROLOL SUCCINATE 50 MG: 50 TABLET, EXTENDED RELEASE ORAL at 09:30

## 2024-02-07 RX ADMIN — ALLOPURINOL 300 MG: 300 TABLET ORAL at 09:31

## 2024-02-07 RX ADMIN — FOLIC ACID 1 MG: 1 TABLET ORAL at 21:01

## 2024-02-07 RX ADMIN — SODIUM CHLORIDE, POTASSIUM CHLORIDE, SODIUM LACTATE AND CALCIUM CHLORIDE 75 ML/HR: 600; 310; 30; 20 INJECTION, SOLUTION INTRAVENOUS at 15:44

## 2024-02-07 RX ADMIN — METOPROLOL SUCCINATE 50 MG: 50 TABLET, EXTENDED RELEASE ORAL at 21:01

## 2024-02-07 RX ADMIN — CEFEPIME 2000 MG: 2 INJECTION, POWDER, FOR SOLUTION INTRAVENOUS at 06:08

## 2024-02-07 RX ADMIN — MORPHINE SULFATE 30 MG: 30 TABLET, FILM COATED, EXTENDED RELEASE ORAL at 21:01

## 2024-02-07 RX ADMIN — AMLODIPINE BESYLATE 2.5 MG: 2.5 TABLET ORAL at 09:31

## 2024-02-07 RX ADMIN — METRONIDAZOLE 500 MG: 500 INJECTION, SOLUTION INTRAVENOUS at 10:30

## 2024-02-07 RX ADMIN — CEFEPIME 2000 MG: 2 INJECTION, POWDER, FOR SOLUTION INTRAVENOUS at 13:54

## 2024-02-07 RX ADMIN — METRONIDAZOLE 500 MG: 500 INJECTION, SOLUTION INTRAVENOUS at 01:06

## 2024-02-07 RX ADMIN — FOLIC ACID 1 MG: 1 TABLET ORAL at 09:31

## 2024-02-07 RX ADMIN — Medication 10 ML: at 21:02

## 2024-02-07 RX ADMIN — POTASSIUM PHOSPHATE, MONOBASIC POTASSIUM PHOSPHATE, DIBASIC 15 MMOL: 224; 236 INJECTION, SOLUTION, CONCENTRATE INTRAVENOUS at 14:01

## 2024-02-07 RX ADMIN — LEVOTHYROXINE SODIUM 125 MCG: 125 TABLET ORAL at 06:08

## 2024-02-07 RX ADMIN — POTASSIUM PHOSPHATE, MONOBASIC POTASSIUM PHOSPHATE, DIBASIC 15 MMOL: 224; 236 INJECTION, SOLUTION, CONCENTRATE INTRAVENOUS at 10:36

## 2024-02-07 RX ADMIN — METRONIDAZOLE 500 MG: 500 INJECTION, SOLUTION INTRAVENOUS at 15:45

## 2024-02-07 NOTE — PROGRESS NOTES
[facility]     Progress Note    Patient Name: Radhames Colón  : 1950  MRN: [id number]  Primary Care Physician:  Mingo Loja MD  Date of admission: 2024    Subjective   Subjective     Follow-up for question of bowel ischemia.    Still about the same.  He still describes no pain, only discomfort, but has been getting some pain medication.  Mostly having trouble with appetite and being unable to tolerate anything by mouth.      Objective   Objective     Vitals:   Temp:  [97.5 °F (36.4 °C)-98.8 °F (37.1 °C)] 97.8 °F (36.6 °C)  Heart Rate:  [55-66] 66  Resp:  [18-20] 18  BP: (129-167)/(47-89) 162/76    Physical Exam   General: Alert, no acute distress.  HEENT: PERRLA  Abdomen: Benign  Extremities: Symmetric.  Neuro: No gross deficits      Assessment & Plan   Assessment / Plan     Assessment/Plan:    Persistent abdominal complaints, not making progress.  Recommend that we proceed to angiography with possible endovascular intervention.  I have discussed with the patient in detail the mechanics of the procedure, the indications, benefits, risks, alternatives as well as potential complications to include but not limited to infection, bleeding, inability to cross the occlusion, vascular injury requiring surgical repair.  He appears to understand and desires to proceed.  Will schedule for tomorrow, likely in the afternoon.        Active Hospital Problems:  Active Hospital Problems    Diagnosis     **Colitis            Electronically signed by Abelino Gonzales MD, 24, 12:54 PM EST.

## 2024-02-07 NOTE — PROGRESS NOTES
"PROGRESS NOTE     Patient Name:  Radhames Colón  YOB: 1950  [id number]   LOS: 2 days   * No surgery found *  Patient Care Team:  Mingo Loja MD as PCP - General (Internal Medicine)  Talita Gonzalez MD (Cardiology)  Royce Pappas DO (Pulmonary Disease)      Reason for Consult/Chief complaint:  weakness    Subjective     Interval History:  VSS, afebrile, feels SOA this morning, sats 96% on room air, reports that the thought of food repulses him and for many months he has had to force himself to eat, denies any pain however is on MS Contin 30 mg BID      Review of Systems:      A complete review of systems was performed and all are negative except what is documented in the HPI.       Objective         Physical Exam:     Constitutional:  well nourished, no acute distress, appears stated age /87 (BP Location: Right arm, Patient Position: Lying)   Pulse 55   Temp 97.5 °F (36.4 °C) (Oral)   Resp 18   Ht 175.3 cm (69\")   Wt 84.4 kg (186 lb 1.1 oz)   SpO2 95%   BMI 27.48 kg/m²    Eyes:  anicteric sclerae, moist conjunctivae, no lid lag, PERRLA  ENMT:  oropharynx clear, moist mucous membranes, good dentition  Neck:   full ROM, trachea midline, no thyromegaly  Cardiovascular: RRR, S1 and S2 present, no MRG, heart rate 55, no pedal edema  Respiratory: lungs CTA, respirations even and unlabored  GI:  Abdomen soft, nontender, nondistended, no HSM     Skin:  warm and dry, normal turgor, no rashes  Psychiatric:  alert and oriented x3, intact judgment and insight, cooperative    Results Review:      I reviewed the patient's new clinical results including CBC, BMP.  LABS:  WBC   Date Value Ref Range Status   02/07/2024 9.87 3.40 - 10.80 10*3/mm3 Final     RBC   Date Value Ref Range Status   02/07/2024 4.62 4.14 - 5.80 10*6/mm3 Final     Hemoglobin   Date Value Ref Range Status   02/07/2024 11.9 (L) 13.0 - 17.7 g/dL Final     Hematocrit   Date Value Ref Range Status   02/07/2024 36.8 " (L) 37.5 - 51.0 % Final     MCV   Date Value Ref Range Status   02/07/2024 79.7 79.0 - 97.0 fL Final     MCH   Date Value Ref Range Status   02/07/2024 25.8 (L) 26.6 - 33.0 pg Final     MCHC   Date Value Ref Range Status   02/07/2024 32.3 31.5 - 35.7 g/dL Final     RDW   Date Value Ref Range Status   02/07/2024 17.2 (H) 12.3 - 15.4 % Final     RDW-SD   Date Value Ref Range Status   02/07/2024 49.0 37.0 - 54.0 fl Final     MPV   Date Value Ref Range Status   02/07/2024 12.5 (H) 6.0 - 12.0 fL Final     Platelets   Date Value Ref Range Status   02/07/2024 134 (L) 140 - 450 10*3/mm3 Final     Neutrophil %   Date Value Ref Range Status   02/05/2024 61.1 42.7 - 76.0 % Final     Lymphocyte %   Date Value Ref Range Status   02/05/2024 26.2 19.6 - 45.3 % Final     Monocyte %   Date Value Ref Range Status   02/05/2024 9.3 5.0 - 12.0 % Final     Eosinophil %   Date Value Ref Range Status   02/05/2024 2.2 0.3 - 6.2 % Final     Basophil %   Date Value Ref Range Status   02/05/2024 0.7 0.0 - 1.5 % Final     Immature Grans %   Date Value Ref Range Status   02/05/2024 0.5 0.0 - 0.5 % Final     Neutrophils, Absolute   Date Value Ref Range Status   02/05/2024 7.27 (H) 1.70 - 7.00 10*3/mm3 Final     Lymphocytes, Absolute   Date Value Ref Range Status   02/05/2024 3.11 (H) 0.70 - 3.10 10*3/mm3 Final     Monocytes, Absolute   Date Value Ref Range Status   02/05/2024 1.10 (H) 0.10 - 0.90 10*3/mm3 Final     Eosinophils, Absolute   Date Value Ref Range Status   02/05/2024 0.26 0.00 - 0.40 10*3/mm3 Final     Basophils, Absolute   Date Value Ref Range Status   02/05/2024 0.08 0.00 - 0.20 10*3/mm3 Final     Immature Grans, Absolute   Date Value Ref Range Status   02/05/2024 0.06 (H) 0.00 - 0.05 10*3/mm3 Final     nRBC   Date Value Ref Range Status   02/05/2024 0.0 0.0 - 0.2 /100 WBC Final         Basic Metabolic Panel    Sodium Sodium   Date Value Ref Range Status   02/07/2024 140 136 - 145 mmol/L Final   02/06/2024 142 136 - 145 mmol/L Final  "  02/05/2024 141 136 - 145 mmol/L Final   02/04/2024 140 136 - 145 mmol/L Final      Potassium Potassium   Date Value Ref Range Status   02/07/2024 3.0 (L) 3.5 - 5.2 mmol/L Final   02/06/2024 3.1 (L) 3.5 - 5.2 mmol/L Final   02/05/2024 2.8 (L) 3.5 - 5.2 mmol/L Final   02/04/2024 3.1 (L) 3.5 - 5.2 mmol/L Final      Chloride Chloride   Date Value Ref Range Status   02/07/2024 107 98 - 107 mmol/L Final   02/06/2024 105 98 - 107 mmol/L Final   02/05/2024 99 98 - 107 mmol/L Final   02/04/2024 94 (L) 98 - 107 mmol/L Final      Bicarbonate No results found for: \"PLASMABICARB\"   BUN BUN   Date Value Ref Range Status   02/07/2024 8 8 - 23 mg/dL Final   02/06/2024 13 8 - 23 mg/dL Final   02/05/2024 19 8 - 23 mg/dL Final   02/04/2024 15 8 - 23 mg/dL Final      Creatinine Creatinine   Date Value Ref Range Status   02/07/2024 0.80 0.76 - 1.27 mg/dL Final   02/06/2024 1.02 0.76 - 1.27 mg/dL Final   02/05/2024 1.22 0.76 - 1.27 mg/dL Final   02/04/2024 1.47 (H) 0.76 - 1.27 mg/dL Final      Calcium Calcium   Date Value Ref Range Status   02/07/2024 8.1 (L) 8.6 - 10.5 mg/dL Final   02/06/2024 7.9 (L) 8.6 - 10.5 mg/dL Final   02/05/2024 8.6 8.6 - 10.5 mg/dL Final   02/04/2024 9.7 8.6 - 10.5 mg/dL Final      Glucose      No components found for: \"GLUCOSE.*\"         IMAGING:  Imaging Results (Last 72 Hours)       Procedure Component Value Units Date/Time    CT Angio Abdominal Aorta Bilateral Iliofem Runoff [[id number]] Collected: 02/05/24 0134     Updated: 02/05/24 0137    Narrative:      PROCEDURE: CT ANGIO ABDOMINAL AORTA BILAT ILIOFEM RUNOFF W WO CONTRAST     COMPARISON: 2/04/2024, 18:30.     INDICATIONS: ISCHEMIC BOWEL.     TECHNIQUE: After obtaining the patient's consent, 2,309 CT/CTA images of the abdomen, pelvis, and   lower extremities were obtained with non-ionic intravenous contrast material. Multi-planar   reformatted/3-D images were created to optimize visualization of vascular anatomy.       PROTOCOL:   Standard imaging " protocol performed.      RADIATION:   Total DLP: 1,011.2 mGy*cm.    Automated exposure control was utilized to minimize radiation dose.   CONTRAST: 93cc mL Isovue 370 I.V.  LABS:   eGFR: >60 mL/min/1.73m^2.     FINDINGS:   Extensive atherosclerotic changes are seen throughout the imaged arterial tree.  Moderate-to-severe   origin stenosis involves the celiac trunk and the superior mesenteric artery (SMA).  The proximal   inferior mesenteric artery (FUENTES) is occluded.  It reconstitutes distally.  These findings support   the diagnosis of chronic mesenteric ischemia.  Please correlate clinically.  No emergent large   vessel occlusion is suggested.  There are single bilateral renal arteries.  Probably   moderate-to-severe origin stenosis involves the right renal artery, as seen on series 4015.   Moderate stenosis involves the origin of the left renal artery (as on series 4014).      Portal venous gas persists, especially involving the left lobe of the liver.  Diffusely thickened   colonic wall is seen, as described previously.  No definite pneumatosis is identified currently.    No pneumoperitoneum is appreciated.  The patient has undergone aortobifemoral bypass graft surgery   the aortobifemoral bypass graft is widely patent.       On the LEFT, the left superficial femoral artery (SFA) is occluded at its origin.  The left deep   femoral artery is patent.  There is distal reconstitution of the left SFA via left deep femoral   collateral arteries.  The left popliteal artery is continuously patent with moderate to severe   atherosclerotic change.  Its trifurcation is patent.  The left-sided infrapopliteal arteries are   patent.  There is single-vessel runoff across the left ankle into the left foot via the left   posterior tibial artery.  The left anterior tibial and left peroneal arteries are small in caliber   at the level of the left ankle.       Similar findings are seen contralaterally with origin occlusion of the  RIGHT superficial femoral   artery (SFA).  There may be luminal stenosis at the anastomosis of the distal right-sided arterial   limb (of the aortobifemoral arterial bypass graft) with the native right common femoral artery.    This finding is best seen on series 4016, such is seen image 7 and adjacent images.  The right deep   femoral artery is patent.  There is distal reconstitution of the distal right superficial femoral   artery (SFA) via the right deep femoral system at about the level of the junction of the right   superficial femoral and the right popliteal artery (i.e., Sandeep's canal).  Moderate-to-severe   atherosclerotic change probably involves the right popliteal artery, which is thought to be   continuously patent.  Its trifurcation is patent.  The right infrapopliteal arteries are patent.    There is probably at least single-vessel runoff across the right ankle into the right foot via the   right posterior tibial artery.  A small caliber right anterior tibial artery is also patent.  The   distal right peroneal artery is patent to just above the level of the right ankle.       The other findings are as described in the prior CT exam report from 2/4/2024 at 1830 hours,   allowing for technique differences.       Impression:            1. Extensive atherosclerotic changes are seen throughout the imaged arterial tree, as detailed   above.       2. Moderate-to-severe origin stenosis involves the celiac trunk and the superior mesenteric artery   (SMA).  The proximal inferior mesenteric artery (FUENTES) is occluded.  It reconstitutes distally via   mesenteric collaterals.  These findings support the diagnosis of chronic mesenteric ischemia.    Please correlate clinically.       3. No emergent large vessel occlusion is suggested.       4. Portal venous gas persists, especially involving the left lobe of the liver, suggesting ischemic   bowel, such as seen colitis.  Diffusely thickened colonic wall is seen, as  described previously.    No definite pneumatosis is identified currently.  No pneumoperitoneum is appreciated.       5. The patient has undergone aortobifemoral bypass graft surgery the aortobifemoral bypass graft is   widely patent.         6. There is bilateral origin occlusion of the native superficial femoral arteries (SFAs); they   reconstitute distally at about the level of the bilateral popliteal arteries via a deep femoral   arterial system collaterals.       7. There is at least single-vessel runoff across the bilateral ankles into the bilateral feet   predominantly via the bilateral posterior tibial arteries, as discussed.     8. Please see above comments for further detail.          Please note that portions of this note were completed with a voice recognition program.     ALVARO ZAPATA JR, MD         Electronically Signed and Approved By: ALVARO ZAPATA JR, MD on 2/05/2024 at 1:34                        CT Abdomen Pelvis Without Contrast [[id number]] Collected: 02/04/24 2005     Updated: 02/04/24 2008    Narrative:      PROCEDURE: CT ABDOMEN PELVIS WO CONTRAST     COMPARISONS: 10/15/2020 (chest CT); 9/26/2018 (C/A/P CTs).     INDICATIONS: Weight loss; dizziness; abnormal LFTs; h/o colorectal carcinoma.     TECHNIQUE: CT images were created without intravenous contrast.       PROTOCOL:   Standard CT imaging protocol performed.      RADIATION:   Total DLP: 567.8 mGy*cm.    Automated exposure control was utilized to minimize radiation dose.      FINDINGS:   A new small amount of portal venous gas is seen, especially involving the left lobe of   the liver, such as seen on image 50 of series 201 and adjacent images.  This finding is abnormal   and suggests ischemic enterocolitis.  There is circumferential mural thickening of the remaining   colon, which may represent infectious/inflammatory or ischemic colitis.  No definite pneumatosis is   seen.  No pneumoperitoneum.  Minimal if any small bowel wall  thickening is identified.  There is an   anastomosis of small bowel and remaining right colon in the right abdomen; that is, there has been   partial resection of the right colon, seen previously.  No definite superior mesenteric venous   (SMV) gas is seen.  Extensive atherosclerotic changes are noted.  Chronic mesenteric ischemia is   possible.  The patient has undergone aortobifemoral bypass graft surgery, seen previously.  No   mechanical bowel obstruction.  No pericolonic fluid collections are identified to suggest abscess.    Myositis ossificans involves the right paraspinal musculature, as seen on image 102 of series 201   and adjacent images.  Myriad other chronic incidental nonemergent findings are seen, as described   in prior exam reports.       Impression:      The study is ABNORMAL.  There is new portal venous gas identified, especially   involving the left lobe of the liver.  This finding is abnormal and suggests ischemic   enterocolitis.  There is diffuse circumferential mural thickening involving the remaining colon,   which may represent ischemic colitis.  Infectious/inflammatory colitis cannot be excluded.    Minimal, if any, mural thickening of the small bowel is seen.  No gastric emphysema is identified.    No mechanical bowel obstruction.  No pneumoperitoneum.  No definite pneumatosis.  No pericolonic   fluid collection is seen to suggest abscess.  Please see above comments for further detail.        Pertinent findings were phoned to Dr. Felix (ordering/attending Located within [facility] ED Physician) at   approximately 2004 hours on 2/4/2024.     Please note that portions of this note were completed with a voice recognition program.     ALVARO ZAPATA JR, MD         Electronically Signed and Approved By: ALVARO ZAPATA JR, MD on 2/04/2024 at 20:04                   CT Head Without Contrast [[id number]] Collected: 02/04/24 1944     Updated: 02/04/24 1947    Narrative:      PROCEDURE: CT HEAD WO CONTRAST      COMPARISON: None.     INDICATIONS: Increasing confusion, weight loss, & dizziness.     PROTOCOL:   Standard CT imaging protocol performed.      RADIATION:   Total DLP: 1,082.2 mGy*cm    MA and/or KV were/was adjusted to minimize radiation dose.       TECHNIQUE: After obtaining the patient's consent, 470 multiplanar CT images were obtained without   non-ionic intravenous contrast material.      DISCUSSION: A routine nonenhanced head CT was performed. No acute brain abnormality is identified.   No acute intracranial hemorrhage. No acute infarction. No acute skull fracture. No midline shift or   acute intracranial mass effect is seen.  Mild chronic small vessel ischemia/infarction is   suspected, including involvement of the brainstem. There are arterial calcifications. The   extra-axial spaces and the ventricular system are prominent, suggesting global atrophy.    Mineralized tiny (5 mm) pilar cysts involve the scalp, such as seen in the high left   fronto-parietal region on image 50 of series 201 and adjacent images.  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  Streak artifact from dental amalgam obscures detail.  Mild chronic   age-indeterminate congestive and/or inflammatory change involves the right mastoid air cell   complex.  Degenerative changes involve the imaged spine and bilateral temporomandibular joints   (TMJs).       Impression:       No acute brain abnormality is seen.        Please note that portions of this note were completed with a voice recognition program.     ALVARO ZAPATA JR, MD         Electronically Signed and Approved By: ALVARO ZAPATA JR, MD on 2/04/2024 at 19:44                        XR Chest 1 View [[id number]] Collected: 02/04/24 1520     Updated: 02/04/24 1523    Narrative:      PROCEDURE: XR CHEST 1 VW     COMPARISON: [facility], , CHEST AP/PA 1 VIEW,  10/14/2020, 10:49.     INDICATIONS: SHORTNESS OF BREATH; DIZZINESS; AMS     FINDINGS:   Patient is status post median sternotomy.  The heart mediastinal contours appear within normal   limits.  There is blunting at the left costophrenic angle.  Lungs are otherwise grossly clear.    Osseous structures demonstrate no acute abnormality       Impression:         1. Small left-sided pleural effusion  2. Clear lungs                  SHARIF DOWNING MD         Electronically Signed and Approved By: SHARIF DOWNING MD on 2/04/2024 at 15:20                             Medications:    Current Facility-Administered Medications:     allopurinol (ZYLOPRIM) tablet 300 mg, 300 mg, Oral, Daily, Chandu Suh MD, 300 mg at 02/06/24 1157    amLODIPine (NORVASC) tablet 2.5 mg, 2.5 mg, Oral, Daily, Chandu Suh MD, 2.5 mg at 02/06/24 1157    sennosides-docusate (PERICOLACE) 8.6-50 MG per tablet 2 tablet, 2 tablet, Oral, BID **AND** polyethylene glycol (MIRALAX) packet 17 g, 17 g, Oral, Daily PRN **AND** bisacodyl (DULCOLAX) EC tablet 5 mg, 5 mg, Oral, Daily PRN **AND** bisacodyl (DULCOLAX) suppository 10 mg, 10 mg, Rectal, Daily PRN, Garrett Olguin DO    cefepime (MAXIPIME) 2000 mg/100 mL 0.9% NS (mbp), 2,000 mg, Intravenous, Q8H, Garrett Olguin DO, 2,000 mg at 02/07/24 0608    dextrose (D50W) (25 g/50 mL) IV injection 25 g, 25 g, Intravenous, Q15 Min PRN, Chandu Suh MD    dextrose (GLUTOSE) oral gel 15 g, 15 g, Oral, Q15 Min PRN, Chandu Suh MD    folic acid (FOLVITE) tablet 1 mg, 1 mg, Oral, BID, Chandu Suh MD, 1 mg at 02/06/24 2236    glucagon (GLUCAGEN) injection 1 mg, 1 mg, Intramuscular, Q15 Min PRN, Chandu Suh MD    Insulin Lispro (humaLOG) injection 2-9 Units, 2-9 Units, Subcutaneous, 4x Daily AC & at Bedtime, Chandu Suh MD    lactated ringers infusion, 75 mL/hr, Intravenous, Continuous, Chandu Suh MD, Last Rate: 75 mL/hr at 02/07/24 0215, 75 mL/hr at 02/07/24 0215    levothyroxine  (SYNTHROID, LEVOTHROID) tablet 125 mcg, 125 mcg, Oral, Q AM, Chandu Suh MD, 125 mcg at 02/07/24 0608    metoprolol succinate XL (TOPROL-XL) 24 hr tablet 50 mg, 50 mg, Oral, BID, Chandu Suh MD, 50 mg at 02/06/24 2236    metroNIDAZOLE (FLAGYL) IVPB 500 mg, 500 mg, Intravenous, Q8H, Garrett Olguin DO, Last Rate: 100 mL/hr at 02/07/24 0106, 500 mg at 02/07/24 0106    Morphine (MS CONTIN) 12 hr tablet 30 mg, 30 mg, Oral, BID, Garrett Olguin DO, 30 mg at 02/06/24 2236    [DISCONTINUED] morphine injection 1 mg, 1 mg, Intravenous, Q4H PRN, 1 mg at 02/05/24 2241 **AND** naloxone (NARCAN) injection 0.4 mg, 0.4 mg, Intravenous, Q5 Min PRN, Garrett Olguin DO    nitroglycerin (NITROSTAT) SL tablet 0.4 mg, 0.4 mg, Sublingual, Q5 Min PRN, Garrett Olguin DO    ondansetron (ZOFRAN) injection 4 mg, 4 mg, Intravenous, Q6H PRN, Garrett Olguin DO    potassium phosphate 15 mmol in 0.9% normal saline 250 mL IVPB, 15 mmol, Intravenous, Once, Ara Davey PA-C    rosuvastatin (CRESTOR) tablet 20 mg, 20 mg, Oral, Nightly, Chandu Suh MD, 20 mg at 02/06/24 2236    sertraline (ZOLOFT) tablet 75 mg, 75 mg, Oral, Daily, Chandu Suh MD, 75 mg at 02/06/24 1157    sodium chloride 0.9 % flush 10 mL, 10 mL, Intravenous, PRN, Job Felix MD    sodium chloride 0.9 % flush 10 mL, 10 mL, Intravenous, Q12H, Garrett Olguin DO, 10 mL at 02/06/24 2301    sodium chloride 0.9 % flush 10 mL, 10 mL, Intravenous, PRN, Garrett Olguin DO    sodium chloride 0.9 % infusion 40 mL, 40 mL, Intravenous, PRN, Garrett Olguin, DO    Assessment & Plan       Colitis   Likely needs a PICC and TPN   Discussed with patient not to force himself to eat   Recommend colonoscopy      MUKUL Neri  02/07/24  08:45 EST    Electronically signed by MUKUL Neri, 02/07/24, 8:45 AM EST.

## 2024-02-07 NOTE — PLAN OF CARE
Goal Outcome Evaluation:   Pt was reordered clear liquid diet, tolerating fair, but still no desire to eat. Continued with electrolyte replacements and antibiotics. Pt ambulated laboy today x1, tolerated well. No requesting pain med at this time. Pt resting in bed. Call light within bed.

## 2024-02-07 NOTE — PROGRESS NOTES
[facility]   Hospitalist Progress Note  Date: 2024  Patient Name: Radhames Colón  : 1950  MRN: [id number]  Date of admission: 2024  Consultants:   -General Surgery: Dr. Kaley De Leon  -Vascular Surgery: Dr. Asher Monzon, Dr. Abelino Gonzales  -Gastroenterology: Dr. Chalino Zepeda    Subjective   Subjective     Chief Complaint: Decreased appetite, nausea    Summary:   Radhames Colón is a 73 y.o. male with CKD stage IIIa, CAD, PAD, COPD, essential hypertension, hyperlipidemia, bilateral carotid artery stenosis, polyarthropathy sleep apnea who presented to ED with decreased appetite and nausea without vomiting.  Patient had experienced significant weight loss over the past few months, of note patient's wife passed away approximately 4 months ago and he initially, he felt weight loss was due to decreased appetite secondary to depression, however, his appetite and weight loss have progressively worsened.  Eval in ED significant for CT scan showing portal venous gas with gas extending into the liver and pancolitis.  Case was discussed initially with gastroenterology who did not feel comfortable giving patient had [facility], however, vascular surgery was contacted due to patient wishes to stay at [facility] for care and not being transferred to [place]. Vascular surgery reviewed imaging and it noted that patient's SMA is patent with mild perhaps mild to moderate stenosis and recommended conservative therapy.  General Surgery evaluated patient and recommended continued monitoring and no indication for urgent surgical intervention at this time.  Gastroenterology consulted and evaluated patient.  Attempts made to advance diet but patient continued not having much of an appetite    Interval Followup:   No acute events overnight.  Patient continues to have some discomfort with eating.  Has minimal appetite.  Only had a few bites of food yesterday.  Denies any active chest pain or shortness  of breath.  Nursing with no additional acute issues to report.    Antibiotics:   -Cefepime  -Flagyl    Pain Medication:   -IV morphine    Objective   Objective     Vitals:   Temp:  [97.5 °F (36.4 °C)-98.2 °F (36.8 °C)] 98.1 °F (36.7 °C)  Heart Rate:  [62-66] 62  Resp:  [18] 18  BP: (130-167)/(47-89) 130/64  Physical Exam   Gen: No acute distress, conversant, pleasant, lying in bed  Resp: CTAB, No w/r/r, normal respiratory effort  Card: RRR, No m/r/g  Abd: Soft, Nontender, Nondistended, + bowel sounds    Result Review    Result Review:  I have personally reviewed the results as below and agree with these findings:  []  Laboratory:   CMP          2/5/2024    04:19 2/6/2024    04:29 2/7/2024    04:39   CMP   Glucose 59  139  132    BUN 19  13  8    Creatinine 1.22  1.02  0.80    EGFR 61.8  76.6  92.3    Sodium 141  142  140    Potassium 2.8  3.1  3.0    Chloride 99  105  107    Calcium 8.6  7.9  8.1    Total Protein 5.6      Albumin 3.2      Globulin 2.4      Total Bilirubin 0.8      Alkaline Phosphatase 72      AST (SGOT) 62      ALT (SGPT) 40      Albumin/Globulin Ratio 1.3      BUN/Creatinine Ratio 15.6  12.7  10.0    Anion Gap 15.9  17.6  10.2      CBC          2/5/2024    04:19 2/6/2024    04:29 2/7/2024    04:39   CBC   WBC 11.88  11.31  9.87    RBC 5.10  4.57  4.62    Hemoglobin 12.8  11.6  11.9    Hematocrit 40.9  36.8  36.8    MCV 80.2  80.5  79.7    MCH 25.1  25.4  25.8    MCHC 31.3  31.5  32.3    RDW 16.5  16.8  17.2    Platelets 133  145  134    Phosphorus low.  Magnesium within normal limits.  [x]  Microbiology: Blood culture (02/04/2024): No growth to date.  []  Radiology:   [x]  EKG/Telemetry:    []  Cardiology/Vascular:    []  Pathology:  []  Old records:  []  Other:    Assessment & Plan   Assessment / Plan     Assessment:  Enterocolitis  CKD stage IIIa  Hypokalemia, recurrent  Hypophosphatemia, new  Leukocytosis.  COPD, not acutely exacerbated  CAD  PAD    Plan:  -General Surgery, Gastroenterology and  Vascular Surgery consulted and following, appreciate assistance and recommendations in the care of this patient.  -Continue IV fluids  -Continue Cefepime and Flagyl.  Tailor based on results of infectious workup  -Pain management as needed IV morphine  -Management discussed with vascular surgery.  Plan to proceed with angiography with possible endovascular intervention.  Plan was to proceed with this on 02/08/2024.  -Replace potassium and phosphorus IV  -Will monitor electrolytes and renal function with BMP, phosphorus level and magnesium level in the AM  -Will monitor WBC and Hgb with CBC in the AM  -Clinical course will dictate further management     DVT Prophylaxis: SCDs  Diet: Clear liquid  Dispo: Home when medically appropriate for discharge  Code Status: Full code     Time spent personally reviewing patient's chart, labs and imaging, evaluating/examining the patient, discussing care plan with patient and nurse at bedside and discussing management with consultants (General Surgery, Gastroenterology and Vascular Surgery): 52 minutes.     Part of this note may be an electronic transcription/translation of spoken language to printed text using the Dragon dictation system.    DVT prophylaxis:  Mechanical DVT prophylaxis orders are present.        CODE STATUS:   Code Status (Patient has no pulse and is not breathing): CPR (Attempt to Resuscitate)  Medical Interventions (Patient has pulse or is breathing): Full Support      Electronically signed by Chandu Suh MD, 02/07/24, 3:27 PM EST.

## 2024-02-08 PROBLEM — K55.1 CHRONIC MESENTERIC ISCHEMIA: Status: ACTIVE | Noted: 2024-02-04

## 2024-02-08 PROBLEM — M79.89 OTHER SPECIFIED SOFT TISSUE DISORDERS: Status: ACTIVE | Noted: 2024-02-04

## 2024-02-08 LAB
ANION GAP SERPL CALCULATED.3IONS-SCNC: 9.4 MMOL/L (ref 5–15)
BUN SERPL-MCNC: 6 MG/DL (ref 8–23)
BUN/CREAT SERPL: 8.1 (ref 7–25)
CALCIUM SPEC-SCNC: 8.1 MG/DL (ref 8.6–10.5)
CHLORIDE SERPL-SCNC: 107 MMOL/L (ref 98–107)
CO2 SERPL-SCNC: 25.6 MMOL/L (ref 22–29)
CREAT SERPL-MCNC: 0.74 MG/DL (ref 0.76–1.27)
DEPRECATED RDW RBC AUTO: 49.6 FL (ref 37–54)
EGFRCR SERPLBLD CKD-EPI 2021: 94.5 ML/MIN/1.73
ERYTHROCYTE [DISTWIDTH] IN BLOOD BY AUTOMATED COUNT: 17.2 % (ref 12.3–15.4)
GLUCOSE BLDC GLUCOMTR-MCNC: 113 MG/DL (ref 70–99)
GLUCOSE BLDC GLUCOMTR-MCNC: 125 MG/DL (ref 70–99)
GLUCOSE BLDC GLUCOMTR-MCNC: 131 MG/DL (ref 70–99)
GLUCOSE BLDC GLUCOMTR-MCNC: 133 MG/DL (ref 70–99)
GLUCOSE SERPL-MCNC: 123 MG/DL (ref 65–99)
HCT VFR BLD AUTO: 38.7 % (ref 37.5–51)
HGB BLD-MCNC: 12.3 G/DL (ref 13–17.7)
MAGNESIUM SERPL-MCNC: 1.6 MG/DL (ref 1.6–2.4)
MCH RBC QN AUTO: 25.7 PG (ref 26.6–33)
MCHC RBC AUTO-ENTMCNC: 31.8 G/DL (ref 31.5–35.7)
MCV RBC AUTO: 81 FL (ref 79–97)
PHOSPHATE SERPL-MCNC: 1.8 MG/DL (ref 2.5–4.5)
PLATELET # BLD AUTO: 128 10*3/MM3 (ref 140–450)
PMV BLD AUTO: 12.2 FL (ref 6–12)
POTASSIUM SERPL-SCNC: 3.3 MMOL/L (ref 3.5–5.2)
RBC # BLD AUTO: 4.78 10*6/MM3 (ref 4.14–5.8)
SODIUM SERPL-SCNC: 142 MMOL/L (ref 136–145)
WBC NRBC COR # BLD AUTO: 11.37 10*3/MM3 (ref 3.4–10.8)

## 2024-02-08 PROCEDURE — 0 IODIXANOL PER 1 ML: Performed by: SURGERY

## 2024-02-08 PROCEDURE — 04753ZZ DILATION OF SUPERIOR MESENTERIC ARTERY, PERCUTANEOUS APPROACH: ICD-10-PCS | Performed by: SURGERY

## 2024-02-08 PROCEDURE — 75726 ARTERY X-RAYS ABDOMEN: CPT | Performed by: SURGERY

## 2024-02-08 PROCEDURE — C1894 INTRO/SHEATH, NON-LASER: HCPCS | Performed by: SURGERY

## 2024-02-08 PROCEDURE — 99152 MOD SED SAME PHYS/QHP 5/>YRS: CPT | Performed by: SURGERY

## 2024-02-08 PROCEDURE — 94761 N-INVAS EAR/PLS OXIMETRY MLT: CPT

## 2024-02-08 PROCEDURE — 25010000002 FENTANYL CITRATE (PF) 50 MCG/ML SOLUTION: Performed by: SURGERY

## 2024-02-08 PROCEDURE — C1887 CATHETER, GUIDING: HCPCS | Performed by: SURGERY

## 2024-02-08 PROCEDURE — 25010000002 CEFEPIME PER 500 MG: Performed by: SURGERY

## 2024-02-08 PROCEDURE — 84100 ASSAY OF PHOSPHORUS: CPT | Performed by: INTERNAL MEDICINE

## 2024-02-08 PROCEDURE — B410ZZZ FLUOROSCOPY OF ABDOMINAL AORTA: ICD-10-PCS | Performed by: SURGERY

## 2024-02-08 PROCEDURE — 25010000002 HEPARIN (PORCINE) PER 1000 UNITS: Performed by: SURGERY

## 2024-02-08 PROCEDURE — 25010000002 MIDAZOLAM PER 1MG: Performed by: SURGERY

## 2024-02-08 PROCEDURE — C1725 CATH, TRANSLUMIN NON-LASER: HCPCS | Performed by: SURGERY

## 2024-02-08 PROCEDURE — 25010000002 METRONIDAZOLE 500 MG/100ML SOLUTION: Performed by: FAMILY MEDICINE

## 2024-02-08 PROCEDURE — 25010000002 PROTAMINE SULFATE PER 10 MG: Performed by: SURGERY

## 2024-02-08 PROCEDURE — 83735 ASSAY OF MAGNESIUM: CPT | Performed by: INTERNAL MEDICINE

## 2024-02-08 PROCEDURE — 25010000002 MAGNESIUM SULFATE 4 GM/100ML SOLUTION: Performed by: INTERNAL MEDICINE

## 2024-02-08 PROCEDURE — 37246 TRLUML BALO ANGIOP 1ST ART: CPT | Performed by: SURGERY

## 2024-02-08 PROCEDURE — 75625 CONTRAST EXAM ABDOMINL AORTA: CPT | Performed by: SURGERY

## 2024-02-08 PROCEDURE — 99233 SBSQ HOSP IP/OBS HIGH 50: CPT | Performed by: INTERNAL MEDICINE

## 2024-02-08 PROCEDURE — 25810000003 SODIUM CHLORIDE 0.9 % SOLUTION: Performed by: INTERNAL MEDICINE

## 2024-02-08 PROCEDURE — C1769 GUIDE WIRE: HCPCS | Performed by: SURGERY

## 2024-02-08 PROCEDURE — 25010000002 MORPHINE PER 10 MG: Performed by: SURGERY

## 2024-02-08 PROCEDURE — 25010000002 METRONIDAZOLE 500 MG/100ML SOLUTION: Performed by: SURGERY

## 2024-02-08 PROCEDURE — 80048 BASIC METABOLIC PNL TOTAL CA: CPT | Performed by: INTERNAL MEDICINE

## 2024-02-08 PROCEDURE — 82948 REAGENT STRIP/BLOOD GLUCOSE: CPT

## 2024-02-08 PROCEDURE — 36245 INS CATH ABD/L-EXT ART 1ST: CPT | Performed by: SURGERY

## 2024-02-08 PROCEDURE — 25010000002 HYDRALAZINE PER 20 MG: Performed by: SURGERY

## 2024-02-08 PROCEDURE — 82948 REAGENT STRIP/BLOOD GLUCOSE: CPT | Performed by: SURGERY

## 2024-02-08 PROCEDURE — 82948 REAGENT STRIP/BLOOD GLUCOSE: CPT | Performed by: INTERNAL MEDICINE

## 2024-02-08 PROCEDURE — 99231 SBSQ HOSP IP/OBS SF/LOW 25: CPT | Performed by: NURSE PRACTITIONER

## 2024-02-08 PROCEDURE — 25810000003 LACTATED RINGERS PER 1000 ML: Performed by: INTERNAL MEDICINE

## 2024-02-08 PROCEDURE — 25010000002 MORPHINE PER 10 MG

## 2024-02-08 PROCEDURE — B41FZZZ FLUOROSCOPY OF RIGHT LOWER EXTREMITY ARTERIES: ICD-10-PCS | Performed by: SURGERY

## 2024-02-08 PROCEDURE — 25010000002 CEFEPIME PER 500 MG: Performed by: FAMILY MEDICINE

## 2024-02-08 PROCEDURE — 99153 MOD SED SAME PHYS/QHP EA: CPT | Performed by: SURGERY

## 2024-02-08 PROCEDURE — 25810000003 SODIUM CHLORIDE 0.9 % SOLUTION: Performed by: SURGERY

## 2024-02-08 PROCEDURE — 85027 COMPLETE CBC AUTOMATED: CPT | Performed by: INTERNAL MEDICINE

## 2024-02-08 RX ORDER — FENTANYL/ROPIVACAINE/NS/PF 2-625MCG/1
15 PLASTIC BAG, INJECTION (ML) EPIDURAL
Status: DISCONTINUED | OUTPATIENT
Start: 2024-02-08 | End: 2024-02-08

## 2024-02-08 RX ORDER — LIDOCAINE HYDROCHLORIDE 20 MG/ML
INJECTION, SOLUTION INFILTRATION; PERINEURAL
Status: DISCONTINUED | OUTPATIENT
Start: 2024-02-08 | End: 2024-02-08 | Stop reason: HOSPADM

## 2024-02-08 RX ORDER — SODIUM CHLORIDE 9 MG/ML
100 INJECTION, SOLUTION INTRAVENOUS CONTINUOUS
Status: ACTIVE | OUTPATIENT
Start: 2024-02-08 | End: 2024-02-08

## 2024-02-08 RX ORDER — FENTANYL CITRATE 50 UG/ML
INJECTION, SOLUTION INTRAMUSCULAR; INTRAVENOUS
Status: DISCONTINUED | OUTPATIENT
Start: 2024-02-08 | End: 2024-02-08 | Stop reason: HOSPADM

## 2024-02-08 RX ORDER — HYDRALAZINE HYDROCHLORIDE 20 MG/ML
INJECTION INTRAMUSCULAR; INTRAVENOUS
Status: DISCONTINUED | OUTPATIENT
Start: 2024-02-08 | End: 2024-02-08 | Stop reason: HOSPADM

## 2024-02-08 RX ORDER — MORPHINE SULFATE 2 MG/ML
2 INJECTION, SOLUTION INTRAMUSCULAR; INTRAVENOUS EVERY 4 HOURS PRN
Status: COMPLETED | OUTPATIENT
Start: 2024-02-08 | End: 2024-02-08

## 2024-02-08 RX ORDER — IODIXANOL 320 MG/ML
INJECTION, SOLUTION INTRAVASCULAR
Status: DISCONTINUED | OUTPATIENT
Start: 2024-02-08 | End: 2024-02-08 | Stop reason: HOSPADM

## 2024-02-08 RX ORDER — PROTAMINE SULFATE 10 MG/ML
INJECTION, SOLUTION INTRAVENOUS
Status: DISCONTINUED | OUTPATIENT
Start: 2024-02-08 | End: 2024-02-08 | Stop reason: HOSPADM

## 2024-02-08 RX ORDER — MIDAZOLAM HYDROCHLORIDE 2 MG/2ML
INJECTION, SOLUTION INTRAMUSCULAR; INTRAVENOUS
Status: DISCONTINUED | OUTPATIENT
Start: 2024-02-08 | End: 2024-02-08 | Stop reason: HOSPADM

## 2024-02-08 RX ORDER — MAGNESIUM SULFATE HEPTAHYDRATE 40 MG/ML
4 INJECTION, SOLUTION INTRAVENOUS ONCE
Status: COMPLETED | OUTPATIENT
Start: 2024-02-08 | End: 2024-02-08

## 2024-02-08 RX ORDER — FENTANYL/ROPIVACAINE/NS/PF 2-625MCG/1
15 PLASTIC BAG, INJECTION (ML) EPIDURAL
Status: COMPLETED | OUTPATIENT
Start: 2024-02-08 | End: 2024-02-08

## 2024-02-08 RX ORDER — HEPARIN SODIUM 1000 [USP'U]/ML
INJECTION, SOLUTION INTRAVENOUS; SUBCUTANEOUS
Status: DISCONTINUED | OUTPATIENT
Start: 2024-02-08 | End: 2024-02-08 | Stop reason: HOSPADM

## 2024-02-08 RX ADMIN — MAGNESIUM SULFATE 4 G: 4 INJECTION INTRAVENOUS at 08:25

## 2024-02-08 RX ADMIN — SODIUM CHLORIDE, POTASSIUM CHLORIDE, SODIUM LACTATE AND CALCIUM CHLORIDE 75 ML/HR: 600; 310; 30; 20 INJECTION, SOLUTION INTRAVENOUS at 06:31

## 2024-02-08 RX ADMIN — AMLODIPINE BESYLATE 2.5 MG: 2.5 TABLET ORAL at 09:22

## 2024-02-08 RX ADMIN — ALLOPURINOL 300 MG: 300 TABLET ORAL at 09:22

## 2024-02-08 RX ADMIN — SERTRALINE HYDROCHLORIDE 75 MG: 50 TABLET ORAL at 09:22

## 2024-02-08 RX ADMIN — LEVOTHYROXINE SODIUM 125 MCG: 125 TABLET ORAL at 06:27

## 2024-02-08 RX ADMIN — FOLIC ACID 1 MG: 1 TABLET ORAL at 20:56

## 2024-02-08 RX ADMIN — POTASSIUM PHOSPHATE, MONOBASIC POTASSIUM PHOSPHATE, DIBASIC 15 MMOL: 224; 236 INJECTION, SOLUTION, CONCENTRATE INTRAVENOUS at 11:17

## 2024-02-08 RX ADMIN — MORPHINE SULFATE 2 MG: 2 INJECTION, SOLUTION INTRAMUSCULAR; INTRAVENOUS at 14:19

## 2024-02-08 RX ADMIN — MORPHINE SULFATE 30 MG: 30 TABLET, FILM COATED, EXTENDED RELEASE ORAL at 09:22

## 2024-02-08 RX ADMIN — CEFEPIME 2000 MG: 2 INJECTION, POWDER, FOR SOLUTION INTRAVENOUS at 06:27

## 2024-02-08 RX ADMIN — CEFEPIME 2000 MG: 2 INJECTION, POWDER, FOR SOLUTION INTRAVENOUS at 14:13

## 2024-02-08 RX ADMIN — ROSUVASTATIN CALCIUM 20 MG: 20 TABLET, FILM COATED ORAL at 20:56

## 2024-02-08 RX ADMIN — METRONIDAZOLE 500 MG: 500 INJECTION, SOLUTION INTRAVENOUS at 16:24

## 2024-02-08 RX ADMIN — CEFEPIME 2000 MG: 2 INJECTION, POWDER, FOR SOLUTION INTRAVENOUS at 20:56

## 2024-02-08 RX ADMIN — METRONIDAZOLE 500 MG: 500 INJECTION, SOLUTION INTRAVENOUS at 23:00

## 2024-02-08 RX ADMIN — METRONIDAZOLE 500 MG: 500 INJECTION, SOLUTION INTRAVENOUS at 00:08

## 2024-02-08 RX ADMIN — MORPHINE SULFATE 30 MG: 30 TABLET, FILM COATED, EXTENDED RELEASE ORAL at 20:56

## 2024-02-08 RX ADMIN — MORPHINE SULFATE 2 MG: 2 INJECTION, SOLUTION INTRAMUSCULAR; INTRAVENOUS at 02:47

## 2024-02-08 RX ADMIN — METRONIDAZOLE 500 MG: 500 INJECTION, SOLUTION INTRAVENOUS at 08:25

## 2024-02-08 RX ADMIN — SODIUM CHLORIDE 100 ML/HR: 9 INJECTION, SOLUTION INTRAVENOUS at 14:39

## 2024-02-08 RX ADMIN — METOPROLOL SUCCINATE 50 MG: 50 TABLET, EXTENDED RELEASE ORAL at 20:56

## 2024-02-08 RX ADMIN — POTASSIUM PHOSPHATE, MONOBASIC POTASSIUM PHOSPHATE, DIBASIC 15 MMOL: 224; 236 INJECTION, SOLUTION, CONCENTRATE INTRAVENOUS at 08:25

## 2024-02-08 RX ADMIN — METOPROLOL SUCCINATE 50 MG: 50 TABLET, EXTENDED RELEASE ORAL at 09:22

## 2024-02-08 NOTE — PLAN OF CARE
Goal Outcome Evaluation:         VSS. UOP. Cath lab today; bed rest x5 hours. Pain controlled per patient; see MAR.

## 2024-02-08 NOTE — PLAN OF CARE
Goal Outcome Evaluation:  Plan of Care Reviewed With: patient        Progress: no change  Outcome Evaluation: pt NPO since midnight due to surgery today. pt did c/o abd pain 8/10 with SOB and HTn during the night. MD notified and ordered PRN morphine. symptoms resolved after prn morphine was given. no other changes during night. will continue P.O.C.Michelle Brown RN

## 2024-02-08 NOTE — PROGRESS NOTES
Angiogram performed.  No significant superior mesenteric artery stenosis.  Angioplastied to 5 mm to ensure that a lesion was not being means and to gauge the caliber.  The artery appears to be approximately 8 mm in diameter.  Very poor visualization of the small vessels.  For details find full report under chart review, cardiology tab.

## 2024-02-08 NOTE — PROGRESS NOTES
"PROGRESS NOTE     Patient Name:  Radhames Colón  YOB: 1950  [id number]   LOS: 3 days   * No surgery date entered *  Patient Care Team:  Mingo Loja MD as PCP - General (Internal Medicine)  Talita Gonzalez MD (Cardiology)  Royce Pappas DO (Pulmonary Disease)      Reason for Consult/Chief complaint: weakness    Subjective     Interval History: VSS, afebrile, still with upper abdominal discomfort, scheduled for angiography with possible endovascular intervention today      Review of Systems:      A complete review of systems was performed and all are negative except what is documented in the HPI.       Objective         Physical Exam:     Constitutional:  well nourished, no acute distress, appears stated age /78   Pulse 62   Temp 97.3 °F (36.3 °C)   Resp 18   Ht 175.3 cm (69\")   Wt 84.4 kg (186 lb 1.1 oz)   SpO2 94%   BMI 27.48 kg/m²    Eyes:  anicteric sclerae, moist conjunctivae, no lid lag, PERRLA  ENMT:  oropharynx clear, moist mucous membranes, good dentition  Neck:   full ROM, trachea midline, no thyromegaly  Cardiovascular: RRR, S1 and S2 present, no MRG, heart rate 62, no pedal edema  Respiratory: lungs CTA, respirations even and unlabored  GI:  Abdomen soft, nontender, nondistended, no HSM     Skin:  warm and dry, normal turgor, no rashes  Psychiatric:  alert and oriented x3, intact judgment and insight, cooperative    Results Review:      I reviewed the patient's new clinical results including CBC, BMP.  LABS:  WBC   Date Value Ref Range Status   02/08/2024 11.37 (H) 3.40 - 10.80 10*3/mm3 Final     RBC   Date Value Ref Range Status   02/08/2024 4.78 4.14 - 5.80 10*6/mm3 Final     Hemoglobin   Date Value Ref Range Status   02/08/2024 12.3 (L) 13.0 - 17.7 g/dL Final     Hematocrit   Date Value Ref Range Status   02/08/2024 38.7 37.5 - 51.0 % Final     MCV   Date Value Ref Range Status   02/08/2024 81.0 79.0 - 97.0 fL Final     MCH   Date Value Ref Range " "Status   02/08/2024 25.7 (L) 26.6 - 33.0 pg Final     MCHC   Date Value Ref Range Status   02/08/2024 31.8 31.5 - 35.7 g/dL Final     RDW   Date Value Ref Range Status   02/08/2024 17.2 (H) 12.3 - 15.4 % Final     RDW-SD   Date Value Ref Range Status   02/08/2024 49.6 37.0 - 54.0 fl Final     MPV   Date Value Ref Range Status   02/08/2024 12.2 (H) 6.0 - 12.0 fL Final     Platelets   Date Value Ref Range Status   02/08/2024 128 (L) 140 - 450 10*3/mm3 Final         Basic Metabolic Panel    Sodium Sodium   Date Value Ref Range Status   02/08/2024 142 136 - 145 mmol/L Final   02/07/2024 140 136 - 145 mmol/L Final   02/06/2024 142 136 - 145 mmol/L Final      Potassium Potassium   Date Value Ref Range Status   02/08/2024 3.3 (L) 3.5 - 5.2 mmol/L Final   02/07/2024 3.0 (L) 3.5 - 5.2 mmol/L Final   02/06/2024 3.1 (L) 3.5 - 5.2 mmol/L Final      Chloride Chloride   Date Value Ref Range Status   02/08/2024 107 98 - 107 mmol/L Final   02/07/2024 107 98 - 107 mmol/L Final   02/06/2024 105 98 - 107 mmol/L Final      Bicarbonate No results found for: \"PLASMABICARB\"   BUN BUN   Date Value Ref Range Status   02/08/2024 6 (L) 8 - 23 mg/dL Final   02/07/2024 8 8 - 23 mg/dL Final   02/06/2024 13 8 - 23 mg/dL Final      Creatinine Creatinine   Date Value Ref Range Status   02/08/2024 0.74 (L) 0.76 - 1.27 mg/dL Final   02/07/2024 0.80 0.76 - 1.27 mg/dL Final   02/06/2024 1.02 0.76 - 1.27 mg/dL Final      Calcium Calcium   Date Value Ref Range Status   02/08/2024 8.1 (L) 8.6 - 10.5 mg/dL Final   02/07/2024 8.1 (L) 8.6 - 10.5 mg/dL Final   02/06/2024 7.9 (L) 8.6 - 10.5 mg/dL Final      Glucose      No components found for: \"GLUCOSE.*\"         IMAGING:  Imaging Results (Last 72 Hours)       ** No results found for the last 72 hours. **            Medications:    Current Facility-Administered Medications:     allopurinol (ZYLOPRIM) tablet 300 mg, 300 mg, Oral, Daily, Chandu Suh MD, 300 mg at 02/07/24 0931    amLODIPine (NORVASC) tablet " 2.5 mg, 2.5 mg, Oral, Daily, Chandu Suh MD, 2.5 mg at 02/07/24 0931    sennosides-docusate (PERICOLACE) 8.6-50 MG per tablet 2 tablet, 2 tablet, Oral, BID **AND** polyethylene glycol (MIRALAX) packet 17 g, 17 g, Oral, Daily PRN **AND** bisacodyl (DULCOLAX) EC tablet 5 mg, 5 mg, Oral, Daily PRN **AND** bisacodyl (DULCOLAX) suppository 10 mg, 10 mg, Rectal, Daily PRN, Garrett Olguin DO    cefepime (MAXIPIME) 2000 mg/100 mL 0.9% NS (mbp), 2,000 mg, Intravenous, Q8H, Garrett Olguin DO, 2,000 mg at 02/08/24 0627    dextrose (D50W) (25 g/50 mL) IV injection 25 g, 25 g, Intravenous, Q15 Min PRN, Chandu Suh MD    dextrose (GLUTOSE) oral gel 15 g, 15 g, Oral, Q15 Min PRN, Chandu Suh MD    folic acid (FOLVITE) tablet 1 mg, 1 mg, Oral, BID, Chandu Suh MD, 1 mg at 02/07/24 2101    glucagon (GLUCAGEN) injection 1 mg, 1 mg, Intramuscular, Q15 Min PRN, Chandu Suh MD    Insulin Lispro (humaLOG) injection 2-9 Units, 2-9 Units, Subcutaneous, 4x Daily AC & at Bedtime, Chandu Suh MD    lactated ringers infusion, 75 mL/hr, Intravenous, Continuous, Chandu Suh MD, Last Rate: 75 mL/hr at 02/08/24 0631, 75 mL/hr at 02/08/24 0631    levothyroxine (SYNTHROID, LEVOTHROID) tablet 125 mcg, 125 mcg, Oral, Q AM, Chandu Suh MD, 125 mcg at 02/08/24 0627    metoprolol succinate XL (TOPROL-XL) 24 hr tablet 50 mg, 50 mg, Oral, BID, Chandu Suh MD, 50 mg at 02/07/24 2101    metroNIDAZOLE (FLAGYL) IVPB 500 mg, 500 mg, Intravenous, Q8H, Garrett Olguin DO, Last Rate: 100 mL/hr at 02/08/24 0825, 500 mg at 02/08/24 0825    Morphine (MS CONTIN) 12 hr tablet 30 mg, 30 mg, Oral, BID, Garrett Olguin DO, 30 mg at 02/07/24 2101    morphine injection 2 mg, 2 mg, Intravenous, Q4H PRN, Ara Davey PA-C, 2 mg at 02/08/24 0247    [DISCONTINUED] morphine injection 1 mg, 1 mg, Intravenous, Q4H PRN, 1 mg at 02/05/24 2241 **AND** naloxone (NARCAN) injection 0.4 mg, 0.4 mg, Intravenous, Q5 Min PRN, Clau,  DO Garrett    nitroglycerin (NITROSTAT) SL tablet 0.4 mg, 0.4 mg, Sublingual, Q5 Min PRN, Garrett Olguin DO    ondansetron (ZOFRAN) injection 4 mg, 4 mg, Intravenous, Q6H PRN, Garrett Olguin DO    potassium chloride (MICRO-K/KLOR-CON) CR capsule, 40 mEq, Oral, Daily, Chandu Suh MD, 40 mEq at 02/07/24 1040    potassium phosphate 15 mmol in 0.9% normal saline 250 mL IVPB, 15 mmol, Intravenous, Q3H, Chandu Suh MD, 15 mmol at 02/08/24 0825    rosuvastatin (CRESTOR) tablet 20 mg, 20 mg, Oral, Nightly, Chandu Suh MD, 20 mg at 02/07/24 2100    sertraline (ZOLOFT) tablet 75 mg, 75 mg, Oral, Daily, Chandu Suh MD, 75 mg at 02/07/24 0931    sodium chloride 0.9 % flush 10 mL, 10 mL, Intravenous, PRN, Job Felix MD    sodium chloride 0.9 % flush 10 mL, 10 mL, Intravenous, Q12H, Garrett Olguin DO, 10 mL at 02/07/24 2102    sodium chloride 0.9 % flush 10 mL, 10 mL, Intravenous, PRN, Garrett Olguin DO    sodium chloride 0.9 % infusion 40 mL, 40 mL, Intravenous, PRN, Garrett Olguin DO    Assessment & Plan       Colitis    Chronic mesenteric ischemia    Other specified soft tissue disorders   Cont current care      MUKUL Neri  02/08/24  09:04 EST    Electronically signed by MUKUL Neri, 02/08/24, 9:04 AM EST.

## 2024-02-08 NOTE — PROGRESS NOTES
[facility]   Hospitalist Progress Note  Date: 2024  Patient Name: Radhames Colón  : 1950  MRN: [id number]  Date of admission: 2024  Consultants:   -General Surgery: Dr. Kaley De Leon  -Vascular Surgery: Dr. Asher Monzon, Dr. Abelino Gonzales  -Gastroenterology: Dr. Chalino Zepeda    Subjective   Subjective     Chief Complaint: Decreased appetite, nausea    Summary:   Radhames Colón is a 73 y.o. male with CKD stage IIIa, CAD, PAD, COPD, essential hypertension, hyperlipidemia, bilateral carotid artery stenosis, polyarthropathy sleep apnea who presented to ED with decreased appetite and nausea without vomiting.  Patient had experienced significant weight loss over the past few months, of note patient's wife passed away approximately 4 months ago and he initially, he felt weight loss was due to decreased appetite secondary to depression, however, his appetite and weight loss have progressively worsened.  Eval in ED significant for CT scan showing portal venous gas with gas extending into the liver and pancolitis.  Case was discussed initially with gastroenterology who did not feel comfortable giving patient had [facility], however, vascular surgery was contacted due to patient wishes to stay at [facility] for care and not being transferred to [place]. Vascular surgery reviewed imaging and it noted that patient's SMA is patent with mild perhaps mild to moderate stenosis and recommended conservative therapy.  General Surgery evaluated patient and recommended continued monitoring and no indication for urgent surgical intervention at this time.  Gastroenterology consulted and evaluated patient.  Attempts made to advance diet but patient continued not having much of an appetite.  Discussed case again with vascular surgery and decision made to pursue angiography with possible endovascular intervention.    Interval Followup:   Patient is been n.p.o. since midnight.  Patient has had  complaints of abdominal pain and discomfort.  Patient also has some complaints of shortness of breath but O2 sats have remained stable.  Denies any chest pain.  Patient did receive IV morphine overnight for pain and discomfort.  Nursing with no additional acute issues to report.    Antibiotics:   -Cefepime  -Flagyl    Pain Medication:   -IV morphine    Objective   Objective     Vitals:   Temp:  [97.3 °F (36.3 °C)-98.1 °F (36.7 °C)] 97.3 °F (36.3 °C)  Heart Rate:  [62-66] 62  Resp:  [18-22] 18  BP: (130-187)/(53-85) 178/80  Physical Exam   Gen: Appears uncomfortable, lying in bed, conversant  Resp: CTAB, No w/r/r, good aeration, no increased work of breathing  Card: RRR, No m/r/g  Abd: Soft, mild tenderness to palpation, Nondistended, + bowel sounds    Result Review    Result Review:  I have personally reviewed the results as below and agree with these findings:  []  Laboratory:   CMP          2/6/2024    04:29 2/7/2024    04:39 2/8/2024    04:52   CMP   Glucose 139  132  123    BUN 13  8  6    Creatinine 1.02  0.80  0.74    EGFR 76.6  92.3  94.5    Sodium 142  140  142    Potassium 3.1  3.0  3.3    Chloride 105  107  107    Calcium 7.9  8.1  8.1    BUN/Creatinine Ratio 12.7  10.0  8.1    Anion Gap 17.6  10.2  9.4      CBC          2/6/2024    04:29 2/7/2024    04:39 2/8/2024    04:51   CBC   WBC 11.31  9.87  11.37    RBC 4.57  4.62  4.78    Hemoglobin 11.6  11.9  12.3    Hematocrit 36.8  36.8  38.7    MCV 80.5  79.7  81.0    MCH 25.4  25.8  25.7    MCHC 31.5  32.3  31.8    RDW 16.8  17.2  17.2    Platelets 145  134  128    Phosphorus low.  Magnesium low.  Glucose well-controlled.  [x]  Microbiology: Blood culture (02/04/2024): No growth to date  []  Radiology:   [x]  EKG/Telemetry:    []  Cardiology/Vascular:    []  Pathology:  []  Old records:  []  Other:    Assessment & Plan   Assessment / Plan     Assessment:  Enterocolitis  CKD stage IIIa  Hypokalemia, recurrent  Hypophosphatemia, recurrent  Hypomagnesemia,  new  Leukocytosis.  COPD, not acutely exacerbated  CAD  PAD    Plan:  -General Surgery, Gastroenterology and Vascular Surgery consulted and following, appreciate assistance and recommendations in the care of this patient.  -Continue IV fluids  -Continue Cefepime and Flagyl.  Tailor based on results of infectious workup  -Continue pain management with as needed IV morphine  -Replace potassium, phosphorus and magnesium IV  -Management discussed with vascular surgery.  Patient has been n.p.o. since midnight and plan is to proceed with angiography later today (02/08/2024).  Possible endovascular intervention.  -Patient encouraged to use incentive spirometer throughout the day  -Monitor electrolytes and renal function with BMP, phosphorus level and magnesium level in the AM  -Monitor WBC and Hgb with CBC in the AM  -Clinical course will dictate further management     DVT Prophylaxis: SCDs  Diet: NPO  Dispo: Home when medically appropriate for discharge  Code Status: Full code     Personally reviewed patients labs and imaging, discussed with patient and nurse at bedside. Discussed management with the following consultants: General Surgery, Gastroenterology and Vascular Surgery.     Part of this note may be an electronic transcription/translation of spoken language to printed text using the Dragon dictation system.    DVT prophylaxis:  Mechanical DVT prophylaxis orders are present.        CODE STATUS:   Code Status (Patient has no pulse and is not breathing): CPR (Attempt to Resuscitate)  Medical Interventions (Patient has pulse or is breathing): Full Support    Electronically signed by Chandu Suh MD, 02/08/24, 8:22 AM EST.

## 2024-02-09 ENCOUNTER — ANESTHESIA (OUTPATIENT)
Dept: GASTROENTEROLOGY | Facility: HOSPITAL | Age: 74
End: 2024-02-09
Payer: MEDICARE

## 2024-02-09 ENCOUNTER — ANESTHESIA EVENT (OUTPATIENT)
Dept: GASTROENTEROLOGY | Facility: HOSPITAL | Age: 74
End: 2024-02-09
Payer: MEDICARE

## 2024-02-09 LAB
ALBUMIN SERPL-MCNC: 2.9 G/DL (ref 3.5–5.2)
ALBUMIN/GLOB SERPL: 1.2 G/DL
ALP SERPL-CCNC: 65 U/L (ref 39–117)
ALT SERPL W P-5'-P-CCNC: 33 U/L (ref 1–41)
ANION GAP SERPL CALCULATED.3IONS-SCNC: 12.6 MMOL/L (ref 5–15)
AST SERPL-CCNC: 44 U/L (ref 1–40)
BACTERIA SPEC AEROBE CULT: NORMAL
BACTERIA SPEC AEROBE CULT: NORMAL
BASOPHILS # BLD AUTO: 0.06 10*3/MM3 (ref 0–0.2)
BASOPHILS NFR BLD AUTO: 0.6 % (ref 0–1.5)
BILIRUB SERPL-MCNC: 0.6 MG/DL (ref 0–1.2)
BUN SERPL-MCNC: 6 MG/DL (ref 8–23)
BUN/CREAT SERPL: 9.4 (ref 7–25)
CALCIUM SPEC-SCNC: 7.7 MG/DL (ref 8.6–10.5)
CHLORIDE SERPL-SCNC: 106 MMOL/L (ref 98–107)
CO2 SERPL-SCNC: 24.4 MMOL/L (ref 22–29)
CREAT SERPL-MCNC: 0.64 MG/DL (ref 0.76–1.27)
DEPRECATED RDW RBC AUTO: 50.7 FL (ref 37–54)
EGFRCR SERPLBLD CKD-EPI 2021: 98.7 ML/MIN/1.73
EOSINOPHIL # BLD AUTO: 0.39 10*3/MM3 (ref 0–0.4)
EOSINOPHIL NFR BLD AUTO: 3.8 % (ref 0.3–6.2)
ERYTHROCYTE [DISTWIDTH] IN BLOOD BY AUTOMATED COUNT: 17.8 % (ref 12.3–15.4)
GLOBULIN UR ELPH-MCNC: 2.4 GM/DL
GLUCOSE BLDC GLUCOMTR-MCNC: 114 MG/DL (ref 70–99)
GLUCOSE BLDC GLUCOMTR-MCNC: 115 MG/DL (ref 70–99)
GLUCOSE BLDC GLUCOMTR-MCNC: 121 MG/DL (ref 70–99)
GLUCOSE BLDC GLUCOMTR-MCNC: 131 MG/DL (ref 70–99)
GLUCOSE SERPL-MCNC: 114 MG/DL (ref 65–99)
HCT VFR BLD AUTO: 42 % (ref 37.5–51)
HGB BLD-MCNC: 13.2 G/DL (ref 13–17.7)
IMM GRANULOCYTES # BLD AUTO: 0.05 10*3/MM3 (ref 0–0.05)
IMM GRANULOCYTES NFR BLD AUTO: 0.5 % (ref 0–0.5)
LYMPHOCYTES # BLD AUTO: 1.57 10*3/MM3 (ref 0.7–3.1)
LYMPHOCYTES NFR BLD AUTO: 15.1 % (ref 19.6–45.3)
MAGNESIUM SERPL-MCNC: 2 MG/DL (ref 1.6–2.4)
MCH RBC QN AUTO: 25.7 PG (ref 26.6–33)
MCHC RBC AUTO-ENTMCNC: 31.4 G/DL (ref 31.5–35.7)
MCV RBC AUTO: 81.9 FL (ref 79–97)
MONOCYTES # BLD AUTO: 1.16 10*3/MM3 (ref 0.1–0.9)
MONOCYTES NFR BLD AUTO: 11.2 % (ref 5–12)
NEUTROPHILS NFR BLD AUTO: 68.8 % (ref 42.7–76)
NEUTROPHILS NFR BLD AUTO: 7.14 10*3/MM3 (ref 1.7–7)
NRBC BLD AUTO-RTO: 0 /100 WBC (ref 0–0.2)
PHOSPHATE SERPL-MCNC: 2 MG/DL (ref 2.5–4.5)
PLATELET # BLD AUTO: 130 10*3/MM3 (ref 140–450)
PMV BLD AUTO: 12.7 FL (ref 6–12)
POTASSIUM SERPL-SCNC: 3.1 MMOL/L (ref 3.5–5.2)
PROT SERPL-MCNC: 5.3 G/DL (ref 6–8.5)
RBC # BLD AUTO: 5.13 10*6/MM3 (ref 4.14–5.8)
SODIUM SERPL-SCNC: 143 MMOL/L (ref 136–145)
WBC NRBC COR # BLD AUTO: 10.37 10*3/MM3 (ref 3.4–10.8)

## 2024-02-09 PROCEDURE — 99232 SBSQ HOSP IP/OBS MODERATE 35: CPT | Performed by: INTERNAL MEDICINE

## 2024-02-09 PROCEDURE — 25010000002 CEFEPIME PER 500 MG: Performed by: SURGERY

## 2024-02-09 PROCEDURE — 88305 TISSUE EXAM BY PATHOLOGIST: CPT | Performed by: INTERNAL MEDICINE

## 2024-02-09 PROCEDURE — 25810000003 SODIUM CHLORIDE 0.9 % SOLUTION 500 ML FLEX CONT: Performed by: SURGERY

## 2024-02-09 PROCEDURE — 99231 SBSQ HOSP IP/OBS SF/LOW 25: CPT | Performed by: NURSE PRACTITIONER

## 2024-02-09 PROCEDURE — 25010000002 PROPOFOL 10 MG/ML EMULSION: Performed by: NURSE ANESTHETIST, CERTIFIED REGISTERED

## 2024-02-09 PROCEDURE — 80053 COMPREHEN METABOLIC PANEL: CPT | Performed by: SURGERY

## 2024-02-09 PROCEDURE — 84100 ASSAY OF PHOSPHORUS: CPT | Performed by: SURGERY

## 2024-02-09 PROCEDURE — 99231 SBSQ HOSP IP/OBS SF/LOW 25: CPT | Performed by: SURGERY

## 2024-02-09 PROCEDURE — 43239 EGD BIOPSY SINGLE/MULTIPLE: CPT | Performed by: INTERNAL MEDICINE

## 2024-02-09 PROCEDURE — 25010000002 METRONIDAZOLE 500 MG/100ML SOLUTION: Performed by: SURGERY

## 2024-02-09 PROCEDURE — 83735 ASSAY OF MAGNESIUM: CPT | Performed by: SURGERY

## 2024-02-09 PROCEDURE — 82948 REAGENT STRIP/BLOOD GLUCOSE: CPT

## 2024-02-09 PROCEDURE — 82948 REAGENT STRIP/BLOOD GLUCOSE: CPT | Performed by: SURGERY

## 2024-02-09 PROCEDURE — 25810000003 LACTATED RINGERS PER 1000 ML: Performed by: SURGERY

## 2024-02-09 PROCEDURE — 85025 COMPLETE CBC W/AUTO DIFF WBC: CPT | Performed by: SURGERY

## 2024-02-09 PROCEDURE — 94761 N-INVAS EAR/PLS OXIMETRY MLT: CPT

## 2024-02-09 PROCEDURE — 25810000003 SODIUM CHLORIDE 0.9 % SOLUTION: Performed by: INTERNAL MEDICINE

## 2024-02-09 PROCEDURE — 94799 UNLISTED PULMONARY SVC/PX: CPT

## 2024-02-09 RX ORDER — PANTOPRAZOLE SODIUM 40 MG/1
40 TABLET, DELAYED RELEASE ORAL
Status: DISCONTINUED | OUTPATIENT
Start: 2024-02-10 | End: 2024-02-15 | Stop reason: HOSPADM

## 2024-02-09 RX ORDER — LIDOCAINE HYDROCHLORIDE 20 MG/ML
INJECTION, SOLUTION EPIDURAL; INFILTRATION; INTRACAUDAL; PERINEURAL AS NEEDED
Status: DISCONTINUED | OUTPATIENT
Start: 2024-02-09 | End: 2024-02-09 | Stop reason: SURG

## 2024-02-09 RX ORDER — SUCRALFATE 1 G/1
1 TABLET ORAL
Status: DISCONTINUED | OUTPATIENT
Start: 2024-02-09 | End: 2024-02-15 | Stop reason: HOSPADM

## 2024-02-09 RX ORDER — PROPOFOL 10 MG/ML
VIAL (ML) INTRAVENOUS AS NEEDED
Status: DISCONTINUED | OUTPATIENT
Start: 2024-02-09 | End: 2024-02-09 | Stop reason: SURG

## 2024-02-09 RX ORDER — FENTANYL/ROPIVACAINE/NS/PF 2-625MCG/1
15 PLASTIC BAG, INJECTION (ML) EPIDURAL ONCE
Status: COMPLETED | OUTPATIENT
Start: 2024-02-09 | End: 2024-02-09

## 2024-02-09 RX ADMIN — CEFEPIME 2000 MG: 2 INJECTION, POWDER, FOR SOLUTION INTRAVENOUS at 16:01

## 2024-02-09 RX ADMIN — METOPROLOL SUCCINATE 50 MG: 50 TABLET, EXTENDED RELEASE ORAL at 09:08

## 2024-02-09 RX ADMIN — Medication 10 ML: at 22:32

## 2024-02-09 RX ADMIN — METRONIDAZOLE 500 MG: 500 INJECTION, SOLUTION INTRAVENOUS at 09:12

## 2024-02-09 RX ADMIN — METRONIDAZOLE 500 MG: 500 INJECTION, SOLUTION INTRAVENOUS at 23:55

## 2024-02-09 RX ADMIN — CEFEPIME 2000 MG: 2 INJECTION, POWDER, FOR SOLUTION INTRAVENOUS at 22:32

## 2024-02-09 RX ADMIN — SERTRALINE HYDROCHLORIDE 75 MG: 50 TABLET ORAL at 09:08

## 2024-02-09 RX ADMIN — SUCRALFATE 1 G: 1 TABLET ORAL at 22:32

## 2024-02-09 RX ADMIN — PROPOFOL 50 MCG/KG/MIN: 10 INJECTION, EMULSION INTRAVENOUS at 14:59

## 2024-02-09 RX ADMIN — SODIUM CHLORIDE, POTASSIUM CHLORIDE, SODIUM LACTATE AND CALCIUM CHLORIDE 75 ML/HR: 600; 310; 30; 20 INJECTION, SOLUTION INTRAVENOUS at 17:11

## 2024-02-09 RX ADMIN — POTASSIUM CHLORIDE 40 MEQ: 10 CAPSULE, COATED, EXTENDED RELEASE ORAL at 09:09

## 2024-02-09 RX ADMIN — ALLOPURINOL 300 MG: 300 TABLET ORAL at 09:08

## 2024-02-09 RX ADMIN — PROPOFOL 100 MG: 10 INJECTION, EMULSION INTRAVENOUS at 14:59

## 2024-02-09 RX ADMIN — METRONIDAZOLE 500 MG: 500 INJECTION, SOLUTION INTRAVENOUS at 17:11

## 2024-02-09 RX ADMIN — METOPROLOL SUCCINATE 50 MG: 50 TABLET, EXTENDED RELEASE ORAL at 22:31

## 2024-02-09 RX ADMIN — MORPHINE SULFATE 30 MG: 30 TABLET, FILM COATED, EXTENDED RELEASE ORAL at 22:31

## 2024-02-09 RX ADMIN — FOLIC ACID 1 MG: 1 TABLET ORAL at 09:08

## 2024-02-09 RX ADMIN — CEFEPIME 2000 MG: 2 INJECTION, POWDER, FOR SOLUTION INTRAVENOUS at 04:37

## 2024-02-09 RX ADMIN — ROSUVASTATIN CALCIUM 20 MG: 20 TABLET, FILM COATED ORAL at 22:31

## 2024-02-09 RX ADMIN — POTASSIUM PHOSPHATE, MONOBASIC POTASSIUM PHOSPHATE, DIBASIC 15 MMOL: 224; 236 INJECTION, SOLUTION, CONCENTRATE INTRAVENOUS at 09:50

## 2024-02-09 RX ADMIN — SODIUM CHLORIDE 100 ML/HR: 9 INJECTION, SOLUTION INTRAVENOUS at 14:58

## 2024-02-09 RX ADMIN — MORPHINE SULFATE 30 MG: 30 TABLET, FILM COATED, EXTENDED RELEASE ORAL at 09:09

## 2024-02-09 RX ADMIN — AMLODIPINE BESYLATE 2.5 MG: 2.5 TABLET ORAL at 09:09

## 2024-02-09 RX ADMIN — FOLIC ACID 1 MG: 1 TABLET ORAL at 22:31

## 2024-02-09 RX ADMIN — LIDOCAINE HYDROCHLORIDE 50 MG: 20 INJECTION, SOLUTION EPIDURAL; INFILTRATION; INTRACAUDAL; PERINEURAL at 14:59

## 2024-02-09 RX ADMIN — LEVOTHYROXINE SODIUM 125 MCG: 125 TABLET ORAL at 05:44

## 2024-02-09 NOTE — PROGRESS NOTES
"PROGRESS NOTE     Patient Name:  Radhames Colón  YOB: 1950  [id number]   LOS: 4 days   1 Day Post-Op  Patient Care Team:  Mingo Loja MD as PCP - General (Internal Medicine)  Talita Gonzalez MD (Cardiology)  Royce Pappas DO (Pulmonary Disease)      Reason for Consult/Chief complaint:  shortness of breath, dizziness, AMS      Subjective     Interval History: now having some upper abdominal pain/discomfort, scheduled for EGD today      Review of Systems:      A complete review of systems was performed and all are negative except what is documented in the HPI.       Objective         Physical Exam:     Constitutional:  well nourished, no acute distress, appears stated age BP (!) 182/94 (BP Location: Right arm, Patient Position: Lying)   Pulse 96   Temp 97.7 °F (36.5 °C) (Oral)   Resp 18   Ht 175.3 cm (69\")   Wt 84.4 kg (186 lb 1.1 oz)   SpO2 96%   BMI 27.48 kg/m²    Eyes:  anicteric sclerae, moist conjunctivae, no lid lag, PERRLA  ENMT:  oropharynx clear, moist mucous membranes, good dentition  Neck:   full ROM, trachea midline, no thyromegaly  Cardiovascular: RRR, S1 and S2 present, no MRG, heart rate 96, no pedal edema  Respiratory: lungs CTA, respirations even and unlabored  GI:  Abdomen soft, nontender, nondistended, no HSM     Skin:  warm and dry, normal turgor, no rashes  Psychiatric:  alert and oriented x3, intact judgment and insight, cooperative    Results Review:      I reviewed the patient's new clinical results including CBC, BMP.  LABS:  WBC   Date Value Ref Range Status   02/09/2024 10.37 3.40 - 10.80 10*3/mm3 Final     RBC   Date Value Ref Range Status   02/09/2024 5.13 4.14 - 5.80 10*6/mm3 Final     Hemoglobin   Date Value Ref Range Status   02/09/2024 13.2 13.0 - 17.7 g/dL Final     Hematocrit   Date Value Ref Range Status   02/09/2024 42.0 37.5 - 51.0 % Final     MCV   Date Value Ref Range Status   02/09/2024 81.9 79.0 - 97.0 fL Final     MCH   Date Value " Ref Range Status   02/09/2024 25.7 (L) 26.6 - 33.0 pg Final     MCHC   Date Value Ref Range Status   02/09/2024 31.4 (L) 31.5 - 35.7 g/dL Final     RDW   Date Value Ref Range Status   02/09/2024 17.8 (H) 12.3 - 15.4 % Final     RDW-SD   Date Value Ref Range Status   02/09/2024 50.7 37.0 - 54.0 fl Final     MPV   Date Value Ref Range Status   02/09/2024 12.7 (H) 6.0 - 12.0 fL Final     Platelets   Date Value Ref Range Status   02/09/2024 130 (L) 140 - 450 10*3/mm3 Final     Neutrophil %   Date Value Ref Range Status   02/09/2024 68.8 42.7 - 76.0 % Final     Lymphocyte %   Date Value Ref Range Status   02/09/2024 15.1 (L) 19.6 - 45.3 % Final     Monocyte %   Date Value Ref Range Status   02/09/2024 11.2 5.0 - 12.0 % Final     Eosinophil %   Date Value Ref Range Status   02/09/2024 3.8 0.3 - 6.2 % Final     Basophil %   Date Value Ref Range Status   02/09/2024 0.6 0.0 - 1.5 % Final     Immature Grans %   Date Value Ref Range Status   02/09/2024 0.5 0.0 - 0.5 % Final     Neutrophils, Absolute   Date Value Ref Range Status   02/09/2024 7.14 (H) 1.70 - 7.00 10*3/mm3 Final     Lymphocytes, Absolute   Date Value Ref Range Status   02/09/2024 1.57 0.70 - 3.10 10*3/mm3 Final     Monocytes, Absolute   Date Value Ref Range Status   02/09/2024 1.16 (H) 0.10 - 0.90 10*3/mm3 Final     Eosinophils, Absolute   Date Value Ref Range Status   02/09/2024 0.39 0.00 - 0.40 10*3/mm3 Final     Basophils, Absolute   Date Value Ref Range Status   02/09/2024 0.06 0.00 - 0.20 10*3/mm3 Final     Immature Grans, Absolute   Date Value Ref Range Status   02/09/2024 0.05 0.00 - 0.05 10*3/mm3 Final     nRBC   Date Value Ref Range Status   02/09/2024 0.0 0.0 - 0.2 /100 WBC Final         Basic Metabolic Panel    Sodium Sodium   Date Value Ref Range Status   02/09/2024 143 136 - 145 mmol/L Final   02/08/2024 142 136 - 145 mmol/L Final   02/07/2024 140 136 - 145 mmol/L Final      Potassium Potassium   Date Value Ref Range Status   02/09/2024 3.1 (L) 3.5  "- 5.2 mmol/L Final   02/08/2024 3.3 (L) 3.5 - 5.2 mmol/L Final   02/07/2024 3.0 (L) 3.5 - 5.2 mmol/L Final      Chloride Chloride   Date Value Ref Range Status   02/09/2024 106 98 - 107 mmol/L Final   02/08/2024 107 98 - 107 mmol/L Final   02/07/2024 107 98 - 107 mmol/L Final      Bicarbonate No results found for: \"PLASMABICARB\"   BUN BUN   Date Value Ref Range Status   02/09/2024 6 (L) 8 - 23 mg/dL Final   02/08/2024 6 (L) 8 - 23 mg/dL Final   02/07/2024 8 8 - 23 mg/dL Final      Creatinine Creatinine   Date Value Ref Range Status   02/09/2024 0.64 (L) 0.76 - 1.27 mg/dL Final   02/08/2024 0.74 (L) 0.76 - 1.27 mg/dL Final   02/07/2024 0.80 0.76 - 1.27 mg/dL Final      Calcium Calcium   Date Value Ref Range Status   02/09/2024 7.7 (L) 8.6 - 10.5 mg/dL Final   02/08/2024 8.1 (L) 8.6 - 10.5 mg/dL Final   02/07/2024 8.1 (L) 8.6 - 10.5 mg/dL Final      Glucose      No components found for: \"GLUCOSE.*\"         IMAGING:  Imaging Results (Last 72 Hours)       ** No results found for the last 72 hours. **            Medications:    Current Facility-Administered Medications:     allopurinol (ZYLOPRIM) tablet 300 mg, 300 mg, Oral, Daily, Abelino Gonzales MD, 300 mg at 02/09/24 0908    amLODIPine (NORVASC) tablet 2.5 mg, 2.5 mg, Oral, Daily, Abeilno Gonzales MD, 2.5 mg at 02/09/24 0909    sennosides-docusate (PERICOLACE) 8.6-50 MG per tablet 2 tablet, 2 tablet, Oral, BID **AND** polyethylene glycol (MIRALAX) packet 17 g, 17 g, Oral, Daily PRN **AND** bisacodyl (DULCOLAX) EC tablet 5 mg, 5 mg, Oral, Daily PRN **AND** bisacodyl (DULCOLAX) suppository 10 mg, 10 mg, Rectal, Daily PRN, Abelino Gonzales MD    cefepime (MAXIPIME) 2000 mg/100 mL 0.9% NS (mbp), 2,000 mg, Intravenous, Q8H, Abelino Gonzlaes MD, 2,000 mg at 02/09/24 0437    dextrose (D50W) (25 g/50 mL) IV injection 25 g, 25 g, Intravenous, Q15 Min PRN, Abelino Gonzales MD    dextrose (GLUTOSE) oral gel 15 g, 15 g, Oral, Q15 Min PRN, Abelino Gonzales MD    folic acid (FOLVITE) " tablet 1 mg, 1 mg, Oral, BID, Abelino Gonzales MD, 1 mg at 02/09/24 0908    glucagon (GLUCAGEN) injection 1 mg, 1 mg, Intramuscular, Q15 Min PRN, Abelino Gonzales MD    Insulin Lispro (humaLOG) injection 2-9 Units, 2-9 Units, Subcutaneous, 4x Daily AC & at Bedtime, Abelino Gonzales MD    lactated ringers infusion, 75 mL/hr, Intravenous, Continuous, Abelino Gonzales MD, Stopped at 02/08/24 1437    levothyroxine (SYNTHROID, LEVOTHROID) tablet 125 mcg, 125 mcg, Oral, Q AM, Abelino Gonzales MD, 125 mcg at 02/09/24 0544    metoprolol succinate XL (TOPROL-XL) 24 hr tablet 50 mg, 50 mg, Oral, BID, Abelino Gonzales MD, 50 mg at 02/09/24 0908    metroNIDAZOLE (FLAGYL) IVPB 500 mg, 500 mg, Intravenous, Q8H, Abelino Gonzales MD, Last Rate: 100 mL/hr at 02/09/24 0912, 500 mg at 02/09/24 0912    Morphine (MS CONTIN) 12 hr tablet 30 mg, 30 mg, Oral, BID, Abelino Gonzales MD, 30 mg at 02/09/24 0909    [DISCONTINUED] morphine injection 1 mg, 1 mg, Intravenous, Q4H PRN, 1 mg at 02/05/24 2241 **AND** naloxone (NARCAN) injection 0.4 mg, 0.4 mg, Intravenous, Q5 Min PRN, Abelino Gonzales MD    nitroglycerin (NITROSTAT) SL tablet 0.4 mg, 0.4 mg, Sublingual, Q5 Min PRN, Abelino Gonzales MD    ondansetron (ZOFRAN) injection 4 mg, 4 mg, Intravenous, Q6H PRN, Abelino Gonzales MD    potassium chloride (MICRO-K/KLOR-CON) CR capsule, 40 mEq, Oral, Daily, Abelino Gonzales MD, 40 mEq at 02/09/24 0909    potassium phosphate 15 mmol in 0.9% normal saline 250 mL IVPB, 15 mmol, Intravenous, Once, Chandu Suh MD, 15 mmol at 02/09/24 0950    rosuvastatin (CRESTOR) tablet 20 mg, 20 mg, Oral, Nightly, Abelino Gonzales MD, 20 mg at 02/08/24 2056    sertraline (ZOLOFT) tablet 75 mg, 75 mg, Oral, Daily, Abelino Gonzales MD, 75 mg at 02/09/24 0908    sodium chloride 0.9 % flush 10 mL, 10 mL, Intravenous, PRN, Abelino Gonzales MD    sodium chloride 0.9 % flush 10 mL, 10 mL, Intravenous, Q12H, Abelino Gonzales MD, 10 mL at 02/07/24 2102    sodium chloride 0.9 % flush 10  mL, 10 mL, Intravenous, Janet ROMERO Patricio, MD    sodium chloride 0.9 % infusion 40 mL, 40 mL, Intravenous, Janet ROMERO Patricio, MD    Assessment & Plan       Colitis    Weight loss, non-intentional    Chronic mesenteric ischemia    Other specified soft tissue disorders     Cont current care   Await results of EGD      MUKUL Neri  02/09/24  11:02 EST    Electronically signed by MUKUL Neri, 02/09/24, 11:02 AM EST.

## 2024-02-09 NOTE — SIGNIFICANT NOTE
02/09/24 1330   Coping/Psychosocial   Observed Emotional State calm;cooperative   Verbalized Emotional State hopefulness   Trust Relationship/Rapport empathic listening provided   Family/Support Persons son;daughter;family   Involvement in Care interacting with patient   Additional Documentation Spiritual Care (Group)   Spiritual Care   Use of Spiritual Resources non-Yazidism use of spiritual care   Spiritual Care Source  initiative   Spiritual Care Follow-Up follow-up, none required as presently assessed   Response to Spiritual Care receptive of support   Spiritual Care Interventions supportive conversation provided   Spiritual Care Visit Type initial   Receptivity to Spiritual Care visit welcomed

## 2024-02-09 NOTE — PROGRESS NOTES
[facility]     Progress Note    Patient Name: Radhames Colón  : 1950  MRN: [id number]  Primary Care Physician:  Mingo Loja MD  Date of admission: 2024    Subjective   Subjective     Follow-up for colitis.    Having some more upper abdominal discomfort.  Scheduled for EGD later today.    Objective   Objective     Vitals:   Temp:  [97.3 °F (36.3 °C)-98.6 °F (37 °C)] 97.7 °F (36.5 °C)  Heart Rate:  [96] 96  Resp:  [18] 18  BP: (125-182)/(57-94) 182/94  Flow (L/min):  [1] 1    Physical Exam   General: Alert, no acute distress.  HEENT: PERRLA  Abdomen: Benign  Extremities: Symmetric.  Neuro: No gross deficits        Assessment & Plan   Assessment / Plan     Assessment/Plan:    Stable from the vascular surgery standpoint.  Will follow peripherally.    Active Hospital Problems:  Active Hospital Problems    Diagnosis     **Colitis     Chronic mesenteric ischemia     Other specified soft tissue disorders     Weight loss, non-intentional            Electronically signed by Abelino Gonzales MD, 24, 12:01 PM EST.

## 2024-02-09 NOTE — PROGRESS NOTES
[facility]   Hospitalist Progress Note  Date: 2024  Patient Name: Radhames Colón  : 1950  MRN: [id number]  Date of admission: 2024  Consultants:   -General Surgery: Dr. Kaley De Leon  -Vascular Surgery: Dr. Asher Monzon, Dr. Abelino Gonzales  -Gastroenterology: Dr. Chalino Zepeda    Subjective   Subjective     Chief Complaint: Decreased appetite, nausea    Summary:   Radhames Colón is a 73 y.o. male with CKD stage IIIa, CAD, PAD, COPD, essential hypertension, hyperlipidemia, bilateral carotid artery stenosis, polyarthropathy sleep apnea who presented to ED with decreased appetite and nausea without vomiting.  Patient had experienced significant weight loss over the past few months, of note patient's wife passed away approximately 4 months ago and he initially, he felt weight loss was due to decreased appetite secondary to depression, however, his appetite and weight loss have progressively worsened.  Eval in ED significant for CT scan showing portal venous gas with gas extending into the liver and pancolitis.  Case was discussed initially with gastroenterology who did not feel comfortable giving patient had [facility], however, vascular surgery was contacted due to patient wishes to stay at [facility] for care and not being transferred to [place]. Vascular surgery reviewed imaging and it noted that patient's SMA is patent with mild perhaps mild to moderate stenosis and recommended conservative therapy.  General Surgery evaluated patient and recommended continued monitoring and no indication for urgent surgical intervention at this time.  Gastroenterology consulted and evaluated patient.  Attempts made to advance diet but patient continued not having much of an appetite.  Discussed case again with vascular surgery and decision made to pursue angiography with possible endovascular intervention.  Angiogram performed on 2024 and no significant superior mesenteric artery  stenosis noted and no intervention was required.    Interval Followup:   Patient with abdominal discomfort.  Patient to have EGD today per gastroenterology.  Patient has been n.p.o. since midnight.  Denies any chest pain or shortness of breath.  Nursing with no additional acute issues to report.    Antibiotics:   -Cefepime  -Flagyl    Pain Medication:   -IV morphine    Objective   Objective     Vitals:   Temp:  [97.3 °F (36.3 °C)-98.6 °F (37 °C)] 97.7 °F (36.5 °C)  Heart Rate:  [96] 96  Resp:  [18] 18  BP: (125-182)/(57-94) 182/94  Flow (L/min):  [1] 1  Physical Exam   Gen: No acute distress, lying in bed, conversant  Resp: CTAB, No w/r/r, normal respiratory effort  Card: RRR, No m/r/g  Abd: Soft, mild tenderness to palpation, Nondistended, + bowel sounds    Result Review    Result Review:  I have personally reviewed the results as below and agree with these findings:  []  Laboratory:   CMP          2/7/2024    04:39 2/8/2024    04:52 2/9/2024    04:21   CMP   Glucose 132  123  114    BUN 8  6  6    Creatinine 0.80  0.74  0.64    EGFR 92.3  94.5  98.7    Sodium 140  142  143    Potassium 3.0  3.3  3.1    Chloride 107  107  106    Calcium 8.1  8.1  7.7    Total Protein   5.3    Albumin   2.9    Globulin   2.4    Total Bilirubin   0.6    Alkaline Phosphatase   65    AST (SGOT)   44    ALT (SGPT)   33    Albumin/Globulin Ratio   1.2    BUN/Creatinine Ratio 10.0  8.1  9.4    Anion Gap 10.2  9.4  12.6      CBC          2/7/2024    04:39 2/8/2024    04:51 2/9/2024    04:21   CBC   WBC 9.87  11.37  10.37    RBC 4.62  4.78  5.13    Hemoglobin 11.9  12.3  13.2    Hematocrit 36.8  38.7  42.0    MCV 79.7  81.0  81.9    MCH 25.8  25.7  25.7    MCHC 32.3  31.8  31.4    RDW 17.2  17.2  17.8    Platelets 134  128  130    Phosphorus low.  Magnesium within normal limits.  [x]  Microbiology: Blood culture (02/04/2024): No growth to date  []  Radiology:   [x]  EKG/Telemetry:    []  Cardiology/Vascular:    []  Pathology:  []  Old  records:  []  Other:    Assessment & Plan   Assessment / Plan     Assessment:  Enterocolitis  CKD stage IIIa  Hypokalemia, recurrent  Hypophosphatemia, recurrent  Hypomagnesemia, improved  Unintentional weight loss  Leukocytosis  COPD, not acutely exacerbated  CAD  PAD    Plan:  -General Surgery, Gastroenterology and Vascular Surgery consulted and following, appreciate assistance and recommendations in the care of this patient.  -Continue IV fluids  -Continue Cefepime and Flagyl.  Tailor based on results of infectious workup  -Continue pain management with as needed IV morphine  -Replace potassium and phosphorus IV  -Management discussed with Gastroenterology.  Patient to undergo EGD today.  Follow-up results.  -Management discussed with vascular surgery.  Angiogram completed and no significant superior mesenteric artery stenosis noted and no intervention was required.  -Patient encouraged to use incentive spirometer throughout the day  -Will monitor electrolytes and renal function with BMP, phosphorus level and magnesium level in the AM  -Will monitor WBC and Hgb with CBC in the AM  -Clinical course will dictate further management     DVT Prophylaxis: SCDs  Diet: NPO  Dispo: Home when medically appropriate for discharge  Code Status: Full code     Personally reviewed patients labs and imaging, discussed with patient and nurse at bedside. Discussed management with the following consultants: General Surgery, Gastroenterology and Vascular Surgery.     Part of this note may be an electronic transcription/translation of spoken language to printed text using the Dragon dictation system.    DVT prophylaxis:  Mechanical DVT prophylaxis orders are present.        CODE STATUS:   Code Status (Patient has no pulse and is not breathing): CPR (Attempt to Resuscitate)  Medical Interventions (Patient has pulse or is breathing): Full Support      Electronically signed by Chandu Suh MD, 02/09/24, 1:23 PM EST.

## 2024-02-09 NOTE — PROGRESS NOTES
[facility]  Inpatient Progress Note    Reason for Follow Up: Shortness of breath, dizziness, and altered mental status    Subjective     Interval History:   Patient had no events overnight.  Patient's daughter at bedside.  Patient had aortogram with mesenteric angiogram, performed yesterday with Dr. Gonzales where no significant superior mesenteric artery stenosis was found.  Patient still with slight upper abdominal discomfort.  Patient is NPO.    Current Facility-Administered Medications:     allopurinol (ZYLOPRIM) tablet 300 mg, 300 mg, Oral, Daily, Abelino Gonzales MD, 300 mg at 02/08/24 0922    amLODIPine (NORVASC) tablet 2.5 mg, 2.5 mg, Oral, Daily, Abelino Gonzales MD, 2.5 mg at 02/08/24 0922    sennosides-docusate (PERICOLACE) 8.6-50 MG per tablet 2 tablet, 2 tablet, Oral, BID **AND** polyethylene glycol (MIRALAX) packet 17 g, 17 g, Oral, Daily PRN **AND** bisacodyl (DULCOLAX) EC tablet 5 mg, 5 mg, Oral, Daily PRN **AND** bisacodyl (DULCOLAX) suppository 10 mg, 10 mg, Rectal, Daily PRN, Abelino Gonzales MD    cefepime (MAXIPIME) 2000 mg/100 mL 0.9% NS (mbp), 2,000 mg, Intravenous, Q8H, Abelino Gonzales MD, 2,000 mg at 02/09/24 0437    dextrose (D50W) (25 g/50 mL) IV injection 25 g, 25 g, Intravenous, Q15 Min PRN, Abelino Gonzales MD    dextrose (GLUTOSE) oral gel 15 g, 15 g, Oral, Q15 Min PRN, Abelino Gonzales MD    folic acid (FOLVITE) tablet 1 mg, 1 mg, Oral, BID, Abelino Gonzales MD, 1 mg at 02/08/24 2056    glucagon (GLUCAGEN) injection 1 mg, 1 mg, Intramuscular, Q15 Min PRN, Abelino Gonzales MD    Insulin Lispro (humaLOG) injection 2-9 Units, 2-9 Units, Subcutaneous, 4x Daily AC & at Bedtime, Abelino Gonzales MD    lactated ringers infusion, 75 mL/hr, Intravenous, Continuous, Abelino Gonzales MD, Stopped at 02/08/24 1437    levothyroxine (SYNTHROID, LEVOTHROID) tablet 125 mcg, 125 mcg, Oral, Q AM, Abelino Gonzales MD, 125 mcg at 02/09/24 0544    metoprolol succinate XL (TOPROL-XL) 24 hr tablet  50 mg, 50 mg, Oral, BID, Abelino Gonzales MD, 50 mg at 02/08/24 2056    metroNIDAZOLE (FLAGYL) IVPB 500 mg, 500 mg, Intravenous, Q8H, Abelino Gonzales MD, Last Rate: 100 mL/hr at 02/08/24 2300, 500 mg at 02/08/24 2300    Morphine (MS CONTIN) 12 hr tablet 30 mg, 30 mg, Oral, BID, Abelino Gonzales MD, 30 mg at 02/08/24 2056    [DISCONTINUED] morphine injection 1 mg, 1 mg, Intravenous, Q4H PRN, 1 mg at 02/05/24 2241 **AND** naloxone (NARCAN) injection 0.4 mg, 0.4 mg, Intravenous, Q5 Min PRN, Abelino Gonzales MD    nitroglycerin (NITROSTAT) SL tablet 0.4 mg, 0.4 mg, Sublingual, Q5 Min PRN, Abelino Gonzales MD    ondansetron (ZOFRAN) injection 4 mg, 4 mg, Intravenous, Q6H PRN, Abelino Gonzales MD    potassium chloride (MICRO-K/KLOR-CON) CR capsule, 40 mEq, Oral, Daily, Abelino Gonzales MD, 40 mEq at 02/07/24 1040    potassium phosphate 15 mmol in 0.9% normal saline 250 mL IVPB, 15 mmol, Intravenous, Once, Chandu Suh MD    rosuvastatin (CRESTOR) tablet 20 mg, 20 mg, Oral, Nightly, Abelino Gonzales MD, 20 mg at 02/08/24 2056    sertraline (ZOLOFT) tablet 75 mg, 75 mg, Oral, Daily, Abelino Gonzales MD, 75 mg at 02/08/24 0922    sodium chloride 0.9 % flush 10 mL, 10 mL, Intravenous, PRN, Abelino Gonzales MD    sodium chloride 0.9 % flush 10 mL, 10 mL, Intravenous, Q12H, Abelino Gonzales MD, 10 mL at 02/07/24 2102    sodium chloride 0.9 % flush 10 mL, 10 mL, Intravenous, PRN, Abelino Gonzales MD    sodium chloride 0.9 % infusion 40 mL, 40 mL, Intravenous, PRN, Abelino Gonzales MD  Review of Systems:    All systems were reviewed and negative except for:  Gastrointestinal: positive for  See HPI    Objective     Vital Signs  Temp:  [97.3 °F (36.3 °C)-98.6 °F (37 °C)] 97.3 °F (36.3 °C)  Resp:  [18] 18  BP: (125-174)/(57-90) 130/66  Body mass index is 27.48 kg/m².    Intake/Output Summary (Last 24 hours) at 2/9/2024 0836  Last data filed at 2/9/2024 0443  Gross per 24 hour   Intake --   Output 2275 ml   Net -2275 ml     No intake/output  "data recorded.     Physical Exam:   General: awake, alert and in no acute distress   Eyes: eyes move symmetrical in all directions, no scleral icterus   Neck: supple, trachea is midline   Skin: warm and dry, not jaundiced   Cardiovascular: regular rhythm and rate   Pulm: Unlabored breathing   Abdomen: soft, non distended, non tender, no guarding   Rectal: deferred   Extremities: no rash or edema   Psychiatric: mental status within normal limits, alert and oriented      Results Review:     I reviewed the patient's new clinical results.    Results from last 7 days   Lab Units 02/09/24 0421 02/08/24 0451 02/07/24 0439   WBC 10*3/mm3 10.37 11.37* 9.87   HEMOGLOBIN g/dL 13.2 12.3* 11.9*   HEMATOCRIT % 42.0 38.7 36.8*   PLATELETS 10*3/mm3 130* 128* 134*     Results from last 7 days   Lab Units 02/09/24 0421 02/08/24 0452 02/07/24 0439 02/06/24 0429 02/05/24 0419 02/04/24  1428   SODIUM mmol/L 143 142 140   < > 141 140   POTASSIUM mmol/L 3.1* 3.3* 3.0*   < > 2.8* 3.1*   CHLORIDE mmol/L 106 107 107   < > 99 94*   CO2 mmol/L 24.4 25.6 22.8   < > 26.1 27.2   BUN mg/dL 6* 6* 8   < > 19 15   CREATININE mg/dL 0.64* 0.74* 0.80   < > 1.22 1.47*   CALCIUM mg/dL 7.7* 8.1* 8.1*   < > 8.6 9.7   BILIRUBIN mg/dL 0.6  --   --   --  0.8 0.8   ALK PHOS U/L 65  --   --   --  72 87   ALT (SGPT) U/L 33  --   --   --  40 53*   AST (SGOT) U/L 44*  --   --   --  62* 93*   GLUCOSE mg/dL 114* 123* 132*   < > 59* 94    < > = values in this interval not displayed.         No results found for: \"LIPASE\"    Radiology:    No radiology results for the last day      Assessment & Plan   Assessment:   Ischemic versus infectious enterocolitis    Plan:   Keep patient NPO  Will perform EGD today with Dr. Zepeda  Explained procedure to patient and family and they understand and agree to the plan.        I discussed the patients findings and my recommendations with patient and family.      Electronically signed by MUKUL Lin, 2/9/2024, 08:36 " EST.      Patient was interviewed, examined, evaluated by me personally.  Patient told me that he continues to have trouble eating.  He has no appetite.  Patient had angiography and there is no surgically correctable disease per Dr. Gonzales.  I have discussed with Dr. Macias as well.    I agree with assessment by the nurse practitioner.  And will proceed with upper endoscopy.  Risk and benefits have been discussed with patient and the family

## 2024-02-09 NOTE — PLAN OF CARE
Goal Outcome Evaluation:      Pt did not complain of pain during the shift. VSS. Pt is able to change position independently. Pt is awaiting discharge planning. Suhas Cui RN

## 2024-02-09 NOTE — PLAN OF CARE
Goal Outcome Evaluation:                 A&O x4, Pt had EGD today, tolerated well, Currently on RA, O2 sat 95%.

## 2024-02-10 LAB
ANION GAP SERPL CALCULATED.3IONS-SCNC: 9.3 MMOL/L (ref 5–15)
BUN SERPL-MCNC: 9 MG/DL (ref 8–23)
BUN/CREAT SERPL: 13 (ref 7–25)
CALCIUM SPEC-SCNC: 8.2 MG/DL (ref 8.6–10.5)
CHLORIDE SERPL-SCNC: 108 MMOL/L (ref 98–107)
CO2 SERPL-SCNC: 23.7 MMOL/L (ref 22–29)
CREAT SERPL-MCNC: 0.69 MG/DL (ref 0.76–1.27)
DEPRECATED RDW RBC AUTO: 51.2 FL (ref 37–54)
EGFRCR SERPLBLD CKD-EPI 2021: 96.5 ML/MIN/1.73
ERYTHROCYTE [DISTWIDTH] IN BLOOD BY AUTOMATED COUNT: 18.1 % (ref 12.3–15.4)
GLUCOSE BLDC GLUCOMTR-MCNC: 121 MG/DL (ref 70–99)
GLUCOSE BLDC GLUCOMTR-MCNC: 129 MG/DL (ref 70–99)
GLUCOSE BLDC GLUCOMTR-MCNC: 142 MG/DL (ref 70–99)
GLUCOSE BLDC GLUCOMTR-MCNC: 154 MG/DL (ref 70–99)
GLUCOSE SERPL-MCNC: 116 MG/DL (ref 65–99)
HCT VFR BLD AUTO: 39.5 % (ref 37.5–51)
HGB BLD-MCNC: 12.5 G/DL (ref 13–17.7)
MAGNESIUM SERPL-MCNC: 1.8 MG/DL (ref 1.6–2.4)
MCH RBC QN AUTO: 25.7 PG (ref 26.6–33)
MCHC RBC AUTO-ENTMCNC: 31.6 G/DL (ref 31.5–35.7)
MCV RBC AUTO: 81.1 FL (ref 79–97)
PHOSPHATE SERPL-MCNC: 1.7 MG/DL (ref 2.5–4.5)
PLATELET # BLD AUTO: 129 10*3/MM3 (ref 140–450)
PMV BLD AUTO: 12 FL (ref 6–12)
POTASSIUM SERPL-SCNC: 4 MMOL/L (ref 3.5–5.2)
RBC # BLD AUTO: 4.87 10*6/MM3 (ref 4.14–5.8)
SODIUM SERPL-SCNC: 141 MMOL/L (ref 136–145)
WBC NRBC COR # BLD AUTO: 11.85 10*3/MM3 (ref 3.4–10.8)

## 2024-02-10 PROCEDURE — 25810000003 SODIUM CHLORIDE 0.9 % SOLUTION: Performed by: FAMILY MEDICINE

## 2024-02-10 PROCEDURE — 25010000002 CEFEPIME PER 500 MG: Performed by: SURGERY

## 2024-02-10 PROCEDURE — 84100 ASSAY OF PHOSPHORUS: CPT | Performed by: INTERNAL MEDICINE

## 2024-02-10 PROCEDURE — 99232 SBSQ HOSP IP/OBS MODERATE 35: CPT | Performed by: INTERNAL MEDICINE

## 2024-02-10 PROCEDURE — 94761 N-INVAS EAR/PLS OXIMETRY MLT: CPT

## 2024-02-10 PROCEDURE — 25810000003 LACTATED RINGERS PER 1000 ML: Performed by: SURGERY

## 2024-02-10 PROCEDURE — 82948 REAGENT STRIP/BLOOD GLUCOSE: CPT | Performed by: SURGERY

## 2024-02-10 PROCEDURE — 25010000002 METRONIDAZOLE 500 MG/100ML SOLUTION: Performed by: SURGERY

## 2024-02-10 PROCEDURE — 83735 ASSAY OF MAGNESIUM: CPT | Performed by: INTERNAL MEDICINE

## 2024-02-10 PROCEDURE — 94799 UNLISTED PULMONARY SVC/PX: CPT

## 2024-02-10 PROCEDURE — 85027 COMPLETE CBC AUTOMATED: CPT | Performed by: INTERNAL MEDICINE

## 2024-02-10 PROCEDURE — 82948 REAGENT STRIP/BLOOD GLUCOSE: CPT

## 2024-02-10 PROCEDURE — 80048 BASIC METABOLIC PNL TOTAL CA: CPT | Performed by: INTERNAL MEDICINE

## 2024-02-10 PROCEDURE — 94640 AIRWAY INHALATION TREATMENT: CPT

## 2024-02-10 RX ORDER — IPRATROPIUM BROMIDE AND ALBUTEROL SULFATE 2.5; .5 MG/3ML; MG/3ML
3 SOLUTION RESPIRATORY (INHALATION)
Status: DISCONTINUED | OUTPATIENT
Start: 2024-02-10 | End: 2024-02-15 | Stop reason: HOSPADM

## 2024-02-10 RX ORDER — IPRATROPIUM BROMIDE AND ALBUTEROL SULFATE 2.5; .5 MG/3ML; MG/3ML
3 SOLUTION RESPIRATORY (INHALATION)
Status: DISCONTINUED | OUTPATIENT
Start: 2024-02-10 | End: 2024-02-14

## 2024-02-10 RX ORDER — METOPROLOL SUCCINATE 50 MG/1
50 TABLET, EXTENDED RELEASE ORAL ONCE
Status: DISCONTINUED | OUTPATIENT
Start: 2024-02-10 | End: 2024-02-10

## 2024-02-10 RX ORDER — METOPROLOL SUCCINATE 50 MG/1
100 TABLET, EXTENDED RELEASE ORAL 2 TIMES DAILY
Status: DISCONTINUED | OUTPATIENT
Start: 2024-02-10 | End: 2024-02-11

## 2024-02-10 RX ORDER — FENTANYL/ROPIVACAINE/NS/PF 2-625MCG/1
15 PLASTIC BAG, INJECTION (ML) EPIDURAL
Status: COMPLETED | OUTPATIENT
Start: 2024-02-10 | End: 2024-02-10

## 2024-02-10 RX ADMIN — POTASSIUM PHOSPHATE, MONOBASIC POTASSIUM PHOSPHATE, DIBASIC 15 MMOL: 224; 236 INJECTION, SOLUTION, CONCENTRATE INTRAVENOUS at 12:04

## 2024-02-10 RX ADMIN — POTASSIUM PHOSPHATE, MONOBASIC POTASSIUM PHOSPHATE, DIBASIC 15 MMOL: 224; 236 INJECTION, SOLUTION, CONCENTRATE INTRAVENOUS at 08:49

## 2024-02-10 RX ADMIN — MORPHINE SULFATE 30 MG: 30 TABLET, FILM COATED, EXTENDED RELEASE ORAL at 21:30

## 2024-02-10 RX ADMIN — IPRATROPIUM BROMIDE AND ALBUTEROL SULFATE 3 ML: .5; 3 SOLUTION RESPIRATORY (INHALATION) at 15:14

## 2024-02-10 RX ADMIN — ALLOPURINOL 300 MG: 300 TABLET ORAL at 08:48

## 2024-02-10 RX ADMIN — Medication 10 ML: at 08:50

## 2024-02-10 RX ADMIN — LEVOTHYROXINE SODIUM 125 MCG: 125 TABLET ORAL at 05:04

## 2024-02-10 RX ADMIN — POTASSIUM CHLORIDE 40 MEQ: 10 CAPSULE, COATED, EXTENDED RELEASE ORAL at 08:48

## 2024-02-10 RX ADMIN — MORPHINE SULFATE 30 MG: 30 TABLET, FILM COATED, EXTENDED RELEASE ORAL at 08:48

## 2024-02-10 RX ADMIN — SUCRALFATE 1 G: 1 TABLET ORAL at 12:47

## 2024-02-10 RX ADMIN — FOLIC ACID 1 MG: 1 TABLET ORAL at 08:48

## 2024-02-10 RX ADMIN — METOPROLOL SUCCINATE 100 MG: 50 TABLET, EXTENDED RELEASE ORAL at 21:30

## 2024-02-10 RX ADMIN — CEFEPIME 2000 MG: 2 INJECTION, POWDER, FOR SOLUTION INTRAVENOUS at 12:46

## 2024-02-10 RX ADMIN — IPRATROPIUM BROMIDE AND ALBUTEROL SULFATE 3 ML: .5; 3 SOLUTION RESPIRATORY (INHALATION) at 23:19

## 2024-02-10 RX ADMIN — METRONIDAZOLE 500 MG: 500 INJECTION, SOLUTION INTRAVENOUS at 17:50

## 2024-02-10 RX ADMIN — CEFEPIME 2000 MG: 2 INJECTION, POWDER, FOR SOLUTION INTRAVENOUS at 05:03

## 2024-02-10 RX ADMIN — IPRATROPIUM BROMIDE AND ALBUTEROL SULFATE 3 ML: .5; 3 SOLUTION RESPIRATORY (INHALATION) at 05:40

## 2024-02-10 RX ADMIN — IPRATROPIUM BROMIDE AND ALBUTEROL SULFATE 3 ML: .5; 3 SOLUTION RESPIRATORY (INHALATION) at 07:55

## 2024-02-10 RX ADMIN — SODIUM CHLORIDE, POTASSIUM CHLORIDE, SODIUM LACTATE AND CALCIUM CHLORIDE 75 ML/HR: 600; 310; 30; 20 INJECTION, SOLUTION INTRAVENOUS at 05:05

## 2024-02-10 RX ADMIN — AMLODIPINE BESYLATE 2.5 MG: 2.5 TABLET ORAL at 08:48

## 2024-02-10 RX ADMIN — METOPROLOL SUCCINATE 50 MG: 50 TABLET, EXTENDED RELEASE ORAL at 08:49

## 2024-02-10 RX ADMIN — SUCRALFATE 1 G: 1 TABLET ORAL at 08:48

## 2024-02-10 RX ADMIN — ROSUVASTATIN CALCIUM 20 MG: 20 TABLET, FILM COATED ORAL at 21:30

## 2024-02-10 RX ADMIN — METRONIDAZOLE 500 MG: 500 INJECTION, SOLUTION INTRAVENOUS at 08:49

## 2024-02-10 RX ADMIN — SERTRALINE HYDROCHLORIDE 75 MG: 50 TABLET ORAL at 08:48

## 2024-02-10 RX ADMIN — IPRATROPIUM BROMIDE AND ALBUTEROL SULFATE 3 ML: .5; 3 SOLUTION RESPIRATORY (INHALATION) at 12:18

## 2024-02-10 RX ADMIN — PANTOPRAZOLE SODIUM 40 MG: 40 TABLET, DELAYED RELEASE ORAL at 05:03

## 2024-02-10 RX ADMIN — FOLIC ACID 1 MG: 1 TABLET ORAL at 21:30

## 2024-02-10 RX ADMIN — IPRATROPIUM BROMIDE AND ALBUTEROL SULFATE 3 ML: .5; 3 SOLUTION RESPIRATORY (INHALATION) at 20:10

## 2024-02-10 RX ADMIN — CEFEPIME 2000 MG: 2 INJECTION, POWDER, FOR SOLUTION INTRAVENOUS at 21:30

## 2024-02-10 NOTE — PROGRESS NOTES
[facility]   Hospitalist Progress Note  Date: 2/10/2024  Patient Name: Radhames Colón  : 1950  MRN: [id number]  Date of admission: 2024  Consultants:   -General Surgery: Dr. Kaley De Leon  -Vascular Surgery: Dr. Asher Monzon, Dr. Abelino Gonzales  -Gastroenterology: Dr. Chalino Zepeda    Subjective   Subjective     Chief Complaint: Decreased appetite, nausea    Summary:   Radhames Colón is a 73 y.o. male with CKD stage IIIa, CAD, PAD, COPD, essential hypertension, hyperlipidemia, bilateral carotid artery stenosis, polyarthropathy sleep apnea who presented to ED with decreased appetite and nausea without vomiting.  Patient had experienced significant weight loss over the past few months, of note patient's wife passed away approximately 4 months ago and he initially, he felt weight loss was due to decreased appetite secondary to depression, however, his appetite and weight loss have progressively worsened.  Eval in ED significant for CT scan showing portal venous gas with gas extending into the liver and pancolitis.  Case was discussed initially with gastroenterology who did not feel comfortable giving patient had [facility], however, vascular surgery was contacted due to patient wishes to stay at [facility] for care and not being transferred to [facility]. Vascular surgery reviewed imaging and it noted that patient's SMA is patent with mild perhaps mild to moderate stenosis and recommended conservative therapy.  General Surgery evaluated patient and recommended continued monitoring and no indication for urgent surgical intervention at this time.  Gastroenterology consulted and evaluated patient.  Attempts made to advance diet but patient continued not having much of an appetite.  Discussed case again with vascular surgery and decision made to pursue angiography with possible endovascular intervention.  Angiogram performed on 2024 and no significant superior mesenteric artery  stenosis noted and no intervention was required.    Interval Followup:   No acute events overnight.  Patient denies any chest pain or shortness of breath.  Clear liquid diet ongoing.  Patient did have EGD yesterday that showed gastritis with hemorrhage and patient started on daily Protonix as well as Carafate 1 g 3 times daily.  Patient did have some tachycardia on telemetry.  Nursing with no additional acute issues to report.    Antibiotics:   -Cefepime  -Flagyl    Pain Medication:   -IV morphine    Objective   Objective     Vitals:   Temp:  [97.2 °F (36.2 °C)-98.8 °F (37.1 °C)] 97.5 °F (36.4 °C)  Heart Rate:  [] 75  Resp:  [18-20] 20  BP: (130-168)/(56-98) 163/65  Physical Exam   Gen: No acute distress, conversant, pleasant, lying in bed  Resp: CTAB, No w/r/r, good aeration, equal chest rise bilaterally  Card: RRR, No m/r/g  Abd: Soft, mild tenderness to palpation, Nondistended, + bowel sounds    Result Review    Result Review:  I have personally reviewed the results as below and agree with these findings:  []  Laboratory:   CMP          2/8/2024    04:52 2/9/2024    04:21 2/10/2024    04:53   CMP   Glucose 123  114  116    BUN 6  6  9    Creatinine 0.74  0.64  0.69    EGFR 94.5  98.7  96.5    Sodium 142  143  141    Potassium 3.3  3.1  4.0    Chloride 107  106  108    Calcium 8.1  7.7  8.2    Total Protein  5.3     Albumin  2.9     Globulin  2.4     Total Bilirubin  0.6     Alkaline Phosphatase  65     AST (SGOT)  44     ALT (SGPT)  33     Albumin/Globulin Ratio  1.2     BUN/Creatinine Ratio 8.1  9.4  13.0    Anion Gap 9.4  12.6  9.3      CBC          2/8/2024    04:51 2/9/2024    04:21 2/10/2024    04:53   CBC   WBC 11.37  10.37  11.85    RBC 4.78  5.13  4.87    Hemoglobin 12.3  13.2  12.5    Hematocrit 38.7  42.0  39.5    MCV 81.0  81.9  81.1    MCH 25.7  25.7  25.7    MCHC 31.8  31.4  31.6    RDW 17.2  17.8  18.1    Platelets 128  130  129    Phosphorus low.  Magnesium within normal limits.  Blood  glucose well-controlled.  [x]  Microbiology: Blood culture (02/04/2024): No growth to date  []  Radiology:   [x]  EKG/Telemetry:    []  Cardiology/Vascular:    []  Pathology:  []  Old records:  []  Other:    Assessment & Plan   Assessment / Plan     Assessment:  Enterocolitis  CKD stage IIIa  Hypokalemia, recurrent  Hypophosphatemia, recurrent  Hypomagnesemia, improved  Unintentional weight loss  Leukocytosis  COPD, not acutely exacerbated  CAD  PAD    Plan:  -General Surgery, Gastroenterology and Vascular Surgery consulted and following, appreciate assistance and recommendations in the care of this patient.  -Stop IV fluids  -Continue Cefepime and Flagyl.  Tailor based on results of infectious workup  -Pain management with as needed IV morphine  -Replace phosphorus IV  -Continue Protonix daily and Carafate 1 g 3 times daily  -Increase metoprolol dosing  -Management discussed with gastroenterology.  EGD results reviewed.  Depending on patient's progression may proceed with colonoscopy next week.  -Patient encouraged to use incentive spirometer throughout the day  -Monitor electrolytes and renal function with BMP, phosphorus level and magnesium level in the AM  -Monitor WBC and Hgb with CBC in the AM  -Clinical course will dictate further management     DVT Prophylaxis: SCDs  Diet: Regular  Dispo: Home when medically appropriate for discharge  Code Status: Full code     Personally reviewed patients labs and imaging, discussed with patient and nurse at bedside. Discussed management with the following consultants: General Surgery, Gastroenterology and Vascular Surgery.     Part of this note may be an electronic transcription/translation of spoken language to printed text using the Dragon dictation system.    DVT prophylaxis:  Mechanical DVT prophylaxis orders are present.        CODE STATUS:   Code Status (Patient has no pulse and is not breathing): CPR (Attempt to Resuscitate)  Medical Interventions (Patient has pulse  or is breathing): Full Support      Electronically signed by Chandu Suh MD, 02/10/24, 2:11 PM EST.

## 2024-02-10 NOTE — PLAN OF CARE
Goal Outcome Evaluation:  Plan of Care Reviewed With: patient        Progress: no change  Outcome Evaluation: VSS. No complaint of pain. No bowel movement. Voiding in urinal. BGL monitored. Pt SOA throughout night. Recieved breathing tx order. Right groin site is soft and bruised. IV abx given per order.

## 2024-02-11 PROBLEM — I47.10 PSVT (PAROXYSMAL SUPRAVENTRICULAR TACHYCARDIA): Status: ACTIVE | Noted: 2024-02-11

## 2024-02-11 LAB
ANION GAP SERPL CALCULATED.3IONS-SCNC: 11.8 MMOL/L (ref 5–15)
BASOPHILS # BLD AUTO: 0.06 10*3/MM3 (ref 0–0.2)
BASOPHILS NFR BLD AUTO: 0.6 % (ref 0–1.5)
BUN SERPL-MCNC: 6 MG/DL (ref 8–23)
BUN/CREAT SERPL: 9.2 (ref 7–25)
CALCIUM SPEC-SCNC: 8.4 MG/DL (ref 8.6–10.5)
CHLORIDE SERPL-SCNC: 107 MMOL/L (ref 98–107)
CO2 SERPL-SCNC: 20.2 MMOL/L (ref 22–29)
CREAT SERPL-MCNC: 0.65 MG/DL (ref 0.76–1.27)
DEPRECATED RDW RBC AUTO: 50.6 FL (ref 37–54)
EGFRCR SERPLBLD CKD-EPI 2021: 98.3 ML/MIN/1.73
EOSINOPHIL # BLD AUTO: 0.55 10*3/MM3 (ref 0–0.4)
EOSINOPHIL NFR BLD AUTO: 5.5 % (ref 0.3–6.2)
ERYTHROCYTE [DISTWIDTH] IN BLOOD BY AUTOMATED COUNT: 18.2 % (ref 12.3–15.4)
GLUCOSE BLDC GLUCOMTR-MCNC: 120 MG/DL (ref 70–99)
GLUCOSE BLDC GLUCOMTR-MCNC: 129 MG/DL (ref 70–99)
GLUCOSE BLDC GLUCOMTR-MCNC: 139 MG/DL (ref 70–99)
GLUCOSE BLDC GLUCOMTR-MCNC: 161 MG/DL (ref 70–99)
GLUCOSE SERPL-MCNC: 129 MG/DL (ref 65–99)
HCT VFR BLD AUTO: 37.4 % (ref 37.5–51)
HGB BLD-MCNC: 11.9 G/DL (ref 13–17.7)
IMM GRANULOCYTES # BLD AUTO: 0.03 10*3/MM3 (ref 0–0.05)
IMM GRANULOCYTES NFR BLD AUTO: 0.3 % (ref 0–0.5)
LYMPHOCYTES # BLD AUTO: 1.34 10*3/MM3 (ref 0.7–3.1)
LYMPHOCYTES NFR BLD AUTO: 13.3 % (ref 19.6–45.3)
MAGNESIUM SERPL-MCNC: 1.6 MG/DL (ref 1.6–2.4)
MCH RBC QN AUTO: 25.4 PG (ref 26.6–33)
MCHC RBC AUTO-ENTMCNC: 31.8 G/DL (ref 31.5–35.7)
MCV RBC AUTO: 79.7 FL (ref 79–97)
MONOCYTES # BLD AUTO: 0.87 10*3/MM3 (ref 0.1–0.9)
MONOCYTES NFR BLD AUTO: 8.6 % (ref 5–12)
NEUTROPHILS NFR BLD AUTO: 7.24 10*3/MM3 (ref 1.7–7)
NEUTROPHILS NFR BLD AUTO: 71.7 % (ref 42.7–76)
NRBC BLD AUTO-RTO: 0 /100 WBC (ref 0–0.2)
PHOSPHATE SERPL-MCNC: 1.7 MG/DL (ref 2.5–4.5)
PLATELET # BLD AUTO: 143 10*3/MM3 (ref 140–450)
PMV BLD AUTO: 11.4 FL (ref 6–12)
POTASSIUM SERPL-SCNC: 3.6 MMOL/L (ref 3.5–5.2)
QT INTERVAL: 296 MS
QT INTERVAL: 383 MS
QT INTERVAL: 449 MS
QTC INTERVAL: 491 MS
QTC INTERVAL: 511 MS
QTC INTERVAL: 563 MS
RBC # BLD AUTO: 4.69 10*6/MM3 (ref 4.14–5.8)
SODIUM SERPL-SCNC: 139 MMOL/L (ref 136–145)
WBC NRBC COR # BLD AUTO: 10.09 10*3/MM3 (ref 3.4–10.8)

## 2024-02-11 PROCEDURE — 99222 1ST HOSP IP/OBS MODERATE 55: CPT | Performed by: INTERNAL MEDICINE

## 2024-02-11 PROCEDURE — 25010000002 METRONIDAZOLE 500 MG/100ML SOLUTION: Performed by: SURGERY

## 2024-02-11 PROCEDURE — 80048 BASIC METABOLIC PNL TOTAL CA: CPT | Performed by: INTERNAL MEDICINE

## 2024-02-11 PROCEDURE — 94761 N-INVAS EAR/PLS OXIMETRY MLT: CPT

## 2024-02-11 PROCEDURE — 94799 UNLISTED PULMONARY SVC/PX: CPT

## 2024-02-11 PROCEDURE — 82948 REAGENT STRIP/BLOOD GLUCOSE: CPT

## 2024-02-11 PROCEDURE — 25810000003 SODIUM CHLORIDE 0.9 % SOLUTION: Performed by: INTERNAL MEDICINE

## 2024-02-11 PROCEDURE — 84100 ASSAY OF PHOSPHORUS: CPT | Performed by: INTERNAL MEDICINE

## 2024-02-11 PROCEDURE — 94664 DEMO&/EVAL PT USE INHALER: CPT

## 2024-02-11 PROCEDURE — 93005 ELECTROCARDIOGRAM TRACING: CPT | Performed by: INTERNAL MEDICINE

## 2024-02-11 PROCEDURE — 63710000001 INSULIN LISPRO (HUMAN) PER 5 UNITS: Performed by: SURGERY

## 2024-02-11 PROCEDURE — 93010 ELECTROCARDIOGRAM REPORT: CPT | Performed by: INTERNAL MEDICINE

## 2024-02-11 PROCEDURE — 99232 SBSQ HOSP IP/OBS MODERATE 35: CPT | Performed by: INTERNAL MEDICINE

## 2024-02-11 PROCEDURE — 83735 ASSAY OF MAGNESIUM: CPT | Performed by: INTERNAL MEDICINE

## 2024-02-11 PROCEDURE — 25010000002 CEFEPIME PER 500 MG: Performed by: SURGERY

## 2024-02-11 PROCEDURE — 85025 COMPLETE CBC W/AUTO DIFF WBC: CPT | Performed by: INTERNAL MEDICINE

## 2024-02-11 PROCEDURE — 25010000002 MAGNESIUM SULFATE 4 GM/100ML SOLUTION: Performed by: INTERNAL MEDICINE

## 2024-02-11 PROCEDURE — 82948 REAGENT STRIP/BLOOD GLUCOSE: CPT | Performed by: SURGERY

## 2024-02-11 RX ORDER — FENTANYL/ROPIVACAINE/NS/PF 2-625MCG/1
15 PLASTIC BAG, INJECTION (ML) EPIDURAL
Status: COMPLETED | OUTPATIENT
Start: 2024-02-11 | End: 2024-02-11

## 2024-02-11 RX ORDER — DILTIAZEM HCL IN NACL,ISO-OSM 125 MG/125
5-15 PLASTIC BAG, INJECTION (ML) INTRAVENOUS
Status: DISCONTINUED | OUTPATIENT
Start: 2024-02-11 | End: 2024-02-12

## 2024-02-11 RX ORDER — MAGNESIUM SULFATE HEPTAHYDRATE 40 MG/ML
4 INJECTION, SOLUTION INTRAVENOUS ONCE
Status: COMPLETED | OUTPATIENT
Start: 2024-02-11 | End: 2024-02-11

## 2024-02-11 RX ORDER — METOPROLOL SUCCINATE 50 MG/1
100 TABLET, EXTENDED RELEASE ORAL ONCE
Status: COMPLETED | OUTPATIENT
Start: 2024-02-11 | End: 2024-02-11

## 2024-02-11 RX ORDER — METOPROLOL SUCCINATE 50 MG/1
200 TABLET, EXTENDED RELEASE ORAL 2 TIMES DAILY
Status: DISCONTINUED | OUTPATIENT
Start: 2024-02-11 | End: 2024-02-15 | Stop reason: HOSPADM

## 2024-02-11 RX ADMIN — CEFEPIME 2000 MG: 2 INJECTION, POWDER, FOR SOLUTION INTRAVENOUS at 20:47

## 2024-02-11 RX ADMIN — IPRATROPIUM BROMIDE AND ALBUTEROL SULFATE 3 ML: .5; 3 SOLUTION RESPIRATORY (INHALATION) at 23:19

## 2024-02-11 RX ADMIN — FOLIC ACID 1 MG: 1 TABLET ORAL at 20:49

## 2024-02-11 RX ADMIN — SUCRALFATE 1 G: 1 TABLET ORAL at 08:25

## 2024-02-11 RX ADMIN — METRONIDAZOLE 500 MG: 500 INJECTION, SOLUTION INTRAVENOUS at 08:26

## 2024-02-11 RX ADMIN — METOPROLOL TARTRATE 5 MG: 1 INJECTION, SOLUTION INTRAVENOUS at 01:42

## 2024-02-11 RX ADMIN — Medication 10 ML: at 20:47

## 2024-02-11 RX ADMIN — POTASSIUM PHOSPHATE, MONOBASIC POTASSIUM PHOSPHATE, DIBASIC 15 MMOL: 224; 236 INJECTION, SOLUTION, CONCENTRATE INTRAVENOUS at 17:35

## 2024-02-11 RX ADMIN — IPRATROPIUM BROMIDE AND ALBUTEROL SULFATE 3 ML: .5; 3 SOLUTION RESPIRATORY (INHALATION) at 07:50

## 2024-02-11 RX ADMIN — METRONIDAZOLE 500 MG: 500 INJECTION, SOLUTION INTRAVENOUS at 17:10

## 2024-02-11 RX ADMIN — ROSUVASTATIN CALCIUM 20 MG: 20 TABLET, FILM COATED ORAL at 20:53

## 2024-02-11 RX ADMIN — MORPHINE SULFATE 30 MG: 30 TABLET, FILM COATED, EXTENDED RELEASE ORAL at 08:25

## 2024-02-11 RX ADMIN — DOCUSATE SODIUM 50MG AND SENNOSIDES 8.6MG 2 TABLET: 8.6; 5 TABLET, FILM COATED ORAL at 20:49

## 2024-02-11 RX ADMIN — METRONIDAZOLE 500 MG: 500 INJECTION, SOLUTION INTRAVENOUS at 01:42

## 2024-02-11 RX ADMIN — LEVOTHYROXINE SODIUM 125 MCG: 125 TABLET ORAL at 05:34

## 2024-02-11 RX ADMIN — ALLOPURINOL 300 MG: 300 TABLET ORAL at 08:25

## 2024-02-11 RX ADMIN — Medication 10 ML: at 08:26

## 2024-02-11 RX ADMIN — POTASSIUM PHOSPHATE, MONOBASIC POTASSIUM PHOSPHATE, DIBASIC 15 MMOL: 224; 236 INJECTION, SOLUTION, CONCENTRATE INTRAVENOUS at 14:34

## 2024-02-11 RX ADMIN — SERTRALINE HYDROCHLORIDE 75 MG: 50 TABLET ORAL at 08:25

## 2024-02-11 RX ADMIN — IPRATROPIUM BROMIDE AND ALBUTEROL SULFATE 3 ML: .5; 3 SOLUTION RESPIRATORY (INHALATION) at 16:42

## 2024-02-11 RX ADMIN — POTASSIUM CHLORIDE 40 MEQ: 10 CAPSULE, COATED, EXTENDED RELEASE ORAL at 08:25

## 2024-02-11 RX ADMIN — METOPROLOL SUCCINATE 100 MG: 50 TABLET, EXTENDED RELEASE ORAL at 17:10

## 2024-02-11 RX ADMIN — SUCRALFATE 1 G: 1 TABLET ORAL at 17:10

## 2024-02-11 RX ADMIN — FOLIC ACID 1 MG: 1 TABLET ORAL at 08:26

## 2024-02-11 RX ADMIN — DOCUSATE SODIUM 50MG AND SENNOSIDES 8.6MG 2 TABLET: 8.6; 5 TABLET, FILM COATED ORAL at 08:25

## 2024-02-11 RX ADMIN — CEFEPIME 2000 MG: 2 INJECTION, POWDER, FOR SOLUTION INTRAVENOUS at 05:33

## 2024-02-11 RX ADMIN — SUCRALFATE 1 G: 1 TABLET ORAL at 12:26

## 2024-02-11 RX ADMIN — IPRATROPIUM BROMIDE AND ALBUTEROL SULFATE 3 ML: .5; 3 SOLUTION RESPIRATORY (INHALATION) at 12:23

## 2024-02-11 RX ADMIN — METOPROLOL SUCCINATE 100 MG: 50 TABLET, EXTENDED RELEASE ORAL at 08:26

## 2024-02-11 RX ADMIN — MAGNESIUM SULFATE 4 G: 4 INJECTION INTRAVENOUS at 14:35

## 2024-02-11 RX ADMIN — CEFEPIME 2000 MG: 2 INJECTION, POWDER, FOR SOLUTION INTRAVENOUS at 12:48

## 2024-02-11 RX ADMIN — MORPHINE SULFATE 30 MG: 30 TABLET, FILM COATED, EXTENDED RELEASE ORAL at 20:49

## 2024-02-11 RX ADMIN — DILTIAZEM HYDROCHLORIDE 5 MG/HR: 5 INJECTION INTRAVENOUS at 03:44

## 2024-02-11 RX ADMIN — INSULIN LISPRO 2 UNITS: 100 INJECTION, SOLUTION INTRAVENOUS; SUBCUTANEOUS at 12:44

## 2024-02-11 RX ADMIN — IPRATROPIUM BROMIDE AND ALBUTEROL SULFATE 3 ML: .5; 3 SOLUTION RESPIRATORY (INHALATION) at 02:52

## 2024-02-11 RX ADMIN — METOPROLOL SUCCINATE 200 MG: 50 TABLET, EXTENDED RELEASE ORAL at 20:49

## 2024-02-11 RX ADMIN — IPRATROPIUM BROMIDE AND ALBUTEROL SULFATE 3 ML: .5; 3 SOLUTION RESPIRATORY (INHALATION) at 19:59

## 2024-02-11 RX ADMIN — PANTOPRAZOLE SODIUM 40 MG: 40 TABLET, DELAYED RELEASE ORAL at 05:34

## 2024-02-11 NOTE — CONSULTS
Cardiology Consult Note  [facility] 5TH FLOOR SURGICAL TELEMETRY UNIT          Patient Identification:  Radhames Colón      [id number]  73 y.o.        male  1950           Reason for Consultation: SVT    PCP: Mingo Loja MD    History of Present Illness:     Patient is a 73-year-old with a known history of CAD with bypass prior stent, systolic congestive heart failure, chronic renal failure stage IIIa, carotid artery stenosis, essential tension, and dyslipidemia who presented with increasing weight loss decreased appetite.  The patient evaluation emergency room was found to have evidence of pancolitis.  The patient was noted on monitor to have intermittent tachycardic episodes last night for which the patient was unaware he was notified of the episodes on telemetry.  Heart rate in the 140s last for several hours.  Today he is back in normal sinus rhythm with intermittent PVCs and ectopic atrial beats.  The patient at home had not been reporting any tachycardic issues or increased work of breath or chest pain problems.  He has not had any syncope or presyncopal spells      Past History:  Past Medical History:   Diagnosis Date    Abdominal aortic aneurysm without rupture     Acute renal failure (ARF)     WAS SHORT TERM DIALYSIS, STAGE 3    Arthritis     Atrophy of thyroid (acquired)     Bilateral carotid artery stenosis     Chronic airway obstruction     Chronic gouty arthritis     Chronic kidney disease, stage 3     Controlled diabetes mellitus type II without complication     COPD (chronic obstructive pulmonary disease)     Coronary artery disease involving coronary bypass graft of native heart without angina pectoris 03/28/2012    with previous bypass surgery (2005 x3) and subsequent stent/PCI of the native circumflex obtuse marginal branch in January 2018    Depression     Disease of liver     Elevated carcinoembryonic antigen (CEA)     GERD (gastroesophageal reflux disease)      Hypertension, essential 07/14/2021    Hypothyroidism     Iron deficiency anemia     Long term (current) use of opiate analgesic     Lumbar spondylosis     Malignant neoplasm of colon     Mixed hyperlipidemia 07/14/2021    Peripheral artery disease     Polyarthropathy     Rhabdomyolysis     Sleep apnea     CPAP     Past Surgical History:   Procedure Laterality Date    ABDOMINAL AORTIC ANEURYSM REPAIR      ANGIOPLASTY FEMORAL ARTERY Left 9/14/2020    Procedure: AORTOILLOFEMORAL ARTERIOGRAM;  Surgeon: Chula Nam Jr., MD;  Location: [facility] OR 18/19;  Service: Vascular;  Laterality: Left;    APPENDECTOMY      CARDIAC CATHETERIZATION      CAROTID ENDARTERECTOMY Left 2005    CAROTID ENDARTERECTOMY Right 2010    CHOLECYSTECTOMY OPEN      COLON SURGERY      COLON RESECTION    COLONOSCOPY  2004,11/2018    Dr. Lamas 2004,     CORONARY ANGIOPLASTY WITH STENT PLACEMENT      CORONARY ARTERY BYPASS GRAFT  2005    ENDOSCOPY      ERCP N/A 12/15/2021    Procedure: ENDOSCOPIC RETROGRADE CHOLANGIOPANCREATOGRAPHY WITH SPINCTEROTOMY, 9-12MM BALLOON SWEEP, INSERTION OF 10FR 9CM BILIARY STENT;  Surgeon: Eden Hernandez MD;  Location: [facility] ENDOSCOPY;  Service: Gastroenterology;  Laterality: N/A;  BILE DUCT STONES    ERCP N/A 1/6/2022    Procedure: ENDOSCOPIC RETROGRADE CHOLANGIOPANCREATOGRAPHY WITH STENT REMOVAL, SPYGLASS, AUTOLITH, 8-10MM BALLOON DILATATION, 9-12 AND 12-15MM BALLOON SWEEP;  Surgeon: Wayne Zepeda MD;  Location: [facility] ENDOSCOPY;  Service: Gastroenterology;  Laterality: N/A;  BILE DUCT STONES    FEMORAL ENDARTERECTOMY Left 9/14/2020    Procedure: LEFT FEMORAL ENDARECTOMY WITHH PATCH ANGIOPLASTY;  Surgeon: Chula Nam Jr., MD;  Location: [facility] OR 18/19;  Service: Vascular;  Laterality: Left;    GALLBLADDER SURGERY      INTERVENTIONAL RADIOLOGY PROCEDURE N/A 2/8/2024    Procedure: Aortogram with mesenteric angiogram, possible angioplasty or stenting;  Surgeon: Janet  MD Abelino;  Location: [facility] INVASIVE LOCATION;  Service: Peripheral Vascular;  Laterality: N/A;    RHINOPLASTY Bilateral     70-80% R, 50% L     Allergies   Allergen Reactions    Penicillins Shortness Of Breath    Ticagrelor Shortness Of Breath    Penicillin G Provider Review Needed and Unknown - Low Severity     Social History     Socioeconomic History    Marital status:    Tobacco Use    Smoking status: Former     Packs/day: 1.00     Years: 45.00     Additional pack years: 0.00     Total pack years: 45.00     Types: Cigarettes     Quit date: 2005     Years since quittin.4    Smokeless tobacco: Never   Vaping Use    Vaping Use: Never used   Substance and Sexual Activity    Alcohol use: Not Currently     Comment: RARE    Drug use: Never    Sexual activity: Defer     Family History   Problem Relation Age of Onset    Heart failure Mother     Malig Hyperthermia Neg Hx        Medications:  Prior to Admission medications    Medication Sig Start Date End Date Taking? Authorizing Provider   allopurinol (ZYLOPRIM) 300 MG tablet Take 1 tablet by mouth Daily. 10/24/23  Yes Katheryn Montana MD   amLODIPine (NORVASC) 2.5 MG tablet TAKE 1 TABLET EVERY DAY  Patient taking differently: Take 1 tablet by mouth Daily. 23  Yes Mingo Loja MD   aspirin 81 MG EC tablet Take 1 tablet by mouth Daily.   Yes Emergency, Nurse Epic, RN   Cholecalciferol (Vitamin D3) 50 MCG (2000 UT) capsule Take 1 capsule by mouth Daily.   Yes Katheryn Montana MD   folic acid (FOLVITE) 1 MG tablet TAKE 1 TABLET TWICE DAILY  Patient taking differently: Take 1 tablet by mouth 2 (Two) Times a Day. 23  Yes Mingo Loja MD   furosemide (LASIX) 40 MG tablet TAKE 1 TABLET EVERY DAY  Patient taking differently: Take 1 tablet by mouth Daily. 3/20/23  Yes Mingo Loja MD   insulin NPH (humuLIN N,novoLIN N) 100 UNIT/ML injection Inject 40 Units under the skin into the appropriate area as  directed Every Night. HOLD INSULIN Wednesday NIGHT BLOOD SUGARS RUN LOW 85-95   Yes Katheryn Montana MD   levothyroxine (SYNTHROID, LEVOTHROID) 125 MCG tablet TAKE 1 TABLET EVERY DAY  Patient taking differently: Take 1 tablet by mouth Every Morning. 4/17/23  Yes Mingo Loja MD   metFORMIN (GLUCOPHAGE) 500 MG tablet TAKE 1 TABLET TWICE DAILY WITH A MEAL  Patient taking differently: Take 1 tablet by mouth 2 (Two) Times a Day With Meals. 8/14/23  Yes Mingo Loja MD   metoprolol succinate XL (TOPROL-XL) 50 MG 24 hr tablet TAKE 1 TABLET TWICE DAILY  Patient taking differently: Take 1 tablet by mouth 2 (Two) Times a Day. 10/2/23  Yes Mingo Loja MD   Morphine (MS CONTIN) 30 MG 12 hr tablet Take 1 tablet by mouth 2 (Two) Times a Day. 8/29/20  Yes Katheryn Montana MD   rosuvastatin (CRESTOR) 20 MG tablet TAKE 1 TABLET EVERY DAY  Patient taking differently: Take 1 tablet by mouth Every Night. 6/28/23  Yes Mingo Loja MD   sertraline (Zoloft) 25 MG tablet Take 1 tablet by mouth Daily.  Patient taking differently: Take 1 tablet by mouth Daily. With 50mg tablet to = 75mg daily 1/18/24  Yes Mingo Loja MD   sertraline (Zoloft) 50 MG tablet Take 1 tablet by mouth Daily.  Patient taking differently: Take 1 tablet by mouth Daily. With 25mg tablet to = 75mg daily 1/18/24  Yes Mingo Loja MD   triamcinolone (KENALOG) 0.1 % cream Apply 1 Application topically to the appropriate area as directed 2 (Two) Times a Day As Needed for Rash or Irritation.   Yes Katheryn Montana MD   vitamin B-12 (CYANOCOBALAMIN) 1000 MCG tablet TAKE 1 TABLET EVERY DAY  Patient taking differently: Take 1 tablet by mouth Daily. 10/2/23  Yes Mingo Loja MD   vitamin C (ASCORBIC ACID) 500 MG tablet TAKE 1 TABLET EVERY DAY  Patient taking differently: Take 1 tablet by mouth Daily. 6/12/23  Yes Mingo Loja MD   temazepam (RESTORIL) 15 MG capsule Take 1  "capsule by mouth At Night As Needed for Sleep. 2/6/24   Mingo Loja MD      Current medications:  allopurinol, 300 mg, Oral, Daily  cefepime, 2,000 mg, Intravenous, Q8H  folic acid, 1 mg, Oral, BID  insulin lispro, 2-9 Units, Subcutaneous, 4x Daily AC & at Bedtime  ipratropium-albuterol, 3 mL, Nebulization, Q4H - RT  levothyroxine, 125 mcg, Oral, Q AM  metoprolol succinate XL, 100 mg, Oral, BID  metroNIDAZOLE, 500 mg, Intravenous, Q8H  Morphine, 30 mg, Oral, BID  pantoprazole, 40 mg, Oral, Q AM  potassium chloride, 40 mEq, Oral, Daily  rosuvastatin, 20 mg, Oral, Nightly  senna-docusate sodium, 2 tablet, Oral, BID  sertraline, 75 mg, Oral, Daily  sodium chloride, 10 mL, Intravenous, Q12H  sucralfate, 1 g, Oral, TID AC      Current IV drips:  dilTIAZem, 5-15 mg/hr, Last Rate: 10 mg/hr (02/11/24 0420)        Review of Systems   Constitutional: Negative for chills, fever and weight loss.   HENT:  Negative for congestion and nosebleeds.    Cardiovascular:  Negative for orthopnea and paroxysmal nocturnal dyspnea.   Respiratory:  Negative for cough and shortness of breath.    Endocrine: Negative for cold intolerance and heat intolerance.   Skin:  Negative for rash.   Musculoskeletal:  Negative for back pain and muscle weakness.   Gastrointestinal:  Negative for abdominal pain, nausea and vomiting.   Genitourinary:  Negative for dysuria and nocturia.   Neurological:  Negative for dizziness and light-headedness.   Psychiatric/Behavioral:  Negative for altered mental status and hallucinations.          Physical Exam    /74 (BP Location: Left arm, Patient Position: Lying)   Pulse 96   Temp 98.1 °F (36.7 °C) (Oral)   Resp 18   Ht 175.3 cm (69\")   Wt 84.4 kg (186 lb 1.1 oz)   SpO2 90%   BMI 27.48 kg/m²  Body mass index is 27.48 kg/m².   Oxygen saturation   @FLOWAN(10::1)@ SpO2  Min: 90 %  Max: 96 %    General Appearance:   no acute distress  Alert and oriented x3  HENT:   lips not " cyanotic  Atraumatic  Neck:  thyroid not enlarged  supple  Respiratory:  no respiratory distress  normal breath sounds  no rales  Cardiovascular:  no jugular venous distention  regular rhythm  apical impulse normal  S1 normal, S2 normal  no S3, no S4   no murmur  no rub, no thrill  no carotid bruit  pedal pulses normal  lower extremity edema: none    Gastrointestinal:   bowel sounds normal  non-tender  no hepatomegaly, no splenomegaly  Musculoskeletal:  no clubbing of fingers.   normocephalic, head atraumatic  Skin:   warm, dry  No rashes  Neuro/Psychiatric:  normal mood and affect  judgement and insight appropriate      Cardiographics:     ECG  (personally reviewed)          Telemetry:  (personally reviewed) normal sinus rhythm with ectopic atrial beats as well as runs of paroxysmal SVT rate in the 140s some of these episodes may have been an ectopic atrial tachycardia versus atrial flutter      Results for orders placed in visit on 04/04/22    Adult Transthoracic Echo Complete W/ Cont if Necessary Per Protocol    Interpretation Summary  · Left ventricular ejection fraction appears to be 46 - 50%. Left ventricular systolic function is mildly decreased.  · The following left ventricular wall segments are hypokinetic: basal inferolateral, mid inferolateral, mid inferior, basal inferior and basal inferoseptal.  · Left ventricular diastolic function is consistent with (grade III w/high LAP) fixed restrictive pattern.  · The right ventricular cavity is borderline dilated.  · Estimated right ventricular systolic pressure from tricuspid regurgitation is mildly elevated (35-45 mmHg).  · Mild mitral regurgitation is noted  · Left atrial volume is mildly increased.         No results found for this or any previous visit.     Cardiolite (Tc-99m Sestamibi) stress test                  Lab Review:       CBC          2/8/2024    04:51 2/9/2024    04:21 2/10/2024    04:53   CBC   WBC 11.37  10.37  11.85    RBC 4.78  5.13  4.87   "  Hemoglobin 12.3  13.2  12.5    Hematocrit 38.7  42.0  39.5    MCV 81.0  81.9  81.1    MCH 25.7  25.7  25.7    MCHC 31.8  31.4  31.6    RDW 17.2  17.8  18.1    Platelets 128  130  129        CMP          2/8/2024    04:52 2/9/2024    04:21 2/10/2024    04:53   CMP   Glucose 123  114  116    BUN 6  6  9    Creatinine 0.74  0.64  0.69    EGFR 94.5  98.7  96.5    Sodium 142  143  141    Potassium 3.3  3.1  4.0    Chloride 107  106  108    Calcium 8.1  7.7  8.2    Total Protein  5.3     Albumin  2.9     Globulin  2.4     Total Bilirubin  0.6     Alkaline Phosphatase  65     AST (SGOT)  44     ALT (SGPT)  33     Albumin/Globulin Ratio  1.2     BUN/Creatinine Ratio 8.1  9.4  13.0    Anion Gap 9.4  12.6  9.3         CARDIAC LABS:      Lab 02/04/24  1428   PROBNP 1,523.0   HSTROP T 47*      No results found for: \"DIGOXIN\"   Lab Results   Component Value Date    TSH 0.213 (L) 10/16/2023           Invalid input(s): \"LDLCALC\"  No results found for: \"POCTROP\"  No components found for: \"DDIMERQUAN\"  Lab Results   Component Value Date    MG 1.8 02/10/2024             CARDIAC LABS:      Lab 02/04/24  1428   PROBNP 1,523.0   HSTROP T 47*      CHADS-VASc Risk Assessment              5 Total Score    1 Hypertension    2 Age >/= 75    1 DM    1 Vascular Disease        Criteria that do not apply:    CHF    PRIOR STROKE/TIA/THROMBO    Age 65-74    Sex: Female          Number  Imaging:  CXR     Assessment:    Colitis    Weight loss, non-intentional    Chronic mesenteric ischemia    Other specified soft tissue disorders      PSVT now back to normal sinus rhythm with just intermittent atrial ectopy episode yesterday lasted a few hours rate was about the 140s this could have represented possible atrial flutter versus an ectopic atrial tachycardia very difficult to discern P waves clearly to confirm recommend up titration of his Toprol to 200 twice daily as well as treatment with aspirin 81 once a day for anticoagulation purposes for the " time being.  Will continue monitor on telemetry correct electrolytes as needed to keep potassium greater than 4 magnesium continue    Plan:  1.  Increase Toprol to 200 twice daily  2.  Aspir 81 daily  3.  Magnesium 1 g IV  4.  Repeat BMP magnesium in a.m.      Thank you for allowing us to share in [facility].            Garrett Oliveira MD   2/11/2024    08:35 EST

## 2024-02-11 NOTE — PROGRESS NOTES
[facility]   Hospitalist Progress Note  Date: 2024  Patient Name: Radhames Colón  : 1950  MRN: [id number]  Date of admission: 2024  Consultants:   -General Surgery: Dr. Kaley De Leon  -Vascular Surgery: Dr. Asher Monzon, Dr. Abelino Gonzales  -Gastroenterology: Dr. Chalino Zepeda  -Cardiology: Dr. Garrett Oliveira    Subjective   Subjective     Chief Complaint: Decreased appetite, nausea    Summary:   Radhames Colón is a 73 y.o. male with CKD stage IIIa, CAD, PAD, COPD, essential hypertension, hyperlipidemia, bilateral carotid artery stenosis, polyarthropathy sleep apnea who presented to ED with decreased appetite and nausea without vomiting.  Patient had experienced significant weight loss over the past few months, of note patient's wife passed away approximately 4 months ago and he initially, he felt weight loss was due to decreased appetite secondary to depression, however, his appetite and weight loss have progressively worsened.  Eval in ED significant for CT scan showing portal venous gas with gas extending into the liver and pancolitis.  Case was discussed initially with gastroenterology who did not feel comfortable giving patient had [facility], however, vascular surgery was contacted due to patient wishes to stay at [facility] for care and not being transferred to [place]. Vascular surgery reviewed imaging and it noted that patient's SMA is patent with mild perhaps mild to moderate stenosis and recommended conservative therapy.  General Surgery evaluated patient and recommended continued monitoring and no indication for urgent surgical intervention at this time.  Gastroenterology consulted and evaluated patient.  Attempts made to advance diet but patient continued not having much of an appetite.  Discussed case again with vascular surgery and decision made to pursue angiography with possible endovascular intervention.  Angiogram performed on 2024 and no significant  superior mesenteric artery stenosis noted and no intervention was required.  Patient continued to have symptoms and EGD was done on 02/09/2024 that showed gastritis with hemorrhage and patient was started on daily Protonix as well as Carafate.    Interval Followup:   Issues with rate control overnight.  Patient had to be started on diltiazem drip.  Cardiology consulted to assist in care.  Heart rate with improved control on exam today and patient still on diltiazem drip.  Patient was able to eat some of his breakfast but overall symptoms unchanged.  Denied any shortness of breath.  Nursing with no additional acute issues to report.    Antibiotics:   -Cefepime  -Flagyl    Objective   Objective     Vitals:   Temp:  [97.8 °F (36.6 °C)-98.6 °F (37 °C)] 97.8 °F (36.6 °C)  Heart Rate:  [] 71  Resp:  [18-20] 20  BP: (124-169)/(56-95) 124/56  Physical Exam   Gen: No acute distress, lying in bed, pleasant, conversant  Resp: CTAB, No w/r/r, normal respiratory effort, equal chest rise bilaterally  Card: RRR, No m/r/g  Abd: Soft, mild tenderness to palpation, Nondistended, + bowel sounds    Result Review    Result Review:  I have personally reviewed the results as below and agree with these findings:  []  Laboratory:   CMP          2/9/2024    04:21 2/10/2024    04:53 2/11/2024    08:46   CMP   Glucose 114  116  129    BUN 6  9  6    Creatinine 0.64  0.69  0.65    EGFR 98.7  96.5  98.3    Sodium 143  141  139    Potassium 3.1  4.0  3.6    Chloride 106  108  107    Calcium 7.7  8.2  8.4    Total Protein 5.3      Albumin 2.9      Globulin 2.4      Total Bilirubin 0.6      Alkaline Phosphatase 65      AST (SGOT) 44      ALT (SGPT) 33      Albumin/Globulin Ratio 1.2      BUN/Creatinine Ratio 9.4  13.0  9.2    Anion Gap 12.6  9.3  11.8      CBC          2/9/2024    04:21 2/10/2024    04:53 2/11/2024    08:46   CBC   WBC 10.37  11.85  10.09    RBC 5.13  4.87  4.69    Hemoglobin 13.2  12.5  11.9    Hematocrit 42.0  39.5  37.4     MCV 81.9  81.1  79.7    MCH 25.7  25.7  25.4    MCHC 31.4  31.6  31.8    RDW 17.8  18.1  18.2    Platelets 130  129  143    Phosphorus and magnesium low.  []  Microbiology:   []  Radiology:   [x]  EKG/Telemetry:    []  Cardiology/Vascular:    []  Pathology:  []  Old records:  []  Other:    Assessment & Plan   Assessment / Plan     Assessment:  Enterocolitis  CKD stage IIIa  Hypokalemia, recurrent  Hypophosphatemia, recurrent  Hypomagnesemia, recurrent  Unintentional weight loss  Leukocytosis  COPD, not acutely exacerbated  CAD  PAD    Plan:  -General Surgery, Gastroenterology and Vascular Surgery consulted and following, appreciate assistance and recommendations in the care of this patient.  -Replace phosphorus, potassium and magnesium IV  -Continue diltiazem drip  -Given issues with rate control cardiology consulted to assist in care of the patient  -Continue Protonix daily Carafate 1 g 3 times daily  -Continue metoprolol  -Encouraged use of incentive spirometer  -Continue cefepime and Flagyl  -Will monitor electrolytes and renal function with BMP, phosphorus level and magnesium level in the AM  -Will monitor WBC and Hgb with CBC in the AM  -Clinical course will dictate further management     DVT Prophylaxis: SCDs  Diet: Regular  Dispo: Home when medically appropriate for discharge  Code Status: Full code     Personally reviewed patients labs and imaging, discussed with patient and nurse at bedside. Discussed management with the following consultants: Cardiology, General Surgery, Gastroenterology and Vascular Surgery.     Part of this note may be an electronic transcription/translation of spoken language to printed text using the Dragon dictation system.    DVT prophylaxis:  Mechanical DVT prophylaxis orders are present.        CODE STATUS:   Code Status (Patient has no pulse and is not breathing): CPR (Attempt to Resuscitate)  Medical Interventions (Patient has pulse or is breathing): Full  Support      Electronically signed by Chandu Suh MD, 02/11/24, 12:50 PM EST.

## 2024-02-11 NOTE — PLAN OF CARE
Goal Outcome Evaluation:      Pt up with standby assist. Received phosphorus and magnesium replacement this shift. Pt ate majority of meals today. Pt titrated off Cardizem drip. Cardiology notified, given one time dose of 100mg of metoprolol for increased bp. Pt's HR maintaining in 70s, NSR. No concerns at this time.  Shania Hawk RN

## 2024-02-11 NOTE — PLAN OF CARE
Goal Outcome Evaluation:      Pt up with 1x assist. Turning independently in bed. Pain controlled with scheduled pain medication. No concerns at this time.  Shania Hawk RN

## 2024-02-12 ENCOUNTER — APPOINTMENT (OUTPATIENT)
Dept: GENERAL RADIOLOGY | Facility: HOSPITAL | Age: 74
End: 2024-02-12
Payer: MEDICARE

## 2024-02-12 ENCOUNTER — TELEPHONE (OUTPATIENT)
Dept: INTERNAL MEDICINE | Facility: CLINIC | Age: 74
End: 2024-02-12
Payer: MEDICARE

## 2024-02-12 PROBLEM — I50.23 ACUTE ON CHRONIC HFREF (HEART FAILURE WITH REDUCED EJECTION FRACTION): Status: ACTIVE | Noted: 2023-04-12

## 2024-02-12 LAB
ANION GAP SERPL CALCULATED.3IONS-SCNC: 7.3 MMOL/L (ref 5–15)
BUN SERPL-MCNC: 9 MG/DL (ref 8–23)
BUN/CREAT SERPL: 12 (ref 7–25)
CALCIUM SPEC-SCNC: 8.6 MG/DL (ref 8.6–10.5)
CHLORIDE SERPL-SCNC: 111 MMOL/L (ref 98–107)
CO2 SERPL-SCNC: 23.7 MMOL/L (ref 22–29)
CREAT SERPL-MCNC: 0.75 MG/DL (ref 0.76–1.27)
DEPRECATED RDW RBC AUTO: 53.4 FL (ref 37–54)
EGFRCR SERPLBLD CKD-EPI 2021: 94.1 ML/MIN/1.73
ERYTHROCYTE [DISTWIDTH] IN BLOOD BY AUTOMATED COUNT: 18.7 % (ref 12.3–15.4)
GLUCOSE BLDC GLUCOMTR-MCNC: 118 MG/DL (ref 70–99)
GLUCOSE BLDC GLUCOMTR-MCNC: 138 MG/DL (ref 70–99)
GLUCOSE BLDC GLUCOMTR-MCNC: 142 MG/DL (ref 70–99)
GLUCOSE BLDC GLUCOMTR-MCNC: 166 MG/DL (ref 70–99)
GLUCOSE SERPL-MCNC: 120 MG/DL (ref 65–99)
HCT VFR BLD AUTO: 37.6 % (ref 37.5–51)
HGB BLD-MCNC: 11.8 G/DL (ref 13–17.7)
MAGNESIUM SERPL-MCNC: 2.2 MG/DL (ref 1.6–2.4)
MCH RBC QN AUTO: 25.7 PG (ref 26.6–33)
MCHC RBC AUTO-ENTMCNC: 31.4 G/DL (ref 31.5–35.7)
MCV RBC AUTO: 81.7 FL (ref 79–97)
NT-PROBNP SERPL-MCNC: 5299 PG/ML (ref 0–1800)
PHOSPHATE SERPL-MCNC: 2.9 MG/DL (ref 2.5–4.5)
PLATELET # BLD AUTO: 165 10*3/MM3 (ref 140–450)
PMV BLD AUTO: 12.1 FL (ref 6–12)
POTASSIUM SERPL-SCNC: 4.8 MMOL/L (ref 3.5–5.2)
RBC # BLD AUTO: 4.6 10*6/MM3 (ref 4.14–5.8)
SODIUM SERPL-SCNC: 142 MMOL/L (ref 136–145)
WBC NRBC COR # BLD AUTO: 13.02 10*3/MM3 (ref 3.4–10.8)

## 2024-02-12 PROCEDURE — 25010000002 METRONIDAZOLE 500 MG/100ML SOLUTION: Performed by: SURGERY

## 2024-02-12 PROCEDURE — 94760 N-INVAS EAR/PLS OXIMETRY 1: CPT

## 2024-02-12 PROCEDURE — 84100 ASSAY OF PHOSPHORUS: CPT | Performed by: INTERNAL MEDICINE

## 2024-02-12 PROCEDURE — 80048 BASIC METABOLIC PNL TOTAL CA: CPT | Performed by: INTERNAL MEDICINE

## 2024-02-12 PROCEDURE — 94799 UNLISTED PULMONARY SVC/PX: CPT

## 2024-02-12 PROCEDURE — 99232 SBSQ HOSP IP/OBS MODERATE 35: CPT | Performed by: INTERNAL MEDICINE

## 2024-02-12 PROCEDURE — 71045 X-RAY EXAM CHEST 1 VIEW: CPT

## 2024-02-12 PROCEDURE — 94761 N-INVAS EAR/PLS OXIMETRY MLT: CPT

## 2024-02-12 PROCEDURE — 63710000001 INSULIN LISPRO (HUMAN) PER 5 UNITS: Performed by: SURGERY

## 2024-02-12 PROCEDURE — 83735 ASSAY OF MAGNESIUM: CPT | Performed by: INTERNAL MEDICINE

## 2024-02-12 PROCEDURE — 83880 ASSAY OF NATRIURETIC PEPTIDE: CPT | Performed by: INTERNAL MEDICINE

## 2024-02-12 PROCEDURE — 82948 REAGENT STRIP/BLOOD GLUCOSE: CPT | Performed by: SURGERY

## 2024-02-12 PROCEDURE — 25010000002 FUROSEMIDE PER 20 MG: Performed by: INTERNAL MEDICINE

## 2024-02-12 PROCEDURE — 82948 REAGENT STRIP/BLOOD GLUCOSE: CPT

## 2024-02-12 PROCEDURE — 85027 COMPLETE CBC AUTOMATED: CPT | Performed by: INTERNAL MEDICINE

## 2024-02-12 PROCEDURE — 25010000002 LABETALOL 5 MG/ML SOLUTION: Performed by: INTERNAL MEDICINE

## 2024-02-12 PROCEDURE — 99233 SBSQ HOSP IP/OBS HIGH 50: CPT | Performed by: INTERNAL MEDICINE

## 2024-02-12 RX ORDER — LABETALOL HYDROCHLORIDE 5 MG/ML
10 INJECTION, SOLUTION INTRAVENOUS EVERY 4 HOURS PRN
Status: DISCONTINUED | OUTPATIENT
Start: 2024-02-12 | End: 2024-02-15 | Stop reason: HOSPADM

## 2024-02-12 RX ORDER — FUROSEMIDE 10 MG/ML
40 INJECTION INTRAMUSCULAR; INTRAVENOUS
Status: DISCONTINUED | OUTPATIENT
Start: 2024-02-12 | End: 2024-02-13

## 2024-02-12 RX ORDER — AMLODIPINE BESYLATE 5 MG/1
5 TABLET ORAL DAILY
Status: DISCONTINUED | OUTPATIENT
Start: 2024-02-12 | End: 2024-02-15 | Stop reason: HOSPADM

## 2024-02-12 RX ADMIN — IPRATROPIUM BROMIDE AND ALBUTEROL SULFATE 3 ML: .5; 3 SOLUTION RESPIRATORY (INHALATION) at 06:06

## 2024-02-12 RX ADMIN — DOCUSATE SODIUM 50MG AND SENNOSIDES 8.6MG 2 TABLET: 8.6; 5 TABLET, FILM COATED ORAL at 21:36

## 2024-02-12 RX ADMIN — AMLODIPINE BESYLATE 5 MG: 5 TABLET ORAL at 16:58

## 2024-02-12 RX ADMIN — APIXABAN 5 MG: 5 TABLET, FILM COATED ORAL at 12:45

## 2024-02-12 RX ADMIN — FOLIC ACID 1 MG: 1 TABLET ORAL at 08:27

## 2024-02-12 RX ADMIN — IPRATROPIUM BROMIDE AND ALBUTEROL SULFATE 3 ML: .5; 3 SOLUTION RESPIRATORY (INHALATION) at 23:17

## 2024-02-12 RX ADMIN — MORPHINE SULFATE 30 MG: 30 TABLET, FILM COATED, EXTENDED RELEASE ORAL at 21:36

## 2024-02-12 RX ADMIN — FUROSEMIDE 40 MG: 10 INJECTION, SOLUTION INTRAMUSCULAR; INTRAVENOUS at 18:14

## 2024-02-12 RX ADMIN — INSULIN LISPRO 2 UNITS: 100 INJECTION, SOLUTION INTRAVENOUS; SUBCUTANEOUS at 12:45

## 2024-02-12 RX ADMIN — IPRATROPIUM BROMIDE AND ALBUTEROL SULFATE 3 ML: .5; 3 SOLUTION RESPIRATORY (INHALATION) at 15:28

## 2024-02-12 RX ADMIN — Medication 10 ML: at 10:40

## 2024-02-12 RX ADMIN — MORPHINE SULFATE 30 MG: 30 TABLET, FILM COATED, EXTENDED RELEASE ORAL at 08:29

## 2024-02-12 RX ADMIN — DOCUSATE SODIUM 50MG AND SENNOSIDES 8.6MG 2 TABLET: 8.6; 5 TABLET, FILM COATED ORAL at 08:27

## 2024-02-12 RX ADMIN — ROSUVASTATIN CALCIUM 20 MG: 20 TABLET, FILM COATED ORAL at 21:36

## 2024-02-12 RX ADMIN — ALLOPURINOL 300 MG: 300 TABLET ORAL at 08:27

## 2024-02-12 RX ADMIN — APIXABAN 5 MG: 5 TABLET, FILM COATED ORAL at 21:36

## 2024-02-12 RX ADMIN — LEVOTHYROXINE SODIUM 125 MCG: 125 TABLET ORAL at 05:11

## 2024-02-12 RX ADMIN — SUCRALFATE 1 G: 1 TABLET ORAL at 12:45

## 2024-02-12 RX ADMIN — PANTOPRAZOLE SODIUM 40 MG: 40 TABLET, DELAYED RELEASE ORAL at 05:11

## 2024-02-12 RX ADMIN — LABETALOL HYDROCHLORIDE 10 MG: 5 INJECTION, SOLUTION INTRAVENOUS at 18:48

## 2024-02-12 RX ADMIN — IPRATROPIUM BROMIDE AND ALBUTEROL SULFATE 3 ML: .5; 3 SOLUTION RESPIRATORY (INHALATION) at 08:10

## 2024-02-12 RX ADMIN — SUCRALFATE 1 G: 1 TABLET ORAL at 16:58

## 2024-02-12 RX ADMIN — IPRATROPIUM BROMIDE AND ALBUTEROL SULFATE 3 ML: .5; 3 SOLUTION RESPIRATORY (INHALATION) at 20:17

## 2024-02-12 RX ADMIN — METOPROLOL SUCCINATE 200 MG: 50 TABLET, EXTENDED RELEASE ORAL at 08:27

## 2024-02-12 RX ADMIN — IPRATROPIUM BROMIDE AND ALBUTEROL SULFATE 3 ML: .5; 3 SOLUTION RESPIRATORY (INHALATION) at 11:58

## 2024-02-12 RX ADMIN — SUCRALFATE 1 G: 1 TABLET ORAL at 07:15

## 2024-02-12 RX ADMIN — SERTRALINE HYDROCHLORIDE 75 MG: 50 TABLET ORAL at 08:27

## 2024-02-12 RX ADMIN — METOPROLOL SUCCINATE 200 MG: 50 TABLET, EXTENDED RELEASE ORAL at 21:36

## 2024-02-12 RX ADMIN — FOLIC ACID 1 MG: 1 TABLET ORAL at 21:36

## 2024-02-12 RX ADMIN — METRONIDAZOLE 500 MG: 500 INJECTION, SOLUTION INTRAVENOUS at 00:50

## 2024-02-12 RX ADMIN — FUROSEMIDE 40 MG: 10 INJECTION, SOLUTION INTRAMUSCULAR; INTRAVENOUS at 10:14

## 2024-02-12 NOTE — PLAN OF CARE
Goal Outcome Evaluation:      Pt had shortness of breath this morning with low O2 sats, given prn nebs and started on 2L of O2 NC, had to be increased to 3L for 95% sat. Frequent toileting this shift. Decreased appetite this shift. BP elevated this shift, awaiting PRN med from pharmacy. Pt expressed wishes to go home on hospice at end of shift. No concerns at this time.  Shania Hawk RN

## 2024-02-12 NOTE — PROGRESS NOTES
CARDIOLOGY  INPATIENT PROGRESS NOTE                [facility] 5TH FLOOR SURGICAL TELEMETRY UNIT    2/12/2024      PATIENT IDENTIFICATION:   Name:  Radhames Colón      MRN:  [id number]     73 y.o.  male                 SUBJECTIVE:   Patient with no significant SVT episodes yesterday but does complain of increase shortness of breath overnight and this morning  OBJECTIVE:  Vitals:    02/12/24 0606 02/12/24 0724 02/12/24 0810 02/12/24 1106   BP:  149/93  155/95   BP Location:  Left arm  Left arm   Patient Position:  Lying  Sitting   Pulse:  70 71 72   Resp: 22 22 22 20   Temp:  97.5 °F (36.4 °C)  97.7 °F (36.5 °C)   TempSrc:  Oral  Axillary   SpO2: 90% (!) 86% (!) 89% 95%   Weight:       Height:               Body mass index is 27.48 kg/m².    Intake/Output Summary (Last 24 hours) at 2/12/2024 1114  Last data filed at 2/12/2024 1044  Gross per 24 hour   Intake 1653 ml   Output 1300 ml   Net 353 ml       Telemetry: Normal sinus rhythm with paroxysmal SVT which appeared consistent with atypical atrial flutter    Physical Exam  General Appearance:   no acute distress  Alert and oriented x3  HENT:   lips not cyanotic  Atraumatic  Neck:  No jvd   supple  Respiratory:  no respiratory distress  Bilateral basal crackles  no rales  Cardiovascular:    regular rhythm  no S3, no S4   no murmur  no rub  lower extremity edema: none    Skin:   warm, dry  No rashes      Allergies   Allergen Reactions    Penicillins Shortness Of Breath    Ticagrelor Shortness Of Breath    Penicillin G Provider Review Needed and Unknown - Low Severity     Scheduled meds:  allopurinol, 300 mg, Oral, Daily  folic acid, 1 mg, Oral, BID  furosemide, 40 mg, Intravenous, BID  insulin lispro, 2-9 Units, Subcutaneous, 4x Daily AC & at Bedtime  ipratropium-albuterol, 3 mL, Nebulization, Q4H - RT  levothyroxine, 125 mcg, Oral, Q AM  metoprolol succinate XL, 200 mg, Oral, BID  Morphine, 30 mg, Oral, BID  pantoprazole, 40 mg, Oral, Q AM  rosuvastatin, 20 mg,  "Oral, Nightly  senna-docusate sodium, 2 tablet, Oral, BID  sertraline, 75 mg, Oral, Daily  sodium chloride, 10 mL, Intravenous, Q12H  sucralfate, 1 g, Oral, TID AC      IV meds:                      dilTIAZem, 5-15 mg/hr, Last Rate: Stopped (02/11/24 1430)      Data Review:  CBC          2/10/2024    04:53 2/11/2024    08:46 2/12/2024    03:50   CBC   WBC 11.85  10.09  13.02    RBC 4.87  4.69  4.60    Hemoglobin 12.5  11.9  11.8    Hematocrit 39.5  37.4  37.6    MCV 81.1  79.7  81.7    MCH 25.7  25.4  25.7    MCHC 31.6  31.8  31.4    RDW 18.1  18.2  18.7    Platelets 129  143  165      CMP          2/10/2024    04:53 2/11/2024    08:46 2/12/2024    03:50   CMP   Glucose 116  129  120    BUN 9  6  9    Creatinine 0.69  0.65  0.75    EGFR 96.5  98.3  94.1    Sodium 141  139  142    Potassium 4.0  3.6  4.8    Chloride 108  107  111    Calcium 8.2  8.4  8.6    BUN/Creatinine Ratio 13.0  9.2  12.0    Anion Gap 9.3  11.8  7.3       CARDIAC LABS:      Lab 02/12/24  0350   PROBNP 5,299.0*        No results found for: \"DIGOXIN\"   Lab Results   Component Value Date    TSH 0.213 (L) 10/16/2023           Invalid input(s): \"LDLCALC\"  No results found for: \"POCTROP\"  Lab Results   Component Value Date    TROPONINT 47 (H) 02/04/2024   (  Lab Results   Component Value Date    MG 2.2 02/12/2024     Results for orders placed in visit on 04/04/22    Adult Transthoracic Echo Complete W/ Cont if Necessary Per Protocol    Interpretation Summary  · Left ventricular ejection fraction appears to be 46 - 50%. Left ventricular systolic function is mildly decreased.  · The following left ventricular wall segments are hypokinetic: basal inferolateral, mid inferolateral, mid inferior, basal inferior and basal inferoseptal.  · Left ventricular diastolic function is consistent with (grade III w/high LAP) fixed restrictive pattern.  · The right ventricular cavity is borderline dilated.  · Estimated right ventricular systolic pressure from tricuspid " regurgitation is mildly elevated (35-45 mmHg).  · Mild mitral regurgitation is noted  · Left atrial volume is mildly increased.           ASSESSMENT:    Colitis    CAD s/p Cabg    Chronic HFrEF (heart failure with reduced ejection fraction)    PSVT (paroxysmal supraventricular tachycardia)    Weight loss, non-intentional    Chronic mesenteric ischemia    Other specified soft tissue disorders    Atypical flutter symptomatically stable with no recurrences overnight continue with his current dose of Toprol would recommend the addition of Eliquis 5 twice daily for CVA prevention and can come off of aspirin    Acute on chronic HFrEF with evidence of increase shortness of breath recommend Lasix 40 twice daily today but strict I's and O's    PLAN:  1.  Toprol 200 twice daily  2.  Add Eliquis 5 twice daily received prevention  3.  Lasix 40 IV twice daily          Garrett Oliveira MD  2/12/2024    11:14 EST

## 2024-02-12 NOTE — PROGRESS NOTES
[facility]   Hospitalist Progress Note  Date: 2024  Patient Name: Radhames Colón  : 1950  MRN: [id number]  Date of admission: 2024  Consultants:   -General Surgery: Dr. Kaley De Leon  -Vascular Surgery: Dr. Asher Monzon, Dr. Abelino Gonzales  -Gastroenterology: Dr. Chalino Zepeda  -Cardiology: Dr. Garrett Oliveira    Subjective   Subjective     Chief Complaint: Decreased appetite, nausea    Summary:   Radhames Colón is a 73 y.o. male with CKD stage IIIa, CAD, PAD, COPD, essential hypertension, hyperlipidemia, bilateral carotid artery stenosis, polyarthropathy sleep apnea who presented to ED with decreased appetite and nausea without vomiting.  Patient had experienced significant weight loss over the past few months, of note patient's wife passed away approximately 4 months ago and he initially, he felt weight loss was due to decreased appetite secondary to depression, however, his appetite and weight loss have progressively worsened.  Eval in ED significant for CT scan showing portal venous gas with gas extending into the liver and pancolitis.  Case was discussed initially with gastroenterology who did not feel comfortable giving patient had [facility], however, vascular surgery was contacted due to patient wishes to stay at [facility] for care and not being transferred to [place]. Vascular surgery reviewed imaging and it noted that patient's SMA is patent with mild perhaps mild to moderate stenosis and recommended conservative therapy.  General Surgery evaluated patient and recommended continued monitoring and no indication for urgent surgical intervention at this time.  Gastroenterology consulted and evaluated patient.  Attempts made to advance diet but patient continued not having much of an appetite.  Discussed case again with vascular surgery and decision made to pursue angiography with possible endovascular intervention.  Angiogram performed on 2024 and no significant  superior mesenteric artery stenosis noted and no intervention was required.  Patient continued to have symptoms and EGD was done on 02/09/2024 that showed gastritis with hemorrhage and patient was started on daily Protonix as well as Carafate.  There were issues with patient's heart rate during hospitalization and cardiology was consulted.  Diltiazem drip initiated.    Interval Followup:   Patient with some increased work of breathing this morning.  Patient was noted to be hypoxic overnight.  He denies any chest pain.  Patient said he was able to have a couple bites of food but not much and still with very poor appetite overall.  Nursing with no additional acute issues to report.    Antibiotics:   -Cefepime  -Flagyl    Objective   Objective     Vitals:   Temp:  [97.5 °F (36.4 °C)-98.1 °F (36.7 °C)] 97.5 °F (36.4 °C)  Heart Rate:  [67-75] 71  Resp:  [18-22] 22  BP: (124-155)/(56-93) 149/93  Flow (L/min):  [2] 2  Physical Exam   Gen: Appears uncomfortable, lying in bed, pleasant, conversant  Resp: Diminished breath sound bilaterally, conversational dyspnea noted, equal chest rise bilaterally  Card: RRR, No m/r/g  Abd: Soft, mild tenderness to palpation, Nondistended, + bowel sounds    Result Review    Result Review:  I have personally reviewed the results as below and agree with these findings:  []  Laboratory:   CMP          2/10/2024    04:53 2/11/2024    08:46 2/12/2024    03:50   CMP   Glucose 116  129  120    BUN 9  6  9    Creatinine 0.69  0.65  0.75    EGFR 96.5  98.3  94.1    Sodium 141  139  142    Potassium 4.0  3.6  4.8    Chloride 108  107  111    Calcium 8.2  8.4  8.6    BUN/Creatinine Ratio 13.0  9.2  12.0    Anion Gap 9.3  11.8  7.3      CBC          2/10/2024    04:53 2/11/2024    08:46 2/12/2024    03:50   CBC   WBC 11.85  10.09  13.02    RBC 4.87  4.69  4.60    Hemoglobin 12.5  11.9  11.8    Hematocrit 39.5  37.4  37.6    MCV 81.1  79.7  81.7    MCH 25.7  25.4  25.7    MCHC 31.6  31.8  31.4    RDW  18.1  18.2  18.7    Platelets 129  143  165    Blood glucose well-controlled.  Phosphorus and magnesium within normal limits.  []  Microbiology:   []  Radiology:   [x]  EKG/Telemetry:    []  Cardiology/Vascular:    []  Pathology:  []  Old records:  []  Other:    Assessment & Plan   Assessment / Plan     Assessment:  Enterocolitis  Acute hypoxemic respiratory failure  CKD stage IIIa  Acute on chronic diastolic heart failure  Atrial flutter  Hypokalemia, improved  Hypophosphatemia, improved  Hypomagnesemia, improved  Unintentional weight loss  Leukocytosis  COPD, not acutely exacerbated  CAD  PAD    Plan:  -General Surgery, Cardiology, Gastroenterology and Vascular Surgery consulted and following, appreciate assistance and recommendations in the care of this patient.  -Chest x-ray ordered  -proBNP level ordered  -Lasix 40 mg IV twice daily ordered.  Monitor electrolytes and renal function closely given IV diuresis  -Continue diltiazem drip  -Continue metoprolol  -Management discussed with cardiology.  Cardiology noted that after review it appears the patient may be in atrial flutter.  -Continue Protonix daily and Carafate 1 g 3 times daily  -Encourage patient to use incentive spirometer and to increase ambulation as tolerated  -Continue cefepime and Flagyl  -Pathology from EGD still pending.  -Monitor electrolytes and renal function with BMP, phosphorus level and magnesium level in the AM  -Monitor WBC and Hgb with CBC in the AM  -Clinical course will dictate further management     DVT Prophylaxis: SCDs  Diet: Regular  Dispo: Home when medically appropriate for discharge  Code Status: Full code     Personally reviewed patients labs and imaging, discussed with patient and nurse at bedside. Discussed management with the following consultants: Cardiology, General Surgery, Gastroenterology and Vascular Surgery.     Part of this note may be an electronic transcription/translation of spoken language to printed text using  the Dragon dictation system.    DVT prophylaxis:  Mechanical DVT prophylaxis orders are present.        CODE STATUS:   Code Status (Patient has no pulse and is not breathing): CPR (Attempt to Resuscitate)  Medical Interventions (Patient has pulse or is breathing): Full Support      Electronically signed by Chandu Suh MD, 02/12/24, 9:45 AM EST.

## 2024-02-13 ENCOUNTER — TELEPHONE (OUTPATIENT)
Dept: INTERNAL MEDICINE | Facility: CLINIC | Age: 74
End: 2024-02-13
Payer: MEDICARE

## 2024-02-13 LAB
ANION GAP SERPL CALCULATED.3IONS-SCNC: 9.3 MMOL/L (ref 5–15)
BUN SERPL-MCNC: 10 MG/DL (ref 8–23)
BUN/CREAT SERPL: 11.5 (ref 7–25)
CALCIUM SPEC-SCNC: 8.9 MG/DL (ref 8.6–10.5)
CHLORIDE SERPL-SCNC: 108 MMOL/L (ref 98–107)
CO2 SERPL-SCNC: 26.7 MMOL/L (ref 22–29)
CREAT SERPL-MCNC: 0.87 MG/DL (ref 0.76–1.27)
CYTO UR: NORMAL
DEPRECATED RDW RBC AUTO: 53.3 FL (ref 37–54)
EGFRCR SERPLBLD CKD-EPI 2021: 90 ML/MIN/1.73
ERYTHROCYTE [DISTWIDTH] IN BLOOD BY AUTOMATED COUNT: 18.6 % (ref 12.3–15.4)
GLUCOSE BLDC GLUCOMTR-MCNC: 100 MG/DL (ref 70–99)
GLUCOSE BLDC GLUCOMTR-MCNC: 122 MG/DL (ref 70–99)
GLUCOSE BLDC GLUCOMTR-MCNC: 168 MG/DL (ref 70–99)
GLUCOSE SERPL-MCNC: 115 MG/DL (ref 65–99)
HCT VFR BLD AUTO: 37.5 % (ref 37.5–51)
HGB BLD-MCNC: 11.5 G/DL (ref 13–17.7)
LAB AP CASE REPORT: NORMAL
LAB AP CLINICAL INFORMATION: NORMAL
MAGNESIUM SERPL-MCNC: 1.9 MG/DL (ref 1.6–2.4)
MCH RBC QN AUTO: 25 PG (ref 26.6–33)
MCHC RBC AUTO-ENTMCNC: 30.7 G/DL (ref 31.5–35.7)
MCV RBC AUTO: 81.5 FL (ref 79–97)
PATH REPORT.FINAL DX SPEC: NORMAL
PATH REPORT.GROSS SPEC: NORMAL
PHOSPHATE SERPL-MCNC: 3 MG/DL (ref 2.5–4.5)
PLATELET # BLD AUTO: 169 10*3/MM3 (ref 140–450)
PMV BLD AUTO: 12.1 FL (ref 6–12)
POTASSIUM SERPL-SCNC: 4.4 MMOL/L (ref 3.5–5.2)
RBC # BLD AUTO: 4.6 10*6/MM3 (ref 4.14–5.8)
SODIUM SERPL-SCNC: 144 MMOL/L (ref 136–145)
WBC NRBC COR # BLD AUTO: 12.12 10*3/MM3 (ref 3.4–10.8)

## 2024-02-13 PROCEDURE — 63710000001 INSULIN LISPRO (HUMAN) PER 5 UNITS: Performed by: SURGERY

## 2024-02-13 PROCEDURE — 94761 N-INVAS EAR/PLS OXIMETRY MLT: CPT

## 2024-02-13 PROCEDURE — 83735 ASSAY OF MAGNESIUM: CPT | Performed by: INTERNAL MEDICINE

## 2024-02-13 PROCEDURE — 94799 UNLISTED PULMONARY SVC/PX: CPT

## 2024-02-13 PROCEDURE — 25010000002 FUROSEMIDE PER 20 MG: Performed by: INTERNAL MEDICINE

## 2024-02-13 PROCEDURE — 84100 ASSAY OF PHOSPHORUS: CPT | Performed by: INTERNAL MEDICINE

## 2024-02-13 PROCEDURE — 80048 BASIC METABOLIC PNL TOTAL CA: CPT | Performed by: INTERNAL MEDICINE

## 2024-02-13 PROCEDURE — 94664 DEMO&/EVAL PT USE INHALER: CPT

## 2024-02-13 PROCEDURE — 85027 COMPLETE CBC AUTOMATED: CPT | Performed by: INTERNAL MEDICINE

## 2024-02-13 PROCEDURE — 82948 REAGENT STRIP/BLOOD GLUCOSE: CPT | Performed by: SURGERY

## 2024-02-13 PROCEDURE — 99231 SBSQ HOSP IP/OBS SF/LOW 25: CPT | Performed by: INTERNAL MEDICINE

## 2024-02-13 PROCEDURE — 99233 SBSQ HOSP IP/OBS HIGH 50: CPT | Performed by: INTERNAL MEDICINE

## 2024-02-13 RX ORDER — FUROSEMIDE 40 MG/1
40 TABLET ORAL
Status: DISCONTINUED | OUTPATIENT
Start: 2024-02-13 | End: 2024-02-15 | Stop reason: HOSPADM

## 2024-02-13 RX ADMIN — IPRATROPIUM BROMIDE AND ALBUTEROL SULFATE 3 ML: .5; 3 SOLUTION RESPIRATORY (INHALATION) at 03:53

## 2024-02-13 RX ADMIN — ROSUVASTATIN CALCIUM 20 MG: 20 TABLET, FILM COATED ORAL at 21:39

## 2024-02-13 RX ADMIN — IPRATROPIUM BROMIDE AND ALBUTEROL SULFATE 3 ML: .5; 3 SOLUTION RESPIRATORY (INHALATION) at 07:03

## 2024-02-13 RX ADMIN — APIXABAN 5 MG: 5 TABLET, FILM COATED ORAL at 21:39

## 2024-02-13 RX ADMIN — MORPHINE SULFATE 30 MG: 30 TABLET, FILM COATED, EXTENDED RELEASE ORAL at 08:59

## 2024-02-13 RX ADMIN — SUCRALFATE 1 G: 1 TABLET ORAL at 17:10

## 2024-02-13 RX ADMIN — FUROSEMIDE 40 MG: 40 TABLET ORAL at 17:12

## 2024-02-13 RX ADMIN — ALLOPURINOL 300 MG: 300 TABLET ORAL at 08:58

## 2024-02-13 RX ADMIN — LEVOTHYROXINE SODIUM 125 MCG: 125 TABLET ORAL at 06:17

## 2024-02-13 RX ADMIN — FOLIC ACID 1 MG: 1 TABLET ORAL at 08:59

## 2024-02-13 RX ADMIN — Medication 10 ML: at 21:39

## 2024-02-13 RX ADMIN — APIXABAN 5 MG: 5 TABLET, FILM COATED ORAL at 08:59

## 2024-02-13 RX ADMIN — IPRATROPIUM BROMIDE AND ALBUTEROL SULFATE 3 ML: .5; 3 SOLUTION RESPIRATORY (INHALATION) at 20:25

## 2024-02-13 RX ADMIN — DOCUSATE SODIUM 50MG AND SENNOSIDES 8.6MG 2 TABLET: 8.6; 5 TABLET, FILM COATED ORAL at 08:59

## 2024-02-13 RX ADMIN — MORPHINE SULFATE 30 MG: 30 TABLET, FILM COATED, EXTENDED RELEASE ORAL at 21:39

## 2024-02-13 RX ADMIN — SERTRALINE HYDROCHLORIDE 75 MG: 50 TABLET ORAL at 08:58

## 2024-02-13 RX ADMIN — SUCRALFATE 1 G: 1 TABLET ORAL at 11:24

## 2024-02-13 RX ADMIN — INSULIN LISPRO 2 UNITS: 100 INJECTION, SOLUTION INTRAVENOUS; SUBCUTANEOUS at 11:24

## 2024-02-13 RX ADMIN — SUCRALFATE 1 G: 1 TABLET ORAL at 07:37

## 2024-02-13 RX ADMIN — AMLODIPINE BESYLATE 5 MG: 5 TABLET ORAL at 08:58

## 2024-02-13 RX ADMIN — DOCUSATE SODIUM 50MG AND SENNOSIDES 8.6MG 2 TABLET: 8.6; 5 TABLET, FILM COATED ORAL at 21:39

## 2024-02-13 RX ADMIN — FOLIC ACID 1 MG: 1 TABLET ORAL at 21:39

## 2024-02-13 RX ADMIN — METOPROLOL SUCCINATE 200 MG: 50 TABLET, EXTENDED RELEASE ORAL at 21:39

## 2024-02-13 RX ADMIN — PANTOPRAZOLE SODIUM 40 MG: 40 TABLET, DELAYED RELEASE ORAL at 06:17

## 2024-02-13 RX ADMIN — FUROSEMIDE 40 MG: 10 INJECTION, SOLUTION INTRAMUSCULAR; INTRAVENOUS at 08:55

## 2024-02-13 RX ADMIN — Medication 10 ML: at 08:59

## 2024-02-13 RX ADMIN — METOPROLOL SUCCINATE 200 MG: 50 TABLET, EXTENDED RELEASE ORAL at 08:59

## 2024-02-13 RX ADMIN — IPRATROPIUM BROMIDE AND ALBUTEROL SULFATE 3 ML: .5; 3 SOLUTION RESPIRATORY (INHALATION) at 14:33

## 2024-02-13 NOTE — PROGRESS NOTES
EGD 2/9/2024: Normal esophagus, diffuse gastritis noted-biopsy with gastropathy otherwise negative, normal duodenum  Protonix added and Carafate 1 g 3 times a day  Is patient set up for colonoscopy?  Dr. Zepeda mentioned possibly doing next week but I do see palliative care note in the chart.

## 2024-02-13 NOTE — PROGRESS NOTES
[facility]   Hospitalist Progress Note  Date: 2024  Patient Name: Radhames Colón  : 1950  MRN: [id number]  Date of admission: 2024  Consultants:   -General Surgery: Dr. Kaley De Leon  -Vascular Surgery: Dr. Asher Monzon, Dr. Abelino Gonzales  -Gastroenterology: Dr. Chalino Zepeda  -Cardiology: Dr. Garrett Oliveira    Subjective   Subjective     Chief Complaint: Decreased appetite, nausea    Summary:   Radhames Colón is a 73 y.o. male with CKD stage IIIa, CAD, PAD, COPD, essential hypertension, hyperlipidemia, bilateral carotid artery stenosis, polyarthropathy sleep apnea who presented to ED with decreased appetite and nausea without vomiting.  Patient had experienced significant weight loss over the past few months, of note patient's wife passed away approximately 4 months ago and he initially, he felt weight loss was due to decreased appetite secondary to depression, however, his appetite and weight loss have progressively worsened.  Eval in ED significant for CT scan showing portal venous gas with gas extending into the liver and pancolitis.  Case was discussed initially with gastroenterology who did not feel comfortable giving patient had [facility], however, vascular surgery was contacted due to patient wishes to stay at [facility] for care and not being transferred to [place]. Vascular surgery reviewed imaging and it noted that patient's SMA is patent with mild perhaps mild to moderate stenosis and recommended conservative therapy.  General Surgery evaluated patient and recommended continued monitoring and no indication for urgent surgical intervention at this time.  Gastroenterology consulted and evaluated patient.  Attempts made to advance diet but patient continued not having much of an appetite.  Discussed case again with vascular surgery and decision made to pursue angiography with possible endovascular intervention.  Angiogram performed on 2024 and no significant  superior mesenteric artery stenosis noted and no intervention was required.  Patient continued to have symptoms and EGD was done on 02/09/2024 that showed gastritis with hemorrhage and patient was started on daily Protonix as well as Carafate.  There were issues with patient's heart rate during hospitalization and cardiology was consulted.  Diltiazem drip initiated.    Interval Followup:   No acute events overnight.  Patient still unable to eat anything.  He will take a few bites and then feels extremely full and nauseous.  After long discussion, he feels that we have exhausted our efforts to determine the cause of his problem and he does not want any further testing.  He would like to focus more on his quality of life rather than quantity.    Antibiotics:   -Cefepime  -Flagyl    Objective   Objective     Vitals:   Temp:  [97.5 °F (36.4 °C)-98.7 °F (37.1 °C)] 98.7 °F (37.1 °C)  Heart Rate:  [] 73  Resp:  [16-20] 16  BP: (132-182)/(72-94) 145/80  Flow (L/min):  [2-3] 2  Physical Exam   Gen: Sitting up in bed, NAD, pleasant  Resp: Diminished breath sound bilaterally, otherwise clear, equal chest rise bilaterally  Card: RRR, No m/r/g  Abd: Soft, mild tenderness to palpation, Nondistended, + bowel sounds    Result Review    Result Review:  I have personally reviewed the results as below and agree with these findings:  [x]  Laboratory: Personally reviewed BMP, CBC, magnesium, phosphorus, blood sugar  CMP          2/11/2024    08:46 2/12/2024    03:50 2/13/2024    03:17   CMP   Glucose 129  120  115    BUN 6  9  10    Creatinine 0.65  0.75  0.87    EGFR 98.3  94.1  90.0    Sodium 139  142  144    Potassium 3.6  4.8  4.4    Chloride 107  111  108    Calcium 8.4  8.6  8.9    BUN/Creatinine Ratio 9.2  12.0  11.5    Anion Gap 11.8  7.3  9.3      CBC          2/11/2024    08:46 2/12/2024    03:50 2/13/2024    03:17   CBC   WBC 10.09  13.02  12.12    RBC 4.69  4.60  4.60    Hemoglobin 11.9  11.8  11.5    Hematocrit 37.4   37.6  37.5    MCV 79.7  81.7  81.5    MCH 25.4  25.7  25.0    MCHC 31.8  31.4  30.7    RDW 18.2  18.7  18.6    Platelets 143  165  169    Blood glucose well-controlled.  Phosphorus and magnesium within normal limits.  []  Microbiology:   []  Radiology:   [x]  EKG/Telemetry:    []  Cardiology/Vascular:    []  Pathology:  []  Old records:  []  Other:    Assessment & Plan   Assessment / Plan     Assessment/plan:  Enterocolitis  Acute hypoxemic respiratory failure  CKD stage IIIa  Acute on chronic diastolic heart failure  Atrial flutter  Hypokalemia, improved  Hypophosphatemia, improved  Hypomagnesemia, improved  Unintentional weight loss  Leukocytosis  COPD, not acutely exacerbated  CAD  PAD    Continue to monitor in the hospital for management of the above  Gastroenterology and cardiology following, appreciate assistance  After discussions, the patient has elected to focus more on comfort and does not want any further testing  Transition IV Lasix to 40 mg p.o. twice daily  Continue oral metoprolol and Eliquis  Continue Protonix and Carafate  Currently, he is not having any issues taking his medications so will continue the majority of medications now and at discharge  Palliative care following, patient is to meet with hospice tomorrow at 1130  Change CODE STATUS to DNR  DC telemetry and transfer to regular bed  Lab holiday tomorrow    Total of 53 minutes spent reviewing labs and imaging, counseling and educating the patient and family, ordering medications, discussing with specialty services, documenting clinical information in the electronic medical record, and care coordination.    DVT prophylaxis:  Medical and mechanical DVT prophylaxis orders are present.      CODE STATUS:   Medical Intervention Limits: NO intubation (DNI); NO cardioversion  Level Of Support Discussed With: Patient  Code Status (Patient has no pulse and is not breathing): No CPR (Do Not Attempt to Resuscitate)  Medical Interventions (Patient has  pulse or is breathing): Limited Support

## 2024-02-13 NOTE — PROGRESS NOTES
CARDIOLOGY  INPATIENT PROGRESS NOTE                [facility] 5TH FLOOR SURGICAL TELEMETRY UNIT    2/13/2024      PATIENT IDENTIFICATION:   Name:  Radhames Colón      MRN:  [id number]     73 y.o.  male                 SUBJECTIVE:   Patient with improved respiratory status today resting comfortably in bed  OBJECTIVE:  Vitals:    02/13/24 0306 02/13/24 0353 02/13/24 0703 02/13/24 0739   BP: 138/92   146/73   BP Location: Left arm   Right arm   Patient Position: Lying   Lying   Pulse:  69 (!) 123 73   Resp: 16 16 16 16   Temp: 97.7 °F (36.5 °C)   98.4 °F (36.9 °C)   TempSrc: Oral   Oral   SpO2: 92% 91% 92% 92%   Weight:       Height:               Body mass index is 27.48 kg/m².    Intake/Output Summary (Last 24 hours) at 2/13/2024 1251  Last data filed at 2/13/2024 1031  Gross per 24 hour   Intake 845 ml   Output 2975 ml   Net -2130 ml       Telemetry: Normal sinus rhythm brief paroxysmal SVT less frequent in nature        Physical Exam  General Appearance:   no acute distress  Alert and oriented x3  HENT:   lips not cyanotic  Atraumatic  Neck:  No jvd   supple  Respiratory:  no respiratory distress  normal breath sounds  no rales  Cardiovascular:    regular rhythm  no S3, no S4   no murmur  no rub  lower extremity edema: none    Skin:   warm, dry  No rashes      Allergies   Allergen Reactions    Penicillins Shortness Of Breath    Ticagrelor Shortness Of Breath    Penicillin G Provider Review Needed and Unknown - Low Severity     Scheduled meds:  allopurinol, 300 mg, Oral, Daily  amLODIPine, 5 mg, Oral, Daily  apixaban, 5 mg, Oral, Q12H  folic acid, 1 mg, Oral, BID  furosemide, 40 mg, Intravenous, BID  insulin lispro, 2-9 Units, Subcutaneous, 4x Daily AC & at Bedtime  ipratropium-albuterol, 3 mL, Nebulization, Q4H - RT  levothyroxine, 125 mcg, Oral, Q AM  metoprolol succinate XL, 200 mg, Oral, BID  Morphine, 30 mg, Oral, BID  pantoprazole, 40 mg, Oral, Q AM  rosuvastatin, 20 mg, Oral, Nightly  senna-docusate  "sodium, 2 tablet, Oral, BID  sertraline, 75 mg, Oral, Daily  sodium chloride, 10 mL, Intravenous, Q12H  sucralfate, 1 g, Oral, TID AC      IV meds:                         Data Review:  CBC          2/11/2024    08:46 2/12/2024    03:50 2/13/2024    03:17   CBC   WBC 10.09  13.02  12.12    RBC 4.69  4.60  4.60    Hemoglobin 11.9  11.8  11.5    Hematocrit 37.4  37.6  37.5    MCV 79.7  81.7  81.5    MCH 25.4  25.7  25.0    MCHC 31.8  31.4  30.7    RDW 18.2  18.7  18.6    Platelets 143  165  169      CMP          2/11/2024    08:46 2/12/2024    03:50 2/13/2024    03:17   CMP   Glucose 129  120  115    BUN 6  9  10    Creatinine 0.65  0.75  0.87    EGFR 98.3  94.1  90.0    Sodium 139  142  144    Potassium 3.6  4.8  4.4    Chloride 107  111  108    Calcium 8.4  8.6  8.9    BUN/Creatinine Ratio 9.2  12.0  11.5    Anion Gap 11.8  7.3  9.3       CARDIAC LABS:      Lab 02/12/24  0350   PROBNP 5,299.0*        No results found for: \"DIGOXIN\"   Lab Results   Component Value Date    TSH 0.213 (L) 10/16/2023           Invalid input(s): \"LDLCALC\"  No results found for: \"POCTROP\"  Lab Results   Component Value Date    TROPONINT 47 (H) 02/04/2024   (  Lab Results   Component Value Date    MG 1.9 02/13/2024     Results for orders placed in visit on 04/04/22    Adult Transthoracic Echo Complete W/ Cont if Necessary Per Protocol    Interpretation Summary  · Left ventricular ejection fraction appears to be 46 - 50%. Left ventricular systolic function is mildly decreased.  · The following left ventricular wall segments are hypokinetic: basal inferolateral, mid inferolateral, mid inferior, basal inferior and basal inferoseptal.  · Left ventricular diastolic function is consistent with (grade III w/high LAP) fixed restrictive pattern.  · The right ventricular cavity is borderline dilated.  · Estimated right ventricular systolic pressure from tricuspid regurgitation is mildly elevated (35-45 mmHg).  · Mild mitral regurgitation is noted  · " Left atrial volume is mildly increased.           ASSESSMENT:    Colitis    CAD s/p Cabg    Acute on chronic HFrEF (heart failure with reduced ejection fraction)    PSVT (paroxysmal supraventricular tachycardia)    Weight loss, non-intentional    Chronic mesenteric ischemia    Other specified soft tissue disorders        PLAN:  1.  Lasix 40 mg IV twice daily can change to p.o. on discharge 40 mg daily  2.  Toprol 20 mg twice daily  3.  Patient and family have decided upon comfort care will sign off          Garrett Oliveira MD  2/13/2024    12:51 EST

## 2024-02-13 NOTE — PLAN OF CARE
Goal Outcome Evaluation:      VSS. UOP. Up standby assist. Hospice consulted. Transferring to 4NT.

## 2024-02-14 PROCEDURE — 94664 DEMO&/EVAL PT USE INHALER: CPT

## 2024-02-14 PROCEDURE — 94799 UNLISTED PULMONARY SVC/PX: CPT

## 2024-02-14 PROCEDURE — 99232 SBSQ HOSP IP/OBS MODERATE 35: CPT | Performed by: INTERNAL MEDICINE

## 2024-02-14 RX ORDER — OXYCODONE AND ACETAMINOPHEN 10; 325 MG/1; MG/1
1 TABLET ORAL EVERY 4 HOURS PRN
Status: DISCONTINUED | OUTPATIENT
Start: 2024-02-14 | End: 2024-02-15 | Stop reason: HOSPADM

## 2024-02-14 RX ADMIN — Medication 10 ML: at 09:15

## 2024-02-14 RX ADMIN — SERTRALINE HYDROCHLORIDE 75 MG: 50 TABLET ORAL at 09:14

## 2024-02-14 RX ADMIN — METOPROLOL SUCCINATE 200 MG: 50 TABLET, EXTENDED RELEASE ORAL at 09:14

## 2024-02-14 RX ADMIN — APIXABAN 5 MG: 5 TABLET, FILM COATED ORAL at 09:14

## 2024-02-14 RX ADMIN — ALLOPURINOL 300 MG: 300 TABLET ORAL at 09:15

## 2024-02-14 RX ADMIN — SUCRALFATE 1 G: 1 TABLET ORAL at 09:14

## 2024-02-14 RX ADMIN — MORPHINE SULFATE 30 MG: 30 TABLET, FILM COATED, EXTENDED RELEASE ORAL at 09:14

## 2024-02-14 RX ADMIN — METOPROLOL SUCCINATE 200 MG: 50 TABLET, EXTENDED RELEASE ORAL at 21:58

## 2024-02-14 RX ADMIN — DOCUSATE SODIUM 50MG AND SENNOSIDES 8.6MG 2 TABLET: 8.6; 5 TABLET, FILM COATED ORAL at 09:14

## 2024-02-14 RX ADMIN — FOLIC ACID 1 MG: 1 TABLET ORAL at 21:58

## 2024-02-14 RX ADMIN — IPRATROPIUM BROMIDE AND ALBUTEROL SULFATE 3 ML: .5; 3 SOLUTION RESPIRATORY (INHALATION) at 01:00

## 2024-02-14 RX ADMIN — APIXABAN 5 MG: 5 TABLET, FILM COATED ORAL at 21:59

## 2024-02-14 RX ADMIN — FUROSEMIDE 40 MG: 40 TABLET ORAL at 09:14

## 2024-02-14 RX ADMIN — LEVOTHYROXINE SODIUM 125 MCG: 125 TABLET ORAL at 05:00

## 2024-02-14 RX ADMIN — IPRATROPIUM BROMIDE AND ALBUTEROL SULFATE 3 ML: .5; 3 SOLUTION RESPIRATORY (INHALATION) at 07:38

## 2024-02-14 RX ADMIN — PANTOPRAZOLE SODIUM 40 MG: 40 TABLET, DELAYED RELEASE ORAL at 05:00

## 2024-02-14 RX ADMIN — ROSUVASTATIN CALCIUM 20 MG: 20 TABLET, FILM COATED ORAL at 21:58

## 2024-02-14 RX ADMIN — MORPHINE SULFATE 30 MG: 30 TABLET, FILM COATED, EXTENDED RELEASE ORAL at 21:58

## 2024-02-14 RX ADMIN — SUCRALFATE 1 G: 1 TABLET ORAL at 17:44

## 2024-02-14 RX ADMIN — DOCUSATE SODIUM 50MG AND SENNOSIDES 8.6MG 2 TABLET: 8.6; 5 TABLET, FILM COATED ORAL at 21:58

## 2024-02-14 RX ADMIN — FUROSEMIDE 40 MG: 40 TABLET ORAL at 17:44

## 2024-02-14 RX ADMIN — AMLODIPINE BESYLATE 5 MG: 5 TABLET ORAL at 09:14

## 2024-02-14 RX ADMIN — IPRATROPIUM BROMIDE AND ALBUTEROL SULFATE 3 ML: .5; 3 SOLUTION RESPIRATORY (INHALATION) at 11:01

## 2024-02-14 RX ADMIN — SUCRALFATE 1 G: 1 TABLET ORAL at 12:07

## 2024-02-14 RX ADMIN — FOLIC ACID 1 MG: 1 TABLET ORAL at 09:14

## 2024-02-14 RX ADMIN — Medication 10 ML: at 21:59

## 2024-02-14 NOTE — CONSULTS
Nutrition Services    Patient Name: Radhames Colón  YOB: 1950  MRN: [id number]  Admission date: 2/4/2024      CLINICAL NUTRITION ASSESSMENT      Reason for Assessment  MST Score 2+     H&P:  Past Medical History:   Diagnosis Date    Abdominal aortic aneurysm without rupture     Acute renal failure (ARF)     WAS SHORT TERM DIALYSIS, STAGE 3    Arthritis     Atrophy of thyroid (acquired)     Bilateral carotid artery stenosis     Chronic airway obstruction     Chronic gouty arthritis     Chronic kidney disease, stage 3     Controlled diabetes mellitus type II without complication     COPD (chronic obstructive pulmonary disease)     Coronary artery disease involving coronary bypass graft of native heart without angina pectoris 03/28/2012    with previous bypass surgery (2005 x3) and subsequent stent/PCI of the native circumflex obtuse marginal branch in January 2018    Depression     Disease of liver     Elevated carcinoembryonic antigen (CEA)     GERD (gastroesophageal reflux disease)     Hypertension, essential 07/14/2021    Hypothyroidism     Iron deficiency anemia     Long term (current) use of opiate analgesic     Lumbar spondylosis     Malignant neoplasm of colon     Mixed hyperlipidemia 07/14/2021    Peripheral artery disease     Polyarthropathy     Rhabdomyolysis     Sleep apnea     CPAP        Current Problems:   Active Hospital Problems    Diagnosis     **Colitis     PSVT (paroxysmal supraventricular tachycardia)     Chronic mesenteric ischemia     Other specified soft tissue disorders     Weight loss, non-intentional     Acute on chronic HFrEF (heart failure with reduced ejection fraction)     CAD s/p Cabg         Nutrition/Diet History         Narrative   Pt was sitting up and eating breakfast at my visit. Pt reports that he consumes 3-4 bites then gets too full. Pt reports that this has been going on for 4 months. He use to weigh 220#, ZVW=273# (15% weight change). He has tried nutrition  "supplements but reports that they give him diarrhea. Pt did not provide food preferences as he states that he gets full no matter what he eats. Noted hospice has been consulted. NFPE: no signs of wasting. Pt meets criteria for severe malnutrition. If aggressive care is desired, may need to consider alternative nutrition, although it does not appear that pt wants aggressive care.      Anthropometrics        Current Height, Weight Height: 175.3 cm (69\")  Weight: 84.4 kg (186 lb 1.1 oz)   Current BMI Body mass index is 27.48 kg/m².   BMI Classification Overweight   % %    Adjusted Body Weight (ABW)    Weight Hx  Wt Readings from Last 30 Encounters:   02/05/24 0127 84.4 kg (186 lb 1.1 oz)   02/04/24 1619 85.4 kg (188 lb 4.4 oz)   01/18/24 1401 89.4 kg (197 lb)   12/08/23 1100 93 kg (205 lb 1.6 oz)   12/06/23 1025 91.6 kg (202 lb)   11/09/23 1101 94.3 kg (208 lb)   10/24/23 1227 96.6 kg (213 lb)   04/24/23 1029 99.4 kg (219 lb 3.2 oz)   04/13/23 1125 97.5 kg (215 lb)   12/09/22 1022 102 kg (223 lb 12.8 oz)   10/28/22 0900 101 kg (223 lb 6.4 oz)   10/13/22 1137 101 kg (222 lb 12.8 oz)   09/13/22 1230 99.2 kg (218 lb 12.8 oz)   08/02/22 1314 102 kg (224 lb 9.6 oz)   06/29/22 1315 103 kg (226 lb 9.6 oz)   06/15/22 1216 102 kg (225 lb 9.6 oz)   06/03/22 1228 106 kg (233 lb)   04/14/22 1054 107 kg (235 lb 6.4 oz)   04/04/22 1142 107 kg (235 lb)   03/28/22 1344 105 kg (231 lb 12.8 oz)   03/01/22 1017 108 kg (237 lb 12.8 oz)   01/06/22 1132 108 kg (238 lb 1.6 oz)   01/06/22 0829 108 kg (238 lb 1.6 oz)   01/04/22 1250 106 kg (233 lb 11 oz)   12/15/21 1029 106 kg (233 lb 11 oz)   12/14/21 1218 103 kg (227 lb)   12/01/21 1334 104 kg (228 lb 3.2 oz)   08/13/21 1240 105 kg (232 lb)   07/14/21 1342 106 kg (234 lb 6.4 oz)   11/09/20 0000 105 kg (231 lb)   10/14/20 0907 105 kg (232 lb 6 oz)   09/14/20 0603 105 kg (231 lb 9.6 oz)   09/11/20 1301 106 kg (233 lb)   08/12/20 0945 105 kg (231 lb)          Wt Change Observation 13% " loss per chart within 4 months      Estimated/Assessed Needs  Estimated Needs based on: Current Body Weight       Energy Requirements 25 kcal/kg    EST Needs (kcal/day) 2100 kcal/day        Protein Requirements 1.1 g/kg    EST Daily Needs (g/day) 92 g/day        Fluid Requirements 1 ml/kcal    Estimated Needs (mL/day) 2100 ml/day      Labs/Medications         Pertinent Labs Reviewed.   Results from last 7 days   Lab Units 02/13/24  0317 02/12/24  0350 02/11/24  0846 02/10/24  0453 02/09/24  0421   SODIUM mmol/L 144 142 139   < > 143   POTASSIUM mmol/L 4.4 4.8 3.6   < > 3.1*   CHLORIDE mmol/L 108* 111* 107   < > 106   CO2 mmol/L 26.7 23.7 20.2*   < > 24.4   BUN mg/dL 10 9 6*   < > 6*   CREATININE mg/dL 0.87 0.75* 0.65*   < > 0.64*   CALCIUM mg/dL 8.9 8.6 8.4*   < > 7.7*   BILIRUBIN mg/dL  --   --   --   --  0.6   ALK PHOS U/L  --   --   --   --  65   ALT (SGPT) U/L  --   --   --   --  33   AST (SGOT) U/L  --   --   --   --  44*   GLUCOSE mg/dL 115* 120* 129*   < > 114*    < > = values in this interval not displayed.     Results from last 7 days   Lab Units 02/13/24 0317 02/12/24  0350 02/11/24  0846   MAGNESIUM mg/dL 1.9 2.2 1.6   PHOSPHORUS mg/dL 3.0 2.9 1.7*   HEMOGLOBIN g/dL 11.5* 11.8* 11.9*   HEMATOCRIT % 37.5 37.6 37.4*     COVID19   Date Value Ref Range Status   02/04/2024 Not Detected Not Detected - Ref. Range Final     Lab Results   Component Value Date    HGBA1C 6.10 (H) 10/16/2023         Pertinent Medications Reviewed.     Malnutrition Severity Assessment      Patient meets criteria for : Severe Malnutrition  Malnutrition Type (last 8 hours)       Malnutrition Severity Assessment       Row Name 02/14/24 0936       Malnutrition Severity Assessment    Malnutrition Type Acute Disease or Injury - Related Malnutrition      Row Name 02/14/24 0936       Insufficient Energy Intake     Insufficient Energy Intake  <75% of est. energy requirement for > or equal to 1 month      Row Name 02/14/24 0936        Unintentional Weight Loss     Unintentional Weight Loss  Weight loss greater than 10% in six months      Row Name 02/14/24 0936       Criteria Met (Must meet criteria for severity in at least 2 of these categories: M Wasting, Fat Loss, Fluid, Secondary Signs, Wt. Status, Intake)    Patient meets criteria for  Severe Malnutrition                     Nutrition Diagnosis         Nutrition Dx Problem 1 Severe malnutrition related to  enterocolitis  as evidenced by  consuming less than 75% of estimated needs for 4 months, 34# weight loss within 4 months (15% weight change)      Nutrition Intervention           Current Nutrition Orders & Evaluation of Intake       Current PO Diet Diet: Regular/House Diet; Texture: Regular Texture (IDDSI 7); Fluid Consistency: Thin (IDDSI 0)   Supplement No active supplement orders           Nutrition Intervention/Prescription        Monitor/encourage PO intake, monitor goals of care/pt decisions        Medical Nutrition Therapy/Nutrition Education          Learner     Readiness Patient  Acceptance     Method     Response Explanation  Verbalizes understanding     Monitor/Evaluation        Monitor PO intake, POC/GOC     Nutrition Discharge Plan         To be determined     Electronically signed by:  Emelina Flores RD  02/14/24 09:36 EST

## 2024-02-14 NOTE — NURSING NOTE
"The patient is on 4NT, on 1 liter nasal canula. The patient is laying supine with HOB slightly elevated. The patient reports no pain, nausea or anxiety at this time. [facility]. Will provide a community resource booklet for additional caregiver services to have as a back up. All questions answered.  EMS DNR has been completed. Family request a DC tomorrow, 2/15 and plan to DC via car. Provider and primary nurse updated.    Update: Per [facility], \"I performed an EOS visit with the patient and multiple family members present. Per Hospice physician, pt is eligible for hospice services. Pt is to be discharged home on hospice tomorrow morning via private vehicle. Equipment has been ordered and will be delivered sometime today. Portable oxygen has also been ordered for delivery to the hospital for pt use upon discharge. Family has been made aware of equipment order.\"    -Sandra ARAUJO, RN, [facility]   Palliative Care    2/14/2024 13:26 EST    "

## 2024-02-14 NOTE — PLAN OF CARE
Goal Outcome Evaluation:              Outcome Evaluation: aox4. pain controlled with scheduled medications. refusing bed alarm, educated on fall risk measures. up x1 assist. emotional support provided. plan to discharge home 2/15 with hospice via private vehicle.

## 2024-02-14 NOTE — PROGRESS NOTES
[facility]   Hospitalist Progress Note  Date: 2024  Patient Name: Radhames Colón  : 1950  MRN: [id number]  Date of admission: 2024  Consultants:   -General Surgery: Dr. Kaley De Leon  -Vascular Surgery: Dr. Asher Monzon, Dr. Abelino Gonzales  -Gastroenterology: Dr. Chalino Zepeda  -Cardiology: Dr. Garrett Oliveira    Subjective   Subjective     Chief Complaint: Decreased appetite, nausea    Summary:   Radhames Colón is a 73 y.o. male with CKD stage IIIa, CAD, PAD, COPD, essential hypertension, hyperlipidemia, bilateral carotid artery stenosis, polyarthropathy sleep apnea who presented to ED with decreased appetite and nausea without vomiting.  Patient had experienced significant weight loss over the past few months, of note patient's wife passed away approximately 4 months ago and he initially, he felt weight loss was due to decreased appetite secondary to depression, however, his appetite and weight loss have progressively worsened.  Eval in ED significant for CT scan showing portal venous gas with gas extending into the liver and pancolitis.  Case was discussed initially with gastroenterology who did not feel comfortable giving patient had [facility], however, vascular surgery was contacted due to patient wishes to stay at [facility] for care and not being transferred to [place]. Vascular surgery reviewed imaging and it noted that patient's SMA is patent with mild perhaps mild to moderate stenosis and recommended conservative therapy.  General Surgery evaluated patient and recommended continued monitoring and no indication for urgent surgical intervention at this time.  Gastroenterology consulted and evaluated patient.  Attempts made to advance diet but patient continued not having much of an appetite.  Discussed case again with vascular surgery and decision made to pursue angiography with possible endovascular intervention.  Angiogram performed on 2024 and no significant  superior mesenteric artery stenosis noted and no intervention was required.  Patient continued to have symptoms and EGD was done on 02/09/2024 that showed gastritis with hemorrhage and patient was started on daily Protonix as well as Carafate.  There were issues with patient's heart rate during hospitalization and cardiology was consulted.  Diltiazem drip initiated.    Interval Followup:   No acute events overnight.  He transition to comfort measures yesterday.  States he did not sleep very well last night but denies any current pain.  Hoping to go home tomorrow under hospice care.    Antibiotics:   -Cefepime  -Flagyl    Objective   Objective     Vitals:   Temp:  [97 °F (36.1 °C)-98.8 °F (37.1 °C)] 97 °F (36.1 °C)  Heart Rate:  [54-78] 61  Resp:  [18] 18  BP: (136-168)/(56-88) 153/77  Flow (L/min):  [1-2] 1  Physical Exam   Gen: Resting comfortably in bed, NAD  Resp: No increased WOB  Abd: Soft, nondistended, + bowel sounds    Result Review    Result Review:  I have personally reviewed the results as below and agree with these findings:  [x]  Laboratory:   CMP          2/11/2024    08:46 2/12/2024    03:50 2/13/2024    03:17   CMP   Glucose 129  120  115    BUN 6  9  10    Creatinine 0.65  0.75  0.87    EGFR 98.3  94.1  90.0    Sodium 139  142  144    Potassium 3.6  4.8  4.4    Chloride 107  111  108    Calcium 8.4  8.6  8.9    BUN/Creatinine Ratio 9.2  12.0  11.5    Anion Gap 11.8  7.3  9.3      CBC          2/11/2024    08:46 2/12/2024    03:50 2/13/2024    03:17   CBC   WBC 10.09  13.02  12.12    RBC 4.69  4.60  4.60    Hemoglobin 11.9  11.8  11.5    Hematocrit 37.4  37.6  37.5    MCV 79.7  81.7  81.5    MCH 25.4  25.7  25.0    MCHC 31.8  31.4  30.7    RDW 18.2  18.7  18.6    Platelets 143  165  169    Blood glucose well-controlled.  Phosphorus and magnesium within normal limits.  []  Microbiology:   []  Radiology:   [x]  EKG/Telemetry:    []  Cardiology/Vascular:    []  Pathology:  []  Old records:  []   Other:    Assessment & Plan   Assessment / Plan     Assessment/plan:  Enterocolitis  Acute hypoxemic respiratory failure  CKD stage IIIa  Acute on chronic diastolic heart failure  Atrial flutter  Hypokalemia, improved  Hypophosphatemia, improved  Hypomagnesemia, improved  Unintentional weight loss  Leukocytosis  COPD, not acutely exacerbated  CAD  PAD    Continue with a comfort focused approach  DC glucose checks per patient request  Continue oral metoprolol and Eliquis  Continue Protonix and Carafate  Currently, he is not having any issues taking his medications so will continue the majority of medications now and at discharge  Meeting with hospice today at 1130  Hopefully can be discharged home with hospice tomorrow morning  Lab holiday tomorrow    DVT prophylaxis:  Medical and mechanical DVT prophylaxis orders are present.      CODE STATUS:   Medical Intervention Limits: NO intubation (DNI); NO cardioversion  Level Of Support Discussed With: Patient  Code Status (Patient has no pulse and is not breathing): No CPR (Do Not Attempt to Resuscitate)  Medical Interventions (Patient has pulse or is breathing): Limited Support

## 2024-02-15 VITALS
TEMPERATURE: 97.9 F | OXYGEN SATURATION: 93 % | BODY MASS INDEX: 27.56 KG/M2 | HEART RATE: 53 BPM | SYSTOLIC BLOOD PRESSURE: 159 MMHG | RESPIRATION RATE: 16 BRPM | HEIGHT: 69 IN | DIASTOLIC BLOOD PRESSURE: 73 MMHG | WEIGHT: 186.07 LBS

## 2024-02-15 PROCEDURE — 99239 HOSP IP/OBS DSCHRG MGMT >30: CPT | Performed by: INTERNAL MEDICINE

## 2024-02-15 PROCEDURE — 94799 UNLISTED PULMONARY SVC/PX: CPT

## 2024-02-15 RX ORDER — METOPROLOL SUCCINATE 200 MG/1
200 TABLET, EXTENDED RELEASE ORAL 2 TIMES DAILY
Qty: 60 TABLET | Refills: 0 | Status: SHIPPED | OUTPATIENT
Start: 2024-02-15

## 2024-02-15 RX ORDER — HYDROCODONE BITARTRATE AND ACETAMINOPHEN 5; 325 MG/1; MG/1
1 TABLET ORAL EVERY 6 HOURS PRN
Qty: 12 TABLET | Refills: 0 | Status: SHIPPED | OUTPATIENT
Start: 2024-02-15

## 2024-02-15 RX ORDER — SUCRALFATE 1 G/1
1 TABLET ORAL
Qty: 90 TABLET | Refills: 0 | Status: SHIPPED | OUTPATIENT
Start: 2024-02-15

## 2024-02-15 RX ORDER — MORPHINE SULFATE 100 MG/5ML
10 SOLUTION ORAL
Qty: 30 ML | Refills: 0 | Status: SHIPPED | OUTPATIENT
Start: 2024-02-15

## 2024-02-15 RX ADMIN — PANTOPRAZOLE SODIUM 40 MG: 40 TABLET, DELAYED RELEASE ORAL at 06:37

## 2024-02-15 RX ADMIN — FOLIC ACID 1 MG: 1 TABLET ORAL at 08:31

## 2024-02-15 RX ADMIN — SUCRALFATE 1 G: 1 TABLET ORAL at 11:12

## 2024-02-15 RX ADMIN — APIXABAN 5 MG: 5 TABLET, FILM COATED ORAL at 08:31

## 2024-02-15 RX ADMIN — SUCRALFATE 1 G: 1 TABLET ORAL at 08:31

## 2024-02-15 RX ADMIN — Medication 10 ML: at 08:32

## 2024-02-15 RX ADMIN — MORPHINE SULFATE 30 MG: 30 TABLET, FILM COATED, EXTENDED RELEASE ORAL at 08:31

## 2024-02-15 RX ADMIN — DOCUSATE SODIUM 50MG AND SENNOSIDES 8.6MG 2 TABLET: 8.6; 5 TABLET, FILM COATED ORAL at 08:31

## 2024-02-15 RX ADMIN — LEVOTHYROXINE SODIUM 125 MCG: 125 TABLET ORAL at 06:37

## 2024-02-15 RX ADMIN — METOPROLOL SUCCINATE 200 MG: 50 TABLET, EXTENDED RELEASE ORAL at 08:31

## 2024-02-15 RX ADMIN — AMLODIPINE BESYLATE 5 MG: 5 TABLET ORAL at 08:31

## 2024-02-15 RX ADMIN — FUROSEMIDE 40 MG: 40 TABLET ORAL at 08:31

## 2024-02-15 RX ADMIN — SERTRALINE HYDROCHLORIDE 75 MG: 50 TABLET ORAL at 08:31

## 2024-02-15 RX ADMIN — ALLOPURINOL 300 MG: 300 TABLET ORAL at 08:31

## 2024-02-15 NOTE — PLAN OF CARE
Goal Outcome Evaluation:  Plan of Care Reviewed With: patient        Progress: no change  Outcome Evaluation: No issues observed or reported overnight. Patient plans to discharge home with hospice 2/15

## 2024-02-15 NOTE — PLAN OF CARE
Goal Outcome Evaluation:  Plan of Care Reviewed With: patient        Progress: no change  Outcome Evaluation: Pt remained A&Ox4 this shift. Also, pt remained on 1L via nasal cannula maintaining stable oxygen saturation. Pt was medicated with scheduled Morphine to help achieve an acceptable pain tolernace level. Pt is to dc to home this shift. All other vital signs remained stable.

## 2024-02-15 NOTE — DISCHARGE SUMMARY
[facility]         HOSPITALIST  DISCHARGE SUMMARY    Patient Name: Radhames Colón  : 1950  MRN: [id number]    Date of Admission: 2024  Date of Discharge: 2/15/2024  Primary Care Physician: Mingo Loja MD    Consults       Date and Time Order Name Status Description    2024  8:14 AM Inpatient Cardiology Consult      2024  1:31 AM Inpatient General Surgery Consult Completed     2024  9:45 PM Surgery (on-call MD unless specified) Completed     2024  9:35 PM IP General Consult (Use specialty-specific consult if known) Completed     2024  9:31 PM Gastroenterology (on-call MD unless specified) Completed     2024  9:22 PM Hospitalist (on-call MD unless specified)              Active and Resolved Hospital Problems:  Active Hospital Problems    Diagnosis POA    **Colitis [K52.9] Yes    PSVT (paroxysmal supraventricular tachycardia) [I47.10] Unknown    Chronic mesenteric ischemia [K55.1] Unknown    Other specified soft tissue disorders [M79.89] Unknown    Weight loss, non-intentional [R63.4] Unknown    Acute on chronic HFrEF (heart failure with reduced ejection fraction) [I50.23] Yes    CAD s/p Cabg [I25.810] Yes      Resolved Hospital Problems   No resolved problems to display.   Chronic mesenteric ischemia  Anorexia weight loss secondary to above  Enterocolitis  Acute hypoxemic respiratory failure  CKD stage IIIa  Acute on chronic diastolic heart failure  Atrial flutter  Hypokalemia, improved  Hypophosphatemia, improved  Hypomagnesemia, improved  Unintentional weight loss  Leukocytosis  COPD, not acutely exacerbated  CAD  PAD    Hospital Course     Hospital Course:  Radhames Colón is a 73 y.o. male with CKD stage IIIa, CAD, PAD, COPD, essential hypertension, hyperlipidemia, bilateral carotid artery stenosis, polyarthropathy sleep apnea who presented to ED with decreased appetite and nausea without vomiting. Patient had experienced significant weight loss over  the past few months, of note patient's wife passed away approximately 4 months ago and he initially, he felt weight loss was due to decreased appetite secondary to depression, however, his appetite and weight loss have progressively worsened. Eval in ED significant for CT scan showing portal venous gas with gas extending into the liver and pancolitis. Case was discussed initially with gastroenterology who did not feel comfortable giving patient had [facility], however, vascular surgery was contacted due to patient wishes to stay at [facility] for care and not being transferred to [place]. Vascular surgery reviewed imaging and it noted that patient's SMA is patent with mild perhaps mild to moderate stenosis and recommended conservative therapy. General Surgery evaluated patient and recommended continued monitoring and no indication for urgent surgical intervention at this time. Gastroenterology consulted and evaluated patient. Attempts made to advance diet but patient continued not having much of an appetite. Discussed case again with vascular surgery and decision made to pursue angiography with possible endovascular intervention. Angiogram performed on 02/08/2024 and no significant superior mesenteric artery stenosis noted and no intervention was required. Patient continued to have symptoms and EGD was done on 02/09/2024 that showed gastritis with hemorrhage and patient was started on daily Protonix as well as Carafate.  Unfortunately, despite appropriate medical therapy, his condition did not improve.  Vascular surgery and gastroenterology feel the patient has an element of chronic mesenteric ischemia that is not amenable to operative management.  Hospital course was complicated by atrial flutter with RVR requiring diltiazem drip.  Colonoscopy and further testing was offered, however, ultimately, the patient wanted to pursue a more palliative care route.  Palliative care was consulted, he  ultimately was discharged home with hospice in stable condition on 2/15/2024.    Day of Discharge     Vital Signs:  Temp:  [97 °F (36.1 °C)-98.1 °F (36.7 °C)] 97.9 °F (36.6 °C)  Heart Rate:  [53-77] 53  Resp:  [16-18] 16  BP: (129-159)/(53-77) 159/73  Flow (L/min):  [1] 1  Physical Exam:   Gen: Appears comfortable, NAD  Resp: No increased WOB  Abd: Soft, nondistended, + bowel sounds     Discharge Details        Discharge Medications        New Medications        Instructions Start Date   apixaban 5 MG tablet tablet  Commonly known as: ELIQUIS   5 mg, Oral, Every 12 Hours Scheduled      HYDROcodone-acetaminophen 5-325 MG per tablet  Commonly known as: Norco   1 tablet, Oral, Every 6 Hours PRN      sucralfate 1 g tablet  Commonly known as: CARAFATE   1 g, Oral, 3 Times Daily Before Meals             Changes to Medications        Instructions Start Date   levothyroxine 125 MCG tablet  Commonly known as: SYNTHROID, LEVOTHROID  What changed: when to take this   TAKE 1 TABLET EVERY DAY      metoprolol succinate  MG 24 hr tablet  Commonly known as: TOPROL-XL  What changed:   medication strength  how much to take   200 mg, Oral, 2 Times Daily      Morphine 30 MG 12 hr tablet  Commonly known as: MS CONTIN  What changed: Another medication with the same name was added. Make sure you understand how and when to take each.   30 mg, Oral, 2 Times Daily      morphine 20 MG/ML concentrated solution  What changed: You were already taking a medication with the same name, and this prescription was added. Make sure you understand how and when to take each.   10 mg, Oral, Every 2 Hours PRN      rosuvastatin 20 MG tablet  Commonly known as: CRESTOR  What changed: when to take this   TAKE 1 TABLET EVERY DAY      sertraline 50 MG tablet  Commonly known as: Zoloft  What changed: additional instructions   50 mg, Oral, Daily      sertraline 25 MG tablet  Commonly known as: Zoloft  What changed: additional instructions   25 mg, Oral,  Daily             Continue These Medications        Instructions Start Date   allopurinol 300 MG tablet  Commonly known as: ZYLOPRIM   300 mg, Oral, Daily      amLODIPine 2.5 MG tablet  Commonly known as: NORVASC   TAKE 1 TABLET EVERY DAY      aspirin 81 MG EC tablet   81 mg, Oral, Daily      furosemide 40 MG tablet  Commonly known as: LASIX   TAKE 1 TABLET EVERY DAY      insulin  UNIT/ML injection  Commonly known as: humuLIN N,novoLIN N   40 Units, Subcutaneous, Nightly, HOLD INSULIN Wednesday NIGHT BLOOD SUGARS RUN LOW 85-95      metFORMIN 500 MG tablet  Commonly known as: GLUCOPHAGE   TAKE 1 TABLET TWICE DAILY WITH A MEAL      temazepam 15 MG capsule  Commonly known as: RESTORIL   15 mg, Oral, Nightly PRN      Vitamin D3 50 MCG (2000 UT) capsule   2,000 Units, Oral, Daily             Stop These Medications      folic acid 1 MG tablet  Commonly known as: FOLVITE     triamcinolone 0.1 % cream  Commonly known as: KENALOG     vitamin B-12 1000 MCG tablet  Commonly known as: CYANOCOBALAMIN     vitamin C 500 MG tablet  Commonly known as: ASCORBIC ACID              Allergies   Allergen Reactions    Penicillins Shortness Of Breath    Ticagrelor Shortness Of Breath    Penicillin G Provider Review Needed and Unknown - Low Severity       Discharge Disposition:  Hospice/Home    Diet:  Hospital:  Diet Order   Procedures    Diet: Regular/House Diet; Texture: Regular Texture (IDDSI 7); Fluid Consistency: Thin (IDDSI 0)       Discharge Activity:   Activity Instructions       Activity as Tolerated              CODE STATUS:  Code Status and Medical Interventions:   Ordered at: 02/13/24 1358     Medical Intervention Limits:    NO intubation (DNI)    NO cardioversion     Level Of Support Discussed With:    Patient     Code Status (Patient has no pulse and is not breathing):    No CPR (Do Not Attempt to Resuscitate)     Medical Interventions (Patient has pulse or is breathing):    Limited Support         Future Appointments    Date Time Provider Department Center   3/19/2024 11:45 AM Mingo Loja MD [facility]   4/30/2024 11:45 AM Mingo Loja MD [facility]   5/9/2024 10:30 AM Nikky Swan, APRN [facility]   6/6/2024  1:30 PM Cheryl Zavaleta, APRQUINN [facility]           Pertinent  and/or Most Recent Results     PROCEDURES:   EGD    LAB RESULTS:      Lab 02/13/24 0317 02/12/24  0350 02/11/24  0846 02/10/24  0453 02/09/24  0421   WBC 12.12* 13.02* 10.09 11.85* 10.37   HEMOGLOBIN 11.5* 11.8* 11.9* 12.5* 13.2   HEMATOCRIT 37.5 37.6 37.4* 39.5 42.0   PLATELETS 169 165 143 129* 130*   NEUTROS ABS  --   --  7.24*  --  7.14*   IMMATURE GRANS (ABS)  --   --  0.03  --  0.05   LYMPHS ABS  --   --  1.34  --  1.57   MONOS ABS  --   --  0.87  --  1.16*   EOS ABS  --   --  0.55*  --  0.39   MCV 81.5 81.7 79.7 81.1 81.9         Lab 02/13/24 0317 02/12/24  0350 02/11/24  0846 02/10/24  0453 02/09/24  0421   SODIUM 144 142 139 141 143   POTASSIUM 4.4 4.8 3.6 4.0 3.1*   CHLORIDE 108* 111* 107 108* 106   CO2 26.7 23.7 20.2* 23.7 24.4   ANION GAP 9.3 7.3 11.8 9.3 12.6   BUN 10 9 6* 9 6*   CREATININE 0.87 0.75* 0.65* 0.69* 0.64*   EGFR 90.0 94.1 98.3 96.5 98.7   GLUCOSE 115* 120* 129* 116* 114*   CALCIUM 8.9 8.6 8.4* 8.2* 7.7*   MAGNESIUM 1.9 2.2 1.6 1.8 2.0   PHOSPHORUS 3.0 2.9 1.7* 1.7* 2.0*         Lab 02/09/24  0421   TOTAL PROTEIN 5.3*   ALBUMIN 2.9*   GLOBULIN 2.4   ALT (SGPT) 33   AST (SGOT) 44*   BILIRUBIN 0.6   ALK PHOS 65         Lab 02/12/24  0350   PROBNP 5,299.0*                 Brief Urine Lab Results  (Last result in the past 365 days)        Color   Clarity   Blood   Leuk Est   Nitrite   Protein   CREAT   Urine HCG        02/04/24 1513 Yellow   Cloudy   Negative   Negative   Negative   100 mg/dL (2+)                 Microbiology Results (last 10 days)       ** No results found for the last 240 hours. **            XR Chest 1 View    Result Date: 2/12/2024    1. New bilateral perihilar airspace  disease which may be secondary to pulmonary edema or pneumonia.       PRADEEP BREWER MD       Electronically Signed and Approved By: PRADEEP BREWER MD on 2/12/2024 at 10:30                       Results for orders placed in visit on 04/04/22    Adult Transthoracic Echo Complete W/ Cont if Necessary Per Protocol    Interpretation Summary  · Left ventricular ejection fraction appears to be 46 - 50%. Left ventricular systolic function is mildly decreased.  · The following left ventricular wall segments are hypokinetic: basal inferolateral, mid inferolateral, mid inferior, basal inferior and basal inferoseptal.  · Left ventricular diastolic function is consistent with (grade III w/high LAP) fixed restrictive pattern.  · The right ventricular cavity is borderline dilated.  · Estimated right ventricular systolic pressure from tricuspid regurgitation is mildly elevated (35-45 mmHg).  · Mild mitral regurgitation is noted  · Left atrial volume is mildly increased.      Labs Pending at Discharge:        Time spent on Discharge including face to face service: 34 minutes    Electronically signed by Victor Hugo Pennington MD, 02/15/24, 8:34 AM EST.

## (undated) DEVICE — PINNACLE R/O II INTRODUCER SHEATH WITH RADIOPAQUE MARKER: Brand: PINNACLE

## (undated) DEVICE — WIREGUIDED RETRIEVAL BASKET: Brand: TRAPEZOID RX

## (undated) DEVICE — PROB BIL AUTOLITH EHL 1.9F 375CM

## (undated) DEVICE — SUT PROLN 6/0 C1 D/A 30IN 8706H

## (undated) DEVICE — PK AAA 40

## (undated) DEVICE — BALN EVERCROSS OTW .035 5F 5X20 80

## (undated) DEVICE — RETRIEVAL BALLOON CATHETER: Brand: EXTRACTOR™ PRO RX

## (undated) DEVICE — CATH IV INSYTE AUTOGARD SHLD 16G 1 1/4

## (undated) DEVICE — TOTAL TRAY, 16FR 10ML SIL FOLEY, URN: Brand: MEDLINE

## (undated) DEVICE — SYRINGE KIT,PACKAGED,,150FT,MK 7(ANGIO-ARTERION, 150ML SYR KIT W/QFT,MC)(60729385): Brand: MEDRAD® MARK 7 ARTERION DISPOSABLE SYRINGE 150 ML WITH QUICK FILL TUBE

## (undated) DEVICE — SPHINCT CANNULATOME2 2L DOME/TP .035IN 6F 2.8MM 200CM

## (undated) DEVICE — Device

## (undated) DEVICE — ANTIBACTERIAL UNDYED BRAIDED (POLYGLACTIN 910), SYNTHETIC ABSORBABLE SUTURE: Brand: COATED VICRYL

## (undated) DEVICE — SNAR POLYP CAPTIFLX WD OVL 27MM 240CM

## (undated) DEVICE — TRAP FLD MINIVAC MEGADYNE 100ML

## (undated) DEVICE — DRP SLUSH WARMR MACH CIR 44X44IN

## (undated) DEVICE — CONN JET HYDRA H20 AUXILIARY DISP

## (undated) DEVICE — NDL PERC 1PRT THNWALL W/BASEPLT 18G 7CM

## (undated) DEVICE — SPHINCTEROTOME: Brand: DREAMTOME™ RX 44

## (undated) DEVICE — ROTATING HEMOSTATIC VALVE .096": Brand: RHV

## (undated) DEVICE — GW GLIDEWIRE STD ANGL .035IN 3X180CM

## (undated) DEVICE — SHEATH STEER INTRO TOURGUIDE 6.5F 55CM 9MM

## (undated) DEVICE — SOL IRR H2O BTL 1000ML STRL

## (undated) DEVICE — DEV TORQ OLCOTT

## (undated) DEVICE — KT VLV BIOP DEFENDO SXN AIR/WATER

## (undated) DEVICE — CATH IV INTROCAN SFTY PUR 20G 1IN

## (undated) DEVICE — DEV INFL CRE STERIFLATE 60CC DISP

## (undated) DEVICE — SUREFIT, DUAL DISPERSIVE ELECTRODE, CONTACT QUALITY MONITOR: Brand: SUREFIT

## (undated) DEVICE — STERILE COTTON TIP 6IN 10PK: Brand: MEDLINE

## (undated) DEVICE — CATH OMNI FLUSH 5FR

## (undated) DEVICE — CATH GUIDE SOFTVU SELECT/V HT OMNI .035 4F 65CM

## (undated) DEVICE — ACCESS AND DELIVERY CATHETER: Brand: SPYSCOPE™ DS II

## (undated) DEVICE — Device: Brand: DEFENDO AIR/WATER/SUCTION AND BIOPSY VALVE

## (undated) DEVICE — ADHS SKIN DERMABOND TOP ADVANCED

## (undated) DEVICE — PK ATS CUST W CARDIOTOMY RESEVOIR

## (undated) DEVICE — GW HIPRFM BIL JAGWIRE REVOLUTION STR .025IN 450CM

## (undated) DEVICE — CATH GUIDE SOFTVU FLUSH HT PIG .035 5F 65CM

## (undated) DEVICE — PENCL E/S ULTRAVAC TELESCP NOSE HOLSTR 10FT

## (undated) DEVICE — SUT SILK 2/0 TIES 18IN A185H

## (undated) DEVICE — KT INTRO MIC VSI SMOTH STFF 4F 40CM 7CM

## (undated) DEVICE — FRCP BX RADJAW4 LG/CAP 2.8 240CM BX80

## (undated) DEVICE — CVR PROB 96IN LF STRL

## (undated) DEVICE — RX DELIVERY SYSTEM: Brand: NAVIFLEX™ RX DELIVERY SYSTEM

## (undated) DEVICE — SOL IRRG H2O PL/BG 1000ML STRL

## (undated) DEVICE — SUT VIC 3/0 CTI 36IN J944H

## (undated) DEVICE — ESOPHAGEAL/PYLORIC/COLONIC/BILIARY WIREGUIDED BALLOON DILATATION CATHETER: Brand: CRE™ PRO

## (undated) DEVICE — GLV SURG SENSICARE PI MIC PF SZ7.5 LF STRL

## (undated) DEVICE — ANGIOGRAPHIC CATHETER: Brand: IMAGER™ II

## (undated) DEVICE — EGD OR ERCP KIT: Brand: MEDLINE INDUSTRIES, INC.

## (undated) DEVICE — INTRO SHEATH PRELUDE PRO MARKRTIP 6F11

## (undated) DEVICE — SUT SILK 4/0 TIES 18IN A183H

## (undated) DEVICE — SOL NACL 0.9PCT 1000ML

## (undated) DEVICE — RADIFOCUS GLIDEWIRE: Brand: GLIDEWIRE

## (undated) DEVICE — SYS PERFUS SEP PLATLT W TIPS CUST

## (undated) DEVICE — ISOLATION BAG: Brand: CONVERTORS

## (undated) DEVICE — SOLIDIFIER LIQLOC PLS 1500CC BT

## (undated) DEVICE — LINER SURG CANSTR SXN S/RIGD 1500CC

## (undated) DEVICE — BLCK/BITE BLOX WO/DENTL/RIM W/STRAP 54F

## (undated) DEVICE — STPLR SKIN VISISTAT WD 35CT

## (undated) DEVICE — SUT SILK 3/0 SH CR5 18IN C0135

## (undated) DEVICE — DEV LK WIREGUIDE FUSN OLYMP SCP